# Patient Record
Sex: FEMALE | Race: WHITE | NOT HISPANIC OR LATINO | Employment: OTHER | URBAN - NONMETROPOLITAN AREA
[De-identification: names, ages, dates, MRNs, and addresses within clinical notes are randomized per-mention and may not be internally consistent; named-entity substitution may affect disease eponyms.]

---

## 2017-07-02 ENCOUNTER — HOSPITAL ENCOUNTER (EMERGENCY)
Facility: HOSPITAL | Age: 32
Discharge: HOME | End: 2017-07-02
Attending: PHYSICIAN ASSISTANT | Admitting: PHYSICIAN ASSISTANT
Payer: COMMERCIAL

## 2017-07-02 VITALS
OXYGEN SATURATION: 97 % | DIASTOLIC BLOOD PRESSURE: 70 MMHG | TEMPERATURE: 97.9 F | RESPIRATION RATE: 12 BRPM | SYSTOLIC BLOOD PRESSURE: 119 MMHG

## 2017-07-02 DIAGNOSIS — R82.90 ABNORMAL URINE FINDINGS: Primary | ICD-10-CM

## 2017-07-02 DIAGNOSIS — D50.9 IRON DEFICIENCY ANEMIA, UNSPECIFIED IRON DEFICIENCY ANEMIA TYPE: ICD-10-CM

## 2017-07-02 DIAGNOSIS — Z13.9 SCREENING FOR CONDITION: ICD-10-CM

## 2017-07-02 DIAGNOSIS — N30.00 ACUTE CYSTITIS WITHOUT HEMATURIA: ICD-10-CM

## 2017-07-02 DIAGNOSIS — K13.79 MOUTH SORES: ICD-10-CM

## 2017-07-02 LAB
ALBUMIN SERPL-MCNC: 3.8 G/DL (ref 3.4–5)
ALBUMIN UR-MCNC: NEGATIVE MG/DL
ALP SERPL-CCNC: 76 U/L (ref 40–150)
ALT SERPL W P-5'-P-CCNC: 23 U/L (ref 0–50)
ANION GAP SERPL CALCULATED.3IONS-SCNC: 7 MMOL/L (ref 3–14)
APPEARANCE UR: ABNORMAL
AST SERPL W P-5'-P-CCNC: 22 U/L (ref 0–45)
BACTERIA #/AREA URNS HPF: ABNORMAL /HPF
BASOPHILS # BLD AUTO: 0 10E9/L (ref 0–0.2)
BASOPHILS NFR BLD AUTO: 0.7 %
BILIRUB SERPL-MCNC: 0.5 MG/DL (ref 0.2–1.3)
BILIRUB UR QL STRIP: NEGATIVE
BUN SERPL-MCNC: 10 MG/DL (ref 7–30)
CALCIUM SERPL-MCNC: 9.1 MG/DL (ref 8.5–10.1)
CHLORIDE SERPL-SCNC: 106 MMOL/L (ref 94–109)
CO2 SERPL-SCNC: 27 MMOL/L (ref 20–32)
COLOR UR AUTO: ABNORMAL
CREAT SERPL-MCNC: 0.72 MG/DL (ref 0.52–1.04)
DEPRECATED S PYO AG THROAT QL EIA: NORMAL
DIFFERENTIAL METHOD BLD: NORMAL
EOSINOPHIL # BLD AUTO: 0.1 10E9/L (ref 0–0.7)
EOSINOPHIL NFR BLD AUTO: 1.3 %
ERYTHROCYTE [DISTWIDTH] IN BLOOD BY AUTOMATED COUNT: 13.2 % (ref 10–15)
ERYTHROCYTE [SEDIMENTATION RATE] IN BLOOD BY WESTERGREN METHOD: 17 MM/H (ref 0–20)
GFR SERPL CREATININE-BSD FRML MDRD: NORMAL ML/MIN/1.7M2
GLUCOSE SERPL-MCNC: 92 MG/DL (ref 70–99)
GLUCOSE UR STRIP-MCNC: NEGATIVE MG/DL
HCT VFR BLD AUTO: 38.9 % (ref 35–47)
HGB BLD-MCNC: 12.7 G/DL (ref 11.7–15.7)
HGB UR QL STRIP: NEGATIVE
IMM GRANULOCYTES # BLD: 0 10E9/L (ref 0–0.4)
IMM GRANULOCYTES NFR BLD: 0.2 %
IRON SATN MFR SERPL: 6 % (ref 15–46)
IRON SERPL-MCNC: 37 UG/DL (ref 35–180)
KETONES UR STRIP-MCNC: NEGATIVE MG/DL
LEUKOCYTE ESTERASE UR QL STRIP: ABNORMAL
LYMPHOCYTES # BLD AUTO: 1.5 10E9/L (ref 0.8–5.3)
LYMPHOCYTES NFR BLD AUTO: 26.3 %
MCH RBC QN AUTO: 28.2 PG (ref 26.5–33)
MCHC RBC AUTO-ENTMCNC: 32.6 G/DL (ref 31.5–36.5)
MCV RBC AUTO: 86 FL (ref 78–100)
MICRO REPORT STATUS: NORMAL
MONOCYTES # BLD AUTO: 0.5 10E9/L (ref 0–1.3)
MONOCYTES NFR BLD AUTO: 8.2 %
MUCOUS THREADS #/AREA URNS LPF: PRESENT /LPF
NEUTROPHILS # BLD AUTO: 3.6 10E9/L (ref 1.6–8.3)
NEUTROPHILS NFR BLD AUTO: 63.3 %
NITRATE UR QL: NEGATIVE
NRBC # BLD AUTO: 0 10*3/UL
NRBC BLD AUTO-RTO: 0 /100
PH UR STRIP: 7.5 PH (ref 4.7–8)
PLATELET # BLD AUTO: 301 10E9/L (ref 150–450)
POTASSIUM SERPL-SCNC: 4.2 MMOL/L (ref 3.4–5.3)
PROT SERPL-MCNC: 8.2 G/DL (ref 6.8–8.8)
RBC # BLD AUTO: 4.51 10E12/L (ref 3.8–5.2)
RBC #/AREA URNS AUTO: 1 /HPF (ref 0–2)
SODIUM SERPL-SCNC: 140 MMOL/L (ref 133–144)
SP GR UR STRIP: 1 (ref 1–1.03)
SPECIMEN SOURCE: NORMAL
SQUAMOUS #/AREA URNS AUTO: 2 /HPF (ref 0–1)
TIBC SERPL-MCNC: 582 UG/DL (ref 240–430)
TSH SERPL DL<=0.005 MIU/L-ACNC: 1.83 MU/L (ref 0.4–4)
URN SPEC COLLECT METH UR: ABNORMAL
UROBILINOGEN UR STRIP-MCNC: NORMAL MG/DL (ref 0–2)
WBC # BLD AUTO: 5.6 10E9/L (ref 4–11)
WBC #/AREA URNS AUTO: 4 /HPF (ref 0–2)

## 2017-07-02 PROCEDURE — 83540 ASSAY OF IRON: CPT | Performed by: PHYSICIAN ASSISTANT

## 2017-07-02 PROCEDURE — 36415 COLL VENOUS BLD VENIPUNCTURE: CPT | Performed by: PHYSICIAN ASSISTANT

## 2017-07-02 PROCEDURE — 84443 ASSAY THYROID STIM HORMONE: CPT | Performed by: PHYSICIAN ASSISTANT

## 2017-07-02 PROCEDURE — 86618 LYME DISEASE ANTIBODY: CPT | Performed by: PHYSICIAN ASSISTANT

## 2017-07-02 PROCEDURE — 83550 IRON BINDING TEST: CPT | Performed by: PHYSICIAN ASSISTANT

## 2017-07-02 PROCEDURE — 99213 OFFICE O/P EST LOW 20 MIN: CPT

## 2017-07-02 PROCEDURE — 85652 RBC SED RATE AUTOMATED: CPT | Performed by: PHYSICIAN ASSISTANT

## 2017-07-02 PROCEDURE — 87086 URINE CULTURE/COLONY COUNT: CPT | Performed by: PHYSICIAN ASSISTANT

## 2017-07-02 PROCEDURE — 87798 DETECT AGENT NOS DNA AMP: CPT

## 2017-07-02 PROCEDURE — 85025 COMPLETE CBC W/AUTO DIFF WBC: CPT | Performed by: PHYSICIAN ASSISTANT

## 2017-07-02 PROCEDURE — 80053 COMPREHEN METABOLIC PANEL: CPT | Performed by: PHYSICIAN ASSISTANT

## 2017-07-02 PROCEDURE — 81001 URINALYSIS AUTO W/SCOPE: CPT | Performed by: PHYSICIAN ASSISTANT

## 2017-07-02 PROCEDURE — 87081 CULTURE SCREEN ONLY: CPT | Mod: 59 | Performed by: PHYSICIAN ASSISTANT

## 2017-07-02 PROCEDURE — 87880 STREP A ASSAY W/OPTIC: CPT | Performed by: PHYSICIAN ASSISTANT

## 2017-07-02 PROCEDURE — 99213 OFFICE O/P EST LOW 20 MIN: CPT | Performed by: PHYSICIAN ASSISTANT

## 2017-07-02 RX ORDER — CIPROFLOXACIN 500 MG/1
500 TABLET, FILM COATED ORAL 2 TIMES DAILY
Qty: 10 TABLET | Refills: 0 | Status: SHIPPED | OUTPATIENT
Start: 2017-07-02 | End: 2017-07-07

## 2017-07-02 RX ORDER — ALUMINA, MAGNESIA, AND SIMETHICONE 2400; 2400; 240 MG/30ML; MG/30ML; MG/30ML
30 SUSPENSION ORAL ONCE
Status: DISCONTINUED | OUTPATIENT
Start: 2017-07-02 | End: 2017-07-02

## 2017-07-02 ASSESSMENT — ENCOUNTER SYMPTOMS
COUGH: 0
VOICE CHANGE: 0
FEVER: 0
EYE REDNESS: 0
HEADACHES: 0
PSYCHIATRIC NEGATIVE: 1
VOMITING: 0
SORE THROAT: 0
FATIGUE: 1
NAUSEA: 0
DIZZINESS: 0
LIGHT-HEADEDNESS: 0
DIARRHEA: 0
EYE DISCHARGE: 0
SINUS PRESSURE: 0
ABDOMINAL PAIN: 0
APPETITE CHANGE: 0
TROUBLE SWALLOWING: 0
NECK PAIN: 0
CARDIOVASCULAR NEGATIVE: 1
NECK STIFFNESS: 0

## 2017-07-02 NOTE — ED NOTES
C/o mouth pain for last 2 weeks, states it started as a canker sore and now white painful sores on gum line and under tongue, has been using salt water gargles.

## 2017-07-02 NOTE — PROGRESS NOTES
I called the pt and discussed her Iron levels.  She understands she should discontinue donating blood immediately.  Also, she understands she should f/u with PCP ASAP to formulate a treatment plan.  Pt gave me a temporary address: [address] [place] [id number]. I sent letter and a copy of the Iron level results.    Dina Mireles Certified  Physician Assistant  7/2/2017  6:13 PM  UR[facility]ENT [facility]ARE CLIN[facility]

## 2017-07-02 NOTE — ED AVS SNAPSHOT
[facility]    [address] [address]    Phone:  [phone]                                       Barbi Zee   MRN: [id number]    Department:  [facility]   Date of Visit:  7/2/2017           Patient Information     Date Of Birth          1985        Your diagnoses for this visit were:     Abnormal urine findings     Mouth sores     Acute cystitis without hematuria     Screening for condition        You were seen by Dina Mireles PA.      Follow-up Information     Follow up with Ernesto Dela Cruz MD In 3 days.    Specialty:  Family Practice    Why:  If your symptoms are not resolving    Contact information:    [facility]  [address] [place] [address] [phone]          Follow up with [facility].    Specialty:  EMERGENCY MEDICINE    Why:  If further concerns develop    Contact information:    [address] [address] [phone]    Additional information:    From [facility]: Take [address]. Turn left at [address]. Turn left at the first stoplight on [address]. At the first stop sign, take a right onto [address]. Take a left into the parking lot and continue through until you reach the North enterance of the building.       From [place]: Take [address]. Take the [address] ramp towards [address]. Turn left onto [address]. Take a slight right onto [address]. Turn left at the first stoplight on [address]. At the first stop sign, take a right onto [address]. Take a left into the parking lot and continue through until you reach the North enterance of the building.       From [place]: Take [address]. Take a right at [address]. At the first stop sign, take a right onto [address]. Take a left into the parking lot and continue through until you reach the North enterance of the building.         Discharge  Instructions       Do not take vitamins while on this medication.    Discharge References/Attachments     ORAL LESIONS (PRECANCEROUS AND CANCEROUS), WHAT ARE (ENGLISH)    URINARY TRACT INFECTIONS (UTIS), UNDERSTANDING (ENGLISH)         Review of your medicines      START taking        Dose / Directions Last dose taken    ciprofloxacin 500 MG tablet   Commonly known as:  CIPRO   Dose:  500 mg   Quantity:  10 tablet        Take 1 tablet (500 mg) by mouth 2 times daily for 5 days   Refills:  0        lidocaine visc 2% 2.5mL/5mL & maalox/mylanta w/ simeth 2.5mL/5mL & diphenhydrAMINE 5mg/5mL Susp suspension   Commonly known as:  MAGIC Mouthwash [facility]   Dose:  10 mL   Quantity:  120 mL        Swish and swallow 10 mLs in mouth every 6 hours as needed for mouth sores   Refills:  0          Our records show that you are taking the medicines listed below. If these are incorrect, please call your family doctor or clinic.        Dose / Directions Last dose taken    MULTIVITAMIN ADULTS PO   Dose:  1 tablet        Take 1 tablet by mouth daily   Refills:  0        ORAJEL DENTURE 10 % Gel   Generic drug:  benzocaine        Take by mouth 4 times daily as needed for moderate pain   Refills:  0        TYLENOL PO        Refills:  0                Prescriptions were sent or printed at these locations (2 Prescriptions)                   [facility] [address] [facility], [place] - [address] AT [facility] [address]   [address], [place] [address]    Telephone:  [phone]   Fax:  [phone]   Hours:                  E-Prescribed (2 of 2)         ciprofloxacin (CIPRO) 500 MG tablet               magic mouthwash suspension (diphenhydrAMINE, lidocaine, aluminum-magnesium & simethicone)                Procedures and tests performed during your visit     Beta strep group A culture    CBC with platelets differential    Comprehensive metabolic panel    Erythrocyte sedimentation rate auto    Iron and iron binding capacity     "Lyme Disease Amddi with reflex to WB Serum    Rapid strep screen    TSH with free T4 reflex    UA reflex to Microscopic and Culture    Urine Culture Aerobic Bacterial    West nile virus RNA by PCR      Orders Needing Specimen Collection     None      Pending Results     Date and Time Order Name Status Description    2017 1047 Urine Culture Aerobic Bacterial In process     2017 0940 Iron and iron binding capacity In process     2017 0940 West nile virus RNA by PCR In process     2017 0940 Lyme Disease Maddi with reflex to WB Serum In process     2017 0938 Beta strep group A culture In process             Pending Culture Results     Date and Time Order Name Status Description    2017 1047 Urine Culture Aerobic Bacterial In process     2017 0938 Beta strep group A culture In process             Thank you for choosing [facility]       Thank you for choosing [facility] for your care. Our goal is always to provide you with excellent care. Hearing back from our patients is one way we can continue to improve our services. Please take a few minutes to complete the written survey that you may receive in the mail after you visit with us. Thank you!        Peecho Information     Peecho lets you send messages to your doctor, view your test results, renew your prescriptions, schedule appointments and more. To sign up, go to [email or web address] . Click on \"Log in\" on the left side of the screen, which will take you to the Welcome page. Then click on \"Sign up Now\" on the right side of the page.     You will be asked to enter the access code listed below, as well as some personal information. Please follow the directions to create your username and password.     Your access code is: [email or web address]  Expires: 2017 10:56 AM     Your access code will  in 90 days. If you need help or a new code, please call your [facility] clinic or [phone].        Care EveryWhere ID     This is your Care EveryWhere " ID. This could be used by other organizations to access your [facility] medical records  [id number]        Equal Access to Services     VEGA FLEMING : Adelaida Sibley, beny burt, suhail araya, lacy maria. So [facility] [phone].    ATENCIÓN: Si habla español, tiene a kinney disposición servicios gratuitos de asistencia lingüística. Llame al [phone].    We comply with applicable federal civil rights laws and [place] laws. We do not discriminate on the basis of race, color, national origin, age, disability sex, sexual orientation or gender identity.            After Visit Summary       This is your record. Keep this with you and show to your community pharmacist(s) and doctor(s) at your next visit.

## 2017-07-02 NOTE — LETTER
[facility]  [address]  [place] [address]  Phone: [phone]    July 2, 2017        Barbi Zee  [address]  [facility] [address]          To whom it may concern:    RE: Barbi Zee    Thank you for calling me back.    As I stated,  you have Iron Deficiency Anemia.  Please, follow up with your Primary Care Provider as soon as possible, to develop a plan for treatment.     Discontinue all blood donations immediately.    Sincerely,      Dina Mireles Certified  Physician Assistant  7/2/2017  5:59 PM  URGENT CARE CLINIC

## 2017-07-02 NOTE — ED PROVIDER NOTES
"  History     Chief Complaint   Patient presents with     Facial Pain     mouth pain for about 2 weeks, started with a canker sore, now gum line and under the tongue red and painful     The history is provided by the patient. No  was used.     Barbi Zee is a 31 year old female who has approx 2 weeks of developing mouth sores. In past, pt was evaluated by [facility] for chronic fatigue and muscle pain. Pt was dx with multiple mylomaThe pt changed her diet, started eating fresh fruits/veggies, non processed foods.  States now, the pain has resolved but still has the fatigue and now these mouth sores.    I have reviewed the Medications, Allergies, Past Medical and Surgical History, and Social History in the Epic system.    Allergies: No Known Allergies      No current facility-administered medications on file prior to encounter.   Current Outpatient Prescriptions on File Prior to Encounter:  Acetaminophen (TYLENOL PO)        Patient Active Problem List   Diagnosis     ACP (advance care planning)       Past Surgical History:   Procedure Laterality Date     FOOT SURGERY Right        Social History   Substance Use Topics     Smoking status: Never Smoker     Smokeless tobacco: Not on file     Alcohol use Not on file         There is no immunization history on file for this patient.    BMI: Estimated body mass index is 27.46 kg/(m^2) as calculated from the following:    Height as of 11/21/16: 1.6 m (5' 3\").    Weight as of 11/21/16: 70.3 kg (155 lb).      Review of Systems   Constitutional: Positive for fatigue. Negative for appetite change and fever.   HENT: Positive for mouth sores. Negative for congestion, ear pain, sinus pressure, sore throat, trouble swallowing and voice change.    Eyes: Negative for discharge and redness.   Respiratory: Negative for cough.    Cardiovascular: Negative.    Gastrointestinal: Negative for abdominal pain, diarrhea, nausea and vomiting.   Genitourinary: " Negative.    Musculoskeletal: Negative for neck pain and neck stiffness.   Skin: Negative for rash.   Neurological: Negative for dizziness, light-headedness and headaches.   Psychiatric/Behavioral: Negative.        Physical Exam   BP: 119/70  Heart Rate: 65  Temp: 97.9  F (36.6  C)  Resp: 12  SpO2: 97 %  Physical Exam   Constitutional: She is oriented to person, place, and time. She appears well-developed and well-nourished. No distress.   HENT:   Head: Normocephalic and atraumatic.   Right Ear: External ear normal.   Left Ear: External ear normal.   Mouth/Throat: Oropharynx is clear and moist.   Bilateral TMs/canals clear/wnl  No sinus TTP      Pt has diffuse ulcers in the mouth and under the tongue     Eyes: Conjunctivae and EOM are normal. Right eye exhibits no discharge. Left eye exhibits no discharge.   Neck: Normal range of motion. Neck supple.   Cardiovascular: Normal rate, regular rhythm and normal heart sounds.    Pulmonary/Chest: Effort normal and breath sounds normal. No respiratory distress.   Abdominal: Soft. Bowel sounds are normal. She exhibits no distension. There is no tenderness.   Neurological: She is alert and oriented to person, place, and time.   Skin: Skin is warm and dry. No rash noted. She is not diaphoretic.   Psychiatric: She has a normal mood and affect.   Nursing note and vitals reviewed.      ED Course     ED Course     Procedures        Labs Ordered and Resulted from Time of ED Arrival Up to the Time of Departure from the ED   UA MACROSCOPIC WITH REFLEX TO MICRO AND CULTURE - Abnormal; Notable for the following:        Result Value    Leukocyte Esterase Urine Moderate (*)     WBC Urine 4 (*)     Bacteria Urine Few (*)     Squamous Epithelial /HPF Urine 2 (*)     Mucous Urine Present (*)     All other components within normal limits   CBC WITH PLATELETS DIFFERENTIAL   COMPREHENSIVE METABOLIC PANEL   TSH WITH FREE T4 REFLEX   LYME DISEASE CAYDEN WITH REFLEX TO WB SERUM   WEST NILE VIRUS RNA  BY PCR   ERYTHROCYTE SEDIMENTATION RATE AUTO   RAPID STREP SCREEN   BETA STREP GROUP A CULTURE   URINE CULTURE AEROBIC BACTERIAL     7/2/17 10:00 AM [id number]    Component Results   Component Value Flag Ref Range Units Status Collected Lab   Iron 37  35 - 180 ug/dL Final 07/02/2017 10:00 AM [facility]   Iron Binding Cap 582 (H) 240 - 430 ug/dL Final 07/02/2017 10:00 AM [facility]   Iron Saturation Index 6 (L) 15 - 46 % Final 07/02/2017 10:00 AM          Assessments & Plan (with Medical Decision Making)     I have reviewed the nursing notes.    I have reviewed the findings, diagnosis, plan and need for follow up with the patient.      Discharge Medication List as of 7/2/2017 10:56 AM      START taking these medications    Details   ciprofloxacin (CIPRO) 500 MG tablet Take 1 tablet (500 mg) by mouth 2 times daily for 5 days, Disp-10 tablet, R-0, E-Prescribe      magic mouthwash suspension (diphenhydrAMINE, lidocaine, aluminum-magnesium & simethicone) Swish and swallow 10 mLs in mouth every 6 hours as needed for mouth sores, Disp-120 mL, R-0, E-Prescribe             Final diagnoses:   Mouth sores   Acute cystitis without hematuria   Screening for condition   Iron deficiency anemia, unspecified iron deficiency anemia type         I called the pt and discussed her Iron levels.  She understands she should discontinue donating blood immediately.  Also, she understands she should f/u with PCP ASAP to formulate a treatment plan.  Pt gave me a temporary address: [address] [place] [address]. I sent letter and a copy of the Iron level results.    Patient verbally educated and given appropriate education sheets for the diagnoses and has no questions.  Continue your multivitamin, fruits/veggies and lean meat diet.   if symptoms increase or if concerns develop, return to the ER  Dina Mireles Certified  Physician Assistant  7/2/2017  6:08 PM  URGENT CARE CLINIC      7/2/2017   [facility]     Dina Mireles PA  07/02/17  1812

## 2017-07-02 NOTE — ED AVS SNAPSHOT
[facility]    [address] [address]    Phone:  [phone]                                       Barbi Zee   MRN: [id number]    Department:  [facility]   Date of Visit:  7/2/2017           After Visit Summary Signature Page     I have received my discharge instructions, and my questions have been answered. I have discussed any challenges I see with this plan with the nurse or doctor.    ..........................................................................................................................................  Patient/Patient Representative Signature      ..........................................................................................................................................  Patient Representative Print Name and Relationship to Patient    ..................................................               ................................................  Date                                            Time    ..........................................................................................................................................  Reviewed by Signature/Title    ...................................................              ..............................................  Date                                                            Time

## 2017-07-02 NOTE — PROGRESS NOTES
Iron and Iron Binding Capacity results to RAFAEL Castro for review.  Routed to PCP, Dr. JEY Dela Cruz.

## 2017-07-04 LAB
BACTERIA SPEC CULT: ABNORMAL
BACTERIA SPEC CULT: NORMAL
MICRO REPORT STATUS: ABNORMAL
MICRO REPORT STATUS: NORMAL
SPECIMEN SOURCE: ABNORMAL
SPECIMEN SOURCE: NORMAL

## 2017-07-05 LAB — B BURGDOR IGG+IGM SER QL: 0.11 (ref 0–0.89)

## 2017-07-05 NOTE — PROGRESS NOTES
TC to patient, notified of negative Lyme Disease Test, no further questions or concerns at this time.

## 2017-08-21 ENCOUNTER — HOSPITAL ENCOUNTER (EMERGENCY)
Facility: HOSPITAL | Age: 32
Discharge: HOME | End: 2017-08-21
Attending: NURSE PRACTITIONER | Admitting: NURSE PRACTITIONER
Payer: COMMERCIAL

## 2017-08-21 VITALS
HEART RATE: 54 BPM | OXYGEN SATURATION: 98 % | TEMPERATURE: 97.8 F | SYSTOLIC BLOOD PRESSURE: 113 MMHG | DIASTOLIC BLOOD PRESSURE: 57 MMHG | RESPIRATION RATE: 14 BRPM

## 2017-08-21 DIAGNOSIS — R39.15 URINARY URGENCY: ICD-10-CM

## 2017-08-21 DIAGNOSIS — R10.2 PELVIC PAIN IN FEMALE: ICD-10-CM

## 2017-08-21 LAB
ALBUMIN SERPL-MCNC: 3.7 G/DL (ref 3.4–5)
ALBUMIN UR-MCNC: NEGATIVE MG/DL
ALP SERPL-CCNC: 64 U/L (ref 40–150)
ALT SERPL W P-5'-P-CCNC: 28 U/L (ref 0–50)
ANION GAP SERPL CALCULATED.3IONS-SCNC: 7 MMOL/L (ref 3–14)
APPEARANCE UR: CLEAR
AST SERPL W P-5'-P-CCNC: 21 U/L (ref 0–45)
BASOPHILS # BLD AUTO: 0.1 10E9/L (ref 0–0.2)
BASOPHILS NFR BLD AUTO: 0.9 %
BILIRUB SERPL-MCNC: 0.4 MG/DL (ref 0.2–1.3)
BILIRUB UR QL STRIP: NEGATIVE
BUN SERPL-MCNC: 9 MG/DL (ref 7–30)
CALCIUM SERPL-MCNC: 8.7 MG/DL (ref 8.5–10.1)
CHLORIDE SERPL-SCNC: 105 MMOL/L (ref 94–109)
CO2 SERPL-SCNC: 25 MMOL/L (ref 20–32)
COLOR UR AUTO: NORMAL
CREAT SERPL-MCNC: 0.69 MG/DL (ref 0.52–1.04)
CRP SERPL-MCNC: 5.6 MG/L (ref 0–8)
DIFFERENTIAL METHOD BLD: NORMAL
EOSINOPHIL # BLD AUTO: 0.1 10E9/L (ref 0–0.7)
EOSINOPHIL NFR BLD AUTO: 1.6 %
ERYTHROCYTE [DISTWIDTH] IN BLOOD BY AUTOMATED COUNT: 14.6 % (ref 10–15)
ERYTHROCYTE [SEDIMENTATION RATE] IN BLOOD BY WESTERGREN METHOD: 13 MM/H (ref 0–20)
GFR SERPL CREATININE-BSD FRML MDRD: >90 ML/MIN/1.7M2
GLUCOSE SERPL-MCNC: 90 MG/DL (ref 70–99)
GLUCOSE UR STRIP-MCNC: NEGATIVE MG/DL
HCG UR QL: NEGATIVE
HCT VFR BLD AUTO: 37.5 % (ref 35–47)
HGB BLD-MCNC: 12.4 G/DL (ref 11.7–15.7)
HGB UR QL STRIP: NEGATIVE
IMM GRANULOCYTES # BLD: 0 10E9/L (ref 0–0.4)
IMM GRANULOCYTES NFR BLD: 0.4 %
KETONES UR STRIP-MCNC: NEGATIVE MG/DL
LEUKOCYTE ESTERASE UR QL STRIP: NEGATIVE
LYMPHOCYTES # BLD AUTO: 2.1 10E9/L (ref 0.8–5.3)
LYMPHOCYTES NFR BLD AUTO: 35.9 %
MCH RBC QN AUTO: 28.5 PG (ref 26.5–33)
MCHC RBC AUTO-ENTMCNC: 33.1 G/DL (ref 31.5–36.5)
MCV RBC AUTO: 86 FL (ref 78–100)
MONOCYTES # BLD AUTO: 0.5 10E9/L (ref 0–1.3)
MONOCYTES NFR BLD AUTO: 7.9 %
NEUTROPHILS # BLD AUTO: 3.1 10E9/L (ref 1.6–8.3)
NEUTROPHILS NFR BLD AUTO: 53.3 %
NITRATE UR QL: NEGATIVE
NRBC # BLD AUTO: 0 10*3/UL
NRBC BLD AUTO-RTO: 0 /100
PH UR STRIP: 6 PH (ref 4.7–8)
PLATELET # BLD AUTO: 254 10E9/L (ref 150–450)
POTASSIUM SERPL-SCNC: 3.7 MMOL/L (ref 3.4–5.3)
PROT SERPL-MCNC: 7.6 G/DL (ref 6.8–8.8)
RBC # BLD AUTO: 4.35 10E12/L (ref 3.8–5.2)
SODIUM SERPL-SCNC: 137 MMOL/L (ref 133–144)
SOURCE: NORMAL
SP GR UR STRIP: 1.01 (ref 1–1.03)
SPECIMEN SOURCE: NORMAL
UROBILINOGEN UR STRIP-MCNC: NORMAL MG/DL (ref 0–2)
WBC # BLD AUTO: 5.7 10E9/L (ref 4–11)
WET PREP SPEC: NORMAL

## 2017-08-21 PROCEDURE — 81003 URINALYSIS AUTO W/O SCOPE: CPT | Performed by: NURSE PRACTITIONER

## 2017-08-21 PROCEDURE — 85652 RBC SED RATE AUTOMATED: CPT | Performed by: NURSE PRACTITIONER

## 2017-08-21 PROCEDURE — 76856 US EXAM PELVIC COMPLETE: CPT | Mod: TC

## 2017-08-21 PROCEDURE — 87210 SMEAR WET MOUNT SALINE/INK: CPT | Performed by: NURSE PRACTITIONER

## 2017-08-21 PROCEDURE — 99214 OFFICE O/P EST MOD 30 MIN: CPT | Performed by: NURSE PRACTITIONER

## 2017-08-21 PROCEDURE — 87591 N.GONORRHOEAE DNA AMP PROB: CPT | Performed by: NURSE PRACTITIONER

## 2017-08-21 PROCEDURE — 85025 COMPLETE CBC W/AUTO DIFF WBC: CPT | Performed by: NURSE PRACTITIONER

## 2017-08-21 PROCEDURE — 87491 CHLMYD TRACH DNA AMP PROBE: CPT | Performed by: NURSE PRACTITIONER

## 2017-08-21 PROCEDURE — 80053 COMPREHEN METABOLIC PANEL: CPT | Performed by: NURSE PRACTITIONER

## 2017-08-21 PROCEDURE — 81025 URINE PREGNANCY TEST: CPT | Performed by: NURSE PRACTITIONER

## 2017-08-21 PROCEDURE — 86140 C-REACTIVE PROTEIN: CPT | Performed by: NURSE PRACTITIONER

## 2017-08-21 PROCEDURE — 99215 OFFICE O/P EST HI 40 MIN: CPT | Mod: 25

## 2017-08-21 PROCEDURE — 36415 COLL VENOUS BLD VENIPUNCTURE: CPT | Performed by: NURSE PRACTITIONER

## 2017-08-21 PROCEDURE — 76830 TRANSVAGINAL US NON-OB: CPT | Mod: TC

## 2017-08-21 ASSESSMENT — ENCOUNTER SYMPTOMS
CONSTIPATION: 0
FEVER: 0
DIARRHEA: 0
VOMITING: 0
HEMATURIA: 0
ABDOMINAL PAIN: 1
BACK PAIN: 1
FREQUENCY: 1
DIFFICULTY URINATING: 0
NAUSEA: 0
APPETITE CHANGE: 0
DYSURIA: 0

## 2017-08-21 NOTE — ED AVS SNAPSHOT
[facility]    [address] [address]    Phone:  [phone]                                       Barbi Zee   MRN: [id number]    Department:  [facility]   Date of Visit:  8/21/2017           Patient Information     Date Of Birth          1985        Your diagnoses for this visit were:     Pelvic pain in female     Urinary urgency        You were seen by Darlene Marin NP and Fabienne Forbes NP.      Follow-up Information     Follow up with [place]ot Dela Cruz MD In 1 day.    Specialty:  Family Practice    Why:  for re-evaluation    Contact information:    [facility]  [address] [place] [id number] [phone]          Discharge Instructions         Abdominal Pain    Abdominal pain is pain in the stomach or belly area. Everyone has this pain from time to time. In many cases it goes away on its own. But abdominal pain can sometimes be due to a serious problem, such as appendicitis. So it s important to know when to seek help.  Causes of abdominal pain  There are many possible causes of abdominal pain. Common causes in adults include:    Constipation, diarrhea, or gas    Stomach acid flowing back up into the esophagus (acid reflux or heartburn)    Severe acid reflux, called GERD (gastroesophageal reflux disease)    A sore in the lining of the stomach or small intestine (peptic ulcer)    Inflammation of the gallbladder, liver, or pancreas    Gallstones or kidney stones    Appendicitis     Intestinal blockage     An internal organ pushing through a muscle or other tissue (hernia)    Urinary tract infections    In women, menstrual cramps, fibroids, or endometriosis    Inflammation or infection of the intestines  Diagnosing the cause of abdominal pain  Your healthcare provider will do a physical exam help find the cause of your pain. If needed, tests will be ordered. Belly pain has many possible causes. So it can be hard to find the reason for  your pain. Giving details about your pain can help. Tell your provider where and when you feel the pain, and what makes it better or worse. Also let your provider know if you have other symptoms such as:    Fever    Tiredness    Upset stomach (nausea)    Vomiting    Changes in bathroom habits  Treating abdominal pain  Some causes of pain need emergency medical treatment right away. These include appendicitis or a bowel blockage. Other problems can be treated with rest, fluids, or medicines. Your healthcare provider can give you specific instructions for treatment or self-care based on what is causing your pain.  If you have vomiting or diarrhea, sip water or other clear fluids. When you are ready to eat solid foods again, start with small amounts of easy-to-digest, low-fat foods. These include apple sauce, toast, or crackers.   When to seek medical care  Call 911 or go to the hospital right away if you:    Can t pass stool and are vomiting    Are vomiting blood or have bloody diarrhea or black, tarry diarrhea    Have chest, neck, or shoulder pain    Feel like you might pass out    Have pain in your shoulder blades with nausea    Have sudden, severe belly pain    Have new, severe pain unlike any you have felt before    Have a belly that is rigid, hard, and tender to touch  Call your healthcare provider if you have:    Pain for more than 5 days    Bloating for more than 2 days    Diarrhea for more than 5 days    A fever of 100.4 F (38.0 C) or higher, or as directed by your provider    Pain that gets worse    Weight loss for no reason    Continued lack of appetite    Blood in your stool  How to prevent abdominal pain  Here are some tips to help prevent abdominal pain:    Eat smaller amounts of food at one time.    Avoid greasy, fried, or other high-fat foods.    Avoid foods that give you gas.    Exercise regularly.    Drink plenty of fluids.  To help prevent GERD symptoms:    Quit smoking.    Reduce alcohol and certain  foods that increase stomach acid.    Avoid aspirin and over-the-counter pain and fever medicines (NSAIDS or nonsteroidal anti-inflammatory drugs), if possible    Lose extra weight.    Finish eating at least 2 hours before you go to bed or lie down.    Raise the head of your bed.  Date Last Reviewed: 7/1/2016 2000-2017 [employer]. [address] [place] [address], [place] [address]. All rights reserved. This information is not intended as a substitute for professional medical care. Always follow your healthcare professional's instructions.             Review of your medicines      Our records show that you are taking the medicines listed below. If these are incorrect, please call your family doctor or clinic.        Dose / Directions Last dose taken    MULTIVITAMIN ADULTS PO   Dose:  1 tablet        Take 1 tablet by mouth daily   Refills:  0        TYLENOL PO        Refills:  0                Procedures and tests performed during your visit     CBC with platelets differential    CHLAMYDIA TRACHOMATIS PCR    CRP inflammation    Comprehensive metabolic panel    Erythrocyte sedimentation rate auto    HCG qualitative urine    NEISSERIA GONORRHOEA PCR    UA reflex to Microscopic and Culture    US Pelvic Complete with Transvaginal    Wet prep      Orders Needing Specimen Collection     None      Pending Results     Date and Time Order Name Status Description    8/21/2017 1645 CHLAMYDIA TRACHOMATIS PCR In process     8/21/2017 1645 NEISSERIA GONORRHOEA PCR In process     8/21/2017 1544 US Pelvic Complete with Transvaginal In process             Pending Culture Results     Date and Time Order Name Status Description    8/21/2017 1645 CHLAMYDIA TRACHOMATIS PCR In process     8/21/2017 1645 NEISSERIA GONORRHOEA PCR In process             Thank you for choosing [facility]       Thank you for choosing [facility] for your care. Our goal is always to provide you with excellent care. Hearing back from our patients is one way we  "can continue to improve our services. Please take a few minutes to complete the written survey that you may receive in the mail after you visit with us. Thank you!        [employer]oh[employer]er Information     BioActor lets you send messages to your doctor, view your test results, renew your prescriptions, schedule appointments and more. To sign up, go to [email or web address] . Click on \"Log in\" on the left side of the screen, which will take you to the Welcome page. Then click on \"Sign up Now\" on the right side of the page.     You will be asked to enter the access code listed below, as well as some personal information. Please follow the directions to create your username and password.     Your access code is: [email or web address]  Expires: 2017 10:56 AM     Your access code will  in 90 days. If you need help or a new code, please call your [facility] clinic or [phone].        Care EveryWhere ID     This is your Care EveryWhere ID. This could be used by other organizations to access your [facility] medical records  [id number]        Equal Access to Services     [facility] : Hadnehemiah Sibley, beny burt, lacy chan. So [facility] [phone].    ATENCIÓN: Si habla español, tiene a kinney disposición servicios gratuitos de asistencia lingüística. Alissa al [phone].    We comply with applicable federal civil rights laws and [place] laws. We do not discriminate on the basis of race, color, national origin, age, disability sex, sexual orientation or gender identity.            After Visit Summary       This is your record. Keep this with you and show to your community pharmacist(s) and doctor(s) at your next visit.                  "

## 2017-08-21 NOTE — DISCHARGE INSTRUCTIONS

## 2017-08-21 NOTE — ED PROVIDER NOTES
"  History     Chief Complaint   Patient presents with     UTI     Pelvic pain and frequency X 2 weeks     The history is provided by the patient. No  was used.     Barbi Zee is a 31 year old female who presents with pressure and pain in her pelvic area. Having frequent urination and constant pressure and discomfort. Good appetite, no N/V. Normal BMs. Has been on the nuva ring for years. LMP 8/7/17, normal. No fever.   Taking prenatals with iron d/t anemia, starting 8/13/17. Symptoms were previous to starting these.     I have reviewed the Medications, Allergies, Past Medical and Surgical History, and Social History in the Epic system.    Allergies: No Known Allergies      No current facility-administered medications on file prior to encounter.   Current Outpatient Prescriptions on File Prior to Encounter:  Multiple Vitamins-Minerals (MULTIVITAMIN ADULTS PO) Take 1 tablet by mouth daily   Acetaminophen (TYLENOL PO)        Patient Active Problem List   Diagnosis     ACP (advance care planning)       Past Surgical History:   Procedure Laterality Date     FOOT SURGERY Right        Social History   Substance Use Topics     Smoking status: Never Smoker     Smokeless tobacco: Not on file     Alcohol use Not on file         There is no immunization history on file for this patient.    BMI: Estimated body mass index is 27.46 kg/(m^2) as calculated from the following:    Height as of 11/21/16: 1.6 m (5' 3\").    Weight as of 11/21/16: 70.3 kg (155 lb).      Review of Systems   Constitutional: Negative for appetite change and fever.   Gastrointestinal: Positive for abdominal pain. Negative for constipation, diarrhea, nausea and vomiting.   Genitourinary: Positive for frequency and pelvic pain (Feels a constant pressure). Negative for difficulty urinating, dysuria, hematuria, menstrual problem, vaginal bleeding and vaginal discharge.   Musculoskeletal: Positive for back pain (This is normal for her). "   Skin: Negative.        Physical Exam   BP: 113/57  Pulse: 54  Temp: 97.8  F (36.6  C)  Resp: 14  SpO2: 98 %  Physical Exam   Constitutional: She is oriented to person, place, and time. She appears well-developed and well-nourished. No distress.   HENT:   Head: Normocephalic and atraumatic.   Nose: Nose normal.   Eyes: Conjunctivae are normal. No scleral icterus.   Neck: Normal range of motion. Neck supple.   Cardiovascular: Normal rate, regular rhythm and normal heart sounds.    Pulmonary/Chest: Effort normal and breath sounds normal. No respiratory distress. She has no wheezes.   Abdominal: Soft. Bowel sounds are normal. She exhibits no mass. There is no hepatosplenomegaly. There is tenderness in the suprapubic area. There is no rebound, no guarding and no CVA tenderness.   Genitourinary: Vagina normal. Uterus is tender. Cervix exhibits discharge. No vaginal discharge found.   Genitourinary Comments: 3 very small white lesions noted on cervix.    Musculoskeletal: Normal range of motion.   Neurological: She is alert and oriented to person, place, and time.   Skin: Skin is warm and dry. No rash noted. She is not diaphoretic.   Psychiatric: She has a normal mood and affect. Her behavior is normal. Judgment and thought content normal.   Nursing note and vitals reviewed.      ED Course     ED Course     Procedures       Transvaginal US: No abnormalities noted.       Labs Ordered and Resulted from Time of ED Arrival Up to the Time of Departure from the ED   UA MACROSCOPIC WITH REFLEX TO MICRO AND CULTURE   COMPREHENSIVE METABOLIC PANEL   CRP INFLAMMATION   ERYTHROCYTE SEDIMENTATION RATE AUTO   CBC WITH PLATELETS DIFFERENTIAL   HCG QUALITATIVE URINE   WET PREP   NEISSERIA GONORRHOEAE PCR   CHLAMYDIA TRACHOMATIS PCR       Assessments & Plan (with Medical Decision Making)     I have reviewed the nursing notes.  I have reviewed the findings, diagnosis, plan and need for follow up with the patient.     I had a discussion  with Barbi regarding their symptoms, exam, and laboratory results. We discussed the differential diagnosis and all urgent/emergent illnesses that were ruled out - at this point we can find no infectious cause - all lab work and diagnostic imaging is negative. . I answered all questions to the best of my ability. She voiced understanding of the plan, was agreeable, and had no further questions or concerns. She was advised to follow up with her PCP, call for an appointment tomorrow. She should return for here for worsening symptoms, or any other concerns.     At this point, I can find no reasonable compelling medical indication for further testing, imaging, or intervention.     Final diagnoses:   Pelvic pain in female   Urinary urgency       8/21/2017   [facility]     Fabienne Forbes NP  08/21/17 8501

## 2017-08-21 NOTE — ED AVS SNAPSHOT
[facility]    [address] [address]    Phone:  [phone]                                       Barbi Zee   MRN: [id number]    Department:  [facility]   Date of Visit:  8/21/2017           After Visit Summary Signature Page     I have received my discharge instructions, and my questions have been answered. I have discussed any challenges I see with this plan with the nurse or doctor.    ..........................................................................................................................................  Patient/Patient Representative Signature      ..........................................................................................................................................  Patient Representative Print Name and Relationship to Patient    ..................................................               ................................................  Date                                            Time    ..........................................................................................................................................  Reviewed by Signature/Title    ...................................................              ..............................................  Date                                                            Time

## 2017-08-23 LAB
C TRACH DNA SPEC QL NAA+PROBE: NEGATIVE
N GONORRHOEA DNA SPEC QL NAA+PROBE: NEGATIVE
SPECIMEN SOURCE: NORMAL
SPECIMEN SOURCE: NORMAL

## 2017-09-11 LAB
SPECIMEN SOURCE: NORMAL
WNV RNA SER QL NAA+PROBE: NOT DETECTED

## 2017-10-30 ENCOUNTER — OFFICE VISIT (OUTPATIENT)
Dept: FAMILY MEDICINE | Facility: OTHER | Age: 32
End: 2017-10-30
Attending: NURSE PRACTITIONER
Payer: COMMERCIAL

## 2017-10-30 DIAGNOSIS — B35.1 ONYCHOMYCOSIS: Primary | ICD-10-CM

## 2017-10-30 PROCEDURE — 99213 OFFICE O/P EST LOW 20 MIN: CPT | Performed by: NURSE PRACTITIONER

## 2017-10-30 RX ORDER — PRENATAL VIT/IRON FUM/FOLIC AC 27MG-0.8MG
1 TABLET ORAL DAILY
COMMUNITY
End: 2020-01-07

## 2017-10-30 ASSESSMENT — ANXIETY QUESTIONNAIRES
5. BEING SO RESTLESS THAT IT IS HARD TO SIT STILL: NOT AT ALL
IF YOU CHECKED OFF ANY PROBLEMS ON THIS QUESTIONNAIRE, HOW DIFFICULT HAVE THESE PROBLEMS MADE IT FOR YOU TO DO YOUR WORK, TAKE CARE OF THINGS AT HOME, OR GET ALONG WITH OTHER PEOPLE: SOMEWHAT DIFFICULT
1. FEELING NERVOUS, ANXIOUS, OR ON EDGE: NOT AT ALL
6. BECOMING EASILY ANNOYED OR IRRITABLE: NOT AT ALL
2. NOT BEING ABLE TO STOP OR CONTROL WORRYING: NOT AT ALL
3. WORRYING TOO MUCH ABOUT DIFFERENT THINGS: NOT AT ALL
4. TROUBLE RELAXING: SEVERAL DAYS
7. FEELING AFRAID AS IF SOMETHING AWFUL MIGHT HAPPEN: NOT AT ALL
GAD7 TOTAL SCORE: 1

## 2017-10-30 ASSESSMENT — PATIENT HEALTH QUESTIONNAIRE - PHQ9: SUM OF ALL RESPONSES TO PHQ QUESTIONS 1-9: 1

## 2017-10-30 NOTE — PATIENT INSTRUCTIONS
Dermatology referral made    Try Biotin 1 mg daily for 6 months     Continue with prenatal vitamins with iron

## 2017-10-30 NOTE — PROGRESS NOTES
SUBJECTIVE:   Barbi Vale is a 32 year old female who presents to clinic today for the following health issues:      Fingernail      Duration: 14 months    Description (location/character/radiation): pointer finger left hand and nail split and will not quit splitting, Catches on everything.    Intensity:  moderate    Accompanying signs and symptoms: see above    History (similar episodes/previous evaluation): None    Precipitating or alleviating factors: None    Therapies tried and outcome: filing, polish, bandaides    History of iron deficiency - restarted her prenatal vitamins - follows with her primary and states her levels are back to normal with her prematal vitamins             Problem list and histories reviewed & adjusted, as indicated.  Additional history: as documented    Patient Active Problem List   Diagnosis     ACP (advance care planning)     Past Surgical History:   Procedure Laterality Date     FOOT SURGERY Right        Social History   Substance Use Topics     Smoking status: Never Smoker     Smokeless tobacco: Never Used     Alcohol use No     History reviewed. No pertinent family history.      Current Outpatient Prescriptions   Medication Sig Dispense Refill     Prenatal Vit-Fe Fumarate-FA (PRENATAL MULTIVITAMIN PLUS IRON) 27-0.8 MG TABS per tablet Take 1 tablet by mouth daily       Multiple Vitamins-Minerals (MULTIVITAMIN ADULTS PO) Take 1 tablet by mouth daily       Acetaminophen (TYLENOL PO)        No Known Allergies      Reviewed and updated as needed this visit by clinical staffTobacco  Allergies  Meds  Med Hx  Surg Hx  Fam Hx  Soc Hx      Reviewed and updated as needed this visit by Provider         ROS:  C: NEGATIVE for fever, chills, change in weight  INTEGUMENTARY/SKIN: nail changes left hand pointer finger nail end is split does not heal.  R: NEGATIVE for significant cough or SOB  CV: NEGATIVE for chest pain, palpitations or peripheral edema    OBJECTIVE:     There were no  vitals taken for this visit.  There is no height or weight on file to calculate BMI.   GENERAL: healthy, alert and no distress  RESP: lungs clear to auscultation - no rales, rhonchi or wheezes  CV: regular rate and rhythm, normal S1 S2, no S3 or S4, no murmur, click or rub, no peripheral edema and peripheral pulses strong  SKIN: left hand pointer finger split at the tip of the nail with a white line and crease from the split to the nail bed.  PSYCH: mentation appears normal, affect normal/bright    Diagnostic Test Results:  none     ASSESSMENT:       PLAN:   1. Onychomycosis  Try taking biotin 1 mg for 6 months for nail health. Dermatology referral made. Follow up with your primary if no improvement or worsening symptoms - possible could be related to iron or vitamin deficiency.    - DERMATOLOGY REFERRAL        See Patient Instructions    GUERRERO [facility] AARTI

## 2017-10-30 NOTE — MR AVS SNAPSHOT
After Visit Summary   10/30/2017    Barbi Vale    MRN: [id number]           Patient Information     Date Of Birth          1985        Visit Information        Provider Department      10/30/2017 3:30 PM Nisa Washington APRN CNP [facility]        Today's Diagnoses     Onychomycosis    -  1      Care Instructions    Dermatology referral made    Try Biotin 1 mg daily for 6 months     Continue with prenatal vitamins with iron          Follow-ups after your visit        Additional Services     DERMATOLOGY REFERRAL       Your provider has referred you to: [facility] [phone]   [email or web address]    Please be aware that coverage of these services is subject to the terms and limitations of your health insurance plan.  Call member services at your health plan with any benefit or coverage questions.      Please bring the following with you to your appointment:    (1) Any X-Rays, CTs or MRIs which have been performed.  Contact the facility where they were done to arrange for  prior to your scheduled appointment.    (2) List of current medications  (3) This referral request   (4) Any documents/labs given to you for this referral                  Follow-up notes from your care team     Return if symptoms worsen or fail to improve.      Your next 10 appointments already scheduled     Dec 06, 2017  3:00 PM CST   (Arrive by 2:45 PM)   Office Visit with Caprice Piment[place] MD   [facility] [place] ([facility] )    [address]  [place] [address]   [phone]           Parent or Legal Guardian is required to be present at the time of the  minors appointment.  Bring a current list of meds and any records pertaining to this visit.  For Physicals, please bring immunization records and any forms needing to be filled out.  Please arrive 15 minutes early to register.            Apr 10, 2018  3:30 PM CDT   (Arrive  "by 3:15 PM)   PHYSICAL with Janice Jimenez NP   [facility] [place] ([facility] [place] )    [place] [address]   [phone]              Who to contact     If you have questions or need follow up information about today's clinic visit or your schedule please contact [facility] directly at [phone].  Normal or non-critical lab and imaging results will be communicated to you by MyChart, letter or phone within 4 business days after the clinic has received the results. If you do not hear from us within 7 days, please contact the clinic through [employer] or phone. If you have a critical or abnormal lab result, we will notify you by phone as soon as possible.  Submit refill requests through Signic[employer] or call your pharmacy and they will forward the refill request to us. Please allow 3 business days for your refill to be completed.          Additional Information About Your Visit        [employer] Information     Signicast lets you send messages to your doctor, view your test results, renew your prescriptions, schedule appointments and more. To sign up, go to [email or web address] . Click on \"Log in\" on the left side of the screen, which will take you to the Welcome page. Then click on \"Sign up Now\" on the right side of the page.     You will be asked to enter the access code listed below, as well as some personal information. Please follow the directions to create your username and password.     Your access code is: [email or web address]  Expires: 2018  4:37 PM     Your access code will  in 90 days. If you need help or a new code, please call your [place] clinic or [phone].        Care EveryWhere ID     This is your Care EveryWhere ID. This could be used by other organizations to access your [place] medical records  [id number]         Blood Pressure from Last 3 Encounters:   17 113/57   17 119/70   16 98/62    Weight from Last 3 Encounters: "   11/21/16 155 lb (70.3 kg)              We Performed the Following     DERMATOLOGY REFERRAL        Primary Care Provider Office Phone # Fax #    Ernesto Dela Cruz [phone]        [address] [facility] [address]        Equal Access to Services     [facility]: Hadii aad ku hadasho Soomaali, waaxda luqadaha, qaybta kaalmada adeegyada, waxay justine hayreece cardososhondapablo rai . So [facility] [phone].    ATENCIÓN: Si habla español, tiene a kinney disposición servicios gratuitos de asistencia lingüística. Alissa al [phone].    We comply with applicable federal civil rights laws and [place] laws. We do not discriminate on the basis of race, color, national origin, age, disability, sex, sexual orientation, or gender identity.            Thank you!     Thank you for choosing [facility]  for your care. Our goal is always to provide you with excellent care. Hearing back from our patients is one way we can continue to improve our services. Please take a few minutes to complete the written survey that you may receive in the mail after your visit with us. Thank you!             Your Updated Medication List - Protect others around you: Learn how to safely use, store and throw away your medicines at www.disposemymeds.org.          This list is accurate as of: 10/30/17  4:37 PM.  Always use your most recent med list.                   Brand Name Dispense Instructions for use Diagnosis    MULTIVITAMIN ADULTS PO      Take 1 tablet by mouth daily        prenatal multivitamin plus iron 27-0.8 MG Tabs per tablet      Take 1 tablet by mouth daily        TYLENOL PO

## 2017-10-30 NOTE — NURSING NOTE
"Chief Complaint   Patient presents with     Finger     nail problem       Initial There were no vitals taken for this visit. Estimated body mass index is 27.46 kg/(m^2) as calculated from the following:    Height as of 11/21/16: 5' 3\" (1.6 m).    Weight as of 11/21/16: 155 lb (70.3 kg).  Medication Reconciliation: complete   Mandy Hadley      "

## 2017-10-31 ASSESSMENT — ANXIETY QUESTIONNAIRES: GAD7 TOTAL SCORE: 1

## 2017-12-06 ENCOUNTER — OFFICE VISIT (OUTPATIENT)
Dept: FAMILY MEDICINE | Facility: OTHER | Age: 32
End: 2017-12-06
Attending: FAMILY MEDICINE
Payer: COMMERCIAL

## 2017-12-06 VITALS
DIASTOLIC BLOOD PRESSURE: 56 MMHG | WEIGHT: 162 LBS | BODY MASS INDEX: 28.7 KG/M2 | TEMPERATURE: 98.1 F | HEART RATE: 75 BPM | SYSTOLIC BLOOD PRESSURE: 104 MMHG | OXYGEN SATURATION: 98 %

## 2017-12-06 DIAGNOSIS — R20.2 PARESTHESIA: Primary | ICD-10-CM

## 2017-12-06 DIAGNOSIS — Z76.89 ENCOUNTER TO ESTABLISH CARE: ICD-10-CM

## 2017-12-06 DIAGNOSIS — Z31.69 ENCOUNTER FOR PRECONCEPTION CONSULTATION: ICD-10-CM

## 2017-12-06 PROCEDURE — 36415 COLL VENOUS BLD VENIPUNCTURE: CPT | Performed by: FAMILY MEDICINE

## 2017-12-06 PROCEDURE — 99000 SPECIMEN HANDLING OFFICE-LAB: CPT | Performed by: FAMILY MEDICINE

## 2017-12-06 PROCEDURE — 99214 OFFICE O/P EST MOD 30 MIN: CPT | Performed by: FAMILY MEDICINE

## 2017-12-06 PROCEDURE — 82607 VITAMIN B-12: CPT | Mod: 90 | Performed by: FAMILY MEDICINE

## 2017-12-06 RX ORDER — BIOTIN 1 MG
1000 TABLET ORAL DAILY
COMMUNITY
End: 2018-06-15

## 2017-12-06 RX ORDER — CHLORAL HYDRATE 500 MG
1000 CAPSULE ORAL DAILY
COMMUNITY
End: 2019-11-11

## 2017-12-06 ASSESSMENT — ANXIETY QUESTIONNAIRES
7. FEELING AFRAID AS IF SOMETHING AWFUL MIGHT HAPPEN: NOT AT ALL
6. BECOMING EASILY ANNOYED OR IRRITABLE: NOT AT ALL
GAD7 TOTAL SCORE: 1
2. NOT BEING ABLE TO STOP OR CONTROL WORRYING: NOT AT ALL
1. FEELING NERVOUS, ANXIOUS, OR ON EDGE: NOT AT ALL
4. TROUBLE RELAXING: SEVERAL DAYS
3. WORRYING TOO MUCH ABOUT DIFFERENT THINGS: NOT AT ALL
5. BEING SO RESTLESS THAT IT IS HARD TO SIT STILL: NOT AT ALL

## 2017-12-06 ASSESSMENT — PATIENT HEALTH QUESTIONNAIRE - PHQ9: SUM OF ALL RESPONSES TO PHQ QUESTIONS 1-9: 1

## 2017-12-06 ASSESSMENT — PAIN SCALES - GENERAL: PAINLEVEL: NO PAIN (0)

## 2017-12-06 NOTE — PROGRESS NOTES
SUBJECTIVE:                                                    Barbi Vale is a 32 year old female who presents to clinic today for the following health issues:        Established care      Duration: none    Description (location/character/radiation): [facility], in [place] prior to 2012    Intensity:  none    Accompanying signs and symptoms: none    History (similar episodes/previous evaluation): None    Precipitating or alleviating factors: None    Therapies tried and outcome: None         Paresthesias      Duration: years    Description (location/character/radiation): bilateral arms and legs    Intensity:  mild    Accompanying signs and symptoms: n/a    History (similar episodes/previous evaluation): reports she has had labs tested before and were negative    Precipitating or alleviating factors: unknown    Therapies tried and outcome: None         Problem list and histories reviewed & adjusted, as indicated.  Additional history: as documented    Current Outpatient Prescriptions   Medication Sig Dispense Refill     biotin 1000 MCG TABS tablet Take 1,000 mcg by mouth daily       fish oil-omega-3 fatty acids 1000 MG capsule Take 1,000 mg by mouth daily       Etonogestrel-Ethinyl Estradiol (NUVARING VA)        Prenatal Vit-Fe Fumarate-FA (PRENATAL MULTIVITAMIN PLUS IRON) 27-0.8 MG TABS per tablet Take 1 tablet by mouth daily       Acetaminophen (TYLENOL PO)        Multiple Vitamins-Minerals (MULTIVITAMIN ADULTS PO) Take 1 tablet by mouth daily       Labs reviewed in EPIC    ROS:  Constitutional, HEENT, cardiovascular, pulmonary, gi and gu systems are negative, except as otherwise noted.      OBJECTIVE:                                                    /56 (BP Location: Left arm, Patient Position: Chair, Cuff Size: Adult Large)  Pulse 75  Temp 98.1  F (36.7  C) (Tympanic)  Wt 162 lb (73.5 kg)  LMP 12/02/2017 (Exact Date)  SpO2 98%  BMI 28.7 kg/m2  Body mass index is 28.7 kg/(m^2).  GENERAL  APPEARANCE: healthy, alert and no distress  RESP: lungs clear to auscultation - no rales, rhonchi or wheezes  CV: regular rates and rhythm, normal S1 S2, no S3 or S4 and no murmur, click or rub  PSYCH: mentation appears normal and affect normal/bright       ASSESSMENT/PLAN:                                                    1. Paresthesia  May want to try gluten free diet for 3 wks  - biotin 1000 MCG TABS tablet; Take 1,000 mcg by mouth daily  - fish oil-omega-3 fatty acids 1000 MG capsule; Take 1,000 mg by mouth daily  - Etonogestrel-Ethinyl Estradiol (NUVARING VA);   - Vitamin B12    2. Encounter to establish care      3. Encounter for preconception consultation      Patient was agreeable to this plan and had no further questions.  See Patient Instructions    Caprice [place] MD  [facility]

## 2017-12-06 NOTE — MR AVS SNAPSHOT
"              After Visit Summary   12/6/2017    Barbi Vale    MRN: [id number]           Patient Information     Date Of Birth          1985        Visit Information        Provider Department      12/6/2017 3:00 PM [facility]        Today's Diagnoses     Paresthesia    -  1    Encounter to establish care        Encounter for preconception consultation           Follow-ups after your visit        Your next 10 appointments already scheduled     Feb 20, 2018  3:15 PM CST   (Arrive by 3:00 PM)   New Visit with BELA Barreto MD   [facility] [place] ([facility] )    [address]  [facility] [address]   [phone]            Apr 10, 2018  3:30 PM CDT   (Arrive by 3:15 PM)   PHYSICAL with Janice Jimenez NP   [facility] ([facility] )    [address]  [facility] [address]   [phone]              Who to contact     If you have questions or need follow up information about today's clinic visit or your schedule please contact [facility] directly at [phone].  Normal or non-critical lab and imaging results will be communicated to you by MyChart, letter or phone within 4 business days after the clinic has received the results. If you do not hear from us within 7 days, please contact the clinic through MyChart or phone. If you have a critical or abnormal lab result, we will notify you by phone as soon as possible.  Submit refill requests through [employer] or call your pharmacy and they will forward the refill request to us. Please allow 3 business days for your refill to be completed.          Additional Information About Your Visit        MyChart Information     [employer] lets you send messages to your doctor, view your test results, renew your prescriptions, schedule appointments and more. To sign up, go to [email or web address] . Click on \"Log in\" on the left side of the screen, " "which will take you to the Welcome page. Then click on \"Sign up Now\" on the right side of the page.     You will be asked to enter the access code listed below, as well as some personal information. Please follow the directions to create your username and password.     Your access code is: [email or web address]  Expires: 2018  3:37 PM     Your access code will  in 90 days. If you need help or a new code, please call your [place] clinic or [phone].        Care EveryWhere ID     This is your Care EveryWhere ID. This could be used by other organizations to access your [place] medical records  [id number]        Your Vitals Were     Pulse Temperature Last Period Pulse Oximetry BMI (Body Mass Index)       75 98.1  F (36.7  C) (Tympanic) 2017 (Exact Date) 98% 28.7 kg/m2        Blood Pressure from Last 3 Encounters:   17 104/56   17 113/57   17 119/70    Weight from Last 3 Encounters:   17 162 lb (73.5 kg)   16 155 lb (70.3 kg)              We Performed the Following     Vitamin B12        Primary Care Provider Office Phone # Fax #    Ernesto Dela Cruz [phone]       [facility] [address] [facility] [address]        Equal Access to Services     [facility]: Hadii madai lambo So[place], waaxda luqadaha, qaybta kaalmada quiana, lacy rai . So [facility] [phone].    ATENCIÓN: Si habla español, tiene a kinney disposición servicios gratuitos de asistencia lingüística. Alissa al [phone].    We comply with applicable federal civil rights laws and [place] laws. We do not discriminate on the basis of race, color, national origin, age, disability, sex, sexual orientation, or gender identity.            Thank you!     Thank you for choosing [facility]  for your care. Our goal is always to provide you with excellent care. Hearing back from our patients is one way we can continue to improve our services. Please " take a few minutes to complete the written survey that you may receive in the mail after your visit with us. Thank you!             Your Updated Medication List - Protect others around you: Learn how to safely use, store and throw away your medicines at www.disposemymeds.org.          This list is accurate as of: 12/6/17  4:57 PM.  Always use your most recent med list.                   Brand Name Dispense Instructions for use Diagnosis    biotin 1000 MCG Tabs tablet      Take 1,000 mcg by mouth daily    Paresthesia       fish oil-omega-3 fatty acids 1000 MG capsule      Take 1,000 mg by mouth daily    Paresthesia       MULTIVITAMIN ADULTS PO      Take 1 tablet by mouth daily        NUVARING VA       Paresthesia       prenatal multivitamin plus iron 27-0.8 MG Tabs per tablet      Take 1 tablet by mouth daily        TYLENOL PO

## 2017-12-06 NOTE — NURSING NOTE
"Chief Complaint   Patient presents with     Establish Care       Initial /56 (BP Location: Left arm, Patient Position: Chair, Cuff Size: Adult Large)  Pulse 75  Temp 98.1  F (36.7  C) (Tympanic)  Wt 162 lb (73.5 kg)  LMP 12/02/2017 (Exact Date)  SpO2 98%  BMI 28.7 kg/m2 Estimated body mass index is 28.7 kg/(m^2) as calculated from the following:    Height as of 11/21/16: 5' 3\" (1.6 m).    Weight as of this encounter: 162 lb (73.5 kg).  Medication Reconciliation: complete     Vivien Chaves    "

## 2017-12-07 LAB — VIT B12 SERPL-MCNC: 674 PG/ML (ref 193–986)

## 2017-12-07 ASSESSMENT — ANXIETY QUESTIONNAIRES: GAD7 TOTAL SCORE: 1

## 2018-01-31 ENCOUNTER — OFFICE VISIT (OUTPATIENT)
Dept: OBGYN | Facility: OTHER | Age: 33
End: 2018-01-31
Attending: ADVANCED PRACTICE MIDWIFE
Payer: COMMERCIAL

## 2018-01-31 ENCOUNTER — APPOINTMENT (OUTPATIENT)
Dept: LAB | Facility: OTHER | Age: 33
End: 2018-01-31
Attending: ADVANCED PRACTICE MIDWIFE
Payer: COMMERCIAL

## 2018-01-31 ENCOUNTER — TELEPHONE (OUTPATIENT)
Dept: FAMILY MEDICINE | Facility: OTHER | Age: 33
End: 2018-01-31

## 2018-01-31 VITALS
BODY MASS INDEX: 29.23 KG/M2 | OXYGEN SATURATION: 99 % | HEART RATE: 79 BPM | HEIGHT: 63 IN | DIASTOLIC BLOOD PRESSURE: 66 MMHG | WEIGHT: 165 LBS | SYSTOLIC BLOOD PRESSURE: 108 MMHG

## 2018-01-31 DIAGNOSIS — R30.0 DYSURIA: Primary | ICD-10-CM

## 2018-01-31 LAB
ALBUMIN UR-MCNC: NEGATIVE MG/DL
APPEARANCE UR: CLEAR
BACTERIA #/AREA URNS HPF: ABNORMAL /HPF
BILIRUB UR QL STRIP: NEGATIVE
COLOR UR AUTO: ABNORMAL
GLUCOSE UR STRIP-MCNC: NEGATIVE MG/DL
HGB UR QL STRIP: NEGATIVE
KETONES UR STRIP-MCNC: NEGATIVE MG/DL
LEUKOCYTE ESTERASE UR QL STRIP: NEGATIVE
NITRATE UR QL: NEGATIVE
PH UR STRIP: 6.5 PH (ref 4.7–8)
RBC #/AREA URNS AUTO: 0 /HPF (ref 0–2)
SOURCE: ABNORMAL
SP GR UR STRIP: 1 (ref 1–1.03)
UROBILINOGEN UR STRIP-MCNC: NORMAL MG/DL (ref 0–2)
WBC #/AREA URNS AUTO: 0 /HPF (ref 0–2)

## 2018-01-31 PROCEDURE — 99202 OFFICE O/P NEW SF 15 MIN: CPT | Performed by: ADVANCED PRACTICE MIDWIFE

## 2018-01-31 PROCEDURE — 81001 URINALYSIS AUTO W/SCOPE: CPT | Performed by: ADVANCED PRACTICE MIDWIFE

## 2018-01-31 ASSESSMENT — PAIN SCALES - GENERAL: PAINLEVEL: MILD PAIN (3)

## 2018-01-31 NOTE — PATIENT INSTRUCTIONS
Return to office for annual visit and as needed.    Thank you for allowing Irvin MASTERS CNM and our OB team to participate in your care.  If you have a scheduling or an appointment question please contact [facility] Coordinator at their direct line [phone].   ALL nursing questions or concerns can be directed to your OB nurse at: [phone] - Jann[place]

## 2018-01-31 NOTE — NURSING NOTE
"Chief Complaint   Patient presents with     Dysuria       Initial /66 (BP Location: Left arm, Patient Position: Chair, Cuff Size: Adult Regular)  Pulse 79  Ht 5' 3\" (1.6 m)  Wt 165 lb (74.8 kg)  LMP 01/31/2018  SpO2 99%  BMI 29.23 kg/m2 Estimated body mass index is 29.23 kg/(m^2) as calculated from the following:    Height as of this encounter: 5' 3\" (1.6 m).    Weight as of this encounter: 165 lb (74.8 kg).  Medication Reconciliation: karyn Hansen      "

## 2018-01-31 NOTE — MR AVS SNAPSHOT
After Visit Summary   1/31/2018    Barbi Vale    MRN: [id number]           Patient Information     Date Of Birth          1985        Visit Information        Provider Department      1/31/2018 3:15 PM Irvin Norris APRN CNM [facility] [facility] [place]        Today's Diagnoses     Dysuria    -  1      Care Instructions    Return to office for annual visit and as needed.    Thank you for allowing Irvin MASTERS, MICHELLE and our OB team to participate in your care.  If you have a scheduling or an appointment question please contact [facility] at their direct line [phone].   ALL nursing questions or concerns can be directed to your OB nurse at: [phone] - Jannette              Follow-ups after your visit        Your next 10 appointments already scheduled     Feb 05, 2018  4:00 PM CST   (Arrive by 3:45 PM)   PHYSICAL with GUERRERO Oneal CNM   [facility] [place] ([facility] [place] )    [address]  [place] [address]   [phone]            Feb 20, 2018  3:15 PM CST   (Arrive by 3:00 PM)   New Visit with BELA Barreto MD   [facility] [place] ([facility] [place] )    [address]  [place] [address] [phone]            Apr 10, 2018  3:30 PM CDT   (Arrive by 3:15 PM)   PHYSICAL with Janice Jimenez NP   [facility] [place] [facility]    [address]  [place] [address]   [phone]              Who to contact     If you have questions or need follow up information about today's clinic visit or your schedule please contact [facility] directly at [phone].  Normal or non-critical lab and imaging results will be communicated to you by MyChart, letter or phone within 4 business days after the clinic has received the results. If you do not hear from us within 7 days, please contact the clinic through MyChart or phone. If you have a critical or abnormal lab  "result, we will notify you by phone as soon as possible.  Submit refill requests through [employer] or call your pharmacy and they will forward the refill request to us. Please allow 3 business days for your refill to be completed.          Additional Information About Your Visit        [employer] Information     [employer] lets you send messages to your doctor, view your test results, renew your prescriptions, schedule appointments and more. To sign up, go to [email or web address] . Click on \"Log in\" on the left side of the screen, which will take you to the Welcome page. Then click on \"Sign up Now\" on the right side of the page.     You will be asked to enter the access code listed below, as well as some personal information. Please follow the directions to create your username and password.     Your access code is: [email or web address]  Expires: 2018  4:43 PM     Your access code will  in 90 days. If you need help or a new code, please call your [place] clinic or [phone].        Care EveryWhere ID     This is your Care EveryWhere ID. This could be used by other organizations to access your [place] medical records  [id number]        Your Vitals Were     Pulse Height Last Period Pulse Oximetry BMI (Body Mass Index)       79 5' 3\" (1.6 m) 2018 99% 29.23 kg/m2        Blood Pressure from Last 3 Encounters:   18 108/66   17 104/56   17 113/57    Weight from Last 3 Encounters:   18 165 lb (74.8 kg)   17 162 lb (73.5 kg)   16 155 lb (70.3 kg)              We Performed the Following     UA with Microscopic reflex to Culture        Primary Care Provider Office Phone # Fax #    Caprice Pimentel [phone]       [facility] [place] [address]  [place] [address]        Equal Access to Services     VEGA FLEMING AH: Adelaida Sibley, waaxda luqadaha, qaybta kaalrg araya, lacy maria. So [facility] " [phone].    ATENCIÓN: Si giovana arechiga, tiene a kinney disposición servicios gratuitos de asistencia lingüística. Alissa nice [phone].    We comply with applicable federal civil rights laws and [place] laws. We do not discriminate on the basis of race, color, national origin, age, disability, sex, sexual orientation, or gender identity.            Thank you!     Thank you for choosing [facility]  for your care. Our goal is always to provide you with excellent care. Hearing back from our patients is one way we can continue to improve our services. Please take a few minutes to complete the written survey that you may receive in the mail after your visit with us. Thank you!             Your Updated Medication List - Protect others around you: Learn how to safely use, store and throw away your medicines at www.disposemymeds.org.          This list is accurate as of 1/31/18  4:43 PM.  Always use your most recent med list.                   Brand Name Dispense Instructions for use Diagnosis    biotin 1000 MCG Tabs tablet      Take 1,000 mcg by mouth daily    Paresthesia       fish oil-omega-3 fatty acids 1000 MG capsule      Take 1,000 mg by mouth daily    Paresthesia       NUVARING VA       Paresthesia       prenatal multivitamin plus iron 27-0.8 MG Tabs per tablet      Take 1 tablet by mouth daily        TYLENOL PO

## 2018-01-31 NOTE — TELEPHONE ENCOUNTER
1:11 PM    Reason for Call: OVERBOOK    Patient is having the following symptoms: possible UTI for 3 days.    The patient is requesting an appointment for 1-31-18 with Dr Pimentel.    Was an appointment offered for this call? Yes  If yes : Appointment type              Date    Preferred method for responding to this message: Telephone Call  What is your phone number ? [phone]    If we cannot reach you directly, may we leave a detailed response at the number you provided? Yes    Can this message wait until your PCP/provider returns, if unavailable today? YES    Chandrika Reid

## 2018-02-04 ENCOUNTER — HEALTH MAINTENANCE LETTER (OUTPATIENT)
Age: 33
End: 2018-02-04

## 2018-02-05 ENCOUNTER — OFFICE VISIT (OUTPATIENT)
Dept: OBGYN | Facility: OTHER | Age: 33
End: 2018-02-05
Attending: ADVANCED PRACTICE MIDWIFE
Payer: COMMERCIAL

## 2018-02-05 VITALS
TEMPERATURE: 98.8 F | OXYGEN SATURATION: 98 % | HEIGHT: 63 IN | BODY MASS INDEX: 29.23 KG/M2 | DIASTOLIC BLOOD PRESSURE: 70 MMHG | SYSTOLIC BLOOD PRESSURE: 116 MMHG | HEART RATE: 80 BPM | WEIGHT: 165 LBS

## 2018-02-05 DIAGNOSIS — Z31.69 ENCOUNTER FOR PRECONCEPTION CONSULTATION: ICD-10-CM

## 2018-02-05 DIAGNOSIS — Z12.4 SCREENING FOR CERVICAL CANCER: ICD-10-CM

## 2018-02-05 DIAGNOSIS — Z01.419 WELL WOMAN EXAM WITH ROUTINE GYNECOLOGICAL EXAM: Primary | ICD-10-CM

## 2018-02-05 PROCEDURE — 99000 SPECIMEN HANDLING OFFICE-LAB: CPT | Performed by: ADVANCED PRACTICE MIDWIFE

## 2018-02-05 PROCEDURE — 88142 CYTOPATH C/V THIN LAYER: CPT | Performed by: ADVANCED PRACTICE MIDWIFE

## 2018-02-05 PROCEDURE — 87624 HPV HI-RISK TYP POOLED RSLT: CPT | Mod: 90 | Performed by: ADVANCED PRACTICE MIDWIFE

## 2018-02-05 PROCEDURE — 99395 PREV VISIT EST AGE 18-39: CPT | Performed by: ADVANCED PRACTICE MIDWIFE

## 2018-02-05 ASSESSMENT — PAIN SCALES - GENERAL: PAINLEVEL: NO PAIN (0)

## 2018-02-05 NOTE — MR AVS SNAPSHOT
After Visit Summary   2/5/2018    Barbi Vale    MRN: [id number]           Patient Information     Date Of Birth          1985        Visit Information        Provider Department      2/5/2018 4:00 PM Irvin Norris APRN CNM [place] [facility] [place]        Today's Diagnoses     Well woman exam with routine gynecological exam    -  1    Encounter for preconception consultation        Screening for cervical cancer          Care Instructions    Return to office in one year for annual visit and as needed.    Thank you for allowing Irvin MASTERS CNM and our OB team to participate in your care.  If you have a scheduling or an appointment question please contact [facility] at their direct line [phone].   ALL nursing questions or concerns can be directed to your OB nurse at: [phone] - Jannette              Follow-ups after your visit        Your next 10 appointments already scheduled     Feb 20, 2018  3:15 PM CST   (Arrive by 3:00 PM)   New Visit with BELA Barreto MD   [facility] ([facility] )    [address] [place] [id number] [phone]            Apr 10, 2018  3:30 PM CDT   (Arrive by 3:15 PM)   PHYSICAL with Janice Jimenez NP   [facility] [place] ([facility] )    [address] [place] [address]   [phone]              Future tests that were ordered for you today     Open Future Orders        Priority Expected Expires Ordered    TSH Routine  2/5/2019 2/5/2018            Who to contact     If you have questions or need follow up information about today's clinic visit or your schedule please contact [facility] directly at [phone].  Normal or non-critical lab and imaging results will be communicated to you by MyChart, letter or phone within 4 business days after the clinic has received the results. If you do not hear from us within 7 days, please contact the  "clinic through [employer] or phone. If you have a critical or abnormal lab result, we will notify you by phone as soon as possible.  Submit refill requests through [employer] or call your pharmacy and they will forward the refill request to us. Please allow 3 business days for your refill to be completed.          Additional Information About Your Visit        [employer] Information     [employer] lets you send messages to your doctor, view your test results, renew your prescriptions, schedule appointments and more. To sign up, go to [email or web address] . Click on \"Log in\" on the left side of the screen, which will take you to the Welcome page. Then click on \"Sign up Now\" on the right side of the page.     You will be asked to enter the access code listed below, as well as some personal information. Please follow the directions to create your username and password.     Your access code is: [email or web address]  Expires: 2018  4:43 PM     Your access code will  in 90 days. If you need help or a new code, please call your [place] clinic or [phone].        Care EveryWhere ID     This is your Care EveryWhere ID. This could be used by other organizations to access your [place] medical records  [id number]        Your Vitals Were     Pulse Temperature Height Last Period Pulse Oximetry BMI (Body Mass Index)    80 98.8  F (37.1  C) 5' 3\" (1.6 m) 2018 98% 29.23 kg/m2       Blood Pressure from Last 3 Encounters:   18 116/70   18 108/66   17 104/56    Weight from Last 3 Encounters:   18 165 lb (74.8 kg)   18 165 lb (74.8 kg)   17 162 lb (73.5 kg)              We Performed the Following     A pap thin layer screen with  HPV - recommended age 30 - 65 years (select HPV order below)     HPV High Risk Types DNA Cervical        Primary Care Provider Office Phone # Fax #    Caprice Pimentel [phone]       [facility] [place] [address]  [place] [address]      "   Equal Access to Services     [facility] : Hadii madai Sibley, wamasterda sharronradhaha, qareinierghada solomonallacy chatterjee. So [facility] [phone].    ATENCIÓN: Si habla español, tiene a kinney disposición servicios gratuitos de asistencia lingüística. Llame al [phone].    We comply with applicable federal civil rights laws and [place] laws. We do not discriminate on the basis of race, color, national origin, age, disability, sex, sexual orientation, or gender identity.            Thank you!     Thank you for choosing [facility]  for your care. Our goal is always to provide you with excellent care. Hearing back from our patients is one way we can continue to improve our services. Please take a few minutes to complete the written survey that you may receive in the mail after your visit with us. Thank you!             Your Updated Medication List - Protect others around you: Learn how to safely use, store and throw away your medicines at www.disposemymeds.org.          This list is accurate as of 2/5/18  5:20 PM.  Always use your most recent med list.                   Brand Name Dispense Instructions for use Diagnosis    biotin 1000 MCG Tabs tablet      Take 1,000 mcg by mouth daily    Paresthesia       fish oil-omega-3 fatty acids 1000 MG capsule      Take 1,000 mg by mouth daily    Paresthesia       prenatal multivitamin plus iron 27-0.8 MG Tabs per tablet      Take 1 tablet by mouth daily        TYLENOL PO
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
## 2018-02-05 NOTE — PATIENT INSTRUCTIONS
Return to office in one year for annual visit and as needed.    Thank you for allowing Ivrin MASTERS CNM and our OB team to participate in your care.  If you have a scheduling or an appointment question please contact [facility] Coordinator at their direct line [phone].   ALL nursing questions or concerns can be directed to your OB nurse at: [phone] - Jannette

## 2018-02-05 NOTE — NURSING NOTE
"Chief Complaint   Patient presents with     Gyn Exam       Initial /70 (BP Location: Left arm, Patient Position: Chair, Cuff Size: Adult Regular)  Pulse 80  Ht 5' 3\" (1.6 m)  Wt 165 lb (74.8 kg)  LMP 01/31/2018  SpO2 98%  BMI 29.23 kg/m2 Estimated body mass index is 29.23 kg/(m^2) as calculated from the following:    Height as of this encounter: 5' 3\" (1.6 m).    Weight as of this encounter: 165 lb (74.8 kg).  Medication Reconciliation: karyn Hansen      "

## 2018-02-08 DIAGNOSIS — Z01.419 WELL WOMAN EXAM WITH ROUTINE GYNECOLOGICAL EXAM: ICD-10-CM

## 2018-02-08 LAB — TSH SERPL DL<=0.005 MIU/L-ACNC: 2.36 MU/L (ref 0.4–4)

## 2018-02-08 PROCEDURE — 36415 COLL VENOUS BLD VENIPUNCTURE: CPT | Performed by: ADVANCED PRACTICE MIDWIFE

## 2018-02-08 PROCEDURE — 84443 ASSAY THYROID STIM HORMONE: CPT | Performed by: ADVANCED PRACTICE MIDWIFE

## 2018-02-09 LAB
COPATH REPORT: NORMAL
PAP: NORMAL

## 2018-02-13 LAB
FINAL DIAGNOSIS: NORMAL
HPV HR 12 DNA CVX QL NAA+PROBE: NEGATIVE
HPV16 DNA SPEC QL NAA+PROBE: NEGATIVE
HPV18 DNA SPEC QL NAA+PROBE: NEGATIVE
SPECIMEN DESCRIPTION: NORMAL
SPECIMEN SOURCE CVX/VAG CYTO: NORMAL

## 2018-02-27 ENCOUNTER — OFFICE VISIT (OUTPATIENT)
Dept: DERMATOLOGY | Facility: OTHER | Age: 33
End: 2018-02-27
Attending: DERMATOLOGY
Payer: COMMERCIAL

## 2018-02-27 VITALS
DIASTOLIC BLOOD PRESSURE: 62 MMHG | SYSTOLIC BLOOD PRESSURE: 112 MMHG | HEART RATE: 77 BPM | HEIGHT: 63 IN | BODY MASS INDEX: 28.35 KG/M2 | WEIGHT: 160 LBS | OXYGEN SATURATION: 98 % | TEMPERATURE: 98.1 F | RESPIRATION RATE: 20 BRPM

## 2018-02-27 DIAGNOSIS — L60.9 NAIL PROBLEM: Primary | ICD-10-CM

## 2018-02-27 PROCEDURE — 99202 OFFICE O/P NEW SF 15 MIN: CPT | Performed by: DERMATOLOGY

## 2018-02-27 RX ORDER — LANOLIN ALCOHOL/MO/W.PET/CERES
400 CREAM (GRAM) TOPICAL DAILY
COMMUNITY
End: 2019-01-31

## 2018-02-27 ASSESSMENT — PAIN SCALES - GENERAL: PAINLEVEL: NO PAIN (0)

## 2018-02-27 NOTE — NURSING NOTE
"Chief Complaint   Patient presents with     Consult     Regarding onychomycosis per [place].        Initial /62  Pulse 77  Temp 98.1  F (36.7  C) (Tympanic)  Resp 20  Ht 1.6 m (5' 3\")  Wt 72.6 kg (160 lb)  LMP 01/31/2018  SpO2 98%  BMI 28.34 kg/m2 Estimated body mass index is 28.34 kg/(m^2) as calculated from the following:    Height as of this encounter: 1.6 m (5' 3\").    Weight as of this encounter: 72.6 kg (160 lb).  Medication Reconciliation: complete   Sita Gaspar      "

## 2018-02-27 NOTE — LETTER
2/27/2018       RE: Barbi Vale  [address]  [place] [address]     Dear Colleague,    Thank you for referring your patient, Barbi Vale, to the [facility] [place] [facility]. Please see a copy of my visit note below.    Dictated and edited. Pt has a nail defect in the matrix producing a split nail plate. A benign phenomenon. BAORN Barreto MD    Again, thank you for allowing me to participate in the care of your patient.      Sincerely,    BELA Barreto MD

## 2018-02-27 NOTE — MR AVS SNAPSHOT
"              After Visit Summary   2018    Barbi Vale    MRN: [id number]           Patient Information     Date Of Birth          1985        Visit Information        Provider Department      2018 3:15 PM [facility]        Today's Diagnoses     Nail problem    -  1       Follow-ups after your visit        Who to contact     If you have questions or need follow up information about today's clinic visit or your schedule please contact [facility] directly at [phone].  Normal or non-critical lab and imaging results will be communicated to you by MyChart, letter or phone within 4 business days after the clinic has received the results. If you do not hear from us within 7 days, please contact the clinic through Wise Connect[employer] or phone. If you have a critical or abnormal lab result, we will notify you by phone as soon as possible.  Submit refill requests through SmartDocs ([employer]) or call your pharmacy and they will forward the refill request to us. Please allow 3 business days for your refill to be completed.          Additional Information About Your Visit        MyChart Information     SmartDocs ([employer]) lets you send messages to your doctor, view your test results, renew your prescriptions, schedule appointments and more. To sign up, go to [email or web address] ([employer]eknow[employer]) . Click on \"Log in\" on the left side of the screen, which will take you to the Welcome page. Then click on \"Sign up Now\" on the right side of the page.     You will be asked to enter the access code listed below, as well as some personal information. Please follow the directions to create your username and password.     Your access code is: [email or web address]  Expires: 2018  4:43 PM     Your access code will  in 90 days. If you need help or a new code, please call your [facility] or [phone].        Care EveryWhere ID     This is your Care EveryWhere ID. This could be used by other organizations " "to access your [facility] medical records  [id number]        Your Vitals Were     Pulse Temperature Respirations Height Last Period Pulse Oximetry    77 98.1  F (36.7  C) (Tympanic) 20 1.6 m (5' 3\") 01/31/2018 98%    BMI (Body Mass Index)                   28.34 kg/m2            Blood Pressure from Last 3 Encounters:   02/27/18 112/62   02/05/18 116/70   01/31/18 108/66    Weight from Last 3 Encounters:   02/27/18 72.6 kg (160 lb)   02/05/18 74.8 kg (165 lb)   01/31/18 74.8 kg (165 lb)              Today, you had the following     No orders found for display         Today's Medication Changes          These changes are accurate as of 2/27/18 11:59 PM.  If you have any questions, ask your nurse or doctor.               Stop taking these medicines if you haven't already. Please contact your care team if you have questions.     TYLENOL PO   Stopped by:  BELA Barreto MD                    Primary Care Provider Office Phone # Fax #    Caprice AIDEN Pimentel [phone]       [facility] [place] [address] [place] [address]        Equal Access to Services     [facility] AH: Hadii aad ku hadasho Soomaali, waaxda luqadaha, qaybta kaalmada adeegyada, lacy fletcher haymaria elenan rox rai . So [facility] [phone].    ATENCIÓN: Si habla español, tiene a kinney disposición servicios gratuitos de asistencia lingüística. Llame al [phone].    We comply with applicable federal civil rights laws and [place] laws. We do not discriminate on the basis of race, color, national origin, age, disability, sex, sexual orientation, or gender identity.            Thank you!     Thank you for choosing [facility]  for your care. Our goal is always to provide you with excellent care. Hearing back from our patients is one way we can continue to improve our services. Please take a few minutes to complete the written survey that you may receive in the mail after your visit with us. Thank you!           "   Your Updated Medication List - Protect others around you: Learn how to safely use, store and throw away your medicines at www.disposemymeds.org.          This list is accurate as of 2/27/18 11:59 PM.  Always use your most recent med list.                   Brand Name Dispense Instructions for use Diagnosis    biotin 1000 MCG Tabs tablet      Take 1,000 mcg by mouth daily    Paresthesia       fish oil-omega-3 fatty acids 1000 MG capsule      Take 1,000 mg by mouth daily    Paresthesia       folic acid 400 MCG tablet    FOLVITE     Take 400 mcg by mouth daily        prenatal multivitamin plus iron 27-0.8 MG Tabs per tablet      Take 1 tablet by mouth daily

## 2018-02-28 NOTE — CONSULTS
Consult Date:  2018      SUBJECTIVE:  Barbi comes in because of a concern that her left first finger, her pointer finger, has been cracked distally for several years, and she wonders whether or not this is a fungus infection. She states that it started when she accidentally avulsed the nail plate.      OBJECTIVE:  Shows a healthy lady in no distress.  The index finger nail does show a distal y-shaped split and  the nail plate also shows a distinct line or groove extending from the posterior nail fold to the distal end of the nail plate.  This is a very tiny 0.5 mm line, but indicates that there is a defect in the matrix     .ASSESSMENT:  As above, she does recall prior to this defect occurring that she did accidentally traumatize her nail, pulling the nail off completely.  This incident may have been injured the matrix at that point.      Advised that little could be done for this, other than for a surgeon to go in and try to locate a defect in the nail matrix and excise it.  Advised that this could result in the similar or a larger defect.     I advised that rather than surgery,  that she superglue or sand the nail so as reduce its distal split.  An artificial nail might help.        She was reassured and asked to return on a p.r.n. basis.  I advised that clearly she did not have a fungus infection causing this problem.         BELA SANDOVAL MD             D: 2018   T: 2018   MT: KODY      Name:     BARBI RODRIGUEZ   MRN:      [id number]        Account:       [id number]   :      1985           Consult Date:  2018      Document: [id number]       cc: MADALYN MASTERS, CNP

## 2018-03-07 ENCOUNTER — TELEPHONE (OUTPATIENT)
Dept: OBGYN | Facility: OTHER | Age: 33
End: 2018-03-07

## 2018-03-07 NOTE — TELEPHONE ENCOUNTER
Please have pt follow up with her PCP for lab and prescription as needed.  This is a chronic problem her PCP should follow.

## 2018-03-07 NOTE — TELEPHONE ENCOUNTER
Pt called requesting a Vitamin D level. Pt stated she has been feeling very tired the last couple of days. She has been taking her prenatal vitamin, fish oil, folic acid, and biotin. She has had a low level of vitamin D in the past. I will place the order if you OK this.   Thank you!

## 2018-03-08 NOTE — PROGRESS NOTES
Dictated and edited. Pt has a nail defect in the matrix producing a split nail plate. A benign phenomenon. BARON Barreto MD

## 2018-03-09 ENCOUNTER — TELEPHONE (OUTPATIENT)
Dept: FAMILY MEDICINE | Facility: OTHER | Age: 33
End: 2018-03-09

## 2018-03-09 NOTE — TELEPHONE ENCOUNTER
Pt has some concerns with her Vitamin D and would like to be tested.  Pt needs to schedule an appointment.  Pt transferred to scheduling.

## 2018-03-19 ENCOUNTER — OFFICE VISIT (OUTPATIENT)
Dept: FAMILY MEDICINE | Facility: OTHER | Age: 33
End: 2018-03-19
Attending: FAMILY MEDICINE
Payer: COMMERCIAL

## 2018-03-19 VITALS
SYSTOLIC BLOOD PRESSURE: 98 MMHG | BODY MASS INDEX: 28.7 KG/M2 | HEART RATE: 73 BPM | TEMPERATURE: 97.9 F | WEIGHT: 162 LBS | OXYGEN SATURATION: 98 % | DIASTOLIC BLOOD PRESSURE: 60 MMHG

## 2018-03-19 DIAGNOSIS — E55.9 VITAMIN D DEFICIENCY: ICD-10-CM

## 2018-03-19 DIAGNOSIS — E61.1 IRON DEFICIENCY: Primary | ICD-10-CM

## 2018-03-19 LAB
IRON SATN MFR SERPL: 27 % (ref 15–46)
IRON SERPL-MCNC: 111 UG/DL (ref 35–180)
TIBC SERPL-MCNC: 409 UG/DL (ref 240–430)

## 2018-03-19 PROCEDURE — 83550 IRON BINDING TEST: CPT | Performed by: FAMILY MEDICINE

## 2018-03-19 PROCEDURE — 99214 OFFICE O/P EST MOD 30 MIN: CPT | Performed by: FAMILY MEDICINE

## 2018-03-19 PROCEDURE — 36415 COLL VENOUS BLD VENIPUNCTURE: CPT | Performed by: FAMILY MEDICINE

## 2018-03-19 PROCEDURE — 83540 ASSAY OF IRON: CPT | Performed by: FAMILY MEDICINE

## 2018-03-19 RX ORDER — ERGOCALCIFEROL 1.25 MG/1
50000 CAPSULE, LIQUID FILLED ORAL
Qty: 4 CAPSULE | Refills: 1 | Status: SHIPPED | OUTPATIENT
Start: 2018-03-19 | End: 2019-01-31

## 2018-03-19 ASSESSMENT — ANXIETY QUESTIONNAIRES
1. FEELING NERVOUS, ANXIOUS, OR ON EDGE: NOT AT ALL
GAD7 TOTAL SCORE: 0
3. WORRYING TOO MUCH ABOUT DIFFERENT THINGS: NOT AT ALL
7. FEELING AFRAID AS IF SOMETHING AWFUL MIGHT HAPPEN: NOT AT ALL
2. NOT BEING ABLE TO STOP OR CONTROL WORRYING: NOT AT ALL
6. BECOMING EASILY ANNOYED OR IRRITABLE: NOT AT ALL
4. TROUBLE RELAXING: NOT AT ALL
5. BEING SO RESTLESS THAT IT IS HARD TO SIT STILL: NOT AT ALL

## 2018-03-19 ASSESSMENT — PAIN SCALES - GENERAL: PAINLEVEL: NO PAIN (0)

## 2018-03-19 NOTE — MR AVS SNAPSHOT
"              After Visit Summary   3/19/2018    Barbi Vale    MRN: [id number]           Patient Information     Date Of Birth          1985        Visit Information        Provider Department      3/19/2018 3:30 PM Caprice Pimentel MD [facility]        Today's Diagnoses     Iron deficiency    -  1    Vitamin D deficiency           Follow-ups after your visit        Your next 10 appointments already scheduled     May 10, 2018  3:30 PM CDT   (Arrive by 3:15 PM)   SHORT with Caprice Pimentel MD   [facility] ([facility] )    [address] [place] [address]   [phone]              Who to contact     If you have questions or need follow up information about today's clinic visit or your schedule please contact [facility] directly at [phone].  Normal or non-critical lab and imaging results will be communicated to you by MyChart, letter or phone within 4 business days after the clinic has received the results. If you do not hear from us within 7 days, please contact the clinic through MyChart or phone. If you have a critical or abnormal lab result, we will notify you by phone as soon as possible.  Submit refill requests through Labs on the Go or call your pharmacy and they will forward the refill request to us. Please allow 3 business days for your refill to be completed.          Additional Information About Your Visit        MyChart Information     Labs on the Go lets you send messages to your doctor, view your test results, renew your prescriptions, schedule appointments and more. To sign up, go to [email or web address] . Click on \"Log in\" on the left side of the screen, which will take you to the Welcome page. Then click on \"Sign up Now\" on the right side of the page.     You will be asked to enter the access code listed below, as well as some personal information. Please follow the directions to create your username and password.   "   Your access code is: [email or web address]  Expires: 2018  5:43 PM     Your access code will  in 90 days. If you need help or a new code, please call your [facility] or [phone].        Care EveryWhere ID     This is your Care EveryWhere ID. This could be used by other organizations to access your [facility] medical records  [id number]        Your Vitals Were     Pulse Temperature Pulse Oximetry BMI (Body Mass Index)          73 97.9  F (36.6  C) (Tympanic) 98% 28.7 kg/m2         Blood Pressure from Last 3 Encounters:   18 98/60   18 112/62   18 116/70    Weight from Last 3 Encounters:   18 162 lb (73.5 kg)   18 160 lb (72.6 kg)   18 165 lb (74.8 kg)              We Performed the Following     Iron and iron binding capacity          Today's Medication Changes          These changes are accurate as of 3/19/18  4:28 PM.  If you have any questions, ask your nurse or doctor.               Start taking these medicines.        Dose/Directions    vitamin D 28453 UNIT capsule   Commonly known as:  ERGOCALCIFEROL   Used for:  Vitamin D deficiency   Started by:  Caprice Pimentel MD        Dose:  74037 Units   Take 1 capsule (50,000 Units) by mouth every 7 days for 8 doses   Quantity:  4 capsule   Refills:  1            Where to get your medicines      These medications were sent to [facility] [address] [place] [address] [place] - [address] AT [facility] [address], [place] [address]     Phone:  [phone]     vitamin D 57342 UNIT capsule                Primary Care Provider Office Phone # Fax #    Caprice Pimentel [phone]       [facility] [place] [address] [facility]  [place] [address]        Equal Access to Services     [facility] BERNADETTE AH: Adelaida machado [place]etta, waaxda luqadaha, qaybta kaalmada adeegyakelvin, lacy maria. [facility] [phone].    ATENCIÓN: Si giovana arechiga, thomas a kinney disposición  servicios gratuitos de asistencia lingüística. Alissa nice [phone].    We comply with applicable federal civil rights laws and [place] laws. We do not discriminate on the basis of race, color, national origin, age, disability, sex, sexual orientation, or gender identity.            Thank you!     [facility] you [facility]  for your care. Our goal is always to provide you with excellent care. Hearing back from our patients is one way we can continue to improve our services. Please take a few minutes to complete the written survey that you may receive in the mail after your visit with us. Thank you!             Your Updated Medication List - Protect others around you: Learn how to safely use, store and throw away your medicines at www.disposemymeds.org.          This list is accurate as of 3/19/18  4:28 PM.  Always use your most recent med list.                   Brand Name Dispense Instructions for use Diagnosis    biotin 1000 MCG Tabs tablet      Take 1,000 mcg by mouth daily    Paresthesia       fish oil-omega-3 fatty acids 1000 MG capsule      Take 1,000 mg by mouth daily    Paresthesia       folic acid 400 MCG tablet    FOLVITE     Take 400 mcg by mouth daily        prenatal multivitamin plus iron 27-0.8 MG Tabs per tablet      Take 1 tablet by mouth daily        vitamin D 54326 UNIT capsule    ERGOCALCIFEROL    4 capsule    Take 1 capsule (50,000 Units) by mouth every 7 days for 8 doses    Vitamin D deficiency

## 2018-03-19 NOTE — PROGRESS NOTES
SUBJECTIVE:                                                    Barbi Vale is a 32 year old female who presents to clinic today for the following health issues:      Fatigue       Duration: 5-6 weeks it became very noticable    Description (location/character/radiation): none    Intensity:  mild    Accompanying signs and symptoms: just very tired. Wondering about vit D deficiency    History (similar episodes/previous evaluation): None    Precipitating or alleviating factors: None    Therapies tried and outcome: taking vitamins, coffee, getting good nights rest.    Doesn't know if she snores  No wakign middle of night, trouble waking at 6 am  Sees therapist but doesn't think she is depressed or anxious, then mentions panic attacks when younger  Possible palpitations    Problem list and histories reviewed & adjusted, as indicated.  Additional history: as documented    Patient Active Problem List   Diagnosis     ACP (advance care planning)     Chronic pain disorder     Myofascial muscle pain     Paresthesia     Dysuria     Well woman exam with routine gynecological exam     Encounter for preconception consultation     Past Surgical History:   Procedure Laterality Date     FOOT SURGERY Right 2003    [place], [place]       Social History   Substance Use Topics     Smoking status: Never Smoker     Smokeless tobacco: Never Used     Alcohol use No     Family History   Problem Relation Age of Onset     MENTAL ILLNESS Mother      MENTAL ILLNESS Father      MENTAL ILLNESS Brother      CEREBROVASCULAR DISEASE Maternal Grandmother      CEREBROVASCULAR DISEASE Maternal Grandfather      Aneurysm Maternal Grandfather      Other - See Comments Paternal Grandmother 86     old age     KIDNEY DISEASE Paternal Grandfather      on dialysis         Current Outpatient Prescriptions   Medication Sig Dispense Refill     vitamin D (ERGOCALCIFEROL) 68747 UNIT capsule Take 1 capsule (50,000 Units) by mouth every 7 days for 8 doses 4  capsule 1     folic acid (FOLVITE) 400 MCG tablet Take 400 mcg by mouth daily       biotin 1000 MCG TABS tablet Take 1,000 mcg by mouth daily       fish oil-omega-3 fatty acids 1000 MG capsule Take 1,000 mg by mouth daily       Prenatal Vit-Fe Fumarate-FA (PRENATAL MULTIVITAMIN PLUS IRON) 27-0.8 MG TABS per tablet Take 1 tablet by mouth daily       Allergies   Allergen Reactions     Depo-Provera [Medroxyprogesterone] Swelling     Swelled up, headaches     Labs reviewed in EPIC    ROS:  Constitutional, HEENT, cardiovascular, pulmonary, gi and gu systems are negative, except as otherwise noted.    OBJECTIVE:                                                    BP 98/60  Pulse 73  Temp 97.9  F (36.6  C) (Tympanic)  Wt 162 lb (73.5 kg)  SpO2 98%  BMI 28.7 kg/m2  Body mass index is 28.7 kg/(m^2).  GENERAL APPEARANCE: healthy, alert and no distress  RESP: lungs clear to auscultation - no rales, rhonchi or wheezes  CV: regular rates and rhythm, normal S1 S2, no S3 or S4 and no murmur, click or rub  PSYCH: mentation appears normal and affect normal/bright       ASSESSMENT/PLAN:                                                    1. Iron deficiency  In the past but has been off gluten and doing much better  - Iron and iron binding capacity    2. Vitamin D deficiency  She was told this in the past but hasn't been taking  - vitamin D (ERGOCALCIFEROL) 14796 UNIT capsule; Take 1 capsule (50,000 Units) by mouth every 7 days for 8 doses  Dispense: 4 capsule; Refill: 1    3.  H/o panic attacks  Major stress with her brother and family and their mental illness, patient denies any diagnoses for herself    4.  Fatigue -- could be second to all of the above, hormonal imbalance, will give her FABM info and f/u 2 mos    Over 40 min spent with patient, over 50% education and counseling.  Patient was agreeable to this plan and had no further questions.  See Patient Instructions    Caprice Pimentel MD  [facility]

## 2018-03-20 ENCOUNTER — TELEPHONE (OUTPATIENT)
Dept: FAMILY MEDICINE | Facility: OTHER | Age: 33
End: 2018-03-20

## 2018-03-20 ASSESSMENT — PATIENT HEALTH QUESTIONNAIRE - PHQ9: SUM OF ALL RESPONSES TO PHQ QUESTIONS 1-9: 3

## 2018-03-20 ASSESSMENT — ANXIETY QUESTIONNAIRES: GAD7 TOTAL SCORE: 0

## 2018-03-20 NOTE — TELEPHONE ENCOUNTER
"To: Nurse of Dr. Pimentel  Patient returned called, \"someone tried calling me\"?  She did mention that it could be to get her iron results.  Her phone [phone].Thank you  "

## 2018-05-16 DIAGNOSIS — E55.9 VITAMIN D DEFICIENCY: ICD-10-CM

## 2018-05-17 RX ORDER — ERGOCALCIFEROL 1.25 MG/1
CAPSULE, LIQUID FILLED ORAL
Qty: 4 CAPSULE | Refills: 0 | OUTPATIENT
Start: 2018-05-17

## 2018-05-17 NOTE — TELEPHONE ENCOUNTER
Vitamin D  Last Written Prescription Date: 3/19/18  Last Fill Quantity: 4 # of Refills: 1  Last Office Visit: 3/19/18

## 2018-05-18 RX ORDER — ERGOCALCIFEROL 1.25 MG/1
50000 CAPSULE, LIQUID FILLED ORAL
Qty: 8 CAPSULE | Refills: 0 | Status: SHIPPED | OUTPATIENT
Start: 2018-05-18 | End: 2019-01-31

## 2018-06-15 ENCOUNTER — OFFICE VISIT (OUTPATIENT)
Dept: FAMILY MEDICINE | Facility: OTHER | Age: 33
End: 2018-06-15
Attending: FAMILY MEDICINE
Payer: COMMERCIAL

## 2018-06-15 VITALS
HEART RATE: 65 BPM | RESPIRATION RATE: 18 BRPM | OXYGEN SATURATION: 98 % | WEIGHT: 166.8 LBS | TEMPERATURE: 98 F | SYSTOLIC BLOOD PRESSURE: 108 MMHG | DIASTOLIC BLOOD PRESSURE: 68 MMHG | BODY MASS INDEX: 29.55 KG/M2 | HEIGHT: 63 IN

## 2018-06-15 DIAGNOSIS — E55.9 HYPOVITAMINOSIS D: ICD-10-CM

## 2018-06-15 DIAGNOSIS — Z23 NEED FOR VACCINATION: Primary | ICD-10-CM

## 2018-06-15 DIAGNOSIS — M79.18 MYOFASCIAL MUSCLE PAIN: ICD-10-CM

## 2018-06-15 PROCEDURE — 90715 TDAP VACCINE 7 YRS/> IM: CPT | Performed by: FAMILY MEDICINE

## 2018-06-15 PROCEDURE — 90471 IMMUNIZATION ADMIN: CPT | Performed by: FAMILY MEDICINE

## 2018-06-15 PROCEDURE — 99213 OFFICE O/P EST LOW 20 MIN: CPT | Mod: 25 | Performed by: FAMILY MEDICINE

## 2018-06-15 ASSESSMENT — ANXIETY QUESTIONNAIRES
5. BEING SO RESTLESS THAT IT IS HARD TO SIT STILL: NOT AT ALL
6. BECOMING EASILY ANNOYED OR IRRITABLE: NOT AT ALL
1. FEELING NERVOUS, ANXIOUS, OR ON EDGE: NOT AT ALL
GAD7 TOTAL SCORE: 0
2. NOT BEING ABLE TO STOP OR CONTROL WORRYING: NOT AT ALL
IF YOU CHECKED OFF ANY PROBLEMS ON THIS QUESTIONNAIRE, HOW DIFFICULT HAVE THESE PROBLEMS MADE IT FOR YOU TO DO YOUR WORK, TAKE CARE OF THINGS AT HOME, OR GET ALONG WITH OTHER PEOPLE: NOT DIFFICULT AT ALL
3. WORRYING TOO MUCH ABOUT DIFFERENT THINGS: NOT AT ALL
7. FEELING AFRAID AS IF SOMETHING AWFUL MIGHT HAPPEN: NOT AT ALL

## 2018-06-15 ASSESSMENT — PATIENT HEALTH QUESTIONNAIRE - PHQ9: 5. POOR APPETITE OR OVEREATING: NOT AT ALL

## 2018-06-15 ASSESSMENT — PAIN SCALES - GENERAL: PAINLEVEL: NO PAIN (0)

## 2018-06-15 NOTE — NURSING NOTE
"Chief Complaint   Patient presents with     RECHECK       Initial /68 (BP Location: Left arm, Patient Position: Sitting, Cuff Size: Adult Regular)  Pulse 65  Temp 98  F (36.7  C) (Tympanic)  Resp 18  Ht 5' 3\" (1.6 m)  Wt 166 lb 12.8 oz (75.7 kg)  SpO2 98%  BMI 29.55 kg/m2 Estimated body mass index is 29.55 kg/(m^2) as calculated from the following:    Height as of this encounter: 5' 3\" (1.6 m).    Weight as of this encounter: 166 lb 12.8 oz (75.7 kg).  Medication Reconciliation: complete    [place] MA  "

## 2018-06-15 NOTE — PROGRESS NOTES
SUBJECTIVE:                                                    Barbi Vale is a 32 year old female who presents to clinic today for the following health issues:        Medication Followup of  Vitamin D     Taking Medication as prescribed: yes    Side Effects:  Energy is increasing    Medication Helping Symptoms:  yes   She reports her muscle pain is much better as well as her fatigue    Problem list and histories reviewed & adjusted, as indicated.  Additional history: as documented    Patient Active Problem List   Diagnosis     ACP (advance care planning)     Chronic pain disorder     Myofascial muscle pain     Paresthesia     Dysuria     Well woman exam with routine gynecological exam     Encounter for preconception consultation     Past Surgical History:   Procedure Laterality Date     FOOT SURGERY Right 2003    [place], [place]       Social History   Substance Use Topics     Smoking status: Never Smoker     Smokeless tobacco: Never Used     Alcohol use No     Family History   Problem Relation Age of Onset     MENTAL ILLNESS Mother      MENTAL ILLNESS Father      MENTAL ILLNESS Brother      CEREBROVASCULAR DISEASE Maternal Grandmother      CEREBROVASCULAR DISEASE Maternal Grandfather      Aneurysm Maternal Grandfather      Other - See Comments Paternal Grandmother 86     old age     KIDNEY DISEASE Paternal Grandfather      on dialysis         Current Outpatient Prescriptions   Medication Sig Dispense Refill     fish oil-omega-3 fatty acids 1000 MG capsule Take 1,000 mg by mouth daily       folic acid (FOLVITE) 400 MCG tablet Take 400 mcg by mouth daily       Prenatal Vit w/Sl-Pygtnxusb-RV (TL FOLATE PO) Take 1,000 mcg by mouth daily       Prenatal Vit-Fe Fumarate-FA (PRENATAL MULTIVITAMIN PLUS IRON) 27-0.8 MG TABS per tablet Take 1 tablet by mouth daily       vitamin D (ERGOCALCIFEROL) 37712 UNIT capsule Take 1 capsule (50,000 Units) by mouth every 7 days for 8 doses 8 capsule 0     Allergies   Allergen  "Reactions     Depo-Provera [Medroxyprogesterone] Swelling     Swelled up, headaches     Labs reviewed in EPIC    ROS:  Constitutional, HEENT, cardiovascular, pulmonary, gi and gu systems are negative, except as otherwise noted.    OBJECTIVE:                                                    /68 (BP Location: Left arm, Patient Position: Sitting, Cuff Size: Adult Regular)  Pulse 65  Temp 98  F (36.7  C) (Tympanic)  Resp 18  Ht 5' 3\" (1.6 m)  Wt 166 lb 12.8 oz (75.7 kg)  SpO2 98%  BMI 29.55 kg/m2  Body mass index is 29.55 kg/(m^2).  GENERAL APPEARANCE: healthy, alert and no distress  RESP: lungs clear to auscultation - no rales, rhonchi or wheezes  CV: regular rates and rhythm, normal S1 S2, no S3 or S4 and no murmur, click or rub  PSYCH: mentation appears normal and affect normal/bright       ASSESSMENT/PLAN:                                                    1. Need for vaccination    - TDAP VACCINE (ADACEL) [94646.002]  - 1st  Administration  [28390]    2. Hypovitaminosis D  Doing much better with vitamin d rx   - Vitamin D Deficiency; Future    3. Myofascial muscle pain  Resolved since going gluten free and the vitamin d, congratulated on her dietary efforts    Also discussed they are actively trying to conceive now.    Patient was agreeable to this plan and had no further questions.  See Patient Instructions    Caprice Pimentel MD  [facility] [place]      "

## 2018-06-15 NOTE — MR AVS SNAPSHOT
After Visit Summary   6/15/2018    Barbi Vale    MRN: [id number]           Patient Information     Date Of Birth          1985        Visit Information        Provider Department      6/15/2018 9:45 AM Caprice Pimentel MD [facility]        Today's Diagnoses     Need for vaccination    -  1    Hypovitaminosis D        Myofascial muscle pain           Follow-ups after your visit        Future tests that were ordered for you today     Open Future Orders        Priority Expected Expires Ordered    Vitamin D Deficiency Routine 6/15/2018 6/15/2019 6/15/2018            Who to contact     If you have questions or need follow up information about today's clinic visit or your schedule please contact [facility] directly at [phone].  Normal or non-critical lab and imaging results will be communicated to you by [employer], letter or phone within 4 business days after the clinic has received the results. If you do not hear from us within 7 days, please contact the clinic through [employer] or phone. If you have a critical or abnormal lab result, we will notify you by phone as soon as possible.  Submit refill requests through AirPR or call your pharmacy and they will forward the refill request to us. Please allow 3 business days for your refill to be completed.          Additional Information About Your Visit        MyChart Information     AirPR gives you secure access to your electronic health record. If you see a primary care provider, you can also send messages to your care team and make appointments. If you have questions, please call your primary care clinic.  If you do not have a primary care provider, please call [phone] and they will assist you.        Care EveryWhere ID     This is your Care EveryWhere ID. This could be used by other organizations to access your [place] medical records  [id number]        Your Vitals Were     Pulse Temperature  "Respirations Height Pulse Oximetry BMI (Body Mass Index)    65 98  F (36.7  C) (Tympanic) 18 5' 3\" (1.6 m) 98% 29.55 kg/m2       Blood Pressure from Last 3 Encounters:   06/15/18 108/68   03/19/18 98/60   02/27/18 112/62    Weight from Last 3 Encounters:   06/15/18 166 lb 12.8 oz (75.7 kg)   03/19/18 162 lb (73.5 kg)   02/27/18 160 lb (72.6 kg)              We Performed the Following     1st  Administration  [07419]     TDAP VACCINE (ADACEL) [93941.002]        Primary Care Provider Office Phone # Fax #    Caprice Pimentel [phone]       [facility] [place] [address]  [place] [address]        Equal Access to Services     [facility] Center[facility]ORNELIA : Hadii madai mae hadasho Soomaali, waaxda luqadaha, qaybta kaalmada adeegyada, lacy fletcher hayreece rai . So [facility] [phone].    ATENCIÓN: Si habla español, tiene a kinney disposición servicios gratuitos de asistencia lingüística. Llame al [phone].    We comply with applicable federal civil rights laws and [place] laws. We do not discriminate on the basis of race, color, national origin, age, disability, sex, sexual orientation, or gender identity.            Thank you!     Thank you for choosing [facility]  for your care. Our goal is always to provide you with excellent care. Hearing back from our patients is one way we can continue to improve our services. Please take a few minutes to complete the written survey that you may receive in the mail after your visit with us. Thank you!             Your Updated Medication List - Protect others around you: Learn how to safely use, store and throw away your medicines at www.disposemymeds.org.          This list is accurate as of 6/15/18  1:01 PM.  Always use your most recent med list.                   Brand Name Dispense Instructions for use Diagnosis    fish oil-omega-3 fatty acids 1000 MG capsule      Take 1,000 mg by mouth daily    Paresthesia       folic acid 400 MCG tablet "    FOLVITE     Take 400 mcg by mouth daily        prenatal multivitamin plus iron 27-0.8 MG Tabs per tablet      Take 1 tablet by mouth daily        TL FOLATE PO      Take 1,000 mcg by mouth daily        vitamin D 30121 UNIT capsule    ERGOCALCIFEROL    8 capsule    Take 1 capsule (50,000 Units) by mouth every 7 days for 8 doses    Vitamin D deficiency

## 2018-06-16 ASSESSMENT — ANXIETY QUESTIONNAIRES: GAD7 TOTAL SCORE: 0

## 2018-06-16 ASSESSMENT — PATIENT HEALTH QUESTIONNAIRE - PHQ9: SUM OF ALL RESPONSES TO PHQ QUESTIONS 1-9: 0

## 2018-06-18 DIAGNOSIS — E55.9 HYPOVITAMINOSIS D: ICD-10-CM

## 2018-06-18 PROCEDURE — 36415 COLL VENOUS BLD VENIPUNCTURE: CPT | Performed by: FAMILY MEDICINE

## 2018-06-18 PROCEDURE — 82306 VITAMIN D 25 HYDROXY: CPT | Mod: 90 | Performed by: FAMILY MEDICINE

## 2018-06-18 PROCEDURE — 99000 SPECIMEN HANDLING OFFICE-LAB: CPT | Performed by: FAMILY MEDICINE

## 2018-06-19 LAB — DEPRECATED CALCIDIOL+CALCIFEROL SERPL-MC: 34 UG/L (ref 20–75)

## 2018-07-02 ENCOUNTER — TELEPHONE (OUTPATIENT)
Dept: OBGYN | Facility: OTHER | Age: 33
End: 2018-07-02

## 2018-07-02 DIAGNOSIS — N92.6 MISSED PERIOD: Primary | ICD-10-CM

## 2018-07-02 DIAGNOSIS — N92.6 MISSED PERIOD: ICD-10-CM

## 2018-07-02 DIAGNOSIS — Z32.01 PREGNANCY TEST POSITIVE: Primary | ICD-10-CM

## 2018-07-02 LAB — HCG UR QL: POSITIVE

## 2018-07-02 PROCEDURE — 81025 URINE PREGNANCY TEST: CPT | Performed by: ADVANCED PRACTICE MIDWIFE

## 2018-07-02 NOTE — TELEPHONE ENCOUNTER
Pregnancy test positive  LPM:5/31/2018  GA 4weeks 4days  G 1 P 0  No bleeding  No spotting  No one sided pelvic pain   Patient understands that if any of these above symptoms are to develop she is to go to the ED immediately.  Patient has been on a prenatal vitamin for months.  Scheduled for US at the hospital at 1215 pm and then a prenew ob appointment with you at 1330 the same day.

## 2018-07-17 ENCOUNTER — TELEPHONE (OUTPATIENT)
Dept: FAMILY MEDICINE | Facility: OTHER | Age: 33
End: 2018-07-17

## 2018-07-17 NOTE — TELEPHONE ENCOUNTER
Pt is 7 weeks pregnant and is taking Vitamin D 6100 units daily. Pt was reading in the what to expect when you are expecting book and they state high amounts of vitamin D are toxic. She would like to know if she should continue the amount or not. Please call pt back.

## 2018-07-18 ENCOUNTER — PRENATAL OFFICE VISIT (OUTPATIENT)
Dept: OBGYN | Facility: OTHER | Age: 33
End: 2018-07-18
Attending: ADVANCED PRACTICE MIDWIFE
Payer: COMMERCIAL

## 2018-07-18 ENCOUNTER — HOSPITAL ENCOUNTER (OUTPATIENT)
Dept: ULTRASOUND IMAGING | Facility: HOSPITAL | Age: 33
Discharge: HOME | End: 2018-07-18
Attending: ADVANCED PRACTICE MIDWIFE | Admitting: ADVANCED PRACTICE MIDWIFE
Payer: COMMERCIAL

## 2018-07-18 VITALS
BODY MASS INDEX: 28.17 KG/M2 | DIASTOLIC BLOOD PRESSURE: 62 MMHG | SYSTOLIC BLOOD PRESSURE: 92 MMHG | HEIGHT: 63 IN | WEIGHT: 159 LBS | HEART RATE: 60 BPM

## 2018-07-18 DIAGNOSIS — E55.9 VITAMIN D DEFICIENCY: Primary | ICD-10-CM

## 2018-07-18 DIAGNOSIS — Z32.01 PREGNANCY TEST POSITIVE: ICD-10-CM

## 2018-07-18 LAB
SPECIMEN SOURCE: ABNORMAL
WET PREP SPEC: ABNORMAL

## 2018-07-18 PROCEDURE — 87210 SMEAR WET MOUNT SALINE/INK: CPT | Performed by: ADVANCED PRACTICE MIDWIFE

## 2018-07-18 PROCEDURE — 36415 COLL VENOUS BLD VENIPUNCTURE: CPT | Performed by: ADVANCED PRACTICE MIDWIFE

## 2018-07-18 PROCEDURE — 87591 N.GONORRHOEAE DNA AMP PROB: CPT | Performed by: ADVANCED PRACTICE MIDWIFE

## 2018-07-18 PROCEDURE — 99207 ZZC PRENATAL VISIT: CPT | Performed by: ADVANCED PRACTICE MIDWIFE

## 2018-07-18 PROCEDURE — 76801 OB US < 14 WKS SINGLE FETUS: CPT | Mod: TC

## 2018-07-18 PROCEDURE — 99000 SPECIMEN HANDLING OFFICE-LAB: CPT | Performed by: ADVANCED PRACTICE MIDWIFE

## 2018-07-18 PROCEDURE — 87491 CHLMYD TRACH DNA AMP PROBE: CPT | Performed by: ADVANCED PRACTICE MIDWIFE

## 2018-07-18 PROCEDURE — 82306 VITAMIN D 25 HYDROXY: CPT | Mod: 90 | Performed by: ADVANCED PRACTICE MIDWIFE

## 2018-07-18 RX ORDER — SACCHAROMYCES BOULARDII 250 MG
250 CAPSULE ORAL 2 TIMES DAILY
COMMUNITY
End: 2018-08-05

## 2018-07-18 ASSESSMENT — PAIN SCALES - GENERAL: PAINLEVEL: NO PAIN (0)

## 2018-07-18 NOTE — PROGRESS NOTES
"Barbi Vale is a 32 year old female  Here with positive pregnancy test and confirmation US.    Discussed:    US findings, do Not change cat litter, avoid undercooked meat and unpasteurized milk and cheeses, wear gloves if gardening and wash hands after, and safe meds in pregnancy.    Also discussed safe amount of Vitamin D in pregnancy      O:   BP 92/62  Pulse 60  Ht 5' 3\" (1.6 m)  Wt 159 lb (72.1 kg)  LMP 2018  BMI 28.17 kg/m2   Pleasant without acute distress.  Pelvic:  Vagina and vulva are normal;  no discharge is noted.    Cervix: normal without lesions. Discharge noted.   Uterus:  , slightly enlarged and mobile, without tenderness.  Adnexa: without masses or tenderness.      A:    G 1 IUP @ 6w 6d    P:    Pelvic exam  GC/CT, wet prep  Early pregnancy education    RTO @ 10w gest. For New OB and as needed    Greater than 10 minutes were spent face to face counseling this patient US findings, do Not change cat litter, avoid undercooked meat and unpasteurized milk and cheeses, wear gloves if gardening and wash hands after, and safe meds in pregnancy.    Also discussed safe amount of Vitamin D in pregnancy      GUERRERO Beasley, CNAIDEN    "

## 2018-07-18 NOTE — MR AVS SNAPSHOT
After Visit Summary   7/18/2018    Barbi Vale    MRN: [id number]           Patient Information     Date Of Birth          1985        Visit Information        Provider Department      7/18/2018 1:30 PM Irvin Norris APRN CNM [facility]        Today's Diagnoses     Vitamin D deficiency    -  1    Pregnancy test positive          Care Instructions    Return to office in 3 weeks for New OB and as needed.    Thank you for allowing Irvin MASTERS CNM and our OB team to participate in your care.  If you have a scheduling or an appointment question please contact [facility] Unit Coordinator at their direct line [phone].   ALL nursing questions or concerns can be directed to your OB nurse at: [phone] - Jannette/Chandrika               Follow-ups after your visit        Your next 10 appointments already scheduled     Aug 15, 2018  9:00 AM CDT   (Arrive by 8:45 AM)   New Prenatal with GUERRERO Oneal CNM   [facility] [place] )    [address]  [place] [address]   [phone]              Who to contact     If you have questions or need follow up information about today's clinic visit or your schedule please contact [facility] directly at [phone].  Normal or non-critical lab and imaging results will be communicated to you by MyChart, letter or phone within 4 business days after the clinic has received the results. If you do not hear from us within 7 days, please contact the clinic through [employer]hart or phone. If you have a critical or abnormal lab result, we will notify you by phone as soon as possible.  Submit refill requests through Alkymos or call your pharmacy and they will forward the refill request to us. Please allow 3 business days for your refill to be completed.          Additional Information About Your Visit        [employer]harMemberConnection Information     Al[employer] gives you secure access to your electronic health  "record. If you see a primary care provider, you can also send messages to your care team and make appointments. If you have questions, please call your primary care clinic.  If you do not have a primary care provider, please call [phone] and they will assist you.        Care EveryWhere ID     This is your Care EveryWhere ID. This could be used by other organizations to access your [place] medical records  [id number]        Your Vitals Were     Pulse Height Last Period BMI (Body Mass Index)          60 5' 3\" (1.6 m) 05/31/2018 28.17 kg/m2         Blood Pressure from Last 3 Encounters:   07/18/18 92/62   06/15/18 108/68   03/19/18 98/60    Weight from Last 3 Encounters:   07/18/18 159 lb (72.1 kg)   06/15/18 166 lb 12.8 oz (75.7 kg)   03/19/18 162 lb (73.5 kg)              We Performed the Following     GC/Chlamydia by PCR - [facility]     Vitamin D Deficiency     Wet prep        Primary Care Provider Office Phone # Fax #    Caprice Pimentel [phone]       [facility] [place] [address]  [place] [address]        Equal Access to Services     RIKKI FLEMING AH: Hadii aad ku hadasho Soomaali, waaxda luqadaha, qaybta kaalmada adeegyada, lacy skinnerin haymaria elenan rox mena lapooja . So [facility] [phone].    ATENCIÓN: Si habla español, tiene a kinney disposición servicios gratuitos de asistencia lingüística. Llame al [phone].    We comply with applicable federal civil rights laws and [place] laws. We do not discriminate on the basis of race, color, national origin, age, disability, sex, sexual orientation, or gender identity.            Thank you!     [facility]. Our goal is always to provide you with excellent care. Hearing back from our patients is one way we can continue to improve our services. Please take a few minutes to complete the written survey that you may receive in the mail after your visit with us. Thank you!             Your " Updated Medication List - Protect others around you: Learn how to safely use, store and throw away your medicines at www.disposemymeds.org.          This list is accurate as of 7/18/18  5:52 PM.  Always use your most recent med list.                   Brand Name Dispense Instructions for use Diagnosis    fish oil-omega-3 fatty acids 1000 MG capsule      Take 1,000 mg by mouth daily    Paresthesia       folic acid 400 MCG tablet    FOLVITE     Take 400 mcg by mouth daily        prenatal multivitamin plus iron 27-0.8 MG Tabs per tablet      Take 1 tablet by mouth daily        saccharomyces boulardii 250 MG capsule    FLORASTOR     Take 250 mg by mouth 2 times daily        VITAMIN D (CHOLECALCIFEROL) PO      Take 5,000 Units by mouth daily

## 2018-07-19 LAB
C TRACH DNA SPEC QL PROBE+SIG AMP: NOT DETECTED
N GONORRHOEA DNA SPEC QL PROBE+SIG AMP: NOT DETECTED
SPECIMEN SOURCE: NORMAL

## 2018-07-20 LAB — DEPRECATED CALCIDIOL+CALCIFEROL SERPL-MC: 37 UG/L (ref 20–75)

## 2018-07-25 ENCOUNTER — MYC MEDICAL ADVICE (OUTPATIENT)
Dept: OBGYN | Facility: OTHER | Age: 33
End: 2018-07-25

## 2018-07-28 ENCOUNTER — HOSPITAL ENCOUNTER (EMERGENCY)
Facility: HOSPITAL | Age: 33
Discharge: HOME | End: 2018-07-28
Attending: NURSE PRACTITIONER | Admitting: NURSE PRACTITIONER
Payer: OTHER MISCELLANEOUS

## 2018-07-28 VITALS
OXYGEN SATURATION: 100 % | DIASTOLIC BLOOD PRESSURE: 58 MMHG | RESPIRATION RATE: 16 BRPM | HEART RATE: 66 BPM | SYSTOLIC BLOOD PRESSURE: 104 MMHG | TEMPERATURE: 97.7 F

## 2018-07-28 DIAGNOSIS — Z33.1 PREGNANCY, INCIDENTAL: ICD-10-CM

## 2018-07-28 DIAGNOSIS — R07.81 RIB PAIN ON RIGHT SIDE: ICD-10-CM

## 2018-07-28 PROCEDURE — 99213 OFFICE O/P EST LOW 20 MIN: CPT | Performed by: NURSE PRACTITIONER

## 2018-07-28 PROCEDURE — G0463 HOSPITAL OUTPT CLINIC VISIT: HCPCS

## 2018-07-28 ASSESSMENT — ENCOUNTER SYMPTOMS
GASTROINTESTINAL NEGATIVE: 1
CONSTITUTIONAL NEGATIVE: 1
NECK PAIN: 0
WOUND: 0
RESPIRATORY NEGATIVE: 1
COLOR CHANGE: 0
BACK PAIN: 0
CARDIOVASCULAR NEGATIVE: 1
JOINT SWELLING: 0

## 2018-07-28 NOTE — ED NOTES
Patient presents with Rt sided rib pain X 1935 last noc.  Patient states she fell on a caution cone at work.

## 2018-07-28 NOTE — ED AVS SNAPSHOT
[facility]    [address]    [place] [address]    Phone:  [phone]                                       Barbi Vale   MRN: [id number]    Department:  [facility]   Date of Visit:  7/28/2018           After Visit Summary Signature Page     I have received my discharge instructions, and my questions have been answered. I have discussed any challenges I see with this plan with the nurse or doctor.    ..........................................................................................................................................  Patient/Patient Representative Signature      ..........................................................................................................................................  Patient Representative Print Name and Relationship to Patient    ..................................................               ................................................  Date                                            Time    ..........................................................................................................................................  Reviewed by Signature/Title    ...................................................              ..............................................  Date                                                            Time

## 2018-07-28 NOTE — ED AVS SNAPSHOT
[facility]    [address] [address]    Phone:  [phone]                                       Barbi Vale   MRN: [id number]    Department:  [facility]   Date of Visit:  7/28/2018           Patient Information     Date Of Birth          1985        Your diagnoses for this visit were:     Rib pain on right side     Pregnancy, incidental        You were seen by Fabienne Forbes NP.      Follow-up Information     Follow up with Caprice Pimentel MD. Schedule an appointment as soon as possible for a visit in 2 days.    Specialty:  Family Practice    Why:  for re-evaluation    Contact information:    [facility]  [address]  [facility] [address] [phone]          Follow up with [facility].    Specialty:  EMERGENCY MEDICINE    Why:  If symptoms worsen    Contact information:    [address] [address] [phone]    Additional information:    From [facility]: Take [address]. Turn left at [address]. Turn left at the first stoplight on [address] [place] [address]. At the first stop sign, take a right onto [address]. Take a left into the parking lot and continue through until you reach the North enterance of the building.       From [place]: Take [address]. Take the [address] ramp towards [place]. Turn left onto [address]. Take a slight right onto [address]. Turn left at the first stoplight on [address]. At the first stop sign, take a right onto [address]. Take a left into the parking lot and continue through until you reach the North enterance of the building.       From [place]: Take [address]. Take a right at [address]. At the first stop sign, take a right onto [address]. Take a left into the parking lot and continue through until you reach the North enterance of the building.         Discharge Instructions        Apply ice to painful area and rest.   Take Tylenol for pain.   Avoid straining or heavy lifting over the weekend.   See PCP on Monday for re-evaluation and release/further restrictions for work.   Follow up with Ob/Gyn as scheduled.         Your next 10 appointments already scheduled     Aug 13, 2018  9:00 AM CDT   (Arrive by 8:45 AM)   New Prenatal with GUERRERO Oneal CNM   [facility] [place] ([facility] [place] )    [address]  [place] [address]   [phone]                 Review of your medicines      Our records show that you are taking the medicines listed below. If these are incorrect, please call your family doctor or clinic.        Dose / Directions Last dose taken    fish oil-omega-3 fatty acids 1000 MG capsule   Dose:  1000 mg        Take 1,000 mg by mouth daily   Refills:  0        folic acid 400 MCG tablet   Commonly known as:  FOLVITE   Dose:  400 mcg        Take 400 mcg by mouth daily   Refills:  0        prenatal multivitamin plus iron 27-0.8 MG Tabs per tablet   Dose:  1 tablet        Take 1 tablet by mouth daily   Refills:  0        saccharomyces boulardii 250 MG capsule   Commonly known as:  FLORASTOR   Dose:  250 mg        Take 250 mg by mouth 2 times daily   Refills:  0        VITAMIN D (CHOLECALCIFEROL) PO   Dose:  5000 Units        Take 5,000 Units by mouth daily   Refills:  0                Orders Needing Specimen Collection     None      Pending Results     No orders found from 7/26/2018 to 7/29/2018.            Pending Culture Results     No orders found from 7/26/2018 to 7/29/2018.            Thank you for choosing [facility]       Thank you for choosing [facility] for your care. Our goal is always to provide you with excellent care. Hearing back from our patients is one way we can continue to improve our services. Please take a few minutes to complete the written survey that you may receive in the mail after you visit with us. Thank you!        Will  Information     [employer] gives you secure access to your electronic health record. If you see a primary care provider, you can also send messages to your care team and make appointments. If you have questions, please call your primary care clinic.  If you do not have a primary care provider, please call [phone] and they will assist you.        Care EveryWhere ID     This is your Care EveryWhere ID. This could be used by other organizations to access your [place] medical records  [id number]        Equal Access to Services     VEGA FLEMING : Hadii madai lambo Sokellyali, waaxda luqadaha, qaybta kaalmada adetroy, lacy maria. So [facility] [phone].    ATENCIÓN: Si habla español, tiene a kinney disposición servicios gratuitos de asistencia lingüística. Llame al [phone].    We comply with applicable federal civil rights laws and [place] laws. We do not discriminate on the basis of race, color, national origin, age, disability, sex, sexual orientation, or gender identity.            After Visit Summary       This is your record. Keep this with you and show to your community pharmacist(s) and doctor(s) at your next visit.

## 2018-07-28 NOTE — DISCHARGE INSTRUCTIONS
Apply ice to painful area and rest.   Take Tylenol for pain.   Avoid straining or heavy lifting over the weekend.   See PCP on Monday for re-evaluation and release/further restrictions for work.   Follow up with Ob/Gyn as scheduled.

## 2018-07-28 NOTE — ED PROVIDER NOTES
History     Chief Complaint   Patient presents with     Rib Pain     states she slipped last pm at work and landed on a orange cone on her right lateral rib area.  8 WEEKS PREGNANT     The history is provided by the patient. No  was used.     Barbi Vale is a 32 year old female who presents with right lateral rib pain. Fell and hit her side last night hitting a orange cone at work.   No difficulty breathing, no SOB, hurts to take a deep breath. Is currently 8 weeks pregnant.   No vaginal bleeding, no abdominal pain, no urinary symptoms. Has not applied ice or taken any Tylenol.      Review of Systems   Constitutional: Negative.    Respiratory: Negative.    Cardiovascular: Negative.    Gastrointestinal: Negative.    Genitourinary: Negative.    Musculoskeletal: Negative for back pain, joint swelling and neck pain.        Right sided rib pain   Skin: Negative for color change and wound.       Physical Exam   BP: 104/58  Pulse: 66  Temp: 97.7  F (36.5  C)  Resp: 16  SpO2: 100 %      Physical Exam   Constitutional: She is oriented to person, place, and time. She appears well-developed and well-nourished. No distress.   HENT:   Head: Normocephalic and atraumatic.   Eyes: Conjunctivae are normal. No scleral icterus.   Neck: Normal range of motion.   Cardiovascular: Normal rate, regular rhythm and normal heart sounds.    Pulmonary/Chest: Effort normal and breath sounds normal.       Abdominal: Normal appearance and bowel sounds are normal. There is no hepatosplenomegaly. There is no tenderness.   Neurological: She is alert and oriented to person, place, and time.   Skin: Skin is warm, dry and intact. No abrasion and no ecchymosis noted. She is not diaphoretic. No erythema.   Nursing note and vitals reviewed.      ED Course     ED Course     Procedures    Assessments & Plan (with Medical Decision Making)     I have reviewed the nursing notes.  I have reviewed the findings, diagnosis, plan and  need for follow up with the patient.  8 weeks pregnant. 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   No abnormalities at injury site. No XR done d/t risk with pregnancy. LS clear. No abdominal symptoms.   Advised ice and rest. Tylenol for pain PRN. See PCP on Monday for re-evaluation.   Return here if symptoms worsen.    New Prescriptions    No medications on file       Final diagnoses:   Rib pain on right side   Pregnancy, incidental       7/28/2018   [facility]     Fabienne Forbes NP  07/28/18 0969

## 2018-07-31 ENCOUNTER — TELEPHONE (OUTPATIENT)
Dept: FAMILY MEDICINE | Facility: OTHER | Age: 33
End: 2018-07-31

## 2018-07-31 ENCOUNTER — OFFICE VISIT (OUTPATIENT)
Dept: FAMILY MEDICINE | Facility: OTHER | Age: 33
End: 2018-07-31
Attending: NURSE PRACTITIONER
Payer: OTHER MISCELLANEOUS

## 2018-07-31 VITALS
HEART RATE: 82 BPM | DIASTOLIC BLOOD PRESSURE: 54 MMHG | SYSTOLIC BLOOD PRESSURE: 106 MMHG | OXYGEN SATURATION: 98 % | BODY MASS INDEX: 28.7 KG/M2 | WEIGHT: 162 LBS | TEMPERATURE: 98.9 F

## 2018-07-31 DIAGNOSIS — S20.211D CONTUSION OF RIB ON RIGHT SIDE, SUBSEQUENT ENCOUNTER: Primary | ICD-10-CM

## 2018-07-31 PROCEDURE — 99213 OFFICE O/P EST LOW 20 MIN: CPT | Performed by: NURSE PRACTITIONER

## 2018-07-31 ASSESSMENT — PAIN SCALES - GENERAL: PAINLEVEL: NO PAIN (0)

## 2018-07-31 ASSESSMENT — ANXIETY QUESTIONNAIRES
4. TROUBLE RELAXING: NOT AT ALL
7. FEELING AFRAID AS IF SOMETHING AWFUL MIGHT HAPPEN: NOT AT ALL
6. BECOMING EASILY ANNOYED OR IRRITABLE: NOT AT ALL
GAD7 TOTAL SCORE: 0
5. BEING SO RESTLESS THAT IT IS HARD TO SIT STILL: NOT AT ALL
1. FEELING NERVOUS, ANXIOUS, OR ON EDGE: NOT AT ALL
2. NOT BEING ABLE TO STOP OR CONTROL WORRYING: NOT AT ALL
3. WORRYING TOO MUCH ABOUT DIFFERENT THINGS: NOT AT ALL

## 2018-07-31 NOTE — NURSING NOTE
"Chief Complaint   Patient presents with     Pain     rib, fu UC       Initial /54  Pulse 82  Temp 98.9  F (37.2  C) (Tympanic)  Wt 162 lb (73.5 kg)  LMP 05/31/2018  SpO2 98%  BMI 28.7 kg/m2 Estimated body mass index is 28.7 kg/(m^2) as calculated from the following:    Height as of 7/18/18: 5' 3\" (1.6 m).    Weight as of this encounter: 162 lb (73.5 kg).  Medication Reconciliation: complete    Vivien Chaves MA    "

## 2018-07-31 NOTE — PROGRESS NOTES
SUBJECTIVE:   Barbi Vale is a 32 year old female who presents to clinic today for the following health issues:      ED/UC Followup:    Facility:  [facility]  Date of visit: 7-  Reason for visit: Rib pain, patient is pregnant  Current Status: feels better. Pain is gone unless she moves a certain way. Worried about heavy lifting things at her job. Goes back to work on Friday.             Problem list and histories reviewed & adjusted, as indicated.  Additional history: as documented      Reviewed and updated as needed this visit by clinical staff       Reviewed and updated as needed this visit by Provider         ROS:  CONSTITUTIONAL:NEGATIVE for fever, chills, change in weight  INTEGUMENTARY/SKIN: slight bruise to right lower rib area  RESP: NEGATIVE for significant cough or SOB  CV: NEGATIVE for chest pain, palpitations or peripheral edema  MUSCULOSKELETAL: tenderness right lower rib area    OBJECTIVE:     /54  Pulse 82  Temp 98.9  F (37.2  C) (Tympanic)  Wt 162 lb (73.5 kg)  LMP 05/31/2018  SpO2 98%  BMI 28.7 kg/m2  Body mass index is 28.7 kg/(m^2).   GENERAL: healthy, alert and no distress  RESP: lungs clear to auscultation - no rales, rhonchi or wheezes  CV: regular rate and rhythm, normal S1 S2, no S3 or S4, no murmur, click or rub, no peripheral edema and peripheral pulses strong  MS: right lower rib pain  SKIN: ecchymoses - right lower rib anterior    Diagnostic Test Results:  none     ASSESSMENT/PLAN:     1. Contusion of rib on right side, subsequent encounter  Right sided rib tenderness. Barbi states she has decreases pain to the site. She uses ice as needed for comfort. She is encouraged to limit any activity that may cause pain. She should go to the ER if she develops SOB, difficulty breathing or increased pain to the site. She did not have an XR done due to her pregnancy. She is encouraged to continue with symptomatic treatment. If she does not have any improvement or worsening of  symptoms she should follow up.  Barbi agrees with plan today.           See Patient Instructions    GUERRERO Gordon [facility]

## 2018-07-31 NOTE — TELEPHONE ENCOUNTER
3:48 PM    Reason for Call: Phone Call    Description: Pt is requesting Release Back to Work. Pt saw Nisa Washington today and forgot to ask to have letter.    Was an appointment offered for this call? No  If yes : Appointment type              Date    Preferred method for responding to this message: Telephone Call  What is your phone number ? [phone]    If we cannot reach you directly, may we leave a detailed response at the number you provided? Yes    Can this message wait until your PCP/provider returns, if unavailable today? Not applicable, provider is in today    Niya Amaro

## 2018-07-31 NOTE — MR AVS SNAPSHOT
After Visit Summary   7/31/2018    Barbi Vale    MRN: [id number]           Patient Information     Date Of Birth          1985        Visit Information        Provider Department      7/31/2018 3:10 PM Nisa Washington APRN [facility] ([facility] [place]        Care Instructions      Rib Contusion     A rib contusion is a bruise to one or more rib bones. It may cause pain, tenderness, swelling and a purplish discoloration. There may be a sharp pain while breathing.  You will be assessed for other injuries. You will likely be given pain medicine. Rib contusions heal on their own, without further treatment. However, pain may take weeks to months to go away.   Note that a small crack (fracture) in the rib may cause the same symptoms as a rib contusion. The small crack may not be seen on a chest X-ray. However, the conditions are managed in the same way.  Home care    Rest. Avoid heavy lifting, strenuous exertion, or any activity that causes pain.    Ice the area to reduce pain and swelling. Put ice cubes in a plastic bag or use a cold pack. (Wrap the cold source in a thin towel. Do not place it directly on your skin.) Ice the injured area for 20 minutes every 1 to 2 hours the first day. Continue with ice packs 3 to 4 times a day for the next 2 days, then as needed for the relief of pain and swelling.    Take any prescribed pain medicine as directed by your healthcare provider. If none was prescribed, take acetaminophen, ibuprofen, or naproxen to control pain.    If you have a significant injury, you may be given a device called an incentive spirometer to keep your lungs healthy. Use as directed.  Follow-up care  Follow up with your healthcare provider during the next week or as directed.  When to seek medical advice  Call your healthcare provider for any of the following:    Shortness of breath or trouble breathing    Increasing chest pain with breathing    Coughing    Dizziness,  weakness, or fainting    New or worsening pain    Fever of 100.4 F (38 C) or higher, or as directed by your healthcare provider  Date Last Reviewed: 2/1/2017 2000-2017 [employer]. [address] [place] [address], [place] [address]. All rights reserved. This information is not intended as a substitute for professional medical care. Always follow your healthcare professional's instructions.                Follow-ups after your visit        Follow-up notes from your care team     Return if symptoms worsen or fail to improve.      Your next 10 appointments already scheduled     Aug 13, 2018  9:00 AM CDT   (Arrive by 8:45 AM)   New Prenatal with GUERRERO Oneal CNM   [facility] ([facility] )    [address] [place] [address] [facility] [address]   [phone]              Who to contact     If you have questions or need follow up information about today's clinic visit or your schedule please contact [facility] directly at [phone].  Normal or non-critical lab and imaging results will be communicated to you by Pingboardhart, letter or phone within 4 business days after the clinic has received the results. If you do not hear from us within 7 days, please contact the clinic through Pingboardhart or phone. If you have a critical or abnormal lab result, we will notify you by phone as soon as possible.  Submit refill requests through VeriTweet or call your pharmacy and they will forward the refill request to us. Please allow 3 business days for your refill to be completed.          Additional Information About Your Visit        Pingboardhart Information     [employer] gives you secure access to your electronic health record. If you see a primary care provider, you can also send messages to your care team and make appointments. If you have questions, please call your primary care clinic.  If you do not have a primary care provider, please call [phone] and they will assist you.        Care  EveryWhere ID     This is your Care EveryWhere ID. This could be used by other organizations to access your [facility] medical records  [id number]        Your Vitals Were     Pulse Temperature Last Period Pulse Oximetry BMI (Body Mass Index)       82 98.9  F (37.2  C) (Tympanic) 05/31/2018 98% 28.7 kg/m2        Blood Pressure from Last 3 Encounters:   07/31/18 106/54   07/28/18 104/58   07/18/18 92/62    Weight from Last 3 Encounters:   07/31/18 162 lb (73.5 kg)   07/18/18 159 lb (72.1 kg)   06/15/18 166 lb 12.8 oz (75.7 kg)              Today, you had the following     No orders found for display       Primary Care Provider Office Phone # Fax #    Caprice Pimentel [phone]       [facility] [place] [address]  [place] [address]        Equal Access to Services     VEGA FLEMING AH: Hadii aad ku hadasho Soomaali, waaxda luqadaha, qaybta kaalmada adeegyada, waxay idiin hayaan rox kharapablo rai . So [facility] [phone].    ATENCIÓN: Si ariadnela esprhea, tiene a kinney disposición servicios gratuitos de asistencia lingüística. Llame al [phone].    We comply with applicable federal civil rights laws and [place] laws. We do not discriminate on the basis of race, color, national origin, age, disability, sex, sexual orientation, or gender identity.            Thank you!     Thank you for choosing [facility] [place]  for your care. Our goal is always to provide you with excellent care. Hearing back from our patients is one way we can continue to improve our services. Please take a few minutes to complete the written survey that you may receive in the mail after your visit with us. Thank you!             Your Updated Medication List - Protect others around you: Learn how to safely use, store and throw away your medicines at www.disposemymeds.org.          This list is accurate as of 7/31/18  3:29 PM.  Always use your most recent med list.                   Brand Name Dispense Instructions  for use Diagnosis    fish oil-omega-3 fatty acids 1000 MG capsule      Take 1,000 mg by mouth daily    Paresthesia       folic acid 400 MCG tablet    FOLVITE     Take 400 mcg by mouth daily        prenatal multivitamin plus iron 27-0.8 MG Tabs per tablet      Take 1 tablet by mouth daily        saccharomyces boulardii 250 MG capsule    FLORASTOR     Take 250 mg by mouth 2 times daily        VITAMIN D (CHOLECALCIFEROL) PO      Take 5,000 Units by mouth daily

## 2018-07-31 NOTE — PATIENT INSTRUCTIONS
Rib Contusion     A rib contusion is a bruise to one or more rib bones. It may cause pain, tenderness, swelling and a purplish discoloration. There may be a sharp pain while breathing.  You will be assessed for other injuries. You will likely be given pain medicine. Rib contusions heal on their own, without further treatment. However, pain may take weeks to months to go away.   Note that a small crack (fracture) in the rib may cause the same symptoms as a rib contusion. The small crack may not be seen on a chest X-ray. However, the conditions are managed in the same way.  Home care    Rest. Avoid heavy lifting, strenuous exertion, or any activity that causes pain.    Ice the area to reduce pain and swelling. Put ice cubes in a plastic bag or use a cold pack. (Wrap the cold source in a thin towel. Do not place it directly on your skin.) Ice the injured area for 20 minutes every 1 to 2 hours the first day. Continue with ice packs 3 to 4 times a day for the next 2 days, then as needed for the relief of pain and swelling.    Take any prescribed pain medicine as directed by your healthcare provider. If none was prescribed, take acetaminophen, ibuprofen, or naproxen to control pain.    If you have a significant injury, you may be given a device called an incentive spirometer to keep your lungs healthy. Use as directed.  Follow-up care  Follow up with your healthcare provider during the next week or as directed.  When to seek medical advice  Call your healthcare provider for any of the following:    Shortness of breath or trouble breathing    Increasing chest pain with breathing    Coughing    Dizziness, weakness, or fainting    New or worsening pain    Fever of 100.4 F (38 C) or higher, or as directed by your healthcare provider  Date Last Reviewed: 2/1/2017 2000-2017 [employer]. [address] [place] [address], [place] [address]. All rights reserved. This information is not intended as a substitute for  professional medical care. Always follow your healthcare professional's instructions.

## 2018-08-01 ASSESSMENT — ANXIETY QUESTIONNAIRES: GAD7 TOTAL SCORE: 0

## 2018-08-01 ASSESSMENT — PATIENT HEALTH QUESTIONNAIRE - PHQ9: SUM OF ALL RESPONSES TO PHQ QUESTIONS 1-9: 3

## 2018-08-02 ENCOUNTER — TELEPHONE (OUTPATIENT)
Dept: FAMILY MEDICINE | Facility: OTHER | Age: 33
End: 2018-08-02

## 2018-08-02 NOTE — TELEPHONE ENCOUNTER
4:09 PM    Reason for Call: Phone Call    Description: pt called and want paperwork faxed to employer tomorrow eder Miguel   Was an appointment offered for this call?   If yes : Appointment type              Date    Preferred method for responding to this message: telephone  What is your phone number ?[phone]    If we cannot reach you directly, may we leave a detailed response at the number you provided? {YES /     Can this message wait until your PCP/provider returns, if available today?    Nataly Hwang

## 2018-08-02 NOTE — TELEPHONE ENCOUNTER
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 Will see if another provider is able to sign off on it otherwise provider will need to have her out of work through 08/03/18. She is scheduled for work on  Sat 08/04/18 also and would like to return to work as soon as she can.    Please route work ability back to me.        Thank you,    Amee GARCIA-P  Work Comp Dept  [address] [address]  Office: [phone] Fax [phone]

## 2018-08-02 NOTE — TELEPHONE ENCOUNTER
Pt called back.  Needs release today (8-02) in order to return to work tomorrow.  Please call when letter is ready so pt can pick it up.  Thank you.

## 2018-08-02 NOTE — TELEPHONE ENCOUNTER
Pt called back with [employer] fax# [phone].  Please fax release and call pt to confirm it has been done.  Thank you.

## 2018-08-02 NOTE — TELEPHONE ENCOUNTER
Spoke to patient and to Felix's nurse.  Paperwork in on her desk and will be discussed in the morning.  Please call pt and let her know when they are done or what she needs to do in order to get them done.  Thank you.

## 2018-08-05 ENCOUNTER — HOSPITAL ENCOUNTER (EMERGENCY)
Facility: HOSPITAL | Age: 33
Discharge: HOME | End: 2018-08-05
Attending: PHYSICIAN ASSISTANT | Admitting: PHYSICIAN ASSISTANT
Payer: COMMERCIAL

## 2018-08-05 VITALS
SYSTOLIC BLOOD PRESSURE: 107 MMHG | DIASTOLIC BLOOD PRESSURE: 63 MMHG | TEMPERATURE: 98.2 F | RESPIRATION RATE: 16 BRPM | OXYGEN SATURATION: 98 %

## 2018-08-05 DIAGNOSIS — Z33.1 PREGNANCY, INCIDENTAL: ICD-10-CM
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
## 2018-08-05 PROCEDURE — 99213 OFFICE O/P EST LOW 20 MIN: CPT | Performed by: PHYSICIAN ASSISTANT

## 2018-08-05 PROCEDURE — G0463 HOSPITAL OUTPT CLINIC VISIT: HCPCS

## 2018-08-05 PROCEDURE — 25000132 ZZH RX MED GY IP 250 OP 250 PS 637: Performed by: PHYSICIAN ASSISTANT

## 2018-08-05 RX ADMIN — AMOXICILLIN AND CLAVULANATE POTASSIUM 1 TABLET: 875; 125 TABLET, FILM COATED ORAL at 21:04

## 2018-08-05 ASSESSMENT — ENCOUNTER SYMPTOMS
PSYCHIATRIC NEGATIVE: 1
CONSTITUTIONAL NEGATIVE: 1
WOUND: 1
NEUROLOGICAL NEGATIVE: 1

## 2018-08-05 NOTE — ED AVS SNAPSHOT
[facility]    [address]    [place] [address]    Phone:  [phone]                                       Barbi Vale   MRN: [id number]    Department:  [facility]   Date of Visit:  8/5/2018           Patient Information     Date Of Birth          1985        Your diagnoses for this visit were:     Dog bite, initial encounter Tetanus 06/2018    Pregnancy, incidental        You were seen by Dina Mireles PA.      Follow-up Information     Follow up In 1 week.    Why:  With your OB provider        Follow up with Caprice Pimentel MD.    Specialty:  Family Practice    Why:  if you develop redness/swelling    Contact information:    [facility] [place]  [address]  [place] [address]  [phone]          Follow up with [facility].    Specialty:  EMERGENCY MEDICINE    Why:  If fever/further concerns develop    Contact information:    [address]  [place] [address] [phone]    Additional information:    From [place]: Take [address]. Turn left at [address]. Turn left at the first stoplight on [address]. At the first stop sign, take a right onto [address]. Take a left into the parking lot and continue through until you reach the North enterance of the building.       From [place]: Take [address]. Take the [address] ramp towards [place]. Turn left onto [address]. Take a slight right onto [address]. Turn left at the first stoplight on [address]. At the first stop sign, take a right onto [address]. Take a left into the parking lot and continue through until you reach the North enterance of the building.       From [place]: Take [address]. Take a right at [address]. At the first stop sign, take a right onto [address]. Take a left into the parking lot and continue through until you reach the North enterance of the  building.       Discharge References/Attachments     BITE, DOG (ENGLISH)    BITES AND SCRATCHES, ANIMAL (ENGLISH)      Your next 10 appointments already scheduled     Aug 13, 2018  9:00 AM CDT   (Arrive by 8:45 AM)   New Prenatal with GUERRERO Oneal CNM   [facility] ([facility] )    [address]  [place] [address]   [phone]                 Review of your medicines      START taking        Dose / Directions Last dose taken    amoxicillin-clavulanate 875-125 MG per tablet   Commonly known as:  AUGMENTIN   Dose:  1 tablet   Quantity:  20 tablet        Take 1 tablet by mouth 2 times daily   Refills:  0          Our records show that you are taking the medicines listed below. If these are incorrect, please call your family doctor or clinic.        Dose / Directions Last dose taken    fish oil-omega-3 fatty acids 1000 MG capsule   Dose:  1000 mg        Take 1,000 mg by mouth daily   Refills:  0        folic acid 400 MCG tablet   Commonly known as:  FOLVITE   Dose:  400 mcg        Take 400 mcg by mouth daily   Refills:  0        prenatal multivitamin plus iron 27-0.8 MG Tabs per tablet   Dose:  1 tablet        Take 1 tablet by mouth daily   Refills:  0                Prescriptions were sent or printed at these locations (1 Prescription)                   [id number] - [place], [place] - [address] AT [facility] [address] [facility] [address]   [address] [place] [address]    Telephone:  [phone]   Fax:  [phone]   Hours:                  E-Prescribed (1 of 1)         amoxicillin-clavulanate (AUGMENTIN) 875-125 MG per tablet                Orders Needing Specimen Collection     None      Pending Results     No orders found from 8/3/2018 to 8/6/2018.            Pending Culture Results     No orders found from 8/3/2018 to 8/6/2018.            Thank you for choosing [facility]       Thank you for choosing [facility] for your care. Our goal is always to provide you with  excellent care. Hearing back from our patients is one way we can continue to improve our services. Please take a few minutes to complete the written survey that you may receive in the mail after you visit with us. Thank you!        [employer] Information     [employer] gives you secure access to your electronic health record. If you see a primary care provider, you can also send messages to your care team and make appointments. If you have questions, please call your primary care clinic.  If you do not have a primary care provider, please call [phone] and they will assist you.        Care EveryWhere ID     This is your Care EveryWhere ID. This could be used by other organizations to access your [facility] medical records  [id number]        Equal Access to Services     VEGA FLEMING : Adelaida Sibley, beny burt, suhail araya, lacy maria. So [facility] [phone].    ATENCIÓN: Si habla español, tiene a kinney disposición servicios gratuitos de asistencia lingüística. Llame al [phone].    We comply with applicable federal civil rights laws and [place] laws. We do not discriminate on the basis of race, color, national origin, age, disability, sex, sexual orientation, or gender identity.            After Visit Summary       This is your record. Keep this with you and show to your community pharmacist(s) and doctor(s) at your next visit.

## 2018-08-05 NOTE — ED AVS SNAPSHOT
[facility]    [address]    [place] [address]    Phone:  [phone]                                       Barbi Vale   MRN: [id number]    Department:  [facility]   Date of Visit:  8/5/2018           After Visit Summary Signature Page     I have received my discharge instructions, and my questions have been answered. I have discussed any challenges I see with this plan with the nurse or doctor.    ..........................................................................................................................................  Patient/Patient Representative Signature      ..........................................................................................................................................  Patient Representative Print Name and Relationship to Patient    ..................................................               ................................................  Date                                            Time    ..........................................................................................................................................  Reviewed by Signature/Title    ...................................................              ..............................................  Date                                                            Time
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
## 2018-08-06 NOTE — ED TRIAGE NOTES
Pt presents today with law enforcement 9 weeks gestation for c/o a dog bite to her left inner thigh, from a dog that is not known to her but law enforcement has been in contact with the owners and they have tags and are saying they are going to produce proof of vaccines by tomorrow.

## 2018-08-13 ENCOUNTER — PRENATAL OFFICE VISIT (OUTPATIENT)
Dept: OBGYN | Facility: OTHER | Age: 33
End: 2018-08-13
Attending: ADVANCED PRACTICE MIDWIFE
Payer: COMMERCIAL

## 2018-08-13 VITALS
WEIGHT: 162 LBS | HEIGHT: 63 IN | BODY MASS INDEX: 28.7 KG/M2 | DIASTOLIC BLOOD PRESSURE: 62 MMHG | SYSTOLIC BLOOD PRESSURE: 120 MMHG

## 2018-08-13 DIAGNOSIS — Z34.01 SUPERVISION OF NORMAL FIRST PREGNANCY IN FIRST TRIMESTER: Primary | ICD-10-CM

## 2018-08-13 LAB
ABO + RH BLD: NORMAL
ABO + RH BLD: NORMAL
ALBUMIN UR-MCNC: NEGATIVE MG/DL
AMPHETAMINES UR QL SCN: NEGATIVE
APPEARANCE UR: CLEAR
BACTERIA #/AREA URNS HPF: ABNORMAL /HPF
BARBITURATES UR QL: NEGATIVE
BENZODIAZ UR QL: NEGATIVE
BILIRUB UR QL STRIP: NEGATIVE
BLD GP AB SCN SERPL QL: NORMAL
BLOOD BANK CMNT PATIENT-IMP: NORMAL
CANNABINOIDS UR QL SCN: NEGATIVE
COCAINE UR QL: NEGATIVE
COLOR UR AUTO: ABNORMAL
ERYTHROCYTE [DISTWIDTH] IN BLOOD BY AUTOMATED COUNT: 11.8 % (ref 10–15)
GLUCOSE UR STRIP-MCNC: NEGATIVE MG/DL
HCT VFR BLD AUTO: 37 % (ref 35–47)
HGB BLD-MCNC: 12.9 G/DL (ref 11.7–15.7)
HGB UR QL STRIP: NEGATIVE
KETONES UR STRIP-MCNC: NEGATIVE MG/DL
LEUKOCYTE ESTERASE UR QL STRIP: NEGATIVE
MCH RBC QN AUTO: 32.2 PG (ref 26.5–33)
MCHC RBC AUTO-ENTMCNC: 34.9 G/DL (ref 31.5–36.5)
MCV RBC AUTO: 92 FL (ref 78–100)
METHADONE UR QL SCN: NEGATIVE
MUCOUS THREADS #/AREA URNS LPF: PRESENT /LPF
NITRATE UR QL: NEGATIVE
OPIATES UR QL SCN: NEGATIVE
PCP UR QL SCN: NEGATIVE
PH UR STRIP: 7.5 PH (ref 4.7–8)
PLATELET # BLD AUTO: 208 10E9/L (ref 150–450)
RBC # BLD AUTO: 4.01 10E12/L (ref 3.8–5.2)
RBC #/AREA URNS AUTO: 0 /HPF (ref 0–2)
SOURCE: ABNORMAL
SP GR UR STRIP: 1 (ref 1–1.03)
SPECIMEN EXP DATE BLD: NORMAL
UROBILINOGEN UR STRIP-MCNC: NORMAL MG/DL (ref 0–2)
WBC # BLD AUTO: 6.3 10E9/L (ref 4–11)
WBC #/AREA URNS AUTO: 0 /HPF (ref 0–5)

## 2018-08-13 PROCEDURE — 99207 ZZC FIRST OB VISIT: CPT | Performed by: ADVANCED PRACTICE MIDWIFE

## 2018-08-13 PROCEDURE — 87389 HIV-1 AG W/HIV-1&-2 AB AG IA: CPT | Mod: 90 | Performed by: ADVANCED PRACTICE MIDWIFE

## 2018-08-13 PROCEDURE — 85027 COMPLETE CBC AUTOMATED: CPT | Performed by: ADVANCED PRACTICE MIDWIFE

## 2018-08-13 PROCEDURE — 36415 COLL VENOUS BLD VENIPUNCTURE: CPT | Performed by: ADVANCED PRACTICE MIDWIFE

## 2018-08-13 PROCEDURE — 86900 BLOOD TYPING SEROLOGIC ABO: CPT | Performed by: ADVANCED PRACTICE MIDWIFE

## 2018-08-13 PROCEDURE — 87340 HEPATITIS B SURFACE AG IA: CPT | Mod: 90 | Performed by: ADVANCED PRACTICE MIDWIFE

## 2018-08-13 PROCEDURE — 86901 BLOOD TYPING SEROLOGIC RH(D): CPT | Performed by: ADVANCED PRACTICE MIDWIFE

## 2018-08-13 PROCEDURE — 86850 RBC ANTIBODY SCREEN: CPT | Performed by: ADVANCED PRACTICE MIDWIFE

## 2018-08-13 PROCEDURE — 81001 URINALYSIS AUTO W/SCOPE: CPT | Mod: 59 | Performed by: ADVANCED PRACTICE MIDWIFE

## 2018-08-13 PROCEDURE — 86780 TREPONEMA PALLIDUM: CPT | Mod: 90 | Performed by: ADVANCED PRACTICE MIDWIFE

## 2018-08-13 PROCEDURE — 80307 DRUG TEST PRSMV CHEM ANLYZR: CPT | Performed by: ADVANCED PRACTICE MIDWIFE

## 2018-08-13 PROCEDURE — 86762 RUBELLA ANTIBODY: CPT | Mod: 90 | Performed by: ADVANCED PRACTICE MIDWIFE

## 2018-08-13 PROCEDURE — 99000 SPECIMEN HANDLING OFFICE-LAB: CPT | Performed by: ADVANCED PRACTICE MIDWIFE

## 2018-08-13 ASSESSMENT — ANXIETY QUESTIONNAIRES
4. TROUBLE RELAXING: NOT AT ALL
3. WORRYING TOO MUCH ABOUT DIFFERENT THINGS: NOT AT ALL
2. NOT BEING ABLE TO STOP OR CONTROL WORRYING: NOT AT ALL
6. BECOMING EASILY ANNOYED OR IRRITABLE: NOT AT ALL
1. FEELING NERVOUS, ANXIOUS, OR ON EDGE: NOT AT ALL
GAD7 TOTAL SCORE: 0
7. FEELING AFRAID AS IF SOMETHING AWFUL MIGHT HAPPEN: NOT AT ALL
5. BEING SO RESTLESS THAT IT IS HARD TO SIT STILL: NOT AT ALL

## 2018-08-13 ASSESSMENT — PAIN SCALES - GENERAL: PAINLEVEL: NO PAIN (0)

## 2018-08-13 NOTE — PATIENT INSTRUCTIONS
Return to office in 4 weeks for prenatal care and as needed.    Thank you for allowing Irvin MASTERS CNM and our OB team to participate in your care.  If you have a scheduling or an appointment question please contact [facility] Unit Coordinator at their direct line [phone].   ALL nursing questions or concerns can be directed to your OB nurse at: [phone] Michelle Bhatia/Chandrika

## 2018-08-13 NOTE — MR AVS SNAPSHOT
After Visit Summary   8/13/2018    Barbi Vale    MRN: [id number]           Patient Information     Date Of Birth          1985        Visit Information        Provider Department      8/13/2018 9:00 AM Irvin Norris APRN CNM [facility] [place]        Today's Diagnoses     Supervision of normal first pregnancy in first trimester    -  1      Care Instructions    Return to office in 4 weeks for prenatal care and as needed.    Thank you for allowing Irvin MASTERS CNM and our OB team to participate in your care.  If you have a scheduling or an appointment question please contact [facility] Unit Coordinator at their direct line [phone].   ALL nursing questions or concerns can be directed to your OB nurse at: [phone] - Jannette/Chandrika               Follow-ups after your visit        Your next 10 appointments already scheduled     Sep 12, 2018  3:45 PM CDT   (Arrive by 3:30 PM)   ESTABLISHED PRENATAL with GUERRERO Oneal CNM   [facility] [place] ([facility] )    [address] [place] [address]  [place] [address]   [phone]              Future tests that were ordered for you today     Open Future Orders        Priority Expected Expires Ordered    US OB < 14 Weeks Nuchal Translucency Routine 8/16/2018 9/5/2018 8/13/2018    First Trimester Screen Biochem Markers Routine 8/16/2018 9/5/2018 8/13/2018    Maternal Quad Marker 2nd Trimester Routine 9/13/2018 10/18/2018 8/13/2018    US OB > 14 Weeks Complete Single Routine 10/4/2018 11/7/2018 8/13/2018            Who to contact     If you have questions or need follow up information about today's clinic visit or your schedule please contact [facility] directly at [phone].  Normal or non-critical lab and imaging results will be communicated to you by MyChart, letter or phone within 4 business days after the clinic has received the results. If you do not hear from us within 7 days, please  "contact the clinic through [employer] or phone. If you have a critical or abnormal lab result, we will notify you by phone as soon as possible.  Submit refill requests through [employer] or call your pharmacy and they will forward the refill request to us. Please allow 3 business days for your refill to be completed.          Additional Information About Your Visit        shoppharBerst Information     [employer] gives you secure access to your electronic health record. If you see a primary care provider, you can also send messages to your care team and make appointments. If you have questions, please call your primary care clinic.  If you do not have a primary care provider, please call [phone] and they will assist you.        Care EveryWhere ID     This is your Care EveryWhere ID. This could be used by other organizations to access your [facility] medical records  [id number]        Your Vitals Were     Height Last Period BMI (Body Mass Index)             5' 3\" (1.6 m) 05/31/2018 28.7 kg/m2          Blood Pressure from Last 3 Encounters:   08/13/18 120/62   08/05/18 107/63   07/31/18 106/54    Weight from Last 3 Encounters:   08/13/18 162 lb (73.5 kg)   07/31/18 162 lb (73.5 kg)   07/18/18 159 lb (72.1 kg)              We Performed the Following     ABO/Rh type and screen     CBC with platelets     Drug Screen Urine (Range)     Hepatitis B surface antigen     HIV Antigen Antibody Combo     Rubella Antibody IgG Quantitative     Treponema Abs w Reflex to RPR and Titer     UA with Microscopic reflex to Culture        Primary Care Provider Office Phone # Fax #    Caprice AIDEN Pimentel [phone]       [facility] [place] [address]  [place] [address]        Equal Access to Services     [facility]: Hadii madai Sibley, waaxda lulindaadaha, qaybta lacy nicole. So [facility] [phone].    ATENCIÓN: Si habla español, tiene a kinney disposición servicios " prashanth de asistencia lingüística. Alissa nice [phone].    We comply with applicable federal civil rights laws and [place] laws. We do not discriminate on the basis of race, color, national origin, age, disability, sex, sexual orientation, or gender identity.            Thank you!     Thank you for choosing [facility]  for your care. Our goal is always to provide you with excellent care. Hearing back from our patients is one way we can continue to improve our services. Please take a few minutes to complete the written survey that you may receive in the mail after your visit with us. Thank you!             Your Updated Medication List - Protect others around you: Learn how to safely use, store and throw away your medicines at www.disposemymeds.org.          This list is accurate as of 8/13/18 10:03 AM.  Always use your most recent med list.                   Brand Name Dispense Instructions for use Diagnosis    amoxicillin-clavulanate 875-125 MG per tablet    AUGMENTIN    20 tablet    Take 1 tablet by mouth 2 times daily        fish oil-omega-3 fatty acids 1000 MG capsule      Take 1,000 mg by mouth daily    Paresthesia       folic acid 400 MCG tablet    FOLVITE     Take 400 mcg by mouth daily        prenatal multivitamin plus iron 27-0.8 MG Tabs per tablet      Take 1 tablet by mouth daily        VITAMIN D (CHOLECALCIFEROL) PO      Take 2,000 Units by mouth daily

## 2018-08-13 NOTE — PROGRESS NOTES
12 Week Visit    Patient education provided on the following:  Benefits of breastfeeding  Importance of early skin-to-skin contact    We reviewed the [place] [facility] Prenatal Toolkit in the [facility] Resource Book.  Patient questions and concerns addressed and reviewed. Support and encouragement provided.
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
## 2018-08-13 NOTE — NURSING NOTE
"Chief Complaint   Patient presents with     Prenatal Care     10 4/7       Initial /62  Ht 5' 3\" (1.6 m)  Wt 162 lb (73.5 kg)  LMP 05/31/2018  BMI 28.7 kg/m2 Estimated body mass index is 28.7 kg/(m^2) as calculated from the following:    Height as of this encounter: 5' 3\" (1.6 m).    Weight as of this encounter: 162 lb (73.5 kg).  Medication Reconciliation: complete    ANISH RAVI LPN    "

## 2018-08-14 LAB
HBV SURFACE AG SERPL QL IA: NONREACTIVE
HIV 1+2 AB+HIV1 P24 AG SERPL QL IA: NONREACTIVE
RUBV IGG SERPL IA-ACNC: 23 IU/ML
T PALLIDUM AB SER QL: NONREACTIVE

## 2018-08-14 ASSESSMENT — PATIENT HEALTH QUESTIONNAIRE - PHQ9: SUM OF ALL RESPONSES TO PHQ QUESTIONS 1-9: 2

## 2018-08-14 ASSESSMENT — ANXIETY QUESTIONNAIRES: GAD7 TOTAL SCORE: 0

## 2018-08-15 DIAGNOSIS — N76.0 BV (BACTERIAL VAGINOSIS): Primary | ICD-10-CM

## 2018-08-15 DIAGNOSIS — B96.89 BV (BACTERIAL VAGINOSIS): Primary | ICD-10-CM

## 2018-08-15 DIAGNOSIS — E55.9 VITAMIN D DEFICIENCY: ICD-10-CM

## 2018-08-15 RX ORDER — METRONIDAZOLE 500 MG/1
2000 TABLET ORAL ONCE
Qty: 4 TABLET | Refills: 0 | Status: CANCELLED | OUTPATIENT
Start: 2018-08-15 | End: 2018-08-15

## 2018-08-15 NOTE — PROGRESS NOTES
Pt requesting Flagyl to treat her BV result and another vitamin D lab to be done. Flagyl and Vitamin D level pended.

## 2018-08-16 RX ORDER — METRONIDAZOLE 500 MG/1
500 TABLET ORAL 3 TIMES DAILY
Qty: 21 TABLET | Refills: 0 | Status: SHIPPED | OUTPATIENT
Start: 2018-08-16 | End: 2018-09-12

## 2018-08-17 DIAGNOSIS — E55.9 VITAMIN D DEFICIENCY: ICD-10-CM

## 2018-08-17 PROCEDURE — 82306 VITAMIN D 25 HYDROXY: CPT | Mod: 90 | Performed by: ADVANCED PRACTICE MIDWIFE

## 2018-08-17 PROCEDURE — 99000 SPECIMEN HANDLING OFFICE-LAB: CPT | Performed by: ADVANCED PRACTICE MIDWIFE

## 2018-08-17 PROCEDURE — 36415 COLL VENOUS BLD VENIPUNCTURE: CPT | Performed by: ADVANCED PRACTICE MIDWIFE

## 2018-08-20 LAB — DEPRECATED CALCIDIOL+CALCIFEROL SERPL-MC: 36 UG/L (ref 20–75)

## 2018-08-22 ENCOUNTER — TELEPHONE (OUTPATIENT)
Dept: FAMILY MEDICINE | Facility: OTHER | Age: 33
End: 2018-08-22

## 2018-08-22 NOTE — TELEPHONE ENCOUNTER
9:22 AM    Reason for Call: Phone Call    Description: Pt would like a call back. Pt would like to discuss possibly having toenail removal. Has been having a lot of issues and needs to know if Dr. Pimentel can do it, or if she needs to see a surgeon or podiatrist.     Was an appointment offered for this call? No  If yes : Appointment type              Date    Preferred method for responding to this message: Telephone Call  What is your phone number ? [phone]     If we cannot reach you directly, may we leave a detailed response at the number you provided? Yes    Can this message wait until your PCP/provider returns, if available today? Not applicable, provider is in today    Niya Amaro

## 2018-08-22 NOTE — TELEPHONE ENCOUNTER
I need to see her first to see if it's infected but otherwise I will be able to take it off for her

## 2018-08-22 NOTE — TELEPHONE ENCOUNTER
3:47 PM    Reason for Call: OVERBOOK    Patient is having the following symptoms: ingrown toe nail with pain for 2 days.    The patient is requesting an appointment for ASAP with Dr Pimentel.    Was an appointment offered for this call? Yes  If yes : Appointment type              Date scheduled 09/12 and added to wait list but pt doesn't want to wait that long    Preferred method for responding to this message: Telephone Call  What is your phone number ?    If we cannot reach you directly, may we leave a detailed response at the number you provided? Yes    Can this message wait until your PCP/provider returns, if unavailable today? Not applicable, provider is in    Antonia Johnson

## 2018-08-22 NOTE — TELEPHONE ENCOUNTER
Patient stated is unable to take care of Left big toe nail. Is in constant throbbing pain. Patient stated pain is at a 4/10 however has a high pain tolerance. Would like this removed as soon as possible. Is this something that is done in office or does she need to be referred else where.   Ashley Suazo LPN

## 2018-08-23 ENCOUNTER — HOSPITAL ENCOUNTER (OUTPATIENT)
Dept: ULTRASOUND IMAGING | Facility: HOSPITAL | Age: 33
Discharge: HOME | End: 2018-08-23
Attending: ADVANCED PRACTICE MIDWIFE | Admitting: ADVANCED PRACTICE MIDWIFE
Payer: COMMERCIAL

## 2018-08-23 DIAGNOSIS — Z34.01 SUPERVISION OF NORMAL FIRST PREGNANCY IN FIRST TRIMESTER: ICD-10-CM

## 2018-08-23 PROCEDURE — 82105 ALPHA-FETOPROTEIN SERUM: CPT | Performed by: ADVANCED PRACTICE MIDWIFE

## 2018-08-23 PROCEDURE — 84704 HCG FREE BETACHAIN TEST: CPT | Performed by: ADVANCED PRACTICE MIDWIFE

## 2018-08-23 PROCEDURE — 76801 OB US < 14 WKS SINGLE FETUS: CPT | Mod: TC

## 2018-08-23 PROCEDURE — 36415 COLL VENOUS BLD VENIPUNCTURE: CPT | Performed by: ADVANCED PRACTICE MIDWIFE

## 2018-08-23 PROCEDURE — 84163 PAPPA SERUM: CPT | Performed by: ADVANCED PRACTICE MIDWIFE

## 2018-09-11 ENCOUNTER — HOSPITAL ENCOUNTER (EMERGENCY)
Facility: HOSPITAL | Age: 33
Discharge: HOME | End: 2018-09-11
Attending: PHYSICIAN ASSISTANT | Admitting: PHYSICIAN ASSISTANT
Payer: COMMERCIAL

## 2018-09-11 VITALS — TEMPERATURE: 97.5 F | OXYGEN SATURATION: 100 % | RESPIRATION RATE: 14 BRPM

## 2018-09-11 DIAGNOSIS — Z33.1 PREGNANCY, INCIDENTAL: ICD-10-CM

## 2018-09-11 DIAGNOSIS — L60.0 INGROWING TOENAIL OF LEFT FOOT: ICD-10-CM

## 2018-09-11 PROCEDURE — 11730 AVULSION NAIL PLATE SIMPLE 1: CPT

## 2018-09-11 PROCEDURE — 11730 AVULSION NAIL PLATE SIMPLE 1: CPT | Performed by: PHYSICIAN ASSISTANT

## 2018-09-11 PROCEDURE — 40000268 ZZH STATISTIC NO CHARGES

## 2018-09-11 NOTE — ED AVS SNAPSHOT
[facility]    [address]    [place] [address]    Phone:  [phone]                                       Barbi Vale   MRN: [id number]    Department:  [facility]   Date of Visit:  9/11/2018           Patient Information     Date Of Birth          1985        Your diagnoses for this visit were:     Ingrowing toenail of left foot Removed       You were seen by Dina Mireles PA.      Follow-up Information     Follow up with Caprice Pimentel MD.    Specialty:  Family Practice    Why:  if redness/swelling develop    Contact information:    [facility] [place]  [address]  [place] [address] [phone]          Follow up with [facility].    Specialty:  EMERGENCY MEDICINE    Why:  if further concerns develop    Contact information:    [address]  [place] [address] [phone]    Additional information:    From [place]: Take [address]. Turn left at [address]. Turn left at the first stoplight on [address]. At the first stop sign, take a right onto [address]. Take a left into the parking lot and continue through until you reach the North enterance of the building.       From [place]: Take [address]. Take the [address] ramp towards [place]. Turn left onto [address]. Take a slight right onto [address]. Turn left at the first stoplight on [address]. At the first stop sign, take a right onto [address]. Take a left into the parking lot and continue through until you reach the North enterance of the building.       From [place]: Take [address]. Take a right at [address]. At the first stop sign, take a right onto [address]. Take a left into the parking lot and continue through until you reach the North enterance of the building.       Discharge References/Attachments     TOENAIL, INGROWN, NOT INFECTED (HOME TREATMENT)  (ENGLISH)    WOUND CARE (ENGLISH)      Your next 10 appointments already scheduled     Sep 12, 2018  3:45 PM CDT   (Arrive by 3:30 PM)   ESTABLISHED PRENATAL with GUERRERO Oneal CNM   [facility] ([facility] )    [address]  [facility] [address]   [phone]            Sep 24, 2018  3:30 PM CDT   (Arrive by 3:15 PM)   SHORT with Caprice Pimentel MD   [facility] ([facility] )    [address]  [facility] [address]   [phone]            Oct 04, 2018  4:00 PM CDT   (Arrive by 3:45 PM)   US OB > 14 WEEKS COMPLETE SINGLE with HIUS2   [facility] ([facility] )    [address] [place] [address]   [phone]           How do I prepare for my exam? (Food and drink instructions) Drink four 8-ounce glasses of fluid an hour before your exam. If you need to empty your bladder before your exam, try to release only a little urine. Then, drink another glass of fluid.  How do I prepare for my exam? (Other instructions) You may have up to two family members in the exam room. If you bring a small child, an adult must be there to care for him or her. No video or camera photography during the procedure.  What should I wear: Wear comfortable clothes.  How long does the exam take: Most ultrasounds take 30 to 60 minutes.  What should I bring: Bring a list of your medicines, including vitamins, minerals and over-the-counter drugs. It is safest to leave personal items at home.  Do I need a :  No  is needed.  What do I need to tell my doctor: Tell your doctor about any allergies you may have.  What should I do after the exam: No restrictions, You may resume normal activities.  What is this test: An ultrasound uses sound waves to make pictures of the body. Sound waves do not cause pain. The only discomfort may be the pressure of the wand against your skin or full bladder.  Who should I call with questions: If you have  any questions, please call the Imaging Department where you will have your exam. Directions, parking instructions, and other information is available on our website, [email or web address].                 Review of your medicines      Our records show that you are taking the medicines listed below. If these are incorrect, please call your family doctor or clinic.        Dose / Directions Last dose taken    amoxicillin-clavulanate 875-125 MG per tablet   Commonly known as:  AUGMENTIN   Dose:  1 tablet   Quantity:  20 tablet        Take 1 tablet by mouth 2 times daily   Refills:  0        fish oil-omega-3 fatty acids 1000 MG capsule   Dose:  1000 mg        Take 1,000 mg by mouth daily   Refills:  0        folic acid 400 MCG tablet   Commonly known as:  FOLVITE   Dose:  400 mcg        Take 400 mcg by mouth daily   Refills:  0        metroNIDAZOLE 500 MG tablet   Commonly known as:  FLAGYL   Dose:  500 mg   Quantity:  21 tablet        Take 1 tablet (500 mg) by mouth 3 times daily   Refills:  0        prenatal multivitamin plus iron 27-0.8 MG Tabs per tablet   Dose:  1 tablet        Take 1 tablet by mouth daily   Refills:  0        VITAMIN D (CHOLECALCIFEROL) PO   Dose:  2000 Units        Take 2,000 Units by mouth daily   Refills:  0                Orders Needing Specimen Collection     None      Pending Results     No orders found from 9/9/2018 to 9/12/2018.            Pending Culture Results     No orders found from 9/9/2018 to 9/12/2018.            Thank you for choosing [facility]       Thank you for choosing [facility] for your care. Our goal is always to provide you with excellent care. Hearing back from our patients is one way we can continue to improve our services. Please take a few minutes to complete the written survey that you may receive in the mail after you visit with us. Thank you!        [employer]hart Information     [facility] gives you secure access to your electronic health record. If you see a primary care  provider, you can also send messages to your care team and make appointments. If you have questions, please call your primary care clinic.  If you do not have a primary care provider, please call [phone] and they will assist you.        Care EveryWhere ID     This is your Care EveryWhere ID. This could be used by other organizations to access your [facility] medical records  [id number]        Equal Access to Services     VEGA FLEMING : Adelaida Sibley, beny burt, suhail araya, lacy maria. So [facility] [phone].    ATENCIÓN: Si habla español, tiene a kinney disposición servicios gratuitos de asistencia lingüística. Llame al [phone].    We comply with applicable federal civil rights laws and [place] laws. We do not discriminate on the basis of race, color, national origin, age, disability, sex, sexual orientation, or gender identity.            After Visit Summary       This is your record. Keep this with you and show to your community pharmacist(s) and doctor(s) at your next visit.

## 2018-09-11 NOTE — ED AVS SNAPSHOT
[facility]    [address]    [place] [address]    Phone:  [phone]                                       Barbi Vale   MRN: [id number]    Department:  [facility]   Date of Visit:  9/11/2018           After Visit Summary Signature Page     I have received my discharge instructions, and my questions have been answered. I have discussed any challenges I see with this plan with the nurse or doctor.    ..........................................................................................................................................  Patient/Patient Representative Signature      ..........................................................................................................................................  Patient Representative Print Name and Relationship to Patient    ..................................................               ................................................  Date                                   Time    ..........................................................................................................................................  Reviewed by Signature/Title    ...................................................              ..............................................  Date                                               Time          22EPIC Rev 08/18

## 2018-09-12 ENCOUNTER — HOSPITAL ENCOUNTER (EMERGENCY)
Facility: HOSPITAL | Age: 33
Discharge: HOME | End: 2018-09-12
Attending: FAMILY MEDICINE | Admitting: FAMILY MEDICINE
Payer: COMMERCIAL

## 2018-09-12 VITALS
TEMPERATURE: 98.6 F | OXYGEN SATURATION: 98 % | SYSTOLIC BLOOD PRESSURE: 113 MMHG | DIASTOLIC BLOOD PRESSURE: 62 MMHG | RESPIRATION RATE: 18 BRPM

## 2018-09-12 DIAGNOSIS — T25.432A: ICD-10-CM

## 2018-09-12 PROCEDURE — 99283 EMERGENCY DEPT VISIT LOW MDM: CPT | Performed by: FAMILY MEDICINE

## 2018-09-12 PROCEDURE — 99283 EMERGENCY DEPT VISIT LOW MDM: CPT

## 2018-09-12 RX ORDER — ACETAMINOPHEN 325 MG/1
325-650 TABLET ORAL ONCE
Status: DISCONTINUED | OUTPATIENT
Start: 2018-09-12 | End: 2018-09-12 | Stop reason: HOSPADM

## 2018-09-12 ASSESSMENT — ENCOUNTER SYMPTOMS
TROUBLE SWALLOWING: 0
CARDIOVASCULAR NEGATIVE: 1
WOUND: 1
SHORTNESS OF BREATH: 0
PSYCHIATRIC NEGATIVE: 1
NAUSEA: 0
DYSPHORIC MOOD: 0
NEUROLOGICAL NEGATIVE: 1
VOMITING: 0
CONSTITUTIONAL NEGATIVE: 1
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
## 2018-09-12 NOTE — DISCHARGE INSTRUCTIONS
You have a chemical burn from the use of silver nitrate. This is a common agent used to manage bleeding and it was used following removal of your left great toenail on 9/11/18. You are pregnant, so, limit your use of pain medication to acetaminophen 500 mg every 6 hours. Rinse the toe with soap and water once daily. Apply Neosporin antibiotic ointment over the area that was cauterized using the silver nitrate twice daily. Monitor for signs of infection such as fever. Also monitor for warmth or redness of the toe. If signs of infection or if worsening pain, seek out immediate medical care.
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
## 2018-09-12 NOTE — ED NOTES
Patient presents to emergency room with c/o left great toe pain. Pt was seen in urgent care yesterday at 9pm and had her left great toe nail removed and silver nitrate was applied to the area. C/o constant burning pain to left great toe since the silver nitrate was applied. Left great toe is red. Where the silver nitrate was applied is charcoal color. No relief with tylenol 500mg. Currently 14 weeks pregnant and didn't want to take anything else for the pain. Updated Dr. Merida.

## 2018-09-12 NOTE — ED NOTES
"Patient verbalizes understanding of discharge instructions. Pt declined the tylenol. \"I will take tylenol when I get home.\" pt states her toe feels better already.   "

## 2018-09-12 NOTE — ED AVS SNAPSHOT
[facility]    [address]    [place] [address]    Phone:  [phone]                                       Barbi Vale   MRN: [id number]    Department:  [facility]   Date of Visit:  9/12/2018           After Visit Summary Signature Page     I have received my discharge instructions, and my questions have been answered. I have discussed any challenges I see with this plan with the nurse or doctor.    ..........................................................................................................................................  Patient/Patient Representative Signature      ..........................................................................................................................................  Patient Representative Print Name and Relationship to Patient    ..................................................               ................................................  Date                                   Time    ..........................................................................................................................................  Reviewed by Signature/Title    ...................................................              ..............................................  Date                                               Time          22EPIC Rev 08/18

## 2018-09-13 ENCOUNTER — HOSPITAL ENCOUNTER (EMERGENCY)
Facility: HOSPITAL | Age: 33
Discharge: HOME | End: 2018-09-13
Attending: NURSE PRACTITIONER | Admitting: NURSE PRACTITIONER
Payer: COMMERCIAL

## 2018-09-13 VITALS
OXYGEN SATURATION: 100 % | SYSTOLIC BLOOD PRESSURE: 130 MMHG | TEMPERATURE: 98 F | DIASTOLIC BLOOD PRESSURE: 74 MMHG | RESPIRATION RATE: 16 BRPM

## 2018-09-13 DIAGNOSIS — Z33.1 PREGNANT STATE, INCIDENTAL: ICD-10-CM

## 2018-09-13 DIAGNOSIS — Z51.89 VISIT FOR WOUND CHECK: ICD-10-CM

## 2018-09-13 DIAGNOSIS — T30.4 CHEMICAL BURN: ICD-10-CM

## 2018-09-13 PROCEDURE — 99213 OFFICE O/P EST LOW 20 MIN: CPT | Performed by: NURSE PRACTITIONER

## 2018-09-13 PROCEDURE — G0463 HOSPITAL OUTPT CLINIC VISIT: HCPCS

## 2018-09-13 RX ORDER — CEPHALEXIN 500 MG/1
500 CAPSULE ORAL 2 TIMES DAILY
Qty: 14 CAPSULE | Refills: 0 | Status: SHIPPED | OUTPATIENT
Start: 2018-09-13 | End: 2019-01-31

## 2018-09-13 RX ORDER — MINERAL OIL/HYDROPHIL PETROLAT
OINTMENT (GRAM) TOPICAL
Status: DISCONTINUED | OUTPATIENT
Start: 2018-09-13 | End: 2018-09-13 | Stop reason: HOSPADM

## 2018-09-13 ASSESSMENT — ENCOUNTER SYMPTOMS: CONSTITUTIONAL NEGATIVE: 1
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
## 2018-09-13 NOTE — DISCHARGE INSTRUCTIONS
Start antibiotics tonight.   Leave bandage in place, follow up here tomorrow for reevaluation.   Change dressing with supplies if bandage becomes soiled.     Elevate your foot and apply cold packs to help with swelling and pain.   Take Tylenol as needed.     Return here if symptoms worsen.

## 2018-09-13 NOTE — ED AVS SNAPSHOT
[facility]    [address]    [place] [address]    Phone:  [phone]                                       Barbi Vale   MRN: [id number]    Department:  [facility]   Date of Visit:  9/13/2018           After Visit Summary Signature Page     I have received my discharge instructions, and my questions have been answered. I have discussed any challenges I see with this plan with the nurse or doctor.    ..........................................................................................................................................  Patient/Patient Representative Signature      ..........................................................................................................................................  Patient Representative Print Name and Relationship to Patient    ..................................................               ................................................  Date                                   Time    ..........................................................................................................................................  Reviewed by Signature/Title    ...................................................              ..............................................  Date                                               Time          22EPIC Rev 08/18

## 2018-09-13 NOTE — ED TRIAGE NOTES
Pt presents with pain,redness and swelling to left great toe since 2 days ago Toenail was removed 2 days ago.

## 2018-09-14 ENCOUNTER — HOSPITAL ENCOUNTER (EMERGENCY)
Facility: HOSPITAL | Age: 33
Discharge: HOME | End: 2018-09-14
Attending: NURSE PRACTITIONER | Admitting: NURSE PRACTITIONER
Payer: COMMERCIAL

## 2018-09-14 VITALS
BODY MASS INDEX: 28.34 KG/M2 | TEMPERATURE: 99.1 F | RESPIRATION RATE: 16 BRPM | OXYGEN SATURATION: 98 % | WEIGHT: 160 LBS | SYSTOLIC BLOOD PRESSURE: 103 MMHG | DIASTOLIC BLOOD PRESSURE: 58 MMHG

## 2018-09-14 DIAGNOSIS — Z33.1 PREGNANCY, INCIDENTAL: ICD-10-CM

## 2018-09-14 DIAGNOSIS — Z51.89 VISIT FOR WOUND CHECK: ICD-10-CM

## 2018-09-14 PROCEDURE — G0463 HOSPITAL OUTPT CLINIC VISIT: HCPCS

## 2018-09-14 PROCEDURE — 25000125 ZZHC RX 250: Performed by: NURSE PRACTITIONER

## 2018-09-14 PROCEDURE — 99213 OFFICE O/P EST LOW 20 MIN: CPT | Performed by: NURSE PRACTITIONER

## 2018-09-14 RX ORDER — LIDOCAINE 40 MG/G
CREAM TOPICAL ONCE
Status: COMPLETED | OUTPATIENT
Start: 2018-09-14 | End: 2018-09-14

## 2018-09-14 RX ADMIN — LIDOCAINE: 40 CREAM TOPICAL at 17:39

## 2018-09-14 NOTE — ED TRIAGE NOTES
Pt presents today alone for c/o continued issues with left great toe, has started ABX and is having increased pain since previous visit. Her toe is red, swollen and painful the area where the nail had been is black.

## 2018-09-14 NOTE — ED AVS SNAPSHOT
[facility]    [address] [place] [address]    Phone:  [phone]                                       Barbi Vale   MRN: [id number]    Department:  [facility]   Date of Visit:  9/14/2018           Patient Information     Date Of Birth          1985        Your diagnoses for this visit were:     Visit for wound check     Pregnancy, incidental        You were seen by Fabienne Forbes NP.      Follow-up Information     Follow up with Caprice Pimentel MD. Schedule an appointment as soon as possible for a visit in 3 days.    Specialty:  Family Practice    Contact information:    [facility]  [address]  [place] [address] [phone]          Follow up with [facility].    Specialty:  EMERGENCY MEDICINE    Why:  If symptoms worsen    Contact information:    [address] [address] [phone]    Additional information:    From [place] Area: Take [address]. Turn left at [address]. Turn left at the first stoplight on [address]. At the first stop sign, take a right onto [address]. Take a left into the parking lot and continue through until you reach the North enterance of the building.       From [place]: Take [address]. Take the [address] ramp towards [place]. Turn left onto [address]. Take a slight right onto [address]. Turn left at the first stoplight on [address]. At the first stop sign, take a right onto [address]. Take a left into the parking lot and continue through until you reach the North enterance of the building.       From [place]: Take [address]. Take a right at [address]. At the first stop sign, take a right onto [address]. Take a left into the parking lot and continue through until you reach the North enterance of the building.         Discharge Instructions       Continue with Keflex  until gone.   See wound care below.   Follow up on Monday or Tuesday for a wound check - call PCP office, if not able to get in with her, return here.   Return here over the weekend if concerns develop.   Elevate foot, apply ice and take Tylenol for pain.         Wound Care Instructions:  1) Wash your hands and work space  2) Gather all your supplies: clean towel, tub of warm water, xeroform gauze, dry gauze, and coban  3) Soak foot in warm water for 5-10 minutes. Pat dry.   4) Dress wound with xeroform gauze, cover with dry gauze, secure with coban.   5) Change dressing daily, more frequently it becomes soiled.  6) Dispose of all dirty supplies  7) Wash hands and equipment     Please report any increase in pain, fevers, chills, changes in the drainage odor.       Your next 10 appointments already scheduled     Sep 19, 2018  3:15 PM CDT   (Arrive by 3:00 PM)   ESTABLISHED PRENATAL with GUERRERO Oneal CNM   [facility] )    [address] [facility] [address]   [phone]            Sep 24, 2018  3:30 PM CDT   (Arrive by 3:15 PM)   SHORT with Caprice Pimentel MD   [facility] )    [address] [facility] [address]   [phone]            Oct 04, 2018  4:00 PM CDT   (Arrive by 3:45 PM)   US OB > 14 WEEKS COMPLETE SINGLE with HIUS2   [facility] ([facility] )    [address] [place] [address]   [phone]           How do I prepare for my exam? (Food and drink instructions) Drink four 8-ounce glasses of fluid an hour before your exam. If you need to empty your bladder before your exam, try to release only a little urine. Then, drink another glass of fluid.  How do I prepare for my exam? (Other instructions) You may have up to two family members in the exam room. If you bring a small child, an adult must be there to care for him or her. No video or camera photography during the procedure.   What should I wear: Wear comfortable clothes.  How long does the exam take: Most ultrasounds take 30 to 60 minutes.  What should I bring: Bring a list of your medicines, including vitamins, minerals and over-the-counter drugs. It is safest to leave personal items at home.  Do I need a :  No  is needed.  What do I need to tell my doctor: Tell your doctor about any allergies you may have.  What should I do after the exam: No restrictions, You may resume normal activities.  What is this test: An ultrasound uses sound waves to make pictures of the body. Sound waves do not cause pain. The only discomfort may be the pressure of the wand against your skin or full bladder.  Who should I call with questions: If you have any questions, please call the Imaging Department where you will have your exam. Directions, parking instructions, and other information is available on our website, [email or web address].                 Review of your medicines      Our records show that you are taking the medicines listed below. If these are incorrect, please call your family doctor or clinic.        Dose / Directions Last dose taken    ACETAMINOPHEN PO   Dose:  500 mg        Take 500 mg by mouth once   Refills:  0        cephALEXin 500 MG capsule   Commonly known as:  KEFLEX   Dose:  500 mg   Quantity:  14 capsule        Take 1 capsule (500 mg) by mouth 2 times daily for 7 days   Refills:  0        fish oil-omega-3 fatty acids 1000 MG capsule   Dose:  1000 mg        Take 1,000 mg by mouth daily   Refills:  0        folic acid 400 MCG tablet   Commonly known as:  FOLVITE   Dose:  400 mcg        Take 400 mcg by mouth daily   Refills:  0        prenatal multivitamin plus iron 27-0.8 MG Tabs per tablet   Dose:  1 tablet        Take 1 tablet by mouth daily   Refills:  0        VITAMIN D (CHOLECALCIFEROL) PO   Dose:  2000 Units        Take 2,000 Units by mouth daily   Refills:  0                Orders Needing Specimen Collection      None      Pending Results     No orders found from 9/12/2018 to 9/15/2018.            Pending Culture Results     No orders found from 9/12/2018 to 9/15/2018.            Thank you for choosing [facility]       Thank you for choosing [facility] for your care. Our goal is always to provide you with excellent care. Hearing back from our patients is one way we can continue to improve our services. Please take a few minutes to complete the written survey that you may receive in the mail after you visit with us. Thank you!        [employer] Information     [employer] gives you secure access to your electronic health record. If you see a primary care provider, you can also send messages to your care team and make appointments. If you have questions, please call your primary care clinic.  If you do not have a primary care provider, please call [phone] and they will assist you.        Care EveryWhere ID     This is your Care EveryWhere ID. This could be used by other organizations to access your [facility] medical records  [id number]        Equal Access to Services     VEGA FLEMING AH: Hadnehemiah Sibley, waaxda javed, qaybta kaalmakelvin araya, lacy maria. So [facility] [phone].    ATENCIÓN: Si habla español, tiene a kinney disposición servicios gratuitos de asistencia lingüística. Llame al [phone].    We comply with applicable federal civil rights laws and [place] laws. We do not discriminate on the basis of race, color, national origin, age, disability, sex, sexual orientation, or gender identity.            After Visit Summary       This is your record. Keep this with you and show to your community pharmacist(s) and doctor(s) at your next visit.

## 2018-09-14 NOTE — DISCHARGE INSTRUCTIONS
Continue with Keflex until gone.   See wound care below.   Follow up on Monday or Tuesday for a wound check - call PCP office, if not able to get in with her, return here.   Return here over the weekend if concerns develop.   Elevate foot, apply ice and take Tylenol for pain.         Wound Care Instructions:  1) Wash your hands and work space  2) Gather all your supplies: clean towel, tub of warm water, xeroform gauze, dry gauze, and coban  3) Soak foot in warm water for 5-10 minutes. Pat dry.   4) Dress wound with xeroform gauze, cover with dry gauze, secure with coban.   5) Change dressing daily, more frequently it becomes soiled.  6) Dispose of all dirty supplies  7) Wash hands and equipment     Please report any increase in pain, fevers, chills, changes in the drainage odor.

## 2018-09-14 NOTE — ED AVS SNAPSHOT
[facility]    [address]    [place] [address]    Phone:  [phone]                                       Barbi Vale   MRN: [id number]    Department:  [facility]   Date of Visit:  9/14/2018           After Visit Summary Signature Page     I have received my discharge instructions, and my questions have been answered. I have discussed any challenges I see with this plan with the nurse or doctor.    ..........................................................................................................................................  Patient/Patient Representative Signature      ..........................................................................................................................................  Patient Representative Print Name and Relationship to Patient    ..................................................               ................................................  Date                                   Time    ..........................................................................................................................................  Reviewed by Signature/Title    ...................................................              ..............................................  Date                                               Time          22EPIC Rev 08/18

## 2018-09-16 LAB — FIRST TRIMESTER SCREEN BIOCHEM MARKERS: NORMAL

## 2018-09-17 ASSESSMENT — ENCOUNTER SYMPTOMS
FEVER: 0
ARTHRALGIAS: 0
JOINT SWELLING: 0
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
## 2018-09-19 DIAGNOSIS — Z34.02 ENCOUNTER FOR SUPERVISION OF NORMAL FIRST PREGNANCY IN SECOND TRIMESTER: Primary | ICD-10-CM

## 2018-09-19 DIAGNOSIS — E55.9 VITAMIN D DEFICIENCY: Primary | ICD-10-CM

## 2018-09-19 PROCEDURE — 99000 SPECIMEN HANDLING OFFICE-LAB: CPT | Performed by: ADVANCED PRACTICE MIDWIFE

## 2018-09-19 PROCEDURE — 82105 ALPHA-FETOPROTEIN SERUM: CPT | Mod: 90 | Performed by: ADVANCED PRACTICE MIDWIFE

## 2018-09-19 PROCEDURE — 36415 COLL VENOUS BLD VENIPUNCTURE: CPT | Performed by: ADVANCED PRACTICE MIDWIFE

## 2018-09-19 PROCEDURE — 82306 VITAMIN D 25 HYDROXY: CPT | Mod: 90 | Performed by: ADVANCED PRACTICE MIDWIFE

## 2018-09-20 NOTE — PROGRESS NOTES
SUBJECTIVE:   Barbi Vale is a 32 year old female who presents to clinic today for the following health issues:    Toe Issue / Pain      Duration: Toenail removed on 9/11/18 at the     Description (location/character/radiation): Had a reaction to the silver nitrate, (chemical burn)    Intensity:  3/10 now, 9/10 at its worst     Accompanying signs and symptoms: Painful, burning, sensation, swollen, red    History (similar episodes/previous evaluation): None - has had about 15 toenail removals , never had a reaction before (has never used silver nitrate before)    Precipitating or alleviating factors: Lidocaine     Therapies tried and outcome: Tylenol - no relief , Lidocaine - had relief      Is doing much better and toe is improving    Problem list and histories reviewed & adjusted, as indicated.  Additional history: as documented    Patient Active Problem List   Diagnosis     ACP (advance care planning)     Chronic pain disorder     Myofascial muscle pain     Paresthesia     Dysuria     Well woman exam with routine gynecological exam     Encounter for preconception consultation     Encounter for surveillance of contraceptives     Other chronic pain     Supervision of normal first pregnancy in first trimester     Chemical dermatitis     Past Surgical History:   Procedure Laterality Date     FOOT SURGERY Right 2003    [place], [place]       Social History   Substance Use Topics     Smoking status: Never Smoker     Smokeless tobacco: Never Used     Alcohol use No     Family History   Problem Relation Age of Onset     Mental Illness Mother      Mental Illness Father      Mental Illness Brother      Cerebrovascular Disease Maternal Grandmother      Cerebrovascular Disease Maternal Grandfather      Aneurysm Maternal Grandfather      Other - See Comments Paternal Grandmother 86     old age     KIDNEY DISEASE Paternal Grandfather      on dialysis         Current Outpatient Prescriptions   Medication Sig Dispense  Refill     ACETAMINOPHEN PO Take 500 mg by mouth once       fish oil-omega-3 fatty acids 1000 MG capsule Take 1,000 mg by mouth daily       folic acid (FOLVITE) 400 MCG tablet Take 400 mcg by mouth daily       Prenatal Vit-Fe Fumarate-FA (PRENATAL MULTIVITAMIN PLUS IRON) 27-0.8 MG TABS per tablet Take 1 tablet by mouth daily       VITAMIN D, CHOLECALCIFEROL, PO Take 2,000 Units by mouth daily       Allergies   Allergen Reactions     Depo-Provera [Medroxyprogesterone] Swelling     Swelled up, headaches     BP Readings from Last 3 Encounters:   09/24/18 92/60   09/23/18 114/69   09/14/18 103/58    Wt Readings from Last 3 Encounters:   09/24/18 162 lb 6.4 oz (73.7 kg)   09/14/18 160 lb (72.6 kg)   08/13/18 162 lb (73.5 kg)                  Labs reviewed in EPIC    Reviewed and updated as needed this visit by clinical staff       Reviewed and updated as needed this visit by Provider         ROS:  Constitutional, HEENT, cardiovascular, pulmonary, gi and gu systems are negative, except as otherwise noted.    OBJECTIVE:                                                    BP 92/60 (BP Location: Left arm, Patient Position: Chair, Cuff Size: Adult Regular)  Pulse 82  Temp 98.2  F (36.8  C) (Tympanic)  Wt 162 lb 6.4 oz (73.7 kg)  LMP 05/31/2018  SpO2 98%  BMI 28.77 kg/m2  Body mass index is 28.77 kg/(m^2).  GENERAL APPEARANCE: healthy, alert and no distress  SKIN: right great toe reveals black and thickened tissue where toenail should be  PSYCH: mentation appears normal and affect normal/bright       ASSESSMENT/PLAN:                                                    1. Chemical dermatitis  Improving, continue current course    Briefly discussed allergist, hold off till after pregnancy    Patient was agreeable to this plan and had no further questions.  See Patient Instructions    Caprice Pimentel MD  [facility] [place] [facility]

## 2018-09-21 LAB — DEPRECATED CALCIDIOL+CALCIFEROL SERPL-MC: 37 UG/L (ref 20–75)

## 2018-09-23 ENCOUNTER — HOSPITAL ENCOUNTER (EMERGENCY)
Facility: HOSPITAL | Age: 33
Discharge: HOME | End: 2018-09-23
Attending: PHYSICIAN ASSISTANT | Admitting: PHYSICIAN ASSISTANT
Payer: COMMERCIAL

## 2018-09-23 VITALS
DIASTOLIC BLOOD PRESSURE: 69 MMHG | SYSTOLIC BLOOD PRESSURE: 114 MMHG | OXYGEN SATURATION: 100 % | RESPIRATION RATE: 18 BRPM | TEMPERATURE: 98.6 F

## 2018-09-23 DIAGNOSIS — R07.0 THROAT PAIN: ICD-10-CM

## 2018-09-23 DIAGNOSIS — R53.83 FATIGUE, UNSPECIFIED TYPE: ICD-10-CM

## 2018-09-23 DIAGNOSIS — Z33.1 PREGNANT STATE, INCIDENTAL: ICD-10-CM

## 2018-09-23 LAB
# FETUSES US: NORMAL
# FETUSES: NORMAL
AFP ADJ MOM AMN: 1.11
AFP SERPL-MCNC: 34 NG/ML
AGE - REPORTED: 33.4 YR
CURRENT SMOKER: NO
CURRENT SMOKER: NO
DEPRECATED S PYO AG THROAT QL EIA: NORMAL
DIABETES STATUS PATIENT: NO
FAMILY MEMBER DISEASES HX: NO
FAMILY MEMBER DISEASES HX: NO
GA METHOD: NORMAL
GA METHOD: NORMAL
GA: NORMAL WK
IDDM PATIENT QL: NO
INTEGRATED SCN PATIENT-IMP: NORMAL
LMP START DATE: NORMAL
MONOCHORIONIC TWINS: NO
SERVICE CMNT-IMP: NO
SPECIMEN DRAWN SERPL: NORMAL
SPECIMEN SOURCE: NORMAL
VALPROIC/CARBAMAZEPINE STATUS: NO
WEIGHT UNITS: NORMAL

## 2018-09-23 PROCEDURE — 99212 OFFICE O/P EST SF 10 MIN: CPT | Performed by: PHYSICIAN ASSISTANT

## 2018-09-23 PROCEDURE — 87081 CULTURE SCREEN ONLY: CPT | Performed by: FAMILY MEDICINE

## 2018-09-23 PROCEDURE — G0463 HOSPITAL OUTPT CLINIC VISIT: HCPCS

## 2018-09-23 PROCEDURE — 87880 STREP A ASSAY W/OPTIC: CPT | Performed by: FAMILY MEDICINE

## 2018-09-23 ASSESSMENT — ENCOUNTER SYMPTOMS
PSYCHIATRIC NEGATIVE: 1
NECK STIFFNESS: 0
FATIGUE: 1
APPETITE CHANGE: 0
NECK PAIN: 0
FEVER: 0
DIARRHEA: 0
HEADACHES: 0
VOICE CHANGE: 0
SINUS PRESSURE: 0
SORE THROAT: 1
NAUSEA: 0
ABDOMINAL PAIN: 0
EYE REDNESS: 0
TROUBLE SWALLOWING: 0
COUGH: 0
LIGHT-HEADEDNESS: 0
VOMITING: 0
EYE DISCHARGE: 0
DIZZINESS: 0

## 2018-09-23 NOTE — ED AVS SNAPSHOT
[facility]    [address]    [place] [address]    Phone:  [phone]                                       Barbi Vale   MRN: [id number]    Department[facility]  [facility]   Date of Visit:  9/23/2018           After Visit Summary Signature Page     I have received my discharge instructions, and my questions have been answered. I have discussed any challenges I see with this plan with the nurse or doctor.    ..........................................................................................................................................  Patient/Patient Representative Signature      ..........................................................................................................................................  Patient Representative Print Name and Relationship to Patient    ..................................................               ................................................  Date                                   Time    ..........................................................................................................................................  Reviewed by Signature/Title    ...................................................              ..............................................  Date                                               Time          22EPIC Rev 08/18

## 2018-09-23 NOTE — ED AVS SNAPSHOT
[facility]    [address]    [place] [address]    Phone:  [phone]                                       Barbi Vale   MRN: [id number]    Department:  [facility]   Date of Visit:  9/23/2018           Patient Information     Date Of Birth          1985        Your diagnoses for this visit were:     Fatigue, unspecified type     Throat pain rapid strep negative  Culture pending    Pregnant state, incidental 16 wk       You were seen by Dina Mireles PA.      Follow-up Information     Follow up with Caprice Pimentel MD.    Specialty:  Family Practice    Why:  If symptoms worsen    Contact information:    [facility] [place]  [address]  [place] [address] [phone]          Please follow up.    Why:  With your OB provider as already directed        Follow up with [facility].    Specialty:  EMERGENCY MEDICINE    Why:  If further concerns develop    Contact information:    [address]  [place] [address] [phone]    Additional information:    From [place]: Take [address]. Turn left at [address]. Turn left at the first stoplight on [address] [place] [address]. At the first stop sign, take a right onto [address]. Take a left into the parking lot and continue through until you reach the North enterance of the building.       From [place]: Take [address]. Take the [address] ramp towards [place]. Turn left onto [address]. Take a slight right onto [address] [facility]. Turn left at the first stoplight on [address]. At the first stop sign, take a right onto [address]. Take a left into the parking lot and continue through until you reach the North enterance of the building.       From [place]: Take [address]. Take a right at [address]. At the first stop sign, take a right onto [address]. Take a left into the parking lot and continue through until  you reach the North enterance of the [facility].       Discharge References/Attachments     FATIGUE, MANAGING (ENGLISH)    SORE THROATS, SELF-CARE FOR (ENGLISH)      Your next 10 appointments already scheduled     Sep 24, 2018  3:30 PM CDT   (Arrive by 3:15 PM)   SHORT with Caprice Pimentel MD   [facility] ([facility] )    [address] [facility] [address]   [phone]            Oct 03, 2018  3:15 PM CDT   (Arrive by 3:00 PM)   [facility] Established Prenatal with GUERRERO Oneal CNM   [facility] ([facility] )    [address] [facility] [address]   [phone]            Oct 04, 2018  4:00 PM CDT   (Arrive by 3:45 PM)   US OB > 14 WEEKS COMPLETE SINGLE with HI[facility]   [facility] ([facility] )    [address] [facility] [address]   [phone]           How do I prepare for my exam? (Food and drink instructions) Drink four 8-ounce glasses of fluid an hour before your exam. If you need to empty your bladder before your exam, try to release only a little urine. Then, drink another glass of fluid.  How do I prepare for my exam? (Other instructions) You may have up to two family members in the exam room. If you bring a small child, an adult must be there to care for him or her. No video or camera photography during the procedure.  What should I wear: Wear comfortable clothes.  How long does the exam take: Most ultrasounds take 30 to 60 minutes.  What should I bring: Bring a list of your medicines, including vitamins, minerals and over-the-counter drugs. It is safest to leave personal items at home.  Do I need a :  No  is needed.  What do I need to tell my doctor: Tell your doctor about any allergies you may have.  What should I do after the exam: No restrictions, You may resume normal activities.  What is this test: An ultrasound uses sound waves to make pictures of the body. Sound  waves do not cause pain. The only discomfort may be the pressure of the wand against your skin or full bladder.  Who should I call with questions: If you have any questions, please call the Imaging Department where you will have your exam. Directions, parking instructions, and other information is available on our website, [email or web address].                 Review of your medicines      Our records show that you are taking the medicines listed below. If these are incorrect, please call your family doctor or clinic.        Dose / Directions Last dose taken    ACETAMINOPHEN PO   Dose:  500 mg        Take 500 mg by mouth once   Refills:  0        fish oil-omega-3 fatty acids 1000 MG capsule   Dose:  1000 mg        Take 1,000 mg by mouth daily   Refills:  0        folic acid 400 MCG tablet   Commonly known as:  FOLVITE   Dose:  400 mcg        Take 400 mcg by mouth daily   Refills:  0        prenatal multivitamin plus iron 27-0.8 MG Tabs per tablet   Dose:  1 tablet        Take 1 tablet by mouth daily   Refills:  0        VITAMIN D (CHOLECALCIFEROL) PO   Dose:  2000 Units        Take 2,000 Units by mouth daily   Refills:  0                Procedures and tests performed during your visit     Beta strep group A culture    Rapid strep screen      Orders Needing Specimen Collection     None      Pending Results     Date and Time Order Name Status Description    9/23/2018 1329 Beta strep group A culture In process             Pending Culture Results     Date and Time Order Name Status Description    9/23/2018 1329 Beta strep group A culture In process             Thank you for choosing [facility]       Thank you for choosing [facility] for your care. Our goal is always to provide you with excellent care. Hearing back from our patients is one way we can continue to improve our services. Please take a few minutes to complete the written survey that you may receive in the mail after you visit with us. Thank you!        Will  Information     [facility] gives you secure access to your electronic health record. If you see a primary care provider, you can also send messages to your care team and make appointments. If you have questions, please call your primary care clinic.  If you do not have a primary care provider, please call [phone] and they will assist you.        Care EveryWhere ID     This is your Care EveryWhere ID. This could be used by other organizations to access your [facility] medical records  [id number]        Equal Access to Services     VEGA FLEMING : Hadii madai lambo Sokellyali, waaxda luqadaha, qaybta kaalmada adetroy, lacy maria. So [facility] [phone].    ATENCIÓN: Si habla español, tiene a kinney disposición servicios gratuitos de asistencia lingüística. Llame al [phone].    We comply with applicable federal civil rights laws and [place] laws. We do not discriminate on the basis of race, color, national origin, age, disability, sex, sexual orientation, or gender identity.            After Visit Summary       This is your record. Keep this with you and show to your community pharmacist(s) and doctor(s) at your next visit.

## 2018-09-24 ENCOUNTER — OFFICE VISIT (OUTPATIENT)
Dept: FAMILY MEDICINE | Facility: OTHER | Age: 33
End: 2018-09-24
Attending: FAMILY MEDICINE
Payer: COMMERCIAL

## 2018-09-24 VITALS
TEMPERATURE: 98.2 F | HEART RATE: 82 BPM | SYSTOLIC BLOOD PRESSURE: 92 MMHG | BODY MASS INDEX: 28.77 KG/M2 | WEIGHT: 162.4 LBS | OXYGEN SATURATION: 98 % | DIASTOLIC BLOOD PRESSURE: 60 MMHG

## 2018-09-24 DIAGNOSIS — L25.3 CHEMICAL DERMATITIS: Primary | ICD-10-CM

## 2018-09-24 PROCEDURE — 99213 OFFICE O/P EST LOW 20 MIN: CPT | Performed by: FAMILY MEDICINE

## 2018-09-24 ASSESSMENT — ANXIETY QUESTIONNAIRES
3. WORRYING TOO MUCH ABOUT DIFFERENT THINGS: NOT AT ALL
2. NOT BEING ABLE TO STOP OR CONTROL WORRYING: NOT AT ALL
6. BECOMING EASILY ANNOYED OR IRRITABLE: NOT AT ALL
7. FEELING AFRAID AS IF SOMETHING AWFUL MIGHT HAPPEN: NOT AT ALL
1. FEELING NERVOUS, ANXIOUS, OR ON EDGE: NOT AT ALL
5. BEING SO RESTLESS THAT IT IS HARD TO SIT STILL: NOT AT ALL
4. TROUBLE RELAXING: NOT AT ALL
GAD7 TOTAL SCORE: 0
IF YOU CHECKED OFF ANY PROBLEMS ON THIS QUESTIONNAIRE, HOW DIFFICULT HAVE THESE PROBLEMS MADE IT FOR YOU TO DO YOUR WORK, TAKE CARE OF THINGS AT HOME, OR GET ALONG WITH OTHER PEOPLE: NOT DIFFICULT AT ALL

## 2018-09-24 ASSESSMENT — PAIN SCALES - GENERAL: PAINLEVEL: MILD PAIN (3)
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
## 2018-09-24 NOTE — MR AVS SNAPSHOT
After Visit Summary   9/24/2018    Barbi Vale    MRN: [id number]           Patient Information     Date Of Birth          1985        Visit Information        Provider Department      9/24/2018 3:30 PM Caprice Pimentel MD [facility]        Today's Diagnoses     Chemical dermatitis    -  1       Follow-ups after your visit        Your next 10 appointments already scheduled     Oct 03, 2018  3:15 PM CDT   (Arrive by 3:00 PM)   [facility] Established Prenatal with GUERRERO Oneal CNM   [facility]    [address]  [facility] [address]   [phone]            Oct 04, 2018  4:00 PM CDT   (Arrive by 3:45 PM)   US OB > 14 WEEKS COMPLETE SINGLE with HIUS2   [facility] ULTRASOUND ([facility] )    [address] [place] [address]   [phone]           How do I prepare for my exam? (Food and drink instructions) Drink four 8-ounce glasses of fluid an hour before your exam. If you need to empty your bladder before your exam, try to release only a little urine. Then, drink another glass of fluid.  How do I prepare for my exam? (Other instructions) You may have up to two family members in the exam room. If you bring a small child, an adult must be there to care for him or her. No video or camera photography during the procedure.  What should I wear: Wear comfortable clothes.  How long does the exam take: Most ultrasounds take 30 to 60 minutes.  What should I bring: Bring a list of your medicines, including vitamins, minerals and over-the-counter drugs. It is safest to leave personal items at home.  Do I need a :  No  is needed.  What do I need to tell my doctor: Tell your doctor about any allergies you may have.  What should I do after the exam: No restrictions, You may resume normal activities.  What is this test: An ultrasound uses sound waves to make pictures of the body. Sound  waves do not cause pain. The only discomfort may be the pressure of the wand against your skin or full bladder.  Who should I call with questions: If you have any questions, please call the Imaging Department where you will have your exam. Directions, parking instructions, and other information is available on our website, [email or web address].              Who to contact     If you have questions or need follow up information about today's clinic visit or your schedule please contact [facility] directly at [phone].  Normal or non-critical lab and imaging results will be communicated to you by Skyriderhart, letter or phone within 4 business days after the clinic has received the results. If you do not hear from us within 7 days, please contact the clinic through [employer] or phone. If you have a critical or abnormal lab result, we will notify you by phone as soon as possible.  Submit refill requests through YellowSchedule or call your pharmacy and they will forward the refill request to us. Please allow 3 business days for your refill to be completed.          Additional Information About Your Visit        Skyriderhart Information     [employer]ellowSchedule gives you secure access to your electronic health record. If you see a primary care provider, you can also send messages to your care team and make appointments. If you have questions, please call your primary care clinic.  If you do not have a primary care provider, please call [phone] and they will assist you.        Care EveryWhere ID     This is your Care EveryWhere ID. This could be used by other organizations to access your [facility] medical records  [id number]        Your Vitals Were     Pulse Temperature Last Period Pulse Oximetry BMI (Body Mass Index)       82 98.2  F (36.8  C) (Tympanic) 05/31/2018 98% 28.77 kg/m2        Blood Pressure from Last 3 Encounters:   09/24/18 92/60   09/23/18 114/69   09/14/18 103/58    Weight from Last 3 Encounters:    09/24/18 162 lb 6.4 oz (73.7 kg)   09/14/18 160 lb (72.6 kg)   08/13/18 162 lb (73.5 kg)              Today, you had the following     No orders found for display       Primary Care Provider Office Phone # Fax #    Caprice Pimentel [phone]       [facility] [place] [address]  [place] [address]        Equal Access to Services     RIKKI FLEMING : Hadii aad ku hadasho Soomaali, waaxda luqadaha, qaybta kaalmada adeegyada, waxay idiin hayaan adeeg kharapablo la'maria elenan . So [facility] [phone].    ATENCIÓN: Si ariadnela español, tiene a kinney disposición servicios gratuitos de asistencia lingüística. Llame al [phone].    We comply with applicable federal civil rights laws and [place] laws. We do not discriminate on the basis of race, color, national origin, age, disability, sex, sexual orientation, or gender identity.            Thank you!     Thank you for choosing [facility]  for your care. Our goal is always to provide you with excellent care. Hearing back from our patients is one way we can continue to improve our services. Please take a few minutes to complete the written survey that you may receive in the mail after your visit with us. Thank you!             Your Updated Medication List - Protect others around you: Learn how to safely use, store and throw away your medicines at www.disposemymeds.org.          This list is accurate as of 9/24/18  4:40 PM.  Always use your most recent med list.                   Brand Name Dispense Instructions for use Diagnosis    ACETAMINOPHEN PO      Take 500 mg by mouth once        fish oil-omega-3 fatty acids 1000 MG capsule      Take 1,000 mg by mouth daily    Paresthesia       folic acid 400 MCG tablet    FOLVITE     Take 400 mcg by mouth daily        prenatal multivitamin plus iron 27-0.8 MG Tabs per tablet      Take 1 tablet by mouth daily        VITAMIN D (CHOLECALCIFEROL) PO      Take 2,000 Units by mouth daily

## 2018-09-24 NOTE — NURSING NOTE
"Chief Complaint   Patient presents with     Ingrown Toenail       Initial BP 92/60 (BP Location: Left arm, Patient Position: Chair, Cuff Size: Adult Regular)  Pulse 82  Temp 98.2  F (36.8  C) (Tympanic)  Wt 162 lb 6.4 oz (73.7 kg)  LMP 05/31/2018  SpO2 98%  BMI 28.77 kg/m2 Estimated body mass index is 28.77 kg/(m^2) as calculated from the following:    Height as of 8/13/18: 5' 3\" (1.6 m).    Weight as of this encounter: 162 lb 6.4 oz (73.7 kg).  Medication Reconciliation: complete    Ashley Suazo    "

## 2018-09-25 LAB
BACTERIA SPEC CULT: NORMAL
SPECIMEN SOURCE: NORMAL

## 2018-09-25 ASSESSMENT — ANXIETY QUESTIONNAIRES: GAD7 TOTAL SCORE: 0

## 2018-09-25 ASSESSMENT — PATIENT HEALTH QUESTIONNAIRE - PHQ9: SUM OF ALL RESPONSES TO PHQ QUESTIONS 1-9: 2

## 2018-10-01 ENCOUNTER — TELEPHONE (OUTPATIENT)
Dept: FAMILY MEDICINE | Facility: OTHER | Age: 33
End: 2018-10-01

## 2018-10-01 NOTE — TELEPHONE ENCOUNTER
12:06 PM    Reason for Call: Phone Call    Description: Pt returned nurse call. Please call her back at [phone]    Was an appointment offered for this call? No  If yes : Appointment type              Date    Preferred method for responding to this message: Telephone Call  What is your phone number ?    If we cannot reach you directly, may we leave a detailed response at the number you provided? Yes    Can this message wait until your PCP/provider returns, if available today? YES, aware provider out until Horace Escalante

## 2018-10-03 ENCOUNTER — TELEPHONE (OUTPATIENT)
Dept: FAMILY MEDICINE | Facility: OTHER | Age: 33
End: 2018-10-03

## 2018-10-03 ENCOUNTER — PRENATAL OFFICE VISIT (OUTPATIENT)
Dept: OBGYN | Facility: OTHER | Age: 33
End: 2018-10-03
Attending: ADVANCED PRACTICE MIDWIFE
Payer: COMMERCIAL

## 2018-10-03 VITALS
WEIGHT: 163 LBS | BODY MASS INDEX: 28.88 KG/M2 | HEIGHT: 63 IN | DIASTOLIC BLOOD PRESSURE: 62 MMHG | SYSTOLIC BLOOD PRESSURE: 107 MMHG

## 2018-10-03 DIAGNOSIS — Z23 NEED FOR PROPHYLACTIC VACCINATION AND INOCULATION AGAINST INFLUENZA: Primary | ICD-10-CM

## 2018-10-03 DIAGNOSIS — Z34.02 SUPERVISION OF NORMAL FIRST PREGNANCY IN SECOND TRIMESTER: ICD-10-CM

## 2018-10-03 LAB
SPECIMEN SOURCE: NORMAL
WET PREP SPEC: NORMAL

## 2018-10-03 PROCEDURE — 87210 SMEAR WET MOUNT SALINE/INK: CPT | Performed by: ADVANCED PRACTICE MIDWIFE

## 2018-10-03 PROCEDURE — 90471 IMMUNIZATION ADMIN: CPT | Performed by: ADVANCED PRACTICE MIDWIFE

## 2018-10-03 PROCEDURE — 99207 ZZC PRENATAL VISIT: CPT | Mod: 25 | Performed by: ADVANCED PRACTICE MIDWIFE

## 2018-10-03 PROCEDURE — 90686 IIV4 VACC NO PRSV 0.5 ML IM: CPT | Performed by: ADVANCED PRACTICE MIDWIFE

## 2018-10-03 ASSESSMENT — PAIN SCALES - GENERAL: PAINLEVEL: NO PAIN (0)

## 2018-10-03 NOTE — NURSING NOTE
"Chief Complaint   Patient presents with     Prenatal Care     17w6d       Initial /62 (BP Location: Left arm, Patient Position: Chair, Cuff Size: Adult Regular)  Ht 5' 3\" (1.6 m)  Wt 163 lb (73.9 kg)  LMP 05/31/2018  BMI 28.87 kg/m2 Estimated body mass index is 28.87 kg/(m^2) as calculated from the following:    Height as of this encounter: 5' 3\" (1.6 m).    Weight as of this encounter: 163 lb (73.9 kg).  Medication Reconciliation: complete    Jannette Hansen LPN    "

## 2018-10-03 NOTE — TELEPHONE ENCOUNTER
4:15 PM    Reason for Call: OVERBOOK    Patient is having the following symptoms: est prenatal care for ongoing months.    The patient is requesting an appointment for anytime with Dr. Pimentel.    Was an appointment offered for this call? Yes  If yes : Appointment type              Date 12/2018    Preferred method for responding to this message: Telephone Call  What is your phone number ?[phone]    If we cannot reach you directly, may we leave a detailed response at the number you provided? Yes    Can this message wait until your PCP/provider returns, if unavailable today? Not applicable, provider is in    Zara Peacock

## 2018-10-03 NOTE — PATIENT INSTRUCTIONS
Return to office in 4 weeks for prenatal care and as needed.    Thank you for allowing Irvin MASTERS CNM and our OB team to participate in your care.  If you have a scheduling or an appointment question please contact [facility] Coordinator at their direct line [phone].   ALL nursing questions or concerns can be directed to your OB nurse at: [phone] Michelle Bhatia/Chandrika

## 2018-10-03 NOTE — PROGRESS NOTES

## 2018-10-03 NOTE — PROGRESS NOTES
Doing well.     Denies contractions, bleeding, or leakage of fluid.     Discussed:  Dog bite, toe nail injury, anatomy U/S, BV DAVIAN, fetal movement      Plan:  Wet prep  Flu shot  U/S on 10/23/18    Return to office in 4 weeks for prenatal care and as needed.    GUERRERO Beasley, CNM

## 2018-10-10 DIAGNOSIS — Z34.02 ENCOUNTER FOR SUPERVISION OF NORMAL FIRST PREGNANCY IN SECOND TRIMESTER: Primary | ICD-10-CM

## 2018-10-15 ENCOUNTER — HOSPITAL ENCOUNTER (OUTPATIENT)
Dept: OBGYN | Facility: HOSPITAL | Age: 33
Discharge: HOME | End: 2018-10-15
Attending: FAMILY MEDICINE | Admitting: FAMILY MEDICINE
Payer: COMMERCIAL

## 2018-10-15 PROCEDURE — G0463 HOSPITAL OUTPT CLINIC VISIT: HCPCS

## 2018-10-15 NOTE — LACTATION NOTE
Prenatal Lactation Education Visit    10/15/2018    3:07 PM      Primary Care Provider:  Irvin Norris CNAIDEN      Attendees:  Barbi Vale    Background:  First baby, wants to breastfeed. States wants to learn everything she can. Received a book at the clinic, but hasn't read it yet.    Information Provided:  We reviewed the [place] Breastfeeding [facility] Prenatal Toolkit- link copy provided to mom and dad.  [email or web address]-toolkit-[email or web address]    Some of the topics we discussed in-depth are:  *Skin-to-skin  *Colostrum and the transition to mature milk  *The Family Room  *The Sweeney Hour  *Latch and hand expression  *Exclusivity  *Frequency and duration of  feedings  *Indications that baby is getting enough  *Support and community resources    Handouts from [facility] given-Latch, Hand Expression, Milk Supply, Pumping      Continue to provide support and encouragement if Akanksha has further questions or concerns.    Bobbi Peterson RN, IBCLC, RLC

## 2018-10-15 NOTE — ADDENDUM NOTE
Encounter addended by: Caprice Peterson RN on: 10/15/2018  3:26 PM<BR>     Actions taken: Sign clinical note

## 2018-10-23 ENCOUNTER — HOSPITAL ENCOUNTER (OUTPATIENT)
Dept: ULTRASOUND IMAGING | Facility: HOSPITAL | Age: 33
Discharge: HOME | End: 2018-10-23
Attending: ADVANCED PRACTICE MIDWIFE | Admitting: ADVANCED PRACTICE MIDWIFE
Payer: COMMERCIAL

## 2018-10-23 DIAGNOSIS — Z34.01 SUPERVISION OF NORMAL FIRST PREGNANCY IN FIRST TRIMESTER: ICD-10-CM

## 2018-10-23 PROCEDURE — 76805 OB US >/= 14 WKS SNGL FETUS: CPT | Mod: TC

## 2018-10-24 DIAGNOSIS — Z34.02 ENCOUNTER FOR SUPERVISION OF NORMAL FIRST PREGNANCY IN SECOND TRIMESTER: Primary | ICD-10-CM

## 2018-10-25 ENCOUNTER — PRENATAL OFFICE VISIT (OUTPATIENT)
Dept: OBGYN | Facility: OTHER | Age: 33
End: 2018-10-25
Attending: OBSTETRICS & GYNECOLOGY
Payer: COMMERCIAL

## 2018-10-25 VITALS
HEART RATE: 71 BPM | SYSTOLIC BLOOD PRESSURE: 106 MMHG | WEIGHT: 165 LBS | DIASTOLIC BLOOD PRESSURE: 64 MMHG | BODY MASS INDEX: 29.23 KG/M2 | HEIGHT: 63 IN | OXYGEN SATURATION: 99 %

## 2018-10-25 DIAGNOSIS — Z34.01 SUPERVISION OF NORMAL FIRST PREGNANCY IN FIRST TRIMESTER: ICD-10-CM

## 2018-10-25 PROCEDURE — 99207 ZZC PRENATAL VISIT: CPT | Performed by: OBSTETRICS & GYNECOLOGY

## 2018-10-25 ASSESSMENT — PAIN SCALES - GENERAL: PAINLEVEL: NO PAIN (0)

## 2018-10-25 NOTE — NURSING NOTE
"Chief Complaint   Patient presents with     Prenatal Care     21 weeks 0 days       Initial /64 (BP Location: Left arm, Patient Position: Sitting, Cuff Size: Adult Regular)  Pulse 71  Ht 5' 3\" (1.6 m)  Wt 165 lb (74.8 kg)  LMP 05/31/2018  SpO2 99%  BMI 29.23 kg/m2 Estimated body mass index is 29.23 kg/(m^2) as calculated from the following:    Height as of this encounter: 5' 3\" (1.6 m).    Weight as of this encounter: 165 lb (74.8 kg).  Medication Reconciliation: complete    Anna Looney, MATTHEW    "

## 2018-10-25 NOTE — PROGRESS NOTES
Doing well.  No concerns today.   Transfer of care from [facility].   Denies PTL sx, vb, lof  Reminded of upcoming labs including 1 hour GTT  Reviewed recent US-no concerns with anatomical survey. Heart not completely visualized so f/u US ordered.  Return to clinic in 4 weeks    Pedro Pablo Cantor MD  10/25/2018

## 2018-10-25 NOTE — MR AVS SNAPSHOT
After Visit Summary   10/25/2018    Barbi Vale    MRN: [id number]           Patient Information     Date Of Birth          1985        Visit Information        Provider Department      10/25/2018 3:15 PM Pedro Pablo Cantor MD [facility]        Today's Diagnoses     Supervision of normal first pregnancy in first trimester          Care Instructions    Return to clinic in 4 weeks            Follow-ups after your visit        Your next 10 appointments already scheduled     Nov 19, 2018  3:30 PM CST   (Arrive by 3:15 PM)   US OB >14 WEEKS FOLLOW UP with HIUS2   [facility] ULTRASOUND ([facility] )    [address] [place] [address]   [phone]           How do I prepare for my exam? (Food and drink instructions) Drink four 8-ounce glasses of fluid an hour before your exam. If you need to empty your bladder before your exam, try to release only a little urine. Then, drink another glass of fluid.  How do I prepare for my exam? (Other instructions) You may have up to two family members in the exam room. If you bring a small child, an adult must be there to care for him or her. No video or camera photography during the procedure.  What should I wear: Wear comfortable clothes.  How long does the exam take: Most ultrasounds take 30 to 60 minutes.  What should I bring: Bring a list of your medicines, including vitamins, minerals and over-the-counter drugs. It is safest to leave personal items at home.  Do I need a :  No  is needed.  What do I need to tell my doctor: Tell your doctor about any allergies you may have.  What should I do after the exam: No restrictions, You may resume normal activities.  What is this test: An ultrasound uses sound waves to make pictures of the body. Sound waves do not cause pain. The only discomfort may be the pressure of the wand against your skin or full bladder.  Who should I call with questions: If you have any questions,  please call the Imaging Department where you will have your exam. Directions, parking instructions, and other information is available on our website, [email or web address].            Nov 20, 2018  4:00 PM CST   (Arrive by 3:45 PM)   ESTABLISHED PRENATAL with Janice Jimenez NP   [facility]    [address] [place] [address]   [phone]              Future tests that were ordered for you today     Open Future Orders        Priority Expected Expires Ordered    US OB >14 Weeks Follow Up Routine 11/19/2018 11/23/2018 10/24/2018            Who to contact     If you have questions or need follow up information about today's clinic visit or your schedule please contact [facility] directly at [phone].  Normal or non-critical lab and imaging results will be communicated to you by MyChart, letter or phone within 4 business days after the clinic has received the results. If you do not hear from us within 7 days, please contact the clinic through MyChart or phone. If you have a critical or abnormal lab result, we will notify you by phone as soon as possible.  Submit refill requests through "[employer] or call your pharmacy and they will forward the refill request to us. Please allow 3 business days for your refill to be completed.          Additional Information About Your Visit        PhotowhoaharWrapMail Information     "[employer] gives you secure access to your electronic health record. If you see a primary care provider, you can also send messages to your care team and make appointments. If you have questions, please call your primary care clinic.  If you do not have a primary care provider, please call [phone] and they will assist you.        Care EveryWhere ID     This is your Care EveryWhere ID. This could be used by other organizations to access your [facility] medical records  [id number]        Your Vitals Were     Pulse Height Last Period  "Pulse Oximetry BMI (Body Mass Index)       71 5' 3\" (1.6 m) 05/31/2018 99% 29.23 kg/m2        Blood Pressure from Last 3 Encounters:   10/25/18 106/64   10/03/18 107/62   09/24/18 92/60    Weight from Last 3 Encounters:   10/25/18 165 lb (74.8 kg)   10/03/18 163 lb (73.9 kg)   09/24/18 162 lb 6.4 oz (73.7 kg)              Today, you had the following     No orders found for display       Primary Care Provider Office Phone # Fax #    Caprice Pimentel [phone]       [facility] [place] [address]  [place] [address]        Equal Access to Services     VEGA FLEMING : Hadii madai mae hadasho Soomaali, waaxda luqadaha, qaybta kaalmada adeegyada, lacy skinnerin hayreece rai . So [facility] [phone].    ATENCIÓN: Si habla español, tiene a kinney disposición servicios gratuitos de asistencia lingüística. Llame al [phone].    We comply with applicable federal civil rights laws and [place] laws. We do not discriminate on the basis of race, color, national origin, age, disability, sex, sexual orientation, or gender identity.            Thank you!     Thank you for choosing [facility]  for your care. Our goal is always to provide you with excellent care. Hearing back from our patients is one way we can continue to improve our services. Please take a few minutes to complete the written survey that you may receive in the mail after your visit with us. Thank you!             Your Updated Medication List - Protect others around you: Learn how to safely use, store and throw away your medicines at www.disposemymeds.org.          This list is accurate as of 10/25/18  5:11 PM.  Always use your most recent med list.                   Brand Name Dispense Instructions for use Diagnosis    ACETAMINOPHEN PO      Take 500 mg by mouth once        fish oil-omega-3 fatty acids 1000 MG capsule      Take 1,000 mg by mouth daily    Paresthesia       folic acid 400 MCG tablet    FOLVITE "     Take 400 mcg by mouth daily        prenatal multivitamin plus iron 27-0.8 MG Tabs per tablet      Take 1 tablet by mouth daily        VITAMIN D (CHOLECALCIFEROL) PO      Take 2,000 Units by mouth daily

## 2018-11-19 ENCOUNTER — HOSPITAL ENCOUNTER (OUTPATIENT)
Dept: ULTRASOUND IMAGING | Facility: HOSPITAL | Age: 33
Discharge: HOME | End: 2018-11-19
Attending: OBSTETRICS & GYNECOLOGY | Admitting: OBSTETRICS & GYNECOLOGY
Payer: COMMERCIAL

## 2018-11-19 DIAGNOSIS — Z34.02 ENCOUNTER FOR SUPERVISION OF NORMAL FIRST PREGNANCY IN SECOND TRIMESTER: ICD-10-CM

## 2018-11-19 PROCEDURE — 76816 OB US FOLLOW-UP PER FETUS: CPT | Mod: TC

## 2018-11-20 ENCOUNTER — PRENATAL OFFICE VISIT (OUTPATIENT)
Dept: OBGYN | Facility: OTHER | Age: 33
End: 2018-11-20
Attending: NURSE PRACTITIONER
Payer: COMMERCIAL

## 2018-11-20 VITALS
BODY MASS INDEX: 30.12 KG/M2 | HEIGHT: 63 IN | WEIGHT: 170 LBS | DIASTOLIC BLOOD PRESSURE: 50 MMHG | SYSTOLIC BLOOD PRESSURE: 100 MMHG

## 2018-11-20 DIAGNOSIS — Z34.02 SUPERVISION OF NORMAL FIRST PREGNANCY IN SECOND TRIMESTER: Primary | ICD-10-CM

## 2018-11-20 PROCEDURE — 99207 ZZC PRENATAL VISIT: CPT | Performed by: NURSE PRACTITIONER

## 2018-11-20 ASSESSMENT — PAIN SCALES - GENERAL: PAINLEVEL: NO PAIN (0)

## 2018-11-20 NOTE — MR AVS SNAPSHOT
After Visit Summary   11/20/2018    Barbi Vale    MRN: [id number]           Patient Information     Date Of Birth          1985        Visit Information        Provider Department      11/20/2018 4:00 PM Janice Jimenez NP [facility]        Today's Diagnoses     Supervision of normal first pregnancy in second trimester    -  1      Care Instructions      Understanding Blood Sugar During Pregnancy  Gestational diabetes causes high blood sugar levels during pregnancy. You are at risk of developing, or perhaps have already developed, gestational diabetes. Controlling your blood sugar can help prevent problems for you and your baby.  Your body turns food into blood sugar  As food is digested, it turns into sugar (glucose), a fuel that feeds your body. This sugar goes into your bloodstream. Your body then releases a substance called insulin to help your body use blood sugar properly.    Blood sugar goes to your baby  The placenta is where nutrients in your blood are exchanged with your baby s blood. Your blood sugar goes to your baby from the placenta through the umbilical cord. Your baby uses this sugar to grow.  Too much blood sugar affects you and your baby  During pregnancy, the placenta makes hormones that can disrupt the way your body uses insulin. If your body can t use insulin properly, your blood sugar level gets too high. Then too much blood sugar goes to your baby. This can cause problems for both you and your baby.  Controlling your blood sugar helps prevent problems  You can lower your blood sugar by eating right, exercising, and taking medicines that your healthcare provider prescribes to control your blood sugar. If you keep your blood sugar in control, the risks to you and your baby are the same as those for a normal pregnancy.  Date Last Reviewed: 2/1/2018 2000-2018 [employer]. [address] [place] [address], [place] [address]. All rights  reserved. This information is not intended as a substitute for professional medical care. Always follow your healthcare professional's instructions.        Adapting to Pregnancy: Second Trimester    Keep up the healthy habits you started in your first trimester. You might be a little more tired than normal. So plan your day wisely. Look at the tips below and choose the ones that suit your lifestyle.  If you have any questions, check with your healthcare provider.   If you work  If you can, adjust your work with your employer to fit your needs. Try these tips:    If you stand for long periods, find ways to do some tasks while sitting. Also, try to stand with 1 foot resting on a low stool or ledge. Shift your weight from foot to foot often. Wear low-heeled shoes.    If you sit, keep your knees level with your hips. Rest your feet on a firm surface. Sit tall with support for your low back.    If you work long hours, ask about adjusting your schedule. Try taking shorter breaks more often.  When you travel  The second trimester may be the best time for any travel. Talk to your healthcare provider about any special plans you may need to make. Always:    Wear a seat belt. Fasten the lap part under your belly. Wear the shoulder part also.    Take breaks often during long trips by car or plane. Move around to stretch your legs.    Drink plenty of fluids on flights. The air in plane cabins is very dry.    Avoid hot climates or high altitudes if you are not used to them.    Avoid places where the food and water might make you sick.    Make sure you are up-to-date on all immunizations, including the flu vaccine. This is especially important when traveling overseas.  Taking time to relax  Find time to rest and relax at work or at home:    Take short time-outs daily. Do relaxation exercises.    Breathe deeply during stressful times.    Try not to take on too much. Plan tasks for times when you have the most energy.    Take naps  when you can. Or just sit and relax.    After week 16, avoid lying on your back for more than a few minutes. Instead, lie on your side. Switch sides often.  Continuing as lovers  Unless your healthcare provider tells you otherwise, there is no reason to stop having sex now. Blood supply increases to the pelvic area in the second trimester. Because of this, sex might be more enjoyable. Try different positions and see what s best. Also, talk to your partner about any changes in desire. Spotting may happen after sex. Be sure to let your healthcare provider know if there is heavy bleeding.  Keeping your environment safe  You can still clean house and use scented products. Just take some simple precautions:    Wear gloves when using cleaning fluids.    Open windows to let in fresh air. Use a fan if you paint.    Avoid secondhand smoke.    Don t breathe fumes from nail polish, hair spray, cleansers, or other chemicals.  Date Last Reviewed: 1/1/2018 2000-2018 [employer]. [address] [place] [address], [place] [address]. All rights reserved. This information is not intended as a substitute for professional medical care. Always follow your healthcare professional's instructions.        Pregnancy: Your Second Trimester Changes  Each day, you and your baby are changing and growing together. Here s a quick look at what s happening to both of you.  How you are changing  Even when you don t notice it, your body is adapting to meet the needs of your growing baby. The changes in your body might also affect your moods.  Your body  Your uterus expands as baby grows. As the weeks go by, you will feel more pressure on your bladder, stomach, and other organs. You may notice some skin color changes on your forehead, nose, or cheeks. Freckles may darken, and moles may grow. You may notice a darker line on your abdomen between your belly button and pubic bone in the midline.  Your moods  The second trimester is often easier than  "the first. Still, be prepared for mood swings. These are due to the increase in hormones (chemicals that affect the way organs work) produced by your body. These mood swings are a normal part of pregnancy.  How your baby is growing          Month 4  Baby s heartbeat may be heard with a Doppler (hand-held ultrasound device) by 9 to 10 weeks. Eyebrows, eyelashes, and fingernails begin to form.  Month 5  You may feel your baby move. After a growth spurt, your baby nears 10 inches. Month 6  Baby s fingerprints have formed. Your baby weighs about 1 to 2 pounds and is about 12 inches long.   Date Last Reviewed: 1/1/2018 2000-2018 [employer]. [address] [place] [address], [place] [address]. All rights reserved. This information is not intended as a substitute for professional medical care. Always follow your healthcare professional's instructions.      Best Practices in Breastfeeding   Q: What are \"best practices\" in breastfeeding?   A: The best hospitals strive to follow \"best practices.\" Research shows that human milk offers the best nutrition for babies. Best practices in breastfeeding means the staff cares for moms and babies in a way that promotes successful breastfeeding. Breastfeeding moms succeed more often when the care team:    Teaches all pregnant women about the benefits of breastfeeding.    Helps mothers start breastfeeding within one hour of birth.    Shows mothers how to breastfeed and how to keep their milk supply up, even if they are apart from their babies.    Gives only human milk to  babies. (No other food or drink unless there's a medical need.)    Helps mothers and babies stay together 24 hours a day. (This is called \"rooming in.\")    Encourages frequent breastfeeding, so moms can make plenty of milk.    Does not give pacifiers or bottles to babies who are breastfeeding.    Explains who mothers should call if they need help breastfeeding after they leave the hospital or " clinic.  The hospital must also train all members of the care team in the skills they need to follow this policy.   Q: Do all of the [facility] follow these practices?  A: All of our hospitals are adopting best practices in breastfeeding. The steps listed will soon be in place in all hospitals.   Q: Why is breastfeeding so important?  A: Breastfeeding is healthy for both babies and mothers.    Your breast milk is the perfect food for your baby. It has all the nutrients your baby needs, plus it has antibodies to help your baby fight common infections (like ear infections and pneumonia).    It is convenient and does not cost any money.    It helps protect moms from some kinds of cancer.    It helps prevent some childhood diseases like diabetes and allergies. It can also help protect your baby from Sudden Infant Death Syndrome (SIDS).    It helps moms lose their pregnancy weight faster.  If you are unable to produce enough milk in the hospital, your care team may recommend donor human milk for your baby.  Q: Will I be forced to breastfeed, even if I don't want to?   A: Absolutely not! Not all moms wish to breastfeed, and a very few cannot breastfeed for some reason. We will respect and support your choices.   We will discuss the ways in which breastfeeding is the perfect food for your baby: Any amount of breast milk is great--even just one feeding. But if you still prefer to use formula, we will provide the formula and teach you how to feed your baby.   If you breastfeed, it is important to give only human milk and not bottles of formula. This way, your body will make enough milk, and you and your baby will get lots of practice. If you would like to breastfeed but want to try a bottle of formula in the hospital, we will discuss the reasons we do not recommend this .   Q: What else might I notice about best practices in breastfeeding?  A: You will notice the following:    Soon after birth, if you and your baby  are able, we will place your baby on your chest for skin-to-skin contact. This helps your baby stay warm and adjust to life outside the womb. If you plan to breastfeed, we will help you and your baby breastfeed within the first hour after birth.    The care team will care for you and your baby in your room, except during medical tests and treatments. This is a critical time for getting to know your baby and learning how to care for him or her. It is important to have your baby with you while there are plenty of people around to help you.  Staying close to your baby night and day will help you make more milk. Also, studies show that both moms and babies sleep better if they sleep in the same room. Your nurses will help you get the extra rest you need.    We will not give your baby a pacifier unless there is a medical need. Instead, we will teach you many other ways to comfort your baby.   When learning to breastfeed, it is best for babies to satisfy their sucking needs at the breast for at least 2 to 4 weeks after birth. This teaches your baby the correct way to latch onto the breast, and it helps you build a good milk supply.    You'll get a lot of support for breastfeeding. We will also tell you who to call for support after you get home.  Q: What can I do to make breastfeeding a success?  A: Try the ideas below.    Get good information about breastfeeding. Call [employer] at [phone] for details about breastfeeding classes. Some sites offer financial support if you need it.    Tell friends and family about your decision to breastfeed. Ask for their support.    Plan ahead to get help with other tasks after your baby is born. This way, you can focus on breastfeeding, resting and caring for yourself.  For more information, go to www.babyfriendlyusa.org, call your clinic's care team, or speak with a childbirth or breastfeeding educator.  For informational purposes only. Not to replace the advice of  your health care provider.   Copyright   2010 [facility]. All rights reserved. zulily [id number] - REV 07/17.            Follow-ups after your visit        Your next 10 appointments already scheduled     Dec 20, 2018  3:30 PM CST   (Arrive by 3:15 PM)   ESTABLISHED PRENATAL with Janice Jimenez NP   [facility] [place] ([facility] [place] )    [address]  [place] [address]   [phone]            Jan 04, 2019  3:30 PM CST   (Arrive by 3:15 PM)   ESTABLISHED PRENATAL with Pedro Pablo Cantor MD   [facility] [place] ([facility] [place] )    [address]  [place] [address]   [phone]            Jan 18, 2019  3:30 PM CST   (Arrive by 3:15 PM)   ESTABLISHED PRENATAL with Janice Jimenez NP   [facility] [place] ([facility] [place] )    [address]  [place] [address]   [phone]            Feb 01, 2019  3:30 PM CST   (Arrive by 3:15 PM)   ESTABLISHED PRENATAL with Janice Jimenez NP   [facility] [place] ([facility] [place] )    [address]  [place] [address]   [phone]            Feb 12, 2019  3:30 PM CST   (Arrive by 3:15 PM)   ESTABLISHED PRENATAL with Janice Jimenez NP   [facility] [place] ([facility] [place] )    [address]  [place] [address]   [phone]              Who to contact     If you have questions or need follow up information about today's clinic visit or your schedule please contact [facility] directly at [phone].  Normal or non-critical lab and imaging results will be communicated to you by MyChart, letter or phone within 4 business days after the clinic has received the results. If you do not hear from us within 7 days, please contact the clinic through MyChart or phone. If you have a critical or abnormal lab result, we will notify you by phone as soon as possible.  Submit refill requests through  "MyChart or call your pharmacy and they will forward the refill request to us. Please allow 3 business days for your refill to be completed.          Additional Information About Your Visit        MyChart Information     [employer] gives you secure access to your electronic health record. If you see a primary care provider, you can also send messages to your care team and make appointments. If you have questions, please call your primary care clinic.  If you do not have a primary care provider, please call [phone] and they will assist you.        Care EveryWhere ID     This is your Care EveryWhere ID. This could be used by other organizations to access your [facility] medical records  [id number]        Your Vitals Were     Height Last Period BMI (Body Mass Index)             5' 3\" (1.6 m) 05/31/2018 30.11 kg/m2          Blood Pressure from Last 3 Encounters:   11/20/18 100/50   10/25/18 106/64   10/03/18 107/62    Weight from Last 3 Encounters:   11/20/18 170 lb (77.1 kg)   10/25/18 165 lb (74.8 kg)   10/03/18 163 lb (73.9 kg)              Today, you had the following     No orders found for display       Primary Care Provider Office Phone # Fax #    Caprice Piemntel [phone]       [facility] [place] [address]  [facility] [address]        Equal Access to Services     RIKKI FLEMING AH: Hadii aad ku hadasho Soomaali, waaxda luqadaha, qaybta kaalmada adeegyada, lacy rai . So [facility] [phone].    ATENCIÓN: Si habla español, tiene a kinney disposición servicios gratuitos de asistencia lingüística. Llame al [phone].    We comply with applicable federal civil rights laws and [place] laws. We do not discriminate on the basis of race, color, national origin, age, disability, sex, sexual orientation, or gender identity.            Thank you!     Thank you for choosing [facility]  for your care. Our goal is always to provide you with " excellent care. Hearing back from our patients is one way we can continue to improve our services. Please take a few minutes to complete the written survey that you may receive in the mail after your visit with us. Thank you!             Your Updated Medication List - Protect others around you: Learn how to safely use, store and throw away your medicines at www.disposemymeds.org.          This list is accurate as of 11/20/18 11:59 PM.  Always use your most recent med list.                   Brand Name Dispense Instructions for use Diagnosis    ACETAMINOPHEN PO      Take 500 mg by mouth once        fish oil-omega-3 fatty acids 1000 MG capsule      Take 1,000 mg by mouth daily    Paresthesia       folic acid 400 MCG tablet    FOLVITE     Take 400 mcg by mouth daily        prenatal multivitamin plus iron 27-0.8 MG Tabs per tablet      Take 1 tablet by mouth daily        VITAMIN D (CHOLECALCIFEROL) PO      Take 2,000 Units by mouth daily

## 2018-11-20 NOTE — NURSING NOTE
"Chief Complaint   Patient presents with     Prenatal Care     24 weeks 5/7       Initial /50 (BP Location: Right arm, Patient Position: Chair, Cuff Size: Adult Regular)  Ht 5' 3\" (1.6 m)  Wt 170 lb (77.1 kg)  LMP 05/31/2018  BMI 30.11 kg/m2 Estimated body mass index is 30.11 kg/(m^2) as calculated from the following:    Height as of this encounter: 5' 3\" (1.6 m).    Weight as of this encounter: 170 lb (77.1 kg).  Medication Reconciliation: complete    ANABEL MADDOX LPN    "

## 2018-11-21 NOTE — PATIENT INSTRUCTIONS
Understanding Blood Sugar During Pregnancy  Gestational diabetes causes high blood sugar levels during pregnancy. You are at risk of developing, or perhaps have already developed, gestational diabetes. Controlling your blood sugar can help prevent problems for you and your baby.  Your body turns food into blood sugar  As food is digested, it turns into sugar (glucose), a fuel that feeds your body. This sugar goes into your bloodstream. Your body then releases a substance called insulin to help your body use blood sugar properly.    Blood sugar goes to your baby  The placenta is where nutrients in your blood are exchanged with your baby s blood. Your blood sugar goes to your baby from the placenta through the umbilical cord. Your baby uses this sugar to grow.  Too much blood sugar affects you and your baby  During pregnancy, the placenta makes hormones that can disrupt the way your body uses insulin. If your body can t use insulin properly, your blood sugar level gets too high. Then too much blood sugar goes to your baby. This can cause problems for both you and your baby.  Controlling your blood sugar helps prevent problems  You can lower your blood sugar by eating right, exercising, and taking medicines that your healthcare provider prescribes to control your blood sugar. If you keep your blood sugar in control, the risks to you and your baby are the same as those for a normal pregnancy.  Date Last Reviewed: 2/1/2018 2000-2018 [employer]. [address] [place] [address], [place] [address]. All rights reserved. This information is not intended as a substitute for professional medical care. Always follow your healthcare professional's instructions.        Adapting to Pregnancy: Second Trimester    Keep up the healthy habits you started in your first trimester. You might be a little more tired than normal. So plan your day wisely. Look at the tips below and choose the ones that suit your lifestyle.  If you  have any questions, check with your healthcare provider.   If you work  If you can, adjust your work with your employer to fit your needs. Try these tips:    If you stand for long periods, find ways to do some tasks while sitting. Also, try to stand with 1 foot resting on a low stool or ledge. Shift your weight from foot to foot often. Wear low-heeled shoes.    If you sit, keep your knees level with your hips. Rest your feet on a firm surface. Sit tall with support for your low back.    If you work long hours, ask about adjusting your schedule. Try taking shorter breaks more often.  When you travel  The second trimester may be the best time for any travel. Talk to your healthcare provider about any special plans you may need to make. Always:    Wear a seat belt. Fasten the lap part under your belly. Wear the shoulder part also.    Take breaks often during long trips by car or plane. Move around to stretch your legs.    Drink plenty of fluids on flights. The air in plane cabins is very dry.    Avoid hot climates or high altitudes if you are not used to them.    Avoid places where the food and water might make you sick.    Make sure you are up-to-date on all immunizations, including the flu vaccine. This is especially important when traveling overseas.  Taking time to relax  Find time to rest and relax at work or at home:    Take short time-outs daily. Do relaxation exercises.    Breathe deeply during stressful times.    Try not to take on too much. Plan tasks for times when you have the most energy.    Take naps when you can. Or just sit and relax.    After week 16, avoid lying on your back for more than a few minutes. Instead, lie on your side. Switch sides often.  Continuing as lovers  Unless your healthcare provider tells you otherwise, there is no reason to stop having sex now. Blood supply increases to the pelvic area in the second trimester. Because of this, sex might be more enjoyable. Try different positions  and see what s best. Also, talk to your partner about any changes in desire. Spotting may happen after sex. Be sure to let your healthcare provider know if there is heavy bleeding.  Keeping your environment safe  You can still clean house and use scented products. Just take some simple precautions:    Wear gloves when using cleaning fluids.    Open windows to let in fresh air. Use a fan if you paint.    Avoid secondhand smoke.    Don t breathe fumes from nail polish, hair spray, cleansers, or other chemicals.  Date Last Reviewed: 1/1/2018 2000-2018 [employer]. [address] [place], [place] [id number]. All rights reserved. This information is not intended as a substitute for professional medical care. Always follow your healthcare professional's instructions.        Pregnancy: Your Second Trimester Changes  Each day, you and your baby are changing and growing together. Here s a quick look at what s happening to both of you.  How you are changing  Even when you don t notice it, your body is adapting to meet the needs of your growing baby. The changes in your body might also affect your moods.  Your body  Your uterus expands as baby grows. As the weeks go by, you will feel more pressure on your bladder, stomach, and other organs. You may notice some skin color changes on your forehead, nose, or cheeks. Freckles may darken, and moles may grow. You may notice a darker line on your abdomen between your belly button and pubic bone in the midline.  Your moods  The second trimester is often easier than the first. Still, be prepared for mood swings. These are due to the increase in hormones (chemicals that affect the way organs work) produced by your body. These mood swings are a normal part of pregnancy.  How your baby is growing          Month 4  Baby s heartbeat may be heard with a Doppler (hand-held ultrasound device) by 9 to 10 weeks. Eyebrows, eyelashes, and fingernails begin to form.  Month 5  You  "may feel your baby move. After a growth spurt, your baby nears 10 inches. Month 6  Baby s fingerprints have formed. Your baby weighs about 1 to 2 pounds and is about 12 inches long.   Date Last Reviewed: 1/1/2018 2000-2018 [employer]. [address] [place] [address], [place] [address]. All rights reserved. This information is not intended as a substitute for professional medical care. Always follow your healthcare professional's instructions.      Best Practices in Breastfeeding   Q: What are \"best practices\" in breastfeeding?   A: The best hospitals strive to follow \"best practices.\" Research shows that human milk offers the best nutrition for babies. Best practices in breastfeeding means the staff cares for moms and babies in a way that promotes successful breastfeeding. Breastfeeding moms succeed more often when the care team:    Teaches all pregnant women about the benefits of breastfeeding.    Helps mothers start breastfeeding within one hour of birth.    Shows mothers how to breastfeed and how to keep their milk supply up, even if they are apart from their babies.    Gives only human milk to  babies. (No other food or drink unless there's a medical need.)    Helps mothers and babies stay together 24 hours a day. (This is called \"rooming in.\")    Encourages frequent breastfeeding, so moms can make plenty of milk.    Does not give pacifiers or bottles to babies who are breastfeeding.    Explains who mothers should call if they need help breastfeeding after they leave the hospital or clinic.  The hospital must also train all members of the care team in the skills they need to follow this policy.   Q: Do all of the [facility] follow these practices?  A: All of our hospitals are adopting best practices in breastfeeding. The steps listed will soon be in place in all hospitals.   Q: Why is breastfeeding so important?  A: Breastfeeding is healthy for both babies and mothers.    Your breast " milk is the perfect food for your baby. It has all the nutrients your baby needs, plus it has antibodies to help your baby fight common infections (like ear infections and pneumonia).    It is convenient and does not cost any money.    It helps protect moms from some kinds of cancer.    It helps prevent some childhood diseases like diabetes and allergies. It can also help protect your baby from Sudden Infant Death Syndrome (SIDS).    It helps moms lose their pregnancy weight faster.  If you are unable to produce enough milk in the hospital, your care team may recommend donor human milk for your baby.  Q: Will I be forced to breastfeed, even if I don't want to?   A: Absolutely not! Not all moms wish to breastfeed, and a very few cannot breastfeed for some reason. We will respect and support your choices.   We will discuss the ways in which breastfeeding is the perfect food for your baby: Any amount of breast milk is great--even just one feeding. But if you still prefer to use formula, we will provide the formula and teach you how to feed your baby.   If you breastfeed, it is important to give only human milk and not bottles of formula. This way, your body will make enough milk, and you and your baby will get lots of practice. If you would like to breastfeed but want to try a bottle of formula in the hospital, we will discuss the reasons we do not recommend this .   Q: What else might I notice about best practices in breastfeeding?  A: You will notice the following:    Soon after birth, if you and your baby are able, we will place your baby on your chest for skin-to-skin contact. This helps your baby stay warm and adjust to life outside the womb. If you plan to breastfeed, we will help you and your baby breastfeed within the first hour after birth.    The care team will care for you and your baby in your room, except during medical tests and treatments. This is a critical time for getting to know your baby and  learning how to care for him or her. It is important to have your baby with you while there are plenty of people around to help you.  Staying close to your baby night and day will help you make more milk. Also, studies show that both moms and babies sleep better if they sleep in the same room. Your nurses will help you get the extra rest you need.    We will not give your baby a pacifier unless there is a medical need. Instead, we will teach you many other ways to comfort your baby.   When learning to breastfeed, it is best for babies to satisfy their sucking needs at the breast for at least 2 to 4 weeks after birth. This teaches your baby the correct way to latch onto the breast, and it helps you build a good milk supply.    You'll get a lot of support for breastfeeding. We will also tell you who to call for support after you get home.  Q: What can I do to make breastfeeding a success?  A: Try the ideas below.    Get good information about breastfeeding. Call [employer] at [phone] for details about breastfeeding classes. Some sites offer financial support if you need it.    Tell friends and family about your decision to breastfeed. Ask for their support.    Plan ahead to get help with other tasks after your baby is born. This way, you can focus on breastfeeding, resting and caring for yourself.  For more information, go to www.babyfriendlyusa.org, call your clinic's care team, or speak with a childbirth or breastfeeding educator.  For informational purposes only. Not to replace the advice of your health care provider.   Copyright   2010 [employer]. All rights reserved. [employer] 900852 - REV 07/17.

## 2018-11-21 NOTE — PROGRESS NOTES
Doing well.  Baby active. No cramping.  Reviewed 28 week labs and Tdap for next time.  Diet and exercise recommendations reviewed.  Anticipatory guidance provided.  RTC in 4 weeks.

## 2018-12-03 DIAGNOSIS — R53.83 FATIGUE: ICD-10-CM

## 2018-12-03 DIAGNOSIS — R53.83 FATIGUE: Primary | ICD-10-CM

## 2018-12-03 PROCEDURE — 99000 SPECIMEN HANDLING OFFICE-LAB: CPT | Performed by: NURSE PRACTITIONER

## 2018-12-03 PROCEDURE — 82607 VITAMIN B-12: CPT | Mod: 90 | Performed by: NURSE PRACTITIONER

## 2018-12-03 PROCEDURE — 36415 COLL VENOUS BLD VENIPUNCTURE: CPT | Performed by: NURSE PRACTITIONER

## 2018-12-03 PROCEDURE — 82306 VITAMIN D 25 HYDROXY: CPT | Mod: 90 | Performed by: NURSE PRACTITIONER

## 2018-12-04 LAB — VIT B12 SERPL-MCNC: 604 PG/ML (ref 193–986)

## 2018-12-05 ENCOUNTER — MYC MEDICAL ADVICE (OUTPATIENT)
Dept: OBGYN | Facility: OTHER | Age: 33
End: 2018-12-05

## 2018-12-05 LAB — DEPRECATED CALCIDIOL+CALCIFEROL SERPL-MC: 30 UG/L (ref 20–75)

## 2018-12-18 DIAGNOSIS — Z34.01 SUPERVISION OF NORMAL FIRST PREGNANCY IN FIRST TRIMESTER: Primary | ICD-10-CM

## 2018-12-18 DIAGNOSIS — Z34.03 SUPERVISION OF NORMAL FIRST PREGNANCY IN THIRD TRIMESTER: ICD-10-CM

## 2018-12-20 ENCOUNTER — PRENATAL OFFICE VISIT (OUTPATIENT)
Dept: OBGYN | Facility: OTHER | Age: 33
End: 2018-12-20
Attending: NURSE PRACTITIONER
Payer: COMMERCIAL

## 2018-12-20 VITALS
DIASTOLIC BLOOD PRESSURE: 50 MMHG | BODY MASS INDEX: 30.65 KG/M2 | HEIGHT: 63 IN | WEIGHT: 173 LBS | SYSTOLIC BLOOD PRESSURE: 120 MMHG

## 2018-12-20 DIAGNOSIS — Z34.03 SUPERVISION OF NORMAL FIRST PREGNANCY IN THIRD TRIMESTER: ICD-10-CM

## 2018-12-20 DIAGNOSIS — Z34.01 SUPERVISION OF NORMAL FIRST PREGNANCY IN FIRST TRIMESTER: Primary | ICD-10-CM

## 2018-12-20 LAB
ERYTHROCYTE [DISTWIDTH] IN BLOOD BY AUTOMATED COUNT: 12.5 % (ref 10–15)
GLUCOSE 1H P 50 G GLC PO SERPL-MCNC: 112 MG/DL (ref 60–129)
HCT VFR BLD AUTO: 34.2 % (ref 35–47)
HGB BLD-MCNC: 11.4 G/DL (ref 11.7–15.7)
MCH RBC QN AUTO: 31.8 PG (ref 26.5–33)
MCHC RBC AUTO-ENTMCNC: 33.3 G/DL (ref 31.5–36.5)
MCV RBC AUTO: 96 FL (ref 78–100)
PLATELET # BLD AUTO: 211 10E9/L (ref 150–450)
RBC # BLD AUTO: 3.58 10E12/L (ref 3.8–5.2)
WBC # BLD AUTO: 7.5 10E9/L (ref 4–11)

## 2018-12-20 PROCEDURE — 86850 RBC ANTIBODY SCREEN: CPT | Performed by: NURSE PRACTITIONER

## 2018-12-20 PROCEDURE — 99207 ZZC PRENATAL VISIT: CPT | Mod: 25 | Performed by: NURSE PRACTITIONER

## 2018-12-20 PROCEDURE — 99000 SPECIMEN HANDLING OFFICE-LAB: CPT | Performed by: NURSE PRACTITIONER

## 2018-12-20 PROCEDURE — 82950 GLUCOSE TEST: CPT | Performed by: NURSE PRACTITIONER

## 2018-12-20 PROCEDURE — 85027 COMPLETE CBC AUTOMATED: CPT | Performed by: NURSE PRACTITIONER

## 2018-12-20 PROCEDURE — 90715 TDAP VACCINE 7 YRS/> IM: CPT | Performed by: NURSE PRACTITIONER

## 2018-12-20 PROCEDURE — 90471 IMMUNIZATION ADMIN: CPT | Performed by: NURSE PRACTITIONER

## 2018-12-20 PROCEDURE — 86780 TREPONEMA PALLIDUM: CPT | Mod: 90 | Performed by: NURSE PRACTITIONER

## 2018-12-20 PROCEDURE — 36415 COLL VENOUS BLD VENIPUNCTURE: CPT | Performed by: NURSE PRACTITIONER

## 2018-12-20 ASSESSMENT — MIFFLIN-ST. JEOR: SCORE: 1458.85

## 2018-12-20 ASSESSMENT — PAIN SCALES - GENERAL: PAINLEVEL: NO PAIN (0)

## 2018-12-20 NOTE — NURSING NOTE
"Chief Complaint   Patient presents with     Prenatal Care     29 weeks 0 days       Initial /50 (BP Location: Left arm, Patient Position: Chair, Cuff Size: Adult Regular)   Ht 1.6 m (5' 3\")   Wt 78.5 kg (173 lb)   LMP 05/31/2018   BMI 30.65 kg/m   Estimated body mass index is 30.65 kg/m  as calculated from the following:    Height as of this encounter: 1.6 m (5' 3\").    Weight as of this encounter: 78.5 kg (173 lb).  Medication Reconciliation: complete    ANABEL MADDOX LPN    "

## 2018-12-21 LAB — BLD GP AB SCN SERPL QL: NORMAL

## 2018-12-22 LAB — T PALLIDUM AB SER QL: NONREACTIVE

## 2018-12-26 NOTE — PATIENT INSTRUCTIONS
Patient Education     Understanding Blood Sugar During Pregnancy  Gestational diabetes causes high blood sugar levels during pregnancy. You are at risk of developing, or perhaps have already developed, gestational diabetes. Controlling your blood sugar can help prevent problems for you and your baby.  Your body turns food into blood sugar  As food is digested, it turns into sugar (glucose), a fuel that feeds your body. This sugar goes into your bloodstream. Your body then releases a substance called insulin to help your body use blood sugar properly.    Blood sugar goes to your baby  The placenta is where nutrients in your blood are exchanged with your baby s blood. Your blood sugar goes to your baby from the placenta through the umbilical cord. Your baby uses this sugar to grow.  Too much blood sugar affects you and your baby  During pregnancy, the placenta makes hormones that can disrupt the way your body uses insulin. If your body can t use insulin properly, your blood sugar level gets too high. Then too much blood sugar goes to your baby. This can cause problems for both you and your baby.  Controlling your blood sugar helps prevent problems  You can lower your blood sugar by eating right, exercising, and taking medicines that your healthcare provider prescribes to control your blood sugar. If you keep your blood sugar in control, the risks to you and your baby are the same as those for a normal pregnancy.  Date Last Reviewed: 2/1/2018 2000-2018 [employer]. [address] [place] [address], [place] [address]. All rights reserved. This information is not intended as a substitute for professional medical care. Always follow your healthcare professional's instructions.

## 2018-12-26 NOTE — PROGRESS NOTES
Doing well.  Baby active.  Denies concerns.  28 week labs and Tdap today.  Discussed kick counts and third trimester changes.  RTC in 2 weeks.

## 2019-01-01 ENCOUNTER — MYC MEDICAL ADVICE (OUTPATIENT)
Dept: OBGYN | Facility: OTHER | Age: 34
End: 2019-01-01

## 2019-01-10 ENCOUNTER — OFFICE VISIT (OUTPATIENT)
Dept: OBGYN | Facility: OTHER | Age: 34
End: 2019-01-10
Attending: OBSTETRICS & GYNECOLOGY
Payer: COMMERCIAL

## 2019-01-10 VITALS — SYSTOLIC BLOOD PRESSURE: 90 MMHG | WEIGHT: 175 LBS | DIASTOLIC BLOOD PRESSURE: 56 MMHG | BODY MASS INDEX: 31 KG/M2

## 2019-01-10 DIAGNOSIS — R35.0 INCREASED FREQUENCY OF URINATION: ICD-10-CM

## 2019-01-10 DIAGNOSIS — R35.0 URINARY FREQUENCY: Primary | ICD-10-CM

## 2019-01-10 LAB
ALBUMIN UR-MCNC: NEGATIVE MG/DL
APPEARANCE UR: CLEAR
BACTERIA #/AREA URNS HPF: ABNORMAL /HPF
BILIRUB UR QL STRIP: NEGATIVE
COLOR UR AUTO: ABNORMAL
GLUCOSE UR STRIP-MCNC: NEGATIVE MG/DL
HGB UR QL STRIP: NEGATIVE
KETONES UR STRIP-MCNC: NEGATIVE MG/DL
LEUKOCYTE ESTERASE UR QL STRIP: ABNORMAL
MUCOUS THREADS #/AREA URNS LPF: PRESENT /LPF
NITRATE UR QL: NEGATIVE
PH UR STRIP: 7 PH (ref 4.7–8)
RBC #/AREA URNS AUTO: 1 /HPF (ref 0–2)
SOURCE: ABNORMAL
SP GR UR STRIP: 1.01 (ref 1–1.03)
UROBILINOGEN UR STRIP-MCNC: NORMAL MG/DL (ref 0–2)
WBC #/AREA URNS AUTO: 1 /HPF (ref 0–5)

## 2019-01-10 PROCEDURE — 81001 URINALYSIS AUTO W/SCOPE: CPT | Performed by: OBSTETRICS & GYNECOLOGY

## 2019-01-10 PROCEDURE — 99207 ZZC PRENATAL VISIT: CPT | Performed by: OBSTETRICS & GYNECOLOGY

## 2019-01-10 PROCEDURE — 87086 URINE CULTURE/COLONY COUNT: CPT | Performed by: OBSTETRICS & GYNECOLOGY

## 2019-01-10 ASSESSMENT — PAIN SCALES - GENERAL: PAINLEVEL: NO PAIN (0)

## 2019-01-10 NOTE — PROGRESS NOTES
Pt reports increased urinary freq. Today  UA pretty clean, culture pending - will await results before treating.  Passed GCT, O+  JACKELINE MOY MD

## 2019-01-12 LAB
BACTERIA SPEC CULT: ABNORMAL
SPECIMEN SOURCE: ABNORMAL

## 2019-01-21 ENCOUNTER — PRENATAL OFFICE VISIT (OUTPATIENT)
Dept: OBGYN | Facility: OTHER | Age: 34
End: 2019-01-21
Attending: NURSE PRACTITIONER
Payer: COMMERCIAL

## 2019-01-21 VITALS
SYSTOLIC BLOOD PRESSURE: 102 MMHG | OXYGEN SATURATION: 98 % | DIASTOLIC BLOOD PRESSURE: 58 MMHG | BODY MASS INDEX: 30.83 KG/M2 | WEIGHT: 174 LBS | HEIGHT: 63 IN | HEART RATE: 84 BPM

## 2019-01-21 DIAGNOSIS — Z34.03 SUPERVISION OF NORMAL FIRST PREGNANCY IN THIRD TRIMESTER: Primary | ICD-10-CM

## 2019-01-21 PROCEDURE — 99207 ZZC PRENATAL VISIT: CPT | Performed by: NURSE PRACTITIONER

## 2019-01-21 ASSESSMENT — MIFFLIN-ST. JEOR: SCORE: 1463.39

## 2019-01-21 ASSESSMENT — PAIN SCALES - GENERAL: PAINLEVEL: NO PAIN (0)

## 2019-01-21 NOTE — PATIENT INSTRUCTIONS
Patient Education     Labor and Childbirth: Thinking About a Birth Plan    A birth plan outlines your wishes for labor and birth. It helps your healthcare providers know what you want and expect. But be aware that labor is a series of changing conditions and your birth plan may need to change at the last minute. Work with your healthcare provider to create a plan that leaves room for the unexpected.  Your support team  The team that helps you plan your childbirth may include:    Healthcare provider/certified nurse-midwife. He or she gives prenatal care (care during your pregnancy) and delivers your baby.    Labor nurse. This is a nurse who assists during labor and birth.    Anesthesiologist. This healthcare provider can provide medicine for pain control if you need it.    Support person. This is a person who helps with your emotional and physical comfort during labor. It might be your partner, a family member, or a friend.    Labor  or . This person provides nonmedical advice and support.  Questions to think about  Birth preparation classes can help you think about what to include in your birth plan. When making your plan, ask yourself:    What type of room will I give birth in?    Do I want to be able to walk around during labor and choose labor positions?    What types of comfort measures do I want? Massage, acupressure, birth balls, or music?    Who do I want for my support people? What will their roles be? Who will be with me in the delivery room?    What are my choices for managing pain during labor and birth? How will medicines for pain affect my baby and my labor?    Do I want continuous fetal monitoring?       What types of medicines and IV fluids will I allow to assist me with labor?    What types of procedures or medicines (if any) will I allow to speed up the labor process?       What type of care and length of hospital stay will my health plan cover?    What choices would I consider should  unexpected circumstances develop?    If I had a  in the past, is  (vaginal birth after ) a choice?    Do I want immediate contact with my baby after birth with no separation?    How do I want to feed my baby? Breastfeeding only, or will I allow some formula?    Do I want to delay any medicines or immunizations right after my baby is born?  Date Last Reviewed: 2018-2018 [employer]. [address] [place] [address], [place] [id number]. All rights reserved. This information is not intended as a substitute for professional medical care. Always follow your healthcare professional's instructions.

## 2019-01-21 NOTE — PROGRESS NOTES
Doing well.  No cramping or lei.  Baby active.  Answered questions regarding birth plan and pain management in labor.  Plan GBS next visit. Reviewed late pregnancy changes and when to call [facility].  RTC in 2 weeks.

## 2019-01-21 NOTE — NURSING NOTE
"Chief Complaint   Patient presents with     Prenatal Care     33 weeks 4 days       Initial /58 (BP Location: Right arm, Patient Position: Sitting, Cuff Size: Adult Regular)   Pulse 84   Ht 1.6 m (5' 3\")   Wt 78.9 kg (174 lb)   LMP 05/31/2018   SpO2 98%   BMI 30.82 kg/m   Estimated body mass index is 30.82 kg/m  as calculated from the following:    Height as of this encounter: 1.6 m (5' 3\").    Weight as of this encounter: 78.9 kg (174 lb).  Medication Reconciliation: complete    Anna Looney LPN    "

## 2019-01-31 ENCOUNTER — PRENATAL OFFICE VISIT (OUTPATIENT)
Dept: OBGYN | Facility: OTHER | Age: 34
End: 2019-01-31
Attending: NURSE PRACTITIONER
Payer: COMMERCIAL

## 2019-01-31 VITALS — DIASTOLIC BLOOD PRESSURE: 62 MMHG | BODY MASS INDEX: 31.18 KG/M2 | SYSTOLIC BLOOD PRESSURE: 90 MMHG | WEIGHT: 176 LBS

## 2019-01-31 DIAGNOSIS — Z34.03 SUPERVISION OF NORMAL FIRST PREGNANCY IN THIRD TRIMESTER: Primary | ICD-10-CM

## 2019-01-31 PROCEDURE — 99207 ZZC PRENATAL VISIT: CPT | Performed by: NURSE PRACTITIONER

## 2019-01-31 PROCEDURE — 87653 STREP B DNA AMP PROBE: CPT | Performed by: NURSE PRACTITIONER

## 2019-01-31 ASSESSMENT — PAIN SCALES - GENERAL: PAINLEVEL: NO PAIN (0)

## 2019-02-01 ENCOUNTER — HOSPITAL ENCOUNTER (OUTPATIENT)
Facility: HOSPITAL | Age: 34
Discharge: HOME | End: 2019-02-01
Attending: OBSTETRICS & GYNECOLOGY | Admitting: OBSTETRICS & GYNECOLOGY
Payer: COMMERCIAL

## 2019-02-01 ENCOUNTER — APPOINTMENT (OUTPATIENT)
Dept: ULTRASOUND IMAGING | Facility: HOSPITAL | Age: 34
End: 2019-02-01
Payer: COMMERCIAL

## 2019-02-01 VITALS
RESPIRATION RATE: 17 BRPM | DIASTOLIC BLOOD PRESSURE: 62 MMHG | HEART RATE: 76 BPM | HEIGHT: 63 IN | TEMPERATURE: 97.8 F | WEIGHT: 176 LBS | OXYGEN SATURATION: 97 % | SYSTOLIC BLOOD PRESSURE: 101 MMHG | BODY MASS INDEX: 31.18 KG/M2

## 2019-02-01 PROBLEM — Z36.89 ENCOUNTER FOR TRIAGE IN PREGNANT PATIENT: Status: ACTIVE | Noted: 2019-02-01

## 2019-02-01 LAB
GP B STREP DNA SPEC QL NAA+PROBE: NEGATIVE
SPECIMEN SOURCE: NORMAL

## 2019-02-01 PROCEDURE — 59025 FETAL NON-STRESS TEST: CPT | Mod: 59

## 2019-02-01 PROCEDURE — G0463 HOSPITAL OUTPT CLINIC VISIT: HCPCS | Mod: 25

## 2019-02-01 PROCEDURE — 59025 FETAL NON-STRESS TEST: CPT | Mod: 26 | Performed by: OBSTETRICS & GYNECOLOGY

## 2019-02-01 PROCEDURE — 76819 FETAL BIOPHYS PROFIL W/O NST: CPT | Mod: TC

## 2019-02-01 ASSESSMENT — MIFFLIN-ST. JEOR: SCORE: 1472.46

## 2019-02-01 NOTE — PLAN OF CARE
"Barbi Vale is a 33 year old  and 35w1d patient came in complaining of Decreased Fetal Movement    Patient Active Problem List   Diagnosis     ACP (advance care planning)     Chronic pain disorder     Myofascial muscle pain     Paresthesia     Dysuria     Well woman exam with routine gynecological exam     Encounter for preconception consultation     Encounter for surveillance of contraceptives     Other chronic pain     Supervision of normal first pregnancy in first trimester     Chemical dermatitis     Encounter for triage in pregnant patient       Pt discharged to home at 5:07 PM and encouraged to rest and drink plenty of fluids.  Pt told to call/return if bleeding more than spotting, water breaks, contractions 3-5 minutes apart that she has to breath through.     Nursing education on signs and symptoms to monitor and reasons to call provided. Self-management instructions reviewed. AVS given and signed. All questions answered and patient verbalizes understanding.    /62   Pulse 76   Temp 97.8  F (36.6  C) (Oral)   Resp 17   Ht 1.6 m (5' 3\")   Wt 79.8 kg (176 lb)   LMP 2018   SpO2 97%   BMI 31.18 kg/m      Cervical status: not examined  Fetal Assessment: Reactive    Discharge support:  Family/Friend Support    Obstetric History       T0      L0     SAB0   TAB0   Ectopic0   Multiple0   Live Births0       # Outcome Date GA Lbr Tim/2nd Weight Sex Delivery Anes PTL Lv   1 Current                   Luci Parks    "

## 2019-02-01 NOTE — PATIENT INSTRUCTIONS
"BREASTFEEDING TIPS  1. Breastfeed every 2-4 hours. If your baby is sleepy - use breast compression, push on chin to \"start up\" baby, switch breasts, undress to diaper and wake before relatching.   Some babies \"cluster\" feed every 1 hour for a while- this is normal. Feed your baby whenever he/she is awake-  even if every hour for a while. This frequent feeding will help you make more milk and encourage your baby to sleep for longer stretches later in the evening or night.    - Position your baby close to you with pillows so he/she is facing you -belly to belly laying horizontally across your lap at the level of your breast and looking a bit \"upwards\" to your breast   -One hand holds the baby's neck behind the ears and the other hand holds your breast  -Baby's nose should start out pointing to your nipple before latching  - Hold your breast in a \"sandwich\" position by gently squeezing your breast in an oval shape and make sure your hands are not covering the areola  This \"nipple sandwich\" will make it easier for your breast to fit inside the baby's mouth-making latching more comfortable for you and baby and preventing sore nipples. Your baby should take a \"mouthful\" of breast!  - You may want to use hand expression to \"prime the pump\" and get a drip of milk out on your nipple to wake baby   (see website: newborns.[employer].edu/Breastfeeding/HandExpression.html)  - Swipe your nipple on baby's upper lip and wait for a BIG open mouth  - YOU bring baby to the breast (hold baby's neck with your fingers just below the ears) and bring baby's head to the breast--leading with the chin.  Try to avoid pushing your breast into baby's mouth- bring baby to you instead!  - Aim to get your baby's bottom lip LOW DOWN ON AREOLA (baby's upper lip just needs to \"clear\" the nipple) .   Your baby should latch onto the areola and NOT just the nipple. That way your baby gets more milk and you don't get sore nipples!      Useful web " sites:  Www.infantrisk.com  Www.aap.org  Www.ibreastfeeding.com  Www.health.[facility].mn.us

## 2019-02-01 NOTE — PLAN OF CARE
Barbi Vale        NST:  reactive  Start:1540  Stop:1618    Physician: Dr Cantor  Reason For Test: Decreased Fetal Movement  EDC:3/7/19  Gestational Age: 35 1/7    Comments:  Pt here for decreased fetal movement.  Reactive NST.       Luci Parks

## 2019-02-01 NOTE — DISCHARGE INSTRUCTIONS

## 2019-02-01 NOTE — PLAN OF CARE
Dr Cantor paged and updated of BPP 8/8 and reactive NST.  Also updated him of GUERO of 9 cm, per Ultrasound staff.  Order to discharge pt home.  Will update pt of the plan of care.

## 2019-02-01 NOTE — PROGRESS NOTES
Doing well.  Baby active.  GBS discussed and done today.  Kick counts reviewed.  Reviewed signs of labor and late pregnancy changes.  RTC in 1 week.

## 2019-02-01 NOTE — PLAN OF CARE
OB Triage Note  Barbi Vale  MRN: [id number]  Gestational Age: 35w1d      Barbi Vale presents for decreased fetal movement (sign/symptom/concern).      Pt denies any contractions, vaginal bleeding or leaking of fluids.  She stated baby's movements have been less obviously and more quiet in the last few days.      Dr Cantor notified of arrival and condition.  Oriented patient to surroundings. Call light within reach.     FHT: reassuring.     Uterine Assessment:no contractions noted.     Plan:  -Initial NST, then fetal/uterine monitoring per MD/patient plan.

## 2019-02-04 ENCOUNTER — TELEPHONE (OUTPATIENT)
Dept: OBGYN | Facility: OTHER | Age: 34
End: 2019-02-04

## 2019-02-04 NOTE — TELEPHONE ENCOUNTER
Patient called wondering if she needs to keep her appointment for Thursday. The appointment was made last week due to baby being breech. She came in on 2/1/2019 due to decreased fetal movement where they did a BPP and it showed baby is now head down. Would you still like her to come on Thursday?

## 2019-02-06 NOTE — PROGRESS NOTES
Fetal Non-Stress Test Results    NST Ordered By: Dr. Cantor  NST Medical Indication: decreased fetal movement    NST Start & Stop Times  NST Start Time: 1540  NST Stop Time: 1618                            NST Results  Fetus A   Baseline Rate: Normal  Accelerations: Present  Decelerations: None  Interpretation: reactive

## 2019-02-12 ENCOUNTER — PRENATAL OFFICE VISIT (OUTPATIENT)
Dept: OBGYN | Facility: OTHER | Age: 34
End: 2019-02-12
Attending: NURSE PRACTITIONER
Payer: COMMERCIAL

## 2019-02-12 VITALS
DIASTOLIC BLOOD PRESSURE: 60 MMHG | HEART RATE: 68 BPM | OXYGEN SATURATION: 99 % | WEIGHT: 179 LBS | HEIGHT: 63 IN | SYSTOLIC BLOOD PRESSURE: 105 MMHG | BODY MASS INDEX: 31.71 KG/M2

## 2019-02-12 DIAGNOSIS — Z34.03 SUPERVISION OF NORMAL FIRST PREGNANCY IN THIRD TRIMESTER: Primary | ICD-10-CM

## 2019-02-12 PROCEDURE — 99207 ZZC PRENATAL VISIT: CPT | Performed by: NURSE PRACTITIONER

## 2019-02-12 ASSESSMENT — PAIN SCALES - GENERAL: PAINLEVEL: NO PAIN (0)

## 2019-02-12 ASSESSMENT — MIFFLIN-ST. JEOR: SCORE: 1486.07

## 2019-02-12 NOTE — NURSING NOTE
"Chief Complaint   Patient presents with     Prenatal Care     36w 5d       Initial /60 (BP Location: Left arm, Cuff Size: Adult Regular)   Pulse 68   Ht 1.6 m (5' 3\")   Wt 81.2 kg (179 lb)   LMP 05/31/2018   SpO2 99%   BMI 31.71 kg/m   Estimated body mass index is 31.71 kg/m  as calculated from the following:    Height as of this encounter: 1.6 m (5' 3\").    Weight as of this encounter: 81.2 kg (179 lb).  Medication Reconciliation: complete    Taylor Foster LPN  "

## 2019-02-13 NOTE — PATIENT INSTRUCTIONS
"BREASTFEEDING TIPS  1. Breastfeed every 2-4 hours. If your baby is sleepy - use breast compression, push on chin to \"start up\" baby, switch breasts, undress to diaper and wake before relatching.   Some babies \"cluster\" feed every 1 hour for a while- this is normal. Feed your baby whenever he/she is awake-  even if every hour for a while. This frequent feeding will help you make more milk and encourage your baby to sleep for longer stretches later in the evening or night.    - Position your baby close to you with pillows so he/she is facing you -belly to belly laying horizontally across your lap at the level of your breast and looking a bit \"upwards\" to your breast   -One hand holds the baby's neck behind the ears and the other hand holds your breast  -Baby's nose should start out pointing to your nipple before latching  - Hold your breast in a \"sandwich\" position by gently squeezing your breast in an oval shape and make sure your hands are not covering the areola  This \"nipple sandwich\" will make it easier for your breast to fit inside the baby's mouth-making latching more comfortable for you and baby and preventing sore nipples. Your baby should take a \"mouthful\" of breast!  - You may want to use hand expression to \"prime the pump\" and get a drip of milk out on your nipple to wake baby   (see website: newborns.[employer].edu/Breastfeeding/HandExpression.html)  - Swipe your nipple on baby's upper lip and wait for a BIG open mouth  - YOU bring baby to the breast (hold baby's neck with your fingers just below the ears) and bring baby's head to the breast--leading with the chin.  Try to avoid pushing your breast into baby's mouth- bring baby to you instead!  - Aim to get your baby's bottom lip LOW DOWN ON AREOLA (baby's upper lip just needs to \"clear\" the nipple) .   Your baby should latch onto the areola and NOT just the nipple. That way your baby gets more milk and you don't get sore nipples!      Useful web " sites:  Www.infantrisk.com  Www.aap.org  Www.ibreastfeeding.com  Www.health.[facility].mn.us

## 2019-02-13 NOTE — PROGRESS NOTES
Baby active.  Occasional BH. No bleeding or LOF.  Late pregnancy comfort measures and warning signs reviewed.  Kick counts reviewed.  RTC in 1 week.

## 2019-02-19 ENCOUNTER — PRENATAL OFFICE VISIT (OUTPATIENT)
Dept: OBGYN | Facility: OTHER | Age: 34
End: 2019-02-19
Attending: OBSTETRICS & GYNECOLOGY
Payer: COMMERCIAL

## 2019-02-19 VITALS
BODY MASS INDEX: 31.71 KG/M2 | HEART RATE: 88 BPM | HEIGHT: 63 IN | DIASTOLIC BLOOD PRESSURE: 71 MMHG | SYSTOLIC BLOOD PRESSURE: 105 MMHG | WEIGHT: 179 LBS

## 2019-02-19 DIAGNOSIS — Z34.03 SUPERVISION OF NORMAL FIRST PREGNANCY IN THIRD TRIMESTER: Primary | ICD-10-CM

## 2019-02-19 PROBLEM — Z34.01 SUPERVISION OF NORMAL FIRST PREGNANCY IN FIRST TRIMESTER: Status: ACTIVE | Noted: 2018-08-13

## 2019-02-19 PROCEDURE — 99207 ZZC PRENATAL VISIT: CPT | Performed by: OBSTETRICS & GYNECOLOGY

## 2019-02-19 ASSESSMENT — MIFFLIN-ST. JEOR: SCORE: 1486.07

## 2019-02-19 ASSESSMENT — PAIN SCALES - GENERAL: PAINLEVEL: NO PAIN (0)

## 2019-02-19 NOTE — PROGRESS NOTES
Doing well.  No concerns today.  Discussed signs of labor and when to call or come in.  She will report to OB if contractions every 5-10 minutes or if rupture of membranes.  RTC in 1 week  Denies regular contractions, vaginal bleeding, EBONIE Cantor MD  2/19/2019

## 2019-02-19 NOTE — NURSING NOTE
"Chief Complaint   Patient presents with     Prenatal Care     37w 5d       Initial /71 (BP Location: Left arm, Cuff Size: Adult Regular)   Pulse 88   Ht 1.6 m (5' 3\")   Wt 81.2 kg (179 lb)   LMP 05/31/2018   BMI 31.71 kg/m   Estimated body mass index is 31.71 kg/m  as calculated from the following:    Height as of this encounter: 1.6 m (5' 3\").    Weight as of this encounter: 81.2 kg (179 lb).  Medication Reconciliation: complete    Taylor Foster LPN  "

## 2019-02-26 ENCOUNTER — PRENATAL OFFICE VISIT (OUTPATIENT)
Dept: OBGYN | Facility: OTHER | Age: 34
End: 2019-02-26
Attending: OBSTETRICS & GYNECOLOGY
Payer: COMMERCIAL

## 2019-02-26 VITALS
SYSTOLIC BLOOD PRESSURE: 100 MMHG | BODY MASS INDEX: 31.89 KG/M2 | HEIGHT: 63 IN | HEART RATE: 68 BPM | WEIGHT: 180 LBS | OXYGEN SATURATION: 98 % | DIASTOLIC BLOOD PRESSURE: 61 MMHG

## 2019-02-26 DIAGNOSIS — Z34.03 SUPERVISION OF NORMAL FIRST PREGNANCY IN THIRD TRIMESTER: Primary | ICD-10-CM

## 2019-02-26 PROCEDURE — 99207 ZZC PRENATAL VISIT: CPT | Performed by: OBSTETRICS & GYNECOLOGY

## 2019-02-26 ASSESSMENT — MIFFLIN-ST. JEOR: SCORE: 1490.6

## 2019-02-26 ASSESSMENT — PAIN SCALES - GENERAL: PAINLEVEL: NO PAIN (0)

## 2019-02-26 NOTE — PROGRESS NOTES
Doing well.  No concerns today.  Discussed signs of labor and when to call or come in.  Discussed kick counts and fetal movement.  RTC in 1 week  Denies regular contractions, vaginal bleeding, EBONIE Cantor MD  2/26/2019

## 2019-02-26 NOTE — NURSING NOTE
"Chief Complaint   Patient presents with     Prenatal Care     38w 5d       Initial /61 (BP Location: Left arm, Cuff Size: Adult Large)   Pulse 68   Ht 1.6 m (5' 3\")   Wt 81.6 kg (180 lb)   LMP 05/31/2018   SpO2 98%   BMI 31.89 kg/m   Estimated body mass index is 31.89 kg/m  as calculated from the following:    Height as of this encounter: 1.6 m (5' 3\").    Weight as of this encounter: 81.6 kg (180 lb).  Medication Reconciliation: complete    Taylor Foster LPN  "

## 2019-03-05 ENCOUNTER — PRENATAL OFFICE VISIT (OUTPATIENT)
Dept: OBGYN | Facility: OTHER | Age: 34
End: 2019-03-05
Attending: OBSTETRICS & GYNECOLOGY
Payer: COMMERCIAL

## 2019-03-05 VITALS
SYSTOLIC BLOOD PRESSURE: 108 MMHG | HEIGHT: 63 IN | BODY MASS INDEX: 31.89 KG/M2 | WEIGHT: 180 LBS | DIASTOLIC BLOOD PRESSURE: 60 MMHG

## 2019-03-05 DIAGNOSIS — Z34.03 SUPERVISION OF NORMAL FIRST PREGNANCY IN THIRD TRIMESTER: Primary | ICD-10-CM

## 2019-03-05 PROCEDURE — 99207 ZZC PRENATAL VISIT: CPT | Performed by: OBSTETRICS & GYNECOLOGY

## 2019-03-05 ASSESSMENT — PAIN SCALES - GENERAL: PAINLEVEL: NO PAIN (0)

## 2019-03-05 ASSESSMENT — MIFFLIN-ST. JEOR: SCORE: 1490.6

## 2019-03-05 NOTE — NURSING NOTE
"Chief Complaint   Patient presents with     Prenatal Care       Initial /60 (BP Location: Left arm, Patient Position: Sitting, Cuff Size: Adult Regular)   Ht 1.6 m (5' 3\")   Wt 81.6 kg (180 lb)   LMP 05/31/2018   BMI 31.89 kg/m   Estimated body mass index is 31.89 kg/m  as calculated from the following:    Height as of this encounter: 1.6 m (5' 3\").    Weight as of this encounter: 81.6 kg (180 lb).  Medication Reconciliation: complete    ANISH RAVI LPN  "

## 2019-03-06 NOTE — PROGRESS NOTES
Doing well.  No concerns today.  She will report to OB if contractions every 5-10 minutes or if rupture of membranes.  RTC in 1 week  Denies regular contractions, vaginal bleeding, EBONIE Cantor MD  3/6/2019

## 2019-03-12 ENCOUNTER — TELEPHONE (OUTPATIENT)
Dept: OBGYN | Facility: OTHER | Age: 34
End: 2019-03-12

## 2019-03-15 ENCOUNTER — HOSPITAL ENCOUNTER (INPATIENT)
Facility: HOSPITAL | Age: 34
LOS: 3 days | Discharge: HOME | End: 2019-03-18
Attending: OBSTETRICS & GYNECOLOGY | Admitting: OBSTETRICS & GYNECOLOGY
Payer: COMMERCIAL

## 2019-03-15 ENCOUNTER — HOSPITAL ENCOUNTER (OUTPATIENT)
Dept: ULTRASOUND IMAGING | Facility: HOSPITAL | Age: 34
End: 2019-03-15
Attending: OBSTETRICS & GYNECOLOGY
Payer: COMMERCIAL

## 2019-03-15 ENCOUNTER — ANESTHESIA EVENT (OUTPATIENT)
Dept: SURGERY | Facility: HOSPITAL | Age: 34
End: 2019-03-15
Payer: COMMERCIAL

## 2019-03-15 ENCOUNTER — ANESTHESIA (OUTPATIENT)
Dept: SURGERY | Facility: HOSPITAL | Age: 34
End: 2019-03-15
Payer: COMMERCIAL

## 2019-03-15 ENCOUNTER — PRENATAL OFFICE VISIT (OUTPATIENT)
Dept: OBGYN | Facility: OTHER | Age: 34
End: 2019-03-15
Attending: OBSTETRICS & GYNECOLOGY
Payer: COMMERCIAL

## 2019-03-15 VITALS
HEART RATE: 67 BPM | SYSTOLIC BLOOD PRESSURE: 110 MMHG | WEIGHT: 178 LBS | DIASTOLIC BLOOD PRESSURE: 64 MMHG | OXYGEN SATURATION: 97 % | BODY MASS INDEX: 31.54 KG/M2 | HEIGHT: 63 IN

## 2019-03-15 DIAGNOSIS — O48.0 POST-TERM PREGNANCY, 40-42 WEEKS OF GESTATION: Primary | ICD-10-CM

## 2019-03-15 LAB
ALBUMIN UR-MCNC: NEGATIVE MG/DL
APPEARANCE UR: CLEAR
BACTERIA #/AREA URNS HPF: ABNORMAL /HPF
BASOPHILS # BLD AUTO: 0 10E9/L (ref 0–0.2)
BASOPHILS NFR BLD AUTO: 0.3 %
BILIRUB UR QL STRIP: NEGATIVE
COLOR UR AUTO: YELLOW
DIFFERENTIAL METHOD BLD: NORMAL
EOSINOPHIL # BLD AUTO: 0 10E9/L (ref 0–0.7)
EOSINOPHIL NFR BLD AUTO: 0.2 %
ERYTHROCYTE [DISTWIDTH] IN BLOOD BY AUTOMATED COUNT: 12.8 % (ref 10–15)
GLUCOSE UR STRIP-MCNC: NEGATIVE MG/DL
HCT VFR BLD AUTO: 39.2 % (ref 35–47)
HGB BLD-MCNC: 13.7 G/DL (ref 11.7–15.7)
HGB UR QL STRIP: NEGATIVE
IMM GRANULOCYTES # BLD: 0 10E9/L (ref 0–0.4)
IMM GRANULOCYTES NFR BLD: 0.3 %
KETONES UR STRIP-MCNC: NEGATIVE MG/DL
LEUKOCYTE ESTERASE UR QL STRIP: NEGATIVE
LYMPHOCYTES # BLD AUTO: 1.7 10E9/L (ref 0.8–5.3)
LYMPHOCYTES NFR BLD AUTO: 15.6 %
MCH RBC QN AUTO: 32.9 PG (ref 26.5–33)
MCHC RBC AUTO-ENTMCNC: 34.9 G/DL (ref 31.5–36.5)
MCV RBC AUTO: 94 FL (ref 78–100)
MONOCYTES # BLD AUTO: 0.7 10E9/L (ref 0–1.3)
MONOCYTES NFR BLD AUTO: 6.4 %
MUCOUS THREADS #/AREA URNS LPF: PRESENT /LPF
NEUTROPHILS # BLD AUTO: 8.3 10E9/L (ref 1.6–8.3)
NEUTROPHILS NFR BLD AUTO: 77.2 %
NITRATE UR QL: NEGATIVE
NRBC # BLD AUTO: 0 10*3/UL
NRBC BLD AUTO-RTO: 0 /100
PH UR STRIP: 7.5 PH (ref 4.7–8)
PLATELET # BLD AUTO: 217 10E9/L (ref 150–450)
RBC # BLD AUTO: 4.16 10E12/L (ref 3.8–5.2)
RBC #/AREA URNS AUTO: <1 /HPF (ref 0–2)
SOURCE: ABNORMAL
SP GR UR STRIP: 1.01 (ref 1–1.03)
UROBILINOGEN UR STRIP-MCNC: NORMAL MG/DL (ref 0–2)
WBC # BLD AUTO: 10.7 10E9/L (ref 4–11)
WBC #/AREA URNS AUTO: 0 /HPF (ref 0–5)

## 2019-03-15 PROCEDURE — 27210794 ZZH OR GENERAL SUPPLY STERILE: Performed by: OBSTETRICS & GYNECOLOGY

## 2019-03-15 PROCEDURE — 25000128 H RX IP 250 OP 636: Performed by: OBSTETRICS & GYNECOLOGY

## 2019-03-15 PROCEDURE — 85025 COMPLETE CBC W/AUTO DIFF WBC: CPT | Performed by: OBSTETRICS & GYNECOLOGY

## 2019-03-15 PROCEDURE — G0463 HOSPITAL OUTPT CLINIC VISIT: HCPCS | Mod: 25

## 2019-03-15 PROCEDURE — 25000125 ZZHC RX 250: Performed by: OBSTETRICS & GYNECOLOGY

## 2019-03-15 PROCEDURE — 40000275 ZZH STATISTIC RCP TIME EA 10 MIN

## 2019-03-15 PROCEDURE — 88307 TISSUE EXAM BY PATHOLOGIST: CPT | Mod: TC | Performed by: OBSTETRICS & GYNECOLOGY

## 2019-03-15 PROCEDURE — 59510 CESAREAN DELIVERY: CPT | Performed by: OBSTETRICS & GYNECOLOGY

## 2019-03-15 PROCEDURE — 36000058 ZZH SURGERY LEVEL 3 EA 15 ADDTL MIN: Performed by: OBSTETRICS & GYNECOLOGY

## 2019-03-15 PROCEDURE — 36415 COLL VENOUS BLD VENIPUNCTURE: CPT | Performed by: OBSTETRICS & GYNECOLOGY

## 2019-03-15 PROCEDURE — 25800030 ZZH RX IP 258 OP 636: Performed by: OBSTETRICS & GYNECOLOGY

## 2019-03-15 PROCEDURE — 86900 BLOOD TYPING SEROLOGIC ABO: CPT | Performed by: OBSTETRICS & GYNECOLOGY

## 2019-03-15 PROCEDURE — 99207 ZZC PRENATAL VISIT: CPT | Performed by: OBSTETRICS & GYNECOLOGY

## 2019-03-15 PROCEDURE — 25000128 H RX IP 250 OP 636: Performed by: NURSE ANESTHETIST, CERTIFIED REGISTERED

## 2019-03-15 PROCEDURE — 37000008 ZZH ANESTHESIA TECHNICAL FEE, 1ST 30 MIN: Performed by: OBSTETRICS & GYNECOLOGY

## 2019-03-15 PROCEDURE — 71000014 ZZH RECOVERY PHASE 1 LEVEL 2 FIRST HR: Performed by: OBSTETRICS & GYNECOLOGY

## 2019-03-15 PROCEDURE — 36000056 ZZH SURGERY LEVEL 3 1ST 30 MIN: Performed by: OBSTETRICS & GYNECOLOGY

## 2019-03-15 PROCEDURE — 37000011 ZZH ANESTHESIA WARD SERVICE: Performed by: NURSE ANESTHETIST, CERTIFIED REGISTERED

## 2019-03-15 PROCEDURE — 59025 FETAL NON-STRESS TEST: CPT

## 2019-03-15 PROCEDURE — 3E0T3BZ INTRODUCTION OF ANESTHETIC AGENT INTO PERIPHERAL NERVES AND PLEXI, PERCUTANEOUS APPROACH: ICD-10-PCS | Performed by: OBSTETRICS & GYNECOLOGY

## 2019-03-15 PROCEDURE — 81001 URINALYSIS AUTO W/SCOPE: CPT | Performed by: OBSTETRICS & GYNECOLOGY

## 2019-03-15 PROCEDURE — 01999 UNLISTED ANES PROCEDURE: CPT | Performed by: NURSE ANESTHETIST, CERTIFIED REGISTERED

## 2019-03-15 PROCEDURE — 0064U ANTB TP TOTAL&RPR IA QUAL: CPT | Performed by: OBSTETRICS & GYNECOLOGY

## 2019-03-15 PROCEDURE — 86901 BLOOD TYPING SEROLOGIC RH(D): CPT | Performed by: OBSTETRICS & GYNECOLOGY

## 2019-03-15 PROCEDURE — 25800030 ZZH RX IP 258 OP 636: Performed by: NURSE ANESTHETIST, CERTIFIED REGISTERED

## 2019-03-15 PROCEDURE — 86850 RBC ANTIBODY SCREEN: CPT | Performed by: OBSTETRICS & GYNECOLOGY

## 2019-03-15 PROCEDURE — 25000132 ZZH RX MED GY IP 250 OP 250 PS 637

## 2019-03-15 PROCEDURE — 27110028 ZZH OR GENERAL SUPPLY NON-STERILE: Performed by: OBSTETRICS & GYNECOLOGY

## 2019-03-15 PROCEDURE — 12000000 ZZH R&B MED SURG/OB

## 2019-03-15 PROCEDURE — 76819 FETAL BIOPHYS PROFIL W/O NST: CPT | Mod: TC

## 2019-03-15 PROCEDURE — 25000132 ZZH RX MED GY IP 250 OP 250 PS 637: Performed by: OBSTETRICS & GYNECOLOGY

## 2019-03-15 PROCEDURE — 37000009 ZZH ANESTHESIA TECHNICAL FEE, EACH ADDTL 15 MIN: Performed by: OBSTETRICS & GYNECOLOGY

## 2019-03-15 PROCEDURE — 25000125 ZZHC RX 250: Performed by: NURSE ANESTHETIST, CERTIFIED REGISTERED

## 2019-03-15 RX ORDER — NALOXONE HYDROCHLORIDE 0.4 MG/ML
.1-.4 INJECTION, SOLUTION INTRAMUSCULAR; INTRAVENOUS; SUBCUTANEOUS
Status: CANCELLED | OUTPATIENT
Start: 2019-03-15

## 2019-03-15 RX ORDER — CEFOXITIN SODIUM 2 G/50ML
2 INJECTION, SOLUTION INTRAVENOUS EVERY 6 HOURS
Status: COMPLETED | OUTPATIENT
Start: 2019-03-15 | End: 2019-03-16

## 2019-03-15 RX ORDER — ACETAMINOPHEN 325 MG/1
650 TABLET ORAL EVERY 4 HOURS PRN
Status: CANCELLED | OUTPATIENT
Start: 2019-03-15

## 2019-03-15 RX ORDER — DEXTROSE, SODIUM CHLORIDE, SODIUM LACTATE, POTASSIUM CHLORIDE, AND CALCIUM CHLORIDE 5; .6; .31; .03; .02 G/100ML; G/100ML; G/100ML; G/100ML; G/100ML
INJECTION, SOLUTION INTRAVENOUS CONTINUOUS
Status: DISCONTINUED | OUTPATIENT
Start: 2019-03-15 | End: 2019-03-17 | Stop reason: CLARIF

## 2019-03-15 RX ORDER — ONDANSETRON 2 MG/ML
4 INJECTION INTRAMUSCULAR; INTRAVENOUS EVERY 30 MIN PRN
Status: DISCONTINUED | OUTPATIENT
Start: 2019-03-15 | End: 2019-03-15 | Stop reason: HOSPADM

## 2019-03-15 RX ORDER — OXYTOCIN 10 [USP'U]/ML
10 INJECTION, SOLUTION INTRAMUSCULAR; INTRAVENOUS
Status: CANCELLED | OUTPATIENT
Start: 2019-03-15 | End: 2019-03-15

## 2019-03-15 RX ORDER — EPHEDRINE SULFATE 50 MG/ML
INJECTION, SOLUTION INTRAVENOUS PRN
Status: DISCONTINUED | OUTPATIENT
Start: 2019-03-15 | End: 2019-03-15

## 2019-03-15 RX ORDER — ONDANSETRON 2 MG/ML
4 INJECTION INTRAMUSCULAR; INTRAVENOUS EVERY 6 HOURS PRN
Status: CANCELLED | OUTPATIENT
Start: 2019-03-15

## 2019-03-15 RX ORDER — CEFAZOLIN SODIUM 1 G/50ML
1 INJECTION, SOLUTION INTRAVENOUS SEE ADMIN INSTRUCTIONS
Status: DISCONTINUED | OUTPATIENT
Start: 2019-03-15 | End: 2019-03-15 | Stop reason: HOSPADM

## 2019-03-15 RX ORDER — ACETAMINOPHEN 325 MG/1
650 TABLET ORAL EVERY 4 HOURS PRN
Status: DISCONTINUED | OUTPATIENT
Start: 2019-03-18 | End: 2019-03-18 | Stop reason: HOSPADM

## 2019-03-15 RX ORDER — HYDROXYZINE HYDROCHLORIDE 50 MG/ML
100 INJECTION, SOLUTION INTRAMUSCULAR EVERY 6 HOURS PRN
Status: DISCONTINUED | OUTPATIENT
Start: 2019-03-15 | End: 2019-03-18 | Stop reason: HOSPADM

## 2019-03-15 RX ORDER — ONDANSETRON 2 MG/ML
4 INJECTION INTRAMUSCULAR; INTRAVENOUS EVERY 6 HOURS PRN
Status: DISCONTINUED | OUTPATIENT
Start: 2019-03-15 | End: 2019-03-18 | Stop reason: HOSPADM

## 2019-03-15 RX ORDER — DIPHENHYDRAMINE HYDROCHLORIDE 50 MG/ML
25 INJECTION INTRAMUSCULAR; INTRAVENOUS EVERY 6 HOURS PRN
Status: DISCONTINUED | OUTPATIENT
Start: 2019-03-15 | End: 2019-03-18 | Stop reason: CLARIF

## 2019-03-15 RX ORDER — FENTANYL CITRATE 50 UG/ML
50-100 INJECTION, SOLUTION INTRAMUSCULAR; INTRAVENOUS
Status: CANCELLED | OUTPATIENT
Start: 2019-03-15

## 2019-03-15 RX ORDER — DOCUSATE SODIUM 100 MG/1
100 CAPSULE, LIQUID FILLED ORAL 2 TIMES DAILY
Status: DISCONTINUED | OUTPATIENT
Start: 2019-03-15 | End: 2019-03-18 | Stop reason: HOSPADM

## 2019-03-15 RX ORDER — CITRIC ACID/SODIUM CITRATE 334-500MG
30 SOLUTION, ORAL ORAL
Status: COMPLETED | OUTPATIENT
Start: 2019-03-15 | End: 2019-03-15

## 2019-03-15 RX ORDER — LIDOCAINE 40 MG/G
CREAM TOPICAL
Status: DISCONTINUED | OUTPATIENT
Start: 2019-03-15 | End: 2019-03-18 | Stop reason: CLARIF

## 2019-03-15 RX ORDER — IBUPROFEN 400 MG/1
800 TABLET, FILM COATED ORAL
Status: CANCELLED | OUTPATIENT
Start: 2019-03-15 | End: 2019-03-15

## 2019-03-15 RX ORDER — ACETAMINOPHEN 325 MG/1
975 TABLET ORAL EVERY 8 HOURS
Status: DISCONTINUED | OUTPATIENT
Start: 2019-03-15 | End: 2019-03-18 | Stop reason: HOSPADM

## 2019-03-15 RX ORDER — OXYTOCIN 10 [USP'U]/ML
10 INJECTION, SOLUTION INTRAMUSCULAR; INTRAVENOUS
Status: DISCONTINUED | OUTPATIENT
Start: 2019-03-15 | End: 2019-03-18 | Stop reason: HOSPADM

## 2019-03-15 RX ORDER — FENTANYL CITRATE 50 UG/ML
25-50 INJECTION, SOLUTION INTRAMUSCULAR; INTRAVENOUS
Status: DISCONTINUED | OUTPATIENT
Start: 2019-03-15 | End: 2019-03-15 | Stop reason: HOSPADM

## 2019-03-15 RX ORDER — ONDANSETRON 4 MG/1
4 TABLET, ORALLY DISINTEGRATING ORAL EVERY 30 MIN PRN
Status: DISCONTINUED | OUTPATIENT
Start: 2019-03-15 | End: 2019-03-15 | Stop reason: HOSPADM

## 2019-03-15 RX ORDER — PROCHLORPERAZINE 25 MG
25 SUPPOSITORY, RECTAL RECTAL EVERY 12 HOURS PRN
Status: DISCONTINUED | OUTPATIENT
Start: 2019-03-15 | End: 2019-03-18 | Stop reason: HOSPADM

## 2019-03-15 RX ORDER — BACITRACIN ZINC 500 [USP'U]/G
OINTMENT TOPICAL PRN
Status: DISCONTINUED | OUTPATIENT
Start: 2019-03-15 | End: 2019-03-15 | Stop reason: HOSPADM

## 2019-03-15 RX ORDER — IBUPROFEN 800 MG/1
800 TABLET, FILM COATED ORAL EVERY 6 HOURS PRN
Status: DISCONTINUED | OUTPATIENT
Start: 2019-03-15 | End: 2019-03-18 | Stop reason: HOSPADM

## 2019-03-15 RX ORDER — CEFAZOLIN SODIUM 2 G/100ML
2 INJECTION, SOLUTION INTRAVENOUS
Status: COMPLETED | OUTPATIENT
Start: 2019-03-15 | End: 2019-03-15

## 2019-03-15 RX ORDER — LIDOCAINE 40 MG/G
CREAM TOPICAL
Status: DISCONTINUED | OUTPATIENT
Start: 2019-03-15 | End: 2019-03-15 | Stop reason: HOSPADM

## 2019-03-15 RX ORDER — NALOXONE HYDROCHLORIDE 0.4 MG/ML
.1-.4 INJECTION, SOLUTION INTRAMUSCULAR; INTRAVENOUS; SUBCUTANEOUS
Status: ACTIVE | OUTPATIENT
Start: 2019-03-15 | End: 2019-03-16

## 2019-03-15 RX ORDER — CARBOPROST TROMETHAMINE 250 UG/ML
250 INJECTION, SOLUTION INTRAMUSCULAR
Status: CANCELLED | OUTPATIENT
Start: 2019-03-15 | End: 2019-03-15

## 2019-03-15 RX ORDER — OXYCODONE HYDROCHLORIDE 5 MG/1
5-10 TABLET ORAL
Status: DISCONTINUED | OUTPATIENT
Start: 2019-03-15 | End: 2019-03-18 | Stop reason: HOSPADM

## 2019-03-15 RX ORDER — NALOXONE HYDROCHLORIDE 0.4 MG/ML
.1-.4 INJECTION, SOLUTION INTRAMUSCULAR; INTRAVENOUS; SUBCUTANEOUS
Status: DISCONTINUED | OUTPATIENT
Start: 2019-03-15 | End: 2019-03-18 | Stop reason: HOSPADM

## 2019-03-15 RX ORDER — OXYTOCIN/0.9 % SODIUM CHLORIDE 30/500 ML
340 PLASTIC BAG, INJECTION (ML) INTRAVENOUS CONTINUOUS PRN
Status: DISCONTINUED | OUTPATIENT
Start: 2019-03-15 | End: 2019-03-18 | Stop reason: HOSPADM

## 2019-03-15 RX ORDER — VITAMIN A ACETATE, .BETA.-CAROTENE, ASCORBIC ACID, CHOLECALCIFEROL, .ALPHA.-TOCOPHEROL ACETATE, DL-, THIAMINE MONONITRATE, RIBOFLAVIN, NIACINAMIDE, PYRIDOXINE HYDROCHLORIDE, FOLIC ACID, CYANOCOBALAMIN, CALCIUM CARBONATE, FERROUS FUMARATE, ZINC OXIDE, AND CUPRIC OXIDE 2000; 2000; 120; 400; 22; 1.84; 3; 20; 10; 1; 12; 200; 27; 25; 2 [IU]/1; [IU]/1; MG/1; [IU]/1; MG/1; MG/1; MG/1; MG/1; MG/1; MG/1; UG/1; MG/1; MG/1; MG/1; MG/1
1 TABLET ORAL DAILY
Status: DISCONTINUED | OUTPATIENT
Start: 2019-03-16 | End: 2019-03-18 | Stop reason: HOSPADM

## 2019-03-15 RX ORDER — METHYLERGONOVINE MALEATE 0.2 MG/ML
INJECTION INTRAVENOUS PRN
Status: DISCONTINUED | OUTPATIENT
Start: 2019-03-15 | End: 2019-03-15

## 2019-03-15 RX ORDER — KETOROLAC TROMETHAMINE 30 MG/ML
30 INJECTION, SOLUTION INTRAMUSCULAR; INTRAVENOUS EVERY 6 HOURS
Status: DISPENSED | OUTPATIENT
Start: 2019-03-15 | End: 2019-03-16

## 2019-03-15 RX ORDER — ONDANSETRON 2 MG/ML
INJECTION INTRAMUSCULAR; INTRAVENOUS PRN
Status: DISCONTINUED | OUTPATIENT
Start: 2019-03-15 | End: 2019-03-15

## 2019-03-15 RX ORDER — BUPIVACAINE HYDROCHLORIDE 5 MG/ML
INJECTION, SOLUTION EPIDURAL; INTRACAUDAL PRN
Status: DISCONTINUED | OUTPATIENT
Start: 2019-03-15 | End: 2019-03-15

## 2019-03-15 RX ORDER — LIDOCAINE 40 MG/G
CREAM TOPICAL
Status: CANCELLED | OUTPATIENT
Start: 2019-03-15

## 2019-03-15 RX ORDER — METHYLERGONOVINE MALEATE 0.2 MG/ML
200 INJECTION INTRAVENOUS
Status: CANCELLED | OUTPATIENT
Start: 2019-03-15 | End: 2019-03-15

## 2019-03-15 RX ORDER — SODIUM CHLORIDE, SODIUM LACTATE, POTASSIUM CHLORIDE, CALCIUM CHLORIDE 600; 310; 30; 20 MG/100ML; MG/100ML; MG/100ML; MG/100ML
INJECTION, SOLUTION INTRAVENOUS CONTINUOUS
Status: DISCONTINUED | OUTPATIENT
Start: 2019-03-15 | End: 2019-03-15 | Stop reason: HOSPADM

## 2019-03-15 RX ORDER — DEXAMETHASONE SODIUM PHOSPHATE 10 MG/ML
INJECTION, SOLUTION INTRAMUSCULAR; INTRAVENOUS PRN
Status: DISCONTINUED | OUTPATIENT
Start: 2019-03-15 | End: 2019-03-15

## 2019-03-15 RX ORDER — METHYLERGONOVINE MALEATE 0.2 MG/ML
200 INJECTION INTRAVENOUS
Status: DISCONTINUED | OUTPATIENT
Start: 2019-03-15 | End: 2019-03-18 | Stop reason: HOSPADM

## 2019-03-15 RX ORDER — SIMETHICONE 80 MG
80 TABLET,CHEWABLE ORAL 4 TIMES DAILY PRN
Status: DISCONTINUED | OUTPATIENT
Start: 2019-03-15 | End: 2019-03-18 | Stop reason: HOSPADM

## 2019-03-15 RX ORDER — DIPHENHYDRAMINE HCL 25 MG
25 CAPSULE ORAL EVERY 6 HOURS PRN
Status: DISCONTINUED | OUTPATIENT
Start: 2019-03-15 | End: 2019-03-18 | Stop reason: HOSPADM

## 2019-03-15 RX ORDER — ACETAMINOPHEN 325 MG/1
975 TABLET ORAL EVERY 6 HOURS PRN
Status: CANCELLED | OUTPATIENT
Start: 2019-03-15

## 2019-03-15 RX ORDER — HYDROCORTISONE 2.5 %
CREAM (GRAM) TOPICAL 3 TIMES DAILY PRN
Status: DISCONTINUED | OUTPATIENT
Start: 2019-03-15 | End: 2019-03-18 | Stop reason: HOSPADM

## 2019-03-15 RX ORDER — CITRIC ACID/SODIUM CITRATE 334-500MG
SOLUTION, ORAL ORAL
Status: COMPLETED
Start: 2019-03-15 | End: 2019-03-15

## 2019-03-15 RX ORDER — OXYTOCIN/0.9 % SODIUM CHLORIDE 30/500 ML
100-340 PLASTIC BAG, INJECTION (ML) INTRAVENOUS CONTINUOUS PRN
Status: CANCELLED | OUTPATIENT
Start: 2019-03-15 | End: 2019-03-15

## 2019-03-15 RX ORDER — MISOPROSTOL 200 UG/1
200 TABLET ORAL
Status: CANCELLED | OUTPATIENT
Start: 2019-03-15

## 2019-03-15 RX ORDER — BISACODYL 10 MG
10 SUPPOSITORY, RECTAL RECTAL DAILY PRN
Status: DISCONTINUED | OUTPATIENT
Start: 2019-03-17 | End: 2019-03-18 | Stop reason: HOSPADM

## 2019-03-15 RX ORDER — LANOLIN 100 %
OINTMENT (GRAM) TOPICAL
Status: DISCONTINUED | OUTPATIENT
Start: 2019-03-15 | End: 2019-03-18 | Stop reason: HOSPADM

## 2019-03-15 RX ORDER — OXYCODONE AND ACETAMINOPHEN 5; 325 MG/1; MG/1
1 TABLET ORAL
Status: CANCELLED | OUTPATIENT
Start: 2019-03-15 | End: 2019-03-15

## 2019-03-15 RX ORDER — BUPIVACAINE HYDROCHLORIDE 7.5 MG/ML
INJECTION, SOLUTION INTRASPINAL PRN
Status: DISCONTINUED | OUTPATIENT
Start: 2019-03-15 | End: 2019-03-15

## 2019-03-15 RX ORDER — OXYTOCIN/0.9 % SODIUM CHLORIDE 30/500 ML
100 PLASTIC BAG, INJECTION (ML) INTRAVENOUS CONTINUOUS
Status: DISCONTINUED | OUTPATIENT
Start: 2019-03-15 | End: 2019-03-17 | Stop reason: CLARIF

## 2019-03-15 RX ADMIN — PHENYLEPHRINE HYDROCHLORIDE 100 MCG: 10 INJECTION, SOLUTION INTRAMUSCULAR; INTRAVENOUS; SUBCUTANEOUS at 19:18

## 2019-03-15 RX ADMIN — ONDANSETRON 4 MG: 2 INJECTION INTRAMUSCULAR; INTRAVENOUS at 19:10

## 2019-03-15 RX ADMIN — EPHEDRINE SULFATE 10 MG: 50 INJECTION, SOLUTION INTRAVENOUS at 19:18

## 2019-03-15 RX ADMIN — BUPIVACAINE HYDROCHLORIDE IN DEXTROSE 1.6 ML: 7.5 INJECTION, SOLUTION SUBARACHNOID at 19:06

## 2019-03-15 RX ADMIN — AZITHROMYCIN MONOHYDRATE 500 MG: 500 INJECTION, POWDER, LYOPHILIZED, FOR SOLUTION INTRAVENOUS at 19:10

## 2019-03-15 RX ADMIN — CEFAZOLIN SODIUM 2 G: 2 INJECTION, SOLUTION INTRAVENOUS at 18:42

## 2019-03-15 RX ADMIN — SODIUM CHLORIDE, POTASSIUM CHLORIDE, SODIUM LACTATE AND CALCIUM CHLORIDE: 600; 310; 30; 20 INJECTION, SOLUTION INTRAVENOUS at 18:45

## 2019-03-15 RX ADMIN — DEXAMETHASONE SODIUM PHOSPHATE 5 MG: 10 INJECTION, SOLUTION INTRAMUSCULAR; INTRAVENOUS at 20:02

## 2019-03-15 RX ADMIN — PHENYLEPHRINE HYDROCHLORIDE 100 MCG: 10 INJECTION, SOLUTION INTRAMUSCULAR; INTRAVENOUS; SUBCUTANEOUS at 19:12

## 2019-03-15 RX ADMIN — OXYTOCIN 40 UNITS: 10 INJECTION, SOLUTION INTRAMUSCULAR; INTRAVENOUS at 19:30

## 2019-03-15 RX ADMIN — DEXAMETHASONE SODIUM PHOSPHATE 5 MG: 10 INJECTION, SOLUTION INTRAMUSCULAR; INTRAVENOUS at 20:04

## 2019-03-15 RX ADMIN — SODIUM CHLORIDE, POTASSIUM CHLORIDE, SODIUM LACTATE AND CALCIUM CHLORIDE 1000 ML: 600; 310; 30; 20 INJECTION, SOLUTION INTRAVENOUS at 18:42

## 2019-03-15 RX ADMIN — SODIUM CITRATE AND CITRIC ACID MONOHYDRATE 30 ML: 500; 334 SOLUTION ORAL at 18:42

## 2019-03-15 RX ADMIN — Medication 30 ML: at 18:42

## 2019-03-15 RX ADMIN — HYDROMORPHONE HYDROCHLORIDE 0.1 MG: 1 INJECTION, SOLUTION INTRAMUSCULAR; INTRAVENOUS; SUBCUTANEOUS at 19:06

## 2019-03-15 RX ADMIN — PHENYLEPHRINE HYDROCHLORIDE 100 MCG: 10 INJECTION, SOLUTION INTRAMUSCULAR; INTRAVENOUS; SUBCUTANEOUS at 19:10

## 2019-03-15 RX ADMIN — CEFOXITIN SODIUM 2 G: 2 INJECTION, SOLUTION INTRAVENOUS at 21:48

## 2019-03-15 RX ADMIN — BUPIVACAINE HYDROCHLORIDE 15 ML: 5 INJECTION, SOLUTION EPIDURAL; INTRACAUDAL at 20:02

## 2019-03-15 RX ADMIN — SODIUM CHLORIDE, POTASSIUM CHLORIDE, SODIUM LACTATE AND CALCIUM CHLORIDE: 600; 310; 30; 20 INJECTION, SOLUTION INTRAVENOUS at 19:30

## 2019-03-15 RX ADMIN — ACETAMINOPHEN 975 MG: 325 TABLET, FILM COATED ORAL at 21:45

## 2019-03-15 RX ADMIN — EPHEDRINE SULFATE 10 MG: 50 INJECTION, SOLUTION INTRAVENOUS at 19:14

## 2019-03-15 RX ADMIN — OXYTOCIN-SODIUM CHLORIDE 0.9% IV SOLN 30 UNIT/500ML 100 ML/HR: 30-0.9/5 SOLUTION at 23:29

## 2019-03-15 RX ADMIN — KETOROLAC TROMETHAMINE 30 MG: 30 INJECTION, SOLUTION INTRAMUSCULAR at 21:44

## 2019-03-15 RX ADMIN — TRANEXAMIC ACID 1 G: 1 INJECTION, SOLUTION INTRAVENOUS at 18:55

## 2019-03-15 RX ADMIN — METHYLERGONOVINE MALEATE 200 MCG: 0.2 INJECTION INTRAMUSCULAR; INTRAVENOUS at 19:35

## 2019-03-15 RX ADMIN — BUPIVACAINE HYDROCHLORIDE 15 ML: 5 INJECTION, SOLUTION EPIDURAL; INTRACAUDAL at 20:04

## 2019-03-15 ASSESSMENT — PAIN SCALES - GENERAL: PAINLEVEL: NO PAIN (0)

## 2019-03-15 ASSESSMENT — MIFFLIN-ST. JEOR: SCORE: 1481.53

## 2019-03-15 NOTE — PROGRESS NOTES
Doing well.  No concerns today.  Patient is aware of the risks of an induction including medications used, risk, benefits and alternatives.  She is willing to take these risks and would like to proceed and was scheduled for cervical ripening Monday with Cytotec IOL Tuesday for post-term pregnacy.  Discussed signs of labor and when to call or come in.  Discussed kick counts and fetal movement.  Denies regular contractions, vaginal bleeding, LOF  Pedro Pablo Cantor MD  3/15/2019

## 2019-03-15 NOTE — PLAN OF CARE
OB Triage Note  Barbi Vale  MRN: [id number]  Gestational Age: 41w1d      Barbi Vale presents for rule out labor (sign/symptom/concern).      States lei since this afternoon around 3:00PM.  Rates pain at 4-5/10.  Denies bleeding or LOF    Dr. Cantor notified of arrival and condition.  Oriented patient to surroundings. Call light within reach.     FHT: 130    NST Start Time:1611  NST Stop Time:  1637  NST: Reactive .  Uterine Assessment:Contractions Q3-6, palpating moderate, patient rates them 4-5 out of 10, and they are lasting 40 seconds.    Plan:  -Initial NST, then fetal/uterine monitoring per MD/patient plan.  -Sterile vaginal exam/sterile speculum exam.  -Nursing education on early labor comfort measures provided.

## 2019-03-15 NOTE — NURSING NOTE
"Chief Complaint   Patient presents with     Prenatal Care     41w 1d       Initial /64 (BP Location: Right arm, Cuff Size: Adult Regular)   Pulse 67   Ht 1.6 m (5' 3\")   Wt 80.7 kg (178 lb)   LMP 05/31/2018   SpO2 97%   BMI 31.53 kg/m   Estimated body mass index is 31.53 kg/m  as calculated from the following:    Height as of this encounter: 1.6 m (5' 3\").    Weight as of this encounter: 80.7 kg (178 lb).  Medication Reconciliation: complete    Taylor Foster LPN  "
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
## 2019-03-16 LAB
ABO + RH BLD: NORMAL
ABO + RH BLD: NORMAL
BLD GP AB SCN SERPL QL: NORMAL
BLOOD BANK CMNT PATIENT-IMP: NORMAL
HGB BLD-MCNC: 10.1 G/DL (ref 11.7–15.7)
SPECIMEN EXP DATE BLD: NORMAL

## 2019-03-16 PROCEDURE — 12000000 ZZH R&B MED SURG/OB

## 2019-03-16 PROCEDURE — 85018 HEMOGLOBIN: CPT | Performed by: OBSTETRICS & GYNECOLOGY

## 2019-03-16 PROCEDURE — 25000132 ZZH RX MED GY IP 250 OP 250 PS 637: Performed by: OBSTETRICS & GYNECOLOGY

## 2019-03-16 PROCEDURE — 25000128 H RX IP 250 OP 636: Performed by: OBSTETRICS & GYNECOLOGY

## 2019-03-16 PROCEDURE — 36415 COLL VENOUS BLD VENIPUNCTURE: CPT | Performed by: OBSTETRICS & GYNECOLOGY

## 2019-03-16 RX ADMIN — ACETAMINOPHEN 975 MG: 325 TABLET, FILM COATED ORAL at 13:33

## 2019-03-16 RX ADMIN — DOCUSATE SODIUM 100 MG: 100 CAPSULE, LIQUID FILLED ORAL at 08:40

## 2019-03-16 RX ADMIN — IBUPROFEN 800 MG: 800 TABLET ORAL at 23:54

## 2019-03-16 RX ADMIN — DOCUSATE SODIUM 100 MG: 100 CAPSULE, LIQUID FILLED ORAL at 22:02

## 2019-03-16 RX ADMIN — VITAMIN D, TAB 1000IU (100/BT) 2000 UNITS: 25 TAB at 08:39

## 2019-03-16 RX ADMIN — CEFOXITIN SODIUM 2 G: 2 INJECTION, SOLUTION INTRAVENOUS at 03:31

## 2019-03-16 RX ADMIN — ACETAMINOPHEN 975 MG: 325 TABLET, FILM COATED ORAL at 22:02

## 2019-03-16 RX ADMIN — Medication 1 TABLET: at 08:40

## 2019-03-16 RX ADMIN — KETOROLAC TROMETHAMINE 30 MG: 30 INJECTION, SOLUTION INTRAMUSCULAR at 08:40

## 2019-03-16 RX ADMIN — KETOROLAC TROMETHAMINE 30 MG: 30 INJECTION, SOLUTION INTRAMUSCULAR at 16:45

## 2019-03-16 RX ADMIN — SODIUM CHLORIDE, SODIUM LACTATE, POTASSIUM CHLORIDE, CALCIUM CHLORIDE AND DEXTROSE MONOHYDRATE: 5; 600; 310; 30; 20 INJECTION, SOLUTION INTRAVENOUS at 05:30

## 2019-03-16 RX ADMIN — ACETAMINOPHEN 975 MG: 325 TABLET, FILM COATED ORAL at 05:29

## 2019-03-16 NOTE — PROGRESS NOTES
[facility] [place]  Obstetrics Post-Op / Progress Note           Assessment and Plan:    Assessment:   Post-operative day #1  Low transverse primary  section  L&D complications: Non reassuring fetal heart tracing  Post term      Clean wound without signs of infection.      Plan:   Ambulation encouraged            Interval History:   Doing well.  Pain is well-controlled.  No fevers.  No history of wound drainage, warmth or significant erythema.  Good appetite.  Denies chest pain, shortness of breath, nausea or vomiting.  Ambulatory.  Breastfeeding well.          Significant Problems:    Mild ileus          Review of Systems:    The patient denies any chest pain, shortness of breath, excessive pain, fever, chills, purulent drainage from the wound, nausea or vomiting.          Medications:     All medications related to the patient's surgery have been reviewed  Current Facility-Administered Medications   Medication     [START ON 3/18/2019] acetaminophen (TYLENOL) tablet 650 mg     acetaminophen (TYLENOL) tablet 975 mg     [START ON 3/17/2019] bisacodyl (DULCOLAX) Suppository 10 mg     dextrose 5% in lactated ringers infusion     diphenhydrAMINE (BENADRYL) capsule 25 mg    Or     diphenhydrAMINE (BENADRYL) injection 25 mg     docusate sodium (COLACE) capsule 100 mg     hydrocortisone 2.5 % cream     hydrOXYzine (VISTARIL) injection 100 mg     ibuprofen (ADVIL/MOTRIN) tablet 800 mg     ketorolac (TORADOL) injection 30 mg     lactated ringers BOLUS 1,000 mL     lanolin ointment     lidocaine (LMX4) kit     lidocaine 1 % 0.1-1 mL     methylergonovine (METHERGINE) injection 200 mcg     naloxone (NARCAN) injection 0.1-0.4 mg     naloxone (NARCAN) injection 0.1-0.4 mg     No MMR Needed -  Assessment: Patient does not need MMR vaccine     NO Rho (D) immune globulin (RhoGam) needed - mother Rh POSITIVE     ondansetron (ZOFRAN) injection 4 mg     oxyCODONE (ROXICODONE) tablet 5-10 mg     oxytocin (PITOCIN)  30 units in 500 mL 0.9% NaCl infusion     oxytocin (PITOCIN) 30 units in 500 mL 0.9% NaCl infusion     oxytocin (PITOCIN) injection 10 Units     PNV PRENATAL PLUS MULTIVITAMIN per tablet 1 tablet     prochlorperazine (COMPAZINE) injection 10 mg    Or     prochlorperazine (COMPAZINE) Suppository 25 mg     simethicone (MYLICON) chewable tablet 80 mg     sodium chloride (PF) 0.9% PF flush 3 mL     sodium chloride (PF) 0.9% PF flush 3 mL     [START ON 3/17/2019] sodium phosphate (FLEET ENEMA) 1 enema     tranexamic acid (CYKLOKAPRON) 1 g in sodium chloride 0.9 % 50 mL bolus     vitamin D3 (CHOLECALCIFEROL) 1000 units (25 mcg) tablet 2,000 Units             Physical Exam:     All vitals stable  Patient Vitals for the past 8 hrs:   BP Temp Temp src Pulse Heart Rate Resp SpO2   03/16/19 0700 94/54 99.1  F (37.3  C) Oral 68 68 16 --   03/16/19 0413 (!) 92/40 98.9  F (37.2  C) Oral -- 69 16 95 %     Wound clean and dry with minimal or no drainage.  Surrounding skin with minimal erythema.   Lungs clear  Bowel sounds present but sluggish  Calves not tender       Data:     All laboratory data related to this surgery reviewed  Hemoglobin   Date Value Ref Range Status   03/16/2019 10.1 (L) 11.7 - 15.7 g/dL Final   03/15/2019 13.7 11.7 - 15.7 g/dL Final   12/20/2018 11.4 (L) 11.7 - 15.7 g/dL Final   08/13/2018 12.9 11.7 - 15.7 g/dL Final   08/21/2017 12.4 11.7 - 15.7 g/dL Final     No imaging studies have been ordered.    Stable doing well except for mild ileus    Devante Martinez MD

## 2019-03-16 NOTE — PLAN OF CARE
Face to face report given with opportunity to observe patient.    Report given to Sujatha Felix   3/16/2019  7:21 AM

## 2019-03-16 NOTE — ANESTHESIA PROCEDURE NOTES
Peripheral nerve/Neuraxial procedure note : TAP  Pre-Procedure  Performed by   Referred by [facility]  Location: OR    Procedure Times:3/15/2019 8:00 PM and 3/15/2019 8:05 PM  Pre-Anesthestic Checklist: patient identified, IV checked, site marked, risks and benefits discussed, informed consent, monitors and equipment checked, pre-op evaluation, at physician/surgeon's request and post-op pain management    Timeout  Correct Patient: Yes   Correct Procedure: Yes   Correct Site: Yes   Correct Laterality: Yes   Correct Position: Yes   Site Marked: Yes   .   Procedure Documentation    Diagnosis:C SECTION.    Procedure:  bilateral  TAP.     Ultrasound used to identify targeted nerve, plexus, or vascular marker and placed a needle adjacent to it., Ultrasound was used to visualize the spread of the anesthetic in close proximity to the above stated nerve. A permanent image is entered into the patient's record.  Patient Prep;mask, sterile gloves, chlorhexidine gluconate and isopropyl alcohol.  .  Needle: insulated Needle Gauge: 20.    Needle Length (Inches) 4  Insertion Method: Single Shot.       Assessment/Narrative  Paresthesias: No.  Injection made incrementally with aspirations every 5 mL..  The placement was negative for: blood aspirated, painful injection and site bleeding.  Bolus given via needle. No blood aspirated via catheter.   Secured via.   Complications: none.

## 2019-03-16 NOTE — PLAN OF CARE
Assessments as charted. B/P: 121/73, T: 99.1, P: R: 16. Rates pain: 0/10. Incision: Drsng CDI Voiding without difficulty. Fundus firm@U. Lochia: Light. Activity: will dangle at bedside later in the shift. Infant feeding: Breast feeding going with moderate difficulty .     LATCH Score:   Latch: 1 - Repeated Attempts  Audible Swallowin - Spontaneous & frequent  Type of Nipple: (Breast/Nipple) 2 - Everted  Comfort: 1 - Filling, small blisters, mild/mod pain  Hold: 0 - Full Assist   Total LATCH Score: 6    Postpartum breastfeeding assessment completed and education provided, see Patient Education Activity.  Items included in the education are:   proper positioning and latch  effectiveness of feeding  manual expression  handling and storing breastmilk  maintenance of breastfeeding for the first 6 months  sign/symptoms of infant feeding issues requiring referral to qualified health care provider  Postpartum care education provided, see Patient Education activity. Patient denies needs. Will monitor.  Anna Felix

## 2019-03-16 NOTE — OP NOTE
Section Operative Note  [facility]    Pre-operative diagnosis: Intrauterine pregnancy at 41 1/7 weeks gestation  Post-dates remote from delivery.  Non-reassuring fetal status   Post-operative diagnosis: Same   Procedure: Primary low transverse  section   Surgeon: Pedro Pablo Cantor MD   Assistant(s): None   Anesthesia: Spinal anesthesia with Dilaudid, Tap block   Estimated blood loss: 800ml   Total IV fluids: (See anesthesia record)   Blood transfusion: No transfusion was given during surgery   Total urine output: (See anesthesia record)   Drains: Ferro catheter   Specimens: placenta    Findings: Vigorous  female .  Apgar's 9/9, intact placenta with 3VC, nml pelvic anatomy   Complications: None   Condition: Mother stable, transfered to post-anesthesia recovery     Procedure Details:  The risks, benefits, complications, treatment options, and expected outcomes were discussed with the patient.  The patient concurred with the proposed plan, giving informed consent.  The patient was taken to Operating Room,  identity confirmed and the procedure verified as  Delivery. A Time Out was held and the above information confirmed.    After uneventful anesthesia placement the patient was prepped and draped in the left lateral tilt  position and a ferro catheter placed.   A low-transverse skin incision was made with a scalpel and carried down through the subcutaneous tissue to the fascia which was extended transversely. The fascia was  from the underlying rectus tissue superiorly and inferiorly. The peritoneum was identified and entered without difficulty. The utero-vesical peritoneal reflection was incised transversely and the bladder flap was bluntly freed from the lower uterine segment. A low transverse uterine incision was made.  The infant was delivered from the vtx presentation.  After the umbilical cord was clamped and cut the infant was suctioned and handed to the  awaiting resuscitation team.  Cord blood was obtained for evaluation. The placenta was removed intact and the uterus swept free of membranes and debris. The uterine incision was closed with running locked sutures of 1.0 Chromic in a 2 layer fashion.  Hemostasis was excellent. Irrigation with warm normal saline was carried out until clear.The rectus muscles were re approximated with running 0 Vicryl.   The fascia was then reapproximated with running sutures of 0 PDS. The subcutaneous space was irrigated and checked for hemostasis.  The skin was reapproximated with surgical staples.  Dressings were applied.   A vaginal exam was performed at the end of the procedure to evacuate the lower uterine segment and vagina of clots.  There were no complications and the patient was transferred to the recovery room in excellent and stable condition.    Instrument, sponge, and needle counts were correct prior the abdominal closure and at the conclusion of the case.         Pedro Pablo Cantor MD

## 2019-03-16 NOTE — PLAN OF CARE
Pseudo Sinusoidal FHT's noted on monitor, Dr. Cantor notified.  Patient repositioned at 1720 without improved FHT's.  Repositioned at 1750 with reassuring FHT's on monitor. AT 1805 Dr. Cantor requested to the unit per patient request.  Decel down to 90's for 2 minutes, O2 rebreather mask applied to Mom, repositioned to hands and knees. Dr. Cantor at bedside and ASAP C-Section called.  1840 FHT's reassuring.

## 2019-03-16 NOTE — ANESTHESIA CARE TRANSFER NOTE
Patient: Barbi Vale    Procedure(s):   SECTION    Diagnosis: * No pre-op diagnosis entered *  Diagnosis Additional Information: No value filed.    Anesthesia Type:   Spinal, Peripheral Nerve Block     Note:  Airway :Room Air  Patient transferred to:Labor and Delivery  Handoff Report: Identifed the Patient, Identified the Reponsible Provider, Reviewed the pertinent medical history, Discussed the surgical course, Reviewed Intra-OP anesthesia mangement and issues during anesthesia, Set expectations for post-procedure period and Allowed opportunity for questions and acknowledgement of understanding      Vitals: (Last set prior to Anesthesia Care Transfer)    CRNA VITALS  3/15/2019 1935 - 3/15/2019 2009      3/15/2019             Pulse:  63    SpO2:  98 %    Resp Rate (set):  8                Electronically Signed By: GUERRERO Lewis CRNA  March 15, 2019  8:09 PM

## 2019-03-16 NOTE — ANESTHESIA POSTPROCEDURE EVALUATION
Patient: Barbi Vale    Procedure(s):   SECTION    Diagnosis:* No pre-op diagnosis entered *  Diagnosis Additional Information: No value filed.    Anesthesia Type:  Spinal, Peripheral Nerve Block    Note:  Anesthesia Post Evaluation    Patient location during evaluation: Bedside  Patient participation: Able to fully participate in evaluation  Level of consciousness: awake  Pain management: adequate  Airway patency: patent  Cardiovascular status: acceptable  Respiratory status: acceptable  Hydration status: acceptable  PONV: none     Anesthetic complications: None          Last vitals:  Vitals:    03/15/19 2245 03/15/19 2301 03/15/19 2314   BP: 110/50     Resp:      Temp:      SpO2:  97% 97%         Electronically Signed By: GUERRERO Lewis CRNA  March 15, 2019  11:39 PM

## 2019-03-16 NOTE — PLAN OF CARE
Patient put on monitor in supine position.  Contraction for 50 seconds with early decel down to 70's for 30 seconds while nurse at bedside.  Moved patient to Right side.  Reassuring FHT's noted. Dr. Cantor notified.

## 2019-03-16 NOTE — ANESTHESIA PROCEDURE NOTES
Peripheral nerve/Neuraxial procedure note : intrathecal  Pre-Procedure  Performed by   Referred by [facility]  Location: OR    Procedure Times:3/15/2019 7:05 PM and 3/15/2019 7:09 PM  Pre-Anesthestic Checklist: patient identified, IV checked, site marked, risks and benefits discussed, informed consent, monitors and equipment checked, pre-op evaluation and at physician/surgeon's request    Timeout  Correct Patient: Yes   Correct Procedure: Yes   Correct Site: Yes   Correct Laterality: Yes   Correct Position: Yes   Site Marked: Yes   .   Procedure Documentation  ASA 2 and Emergent  Diagnosis:c section.    Procedure:    Intrathecal.  Insertion Site:L3-4  (midline approach)      Patient Prep;mask, sterile gloves, chlorhexidine gluconate and isopropyl alcohol, patient draped.  .  Needle: Shan tip Spinal/LP Needle Length (inches): 3.5 # of attempts: 1 and # of redirects: Introducer used Introducer: 20 G .       Assessment/Narrative  Paresthesias: No.  .  .  clear CSF fluid removed . Time Injected: 19:10  Sensory Level: T8

## 2019-03-16 NOTE — PLAN OF CARE
Report received from PACU RN Cele. Pt. Bonding well with baby. Denies pain. Fundus firm, incision drsng CDI. AVSS. Continuous pulse oximetry in place. SCD's on. Call light within reach. Will continue to assess.

## 2019-03-16 NOTE — PLAN OF CARE
Improving  Adult Inpatient Plan of Care  Plan of Care Review  3/16/2019 1757 - Improving by Lorraine Echevarria RN  Flowsheets  Taken 3/15/2019 2330 by Anna Felix RN  Plan of Care Reviewed With  patient  Taken 3/16/2019 1757 by Lorraine Echevarria RN  Progress  improving  Note  Works well with baby on breast feeding  Patient-Specific Goal (Individualization)  3/16/2019 1757 - Improving by Lorraine Echevarria RN  Absence of Hospital-Acquired Illness or Injury  3/16/2019 1757 - Improving by Lorraine Echevarria RN  Optimal Comfort and Wellbeing  3/16/2019 1757 - Improving by Lorraine Echevarria RN  Readiness for Transition of Care  3/16/2019 1757 - Improving by Lorraine Echevarria RN  Rounds/Family Conference  3/16/2019 1757 - Improving by Lorraine Echevarria RN  Adjustment to Role Transition (Postpartum  Delivery)  Successful Maternal Role Transition  3/16/2019 1757 - Improving by Lorraine Echevarria RN  Note  Noted ferro removed at 1700, up and moving about in room walked in hallway. Has not voided as yet.  Bleeding (Postpartum  Delivery)  Hemostasis  3/16/2019 1757 - Improving by Lorraine Echevarria RN  Note  Minimal to scant vaginal drainage noted.  No drainage noted on abd binder  Infection (Postpartum  Delivery)  Absence of Infection Signs/Symptoms  3/16/2019 1757 - Improving by Lorraine Echevarria RN  Note  Vital signs are WNL.   Prevent or Manage Infection  3/16/2019 1757 by Lorraine Echevarria RN  Flowsheets  Taken 3/16/2019 1757   Fever Reduction/Comfort Measures  lightweight bedding;fluid intake increased;medication administered  Infection Management  aseptic technique maintained  Note  Tolerated iv toradol and oral tylenol, starting to have a little pain at incision this pm after being up and walking encouraged to rest.  Pain (Postpartum  Delivery)  Acceptable Pain Control  3/16/2019 1757 - Improving by Lorraine Echevarria RN  Note  States pain is tolerable  Postoperative Nausea and Vomiting (Postpartum   Delivery)  Nausea and Vomiting Relief  3/16/2019 1757 - Improving by Lorraine Echevarria RN  Postoperative Urinary Retention (Postpartum  Delivery)  Effective Urinary Elimination  3/16/2019 1757 - Improving by Lorraine Echevarria, RN  Note  Has not voided as yet.  Monitor and Manage Urinary Retention  3/16/2019 1757 by Lorraine Echevarria, RN  Flowsheets  Taken 3/16/2019 1757   Urinary Elimination Promotion  voiding relaxation promoted

## 2019-03-17 PROCEDURE — 25000132 ZZH RX MED GY IP 250 OP 250 PS 637: Performed by: OBSTETRICS & GYNECOLOGY

## 2019-03-17 PROCEDURE — 12000000 ZZH R&B MED SURG/OB

## 2019-03-17 RX ORDER — ACETAMINOPHEN 325 MG/1
975 TABLET ORAL EVERY 6 HOURS PRN
Status: DISCONTINUED | OUTPATIENT
Start: 2019-03-17 | End: 2019-03-18 | Stop reason: CLARIF

## 2019-03-17 RX ADMIN — SIMETHICONE 80 MG: 80 TABLET, CHEWABLE ORAL at 22:57

## 2019-03-17 RX ADMIN — DOCUSATE SODIUM 100 MG: 100 CAPSULE, LIQUID FILLED ORAL at 20:58

## 2019-03-17 RX ADMIN — OXYCODONE HYDROCHLORIDE 10 MG: 5 TABLET ORAL at 08:57

## 2019-03-17 RX ADMIN — ACETAMINOPHEN 975 MG: 325 TABLET, FILM COATED ORAL at 19:19

## 2019-03-17 RX ADMIN — OXYCODONE HYDROCHLORIDE 5 MG: 5 TABLET ORAL at 04:38

## 2019-03-17 RX ADMIN — OXYCODONE HYDROCHLORIDE 10 MG: 5 TABLET ORAL at 22:56

## 2019-03-17 RX ADMIN — ACETAMINOPHEN 975 MG: 325 TABLET, FILM COATED ORAL at 05:53

## 2019-03-17 RX ADMIN — IBUPROFEN 800 MG: 800 TABLET ORAL at 12:09

## 2019-03-17 RX ADMIN — IBUPROFEN 800 MG: 800 TABLET ORAL at 22:57

## 2019-03-17 RX ADMIN — DOCUSATE SODIUM 100 MG: 100 CAPSULE, LIQUID FILLED ORAL at 08:58

## 2019-03-17 RX ADMIN — OXYCODONE HYDROCHLORIDE 10 MG: 5 TABLET ORAL at 12:09

## 2019-03-17 RX ADMIN — VITAMIN D, TAB 1000IU (100/BT) 2000 UNITS: 25 TAB at 08:58

## 2019-03-17 RX ADMIN — Medication 1 TABLET: at 08:58

## 2019-03-17 RX ADMIN — ACETAMINOPHEN 975 MG: 325 TABLET, FILM COATED ORAL at 22:56

## 2019-03-17 NOTE — PLAN OF CARE
Assessments as charted. B/P: 93/49, T: 97.8, P: 72, R: 16. Rates pain: 3/10 incision. Wants only tylenol and motrin for discomfort. Incision: Healing well, well approximated and without signs of infection. Voiding without difficulty. Fundus firm @U. Lochia: Light. Activity: normal activity. Infant feeding: Breast feeding with nipple shielf .     LATCH Score:   Latch: 1 - Repeated Attempts  Audible Swallowin - Spontaneous & frequent  Type of Nipple: (Breast/Nipple) 2 - Everted  Comfort: 1 - Filling, small blisters, mild/mod pain  Hold: 1 - Min. Assist   Total LATCH Score: 7    Postpartum breastfeeding assessment completed and education provided, see Patient Education Activity.  Items included in the education are:   proper positioning and latch  effectiveness of feeding  manual expression  handling and storing breastmilk  maintenance of breastfeeding for the first 6 months  sign/symptoms of infant feeding issues requiring referral to qualified health care provider  Postpartum care education provided, see Patient Education activity. Patient denies needs. Will monitor.  Anna Felix

## 2019-03-17 NOTE — PLAN OF CARE
Face to face report given with opportunity to observe patient.    Report given to Chasity Felix   3/17/2019  7:40 AM

## 2019-03-17 NOTE — PLAN OF CARE
Assessments as charted. B/P: 97/64, T: 97.9, P: 66, R: 18. Rates pain: 0/10. Incision: Healing well, well approximated and without signs of infection. Voiding without difficulty. Fundus firm. Lochia: Light. Activity: normal activity. Infant feeding: Both breast and formula. Patient breast fed  without the nipple shield while sitting up in chair.  States this position feels much better and she feels better about her ability to breastfeed.  She has good mechanics of placing  skin to skin, tummy to tummy, and good grasp of breast.  [place] is vigorous at breast and has a good latch.  Education provided about exclusive breastfeeding, but mother is nervous about baby seeming like she is so hungry.  She topped off the  after breastfeeding with 15 mL's of formula.      LATCH Score:   Latch: 1 - Repeated Attempts  Audible Swallowin - Spontaneous & frequent  Type of Nipple: (Breast/Nipple) 2 - Everted  Comfort: 1 - Filling, small blisters, mild/mod pain  Hold: 2 - No Assist   Total LATCH Score: 8    Postpartum breastfeeding assessment completed and education provided, see Patient Education Activity.  Items included in the education are:   proper positioning and latch  effectiveness of feeding  manual expression  handling and storing breastmilk  maintenance of breastfeeding for the first 6 months  sign/symptoms of infant feeding issues requiring referral to qualified health care provider  Postpartum care education provided, see Patient Education activity. Patient denies needs. Will monitor.  Chasity Hernandez

## 2019-03-17 NOTE — PROGRESS NOTES
[facility]   Obstetrics Post-Op / Progress Note           Assessment and Plan:    Assessment:   Post-operative day #2  Low transverse primary  section  L&D complications: Non-reassuring fetal heart tracing      Clean wound without signs of infection.    Incisional discomfort      Plan:   Anticipate discharge tomorrow            Interval History:   Doing well.  Pain is well-controlled.  No fevers.  No history of wound drainage, warmth or significant erythema.  Good appetite.  Denies chest pain, shortness of breath, nausea or vomiting.  Ambulatory.  Breastfeeding well.          Significant Problems:    None          Review of Systems:    The patient denies any chest pain, shortness of breath, excessive pain, fever, chills, purulent drainage from the wound, nausea or vomiting.          Medications:     All medications related to the patient's surgery have been reviewed  Current Facility-Administered Medications   Medication     [START ON 3/18/2019] acetaminophen (TYLENOL) tablet 650 mg     acetaminophen (TYLENOL) tablet 975 mg     bisacodyl (DULCOLAX) Suppository 10 mg     dextrose 5% in lactated ringers infusion     diphenhydrAMINE (BENADRYL) capsule 25 mg    Or     diphenhydrAMINE (BENADRYL) injection 25 mg     docusate sodium (COLACE) capsule 100 mg     hydrocortisone 2.5 % cream     hydrOXYzine (VISTARIL) injection 100 mg     ibuprofen (ADVIL/MOTRIN) tablet 800 mg     lactated ringers BOLUS 1,000 mL     lanolin ointment     lidocaine (LMX4) kit     lidocaine 1 % 0.1-1 mL     methylergonovine (METHERGINE) injection 200 mcg     naloxone (NARCAN) injection 0.1-0.4 mg     No MMR Needed -  Assessment: Patient does not need MMR vaccine     NO Rho (D) immune globulin (RhoGam) needed - mother Rh POSITIVE     ondansetron (ZOFRAN) injection 4 mg     oxyCODONE (ROXICODONE) tablet 5-10 mg     oxytocin (PITOCIN) 30 units in 500 mL 0.9% NaCl infusion     oxytocin (PITOCIN) 30 units in 500 mL 0.9%  NaCl infusion     oxytocin (PITOCIN) injection 10 Units     PNV PRENATAL PLUS MULTIVITAMIN per tablet 1 tablet     prochlorperazine (COMPAZINE) injection 10 mg    Or     prochlorperazine (COMPAZINE) Suppository 25 mg     simethicone (MYLICON) chewable tablet 80 mg     sodium chloride (PF) 0.9% PF flush 3 mL     sodium chloride (PF) 0.9% PF flush 3 mL     sodium phosphate (FLEET ENEMA) 1 enema     tranexamic acid (CYKLOKAPRON) 1 g in sodium chloride 0.9 % 50 mL bolus     vitamin D3 (CHOLECALCIFEROL) 1000 units (25 mcg) tablet 2,000 Units             Physical Exam:     All vitals stable  Patient Vitals for the past 8 hrs:   BP Temp Temp src Pulse Heart Rate Resp SpO2   03/17/19 0946 102/63 98  F (36.7  C) Oral 66 66 18 97 %     Wound clean and dry with minimal or no drainage.  Surrounding skin with minimal erythema.          Data:     All laboratory data related to this surgery reviewed  Hemoglobin   Date Value Ref Range Status   03/16/2019 10.1 (L) 11.7 - 15.7 g/dL Final   03/15/2019 13.7 11.7 - 15.7 g/dL Final   12/20/2018 11.4 (L) 11.7 - 15.7 g/dL Final   08/13/2018 12.9 11.7 - 15.7 g/dL Final   08/21/2017 12.4 11.7 - 15.7 g/dL Final     No imaging studies have been ordered  Stable   Doing well     Incisional discomfort.    Devante Martinez MD

## 2019-03-17 NOTE — PROGRESS NOTES
Up and void 100 ml pink tinge urine.  Face to face report given at bedside with selina WILSON. opportunity to observe patient.

## 2019-03-18 VITALS
DIASTOLIC BLOOD PRESSURE: 58 MMHG | OXYGEN SATURATION: 96 % | HEART RATE: 61 BPM | TEMPERATURE: 97.9 F | SYSTOLIC BLOOD PRESSURE: 99 MMHG | RESPIRATION RATE: 16 BRPM

## 2019-03-18 PROBLEM — D62 ANEMIA DUE TO BLOOD LOSS, ACUTE: Status: ACTIVE | Noted: 2019-03-18

## 2019-03-18 PROCEDURE — 25000128 H RX IP 250 OP 636: Performed by: OBSTETRICS & GYNECOLOGY

## 2019-03-18 PROCEDURE — 25000132 ZZH RX MED GY IP 250 OP 250 PS 637: Performed by: OBSTETRICS & GYNECOLOGY

## 2019-03-18 RX ORDER — OXYCODONE AND ACETAMINOPHEN 5; 325 MG/1; MG/1
1 TABLET ORAL EVERY 6 HOURS PRN
Qty: 35 TABLET | Refills: 0 | Status: SHIPPED | OUTPATIENT
Start: 2019-03-18 | End: 2019-03-21

## 2019-03-18 RX ORDER — IBUPROFEN 800 MG/1
800 TABLET, FILM COATED ORAL EVERY 8 HOURS PRN
Qty: 50 TABLET | Refills: 1 | Status: SHIPPED | OUTPATIENT
Start: 2019-03-18 | End: 2019-04-02

## 2019-03-18 RX ADMIN — Medication 1 TABLET: at 08:29

## 2019-03-18 RX ADMIN — ACETAMINOPHEN 975 MG: 325 TABLET, FILM COATED ORAL at 05:25

## 2019-03-18 RX ADMIN — OXYCODONE HYDROCHLORIDE 10 MG: 5 TABLET ORAL at 05:25

## 2019-03-18 RX ADMIN — IBUPROFEN 800 MG: 800 TABLET ORAL at 08:29

## 2019-03-18 RX ADMIN — DOCUSATE SODIUM 100 MG: 100 CAPSULE, LIQUID FILLED ORAL at 08:29

## 2019-03-18 RX ADMIN — OXYCODONE HYDROCHLORIDE 10 MG: 5 TABLET ORAL at 10:19

## 2019-03-18 RX ADMIN — HYDROXYZINE HYDROCHLORIDE 100 MG: 50 INJECTION, SOLUTION INTRAMUSCULAR at 01:24

## 2019-03-18 RX ADMIN — VITAMIN D, TAB 1000IU (100/BT) 2000 UNITS: 25 TAB at 08:29

## 2019-03-18 RX ADMIN — OXYCODONE HYDROCHLORIDE 10 MG: 5 TABLET ORAL at 14:16

## 2019-03-18 NOTE — PLAN OF CARE
VSS and afebrile. Assessments completed as charted. Fundus firm U/2 and lochia scant amt, with no large clots noted per. Incision clean, dry and intact with staples in place. Dressing of non-adherent telfa in place with no drainage noted. Pt reports passing flatus. Pt up ind in room. Encouraged to ambulate hallways x2 a shift. Pt showered this am, new dressing placed, cam cares done, bed linens changed and dressed into own clothes. Pt reports pain 3 in incision. States relief noted from Ibuprofen, Oxycodone and Tylenol. Babe rooming in and bonding well. Planning to discharge home per MD order today with babe. Deny any needs or concerns at this time. Will continue to monitor.

## 2019-03-18 NOTE — DISCHARGE INSTRUCTIONS
Postop  Birth Instructions    Activity    Do not lift more than 10 pounds for 6 weeks after surgery.  Ask family and friends for help when you need it.  No driving until you have stopped taking your narcotic pain medications   No heavy exercise or activity for 6 weeks.  Don't do anything that will put a strain on your surgery site.  Don't strain when using the toilet.  Your care team may prescribe a stool softener if you have problems with your bowel movements.    To care for your incision:    Keep the incision clean and dry.  Do not soak your incision in water. No swimming or hot tubs until it has fully healed. You may soak in the bathtub if the water level is below your incision.  Do not use peroxide, gel, cream, lotion, or ointment on your incision.  Adjust your clothes to avoid pressure on your surgery site (check the elastic in your underwear for example).    You may see a small amount of clear or pink drainage and this is normal.    Check with your health care provider:     If the drainage increases or has an odor.  If the incision reddens, you have swelling, or develop a rash.  If you have increased pain and the medicine we prescribed doesn't help.  If you have a fever above 100.4 F (38 C) with or without chills when placing thermometer under your tongue.  The area around your incision (surgery wound), will feel numb.  This is normal. The numbness should go away in less than a year.     Keep your hands clean:  Always wash your hands before touching your incision (surgery wound). This helps reduce your risk of infection. If your hands aren't dirty, you may use an alcohol hand-rub to clean your hands. Keep your nails clean and short.    Call your healthcare provider if you have any of these symptoms:    You soak a sanitary pad with blood within 1 hour, or you see blood clots larger than a golf ball.  Bleeding that lasts more than 6 weeks.  Vaginal discharge that smells bad.  Severe pain, cramping or  tenderness in your lower belly area.  A need to urinate more frequently (use the toilet more often), more urgently (use the toilet very quickly), or it burns when you urinate.  Nausea and vomiting.  Redness, swelling or pain around a vein in your leg.  Problems breastfeeding or a red or painful area on your breast.  Chest pain and cough or are gasping for air.  Problems with coping with sadness, anxiety or depression. If you have concerns about hurting yourself or the baby, call your provider immediately. The Safe Place for Newborn[facility] law allows you to bring your baby to the hospital up to 7 days after birth, no questions asked.  You have questions or concerns after you return home.

## 2019-03-18 NOTE — DISCHARGE SUMMARY
[facility]    Discharge Summary  Obstetrics    Date of Admission:  3/15/2019  Date of Discharge:  3/18/2019  Discharging Provider: Devin Ya    Discharge Diagnoses   PG at term  Early labor  NRFHT CAT III  ASAP ---3/15/2019  Anemia due to acute blood loss due to , not complicated    Procedure/Surgery Information   Procedure: Procedure(s):   SECTION   Surgeon(s): Surgeon(s) and Role:     * Pedro Pablo Cantor MD - Primary     History of Present Illness   Barbi Vale is a 33 year old female who presented with early labor and non reassuring fht's  CAT II, had ASAP .    Hospital Course   The patient's hospital course was unremarkable.  She recovered as anticipated and experienced no post-operative complications.  On discharge, her pain was well controlled. Vaginal bleeding is similar to peak menstrual flow.  Voiding without difficulty.  Ambulating well and tolerating a normal diet.  No fever or significant wound drainage.  Breastfeeding well.  Infant is stable.  She was discharged on post-partum day # 3.    Post-partum hemoglobin:   Hemoglobin   Date Value Ref Range Status   2019 10.1 (L) 11.7 - 15.7 g/dL Final       Devin Ya    Discharge Disposition   Discharged to home   Condition at discharge: Good    Pending Results   Final pathology results: Pending at time of discharge  These results will be followed up by Devaughn  Unresulted Labs Ordered in the Past 30 Days of this Admission     Date and Time Order Name Status Description    3/15/2019 1827 Treponema Abs w Reflex to RPR and Titer In process           Primary Care Physician   Caprice Pimentel    Consultations This Hospital Stay   HOME CARE POST PARTUM/ IP CONSULT  LACTATION IP CONSULT    Discharge Orders      Activity    Review discharge instructions     Reason for your hospital stay    Maternity care     Discharge Instructions - Postpartum visit    1 week clip removal with Devaughn  2 weeks pp with  Janice  6 weeks po and pp with Devaughn     Diet    Resume previous diet     Discharge Medications   Current Discharge Medication List      START taking these medications    Details   ibuprofen (ADVIL/MOTRIN) 800 MG tablet Take 1 tablet (800 mg) by mouth every 8 hours as needed for pain Take with a full meal for primary pain control  Qty: 50 tablet, Refills: 1    Associated Diagnoses:  delivery delivered      oxyCODONE-acetaminophen (PERCOCET) 5-325 MG tablet Take 1 tablet by mouth every 6 hours as needed for pain Take with food as a supplement to Ibuprofen for secondary pain control  Qty: 35 tablet, Refills: 0    Associated Diagnoses:  delivery delivered         CONTINUE these medications which have NOT CHANGED    Details   B Complex Vitamins (VITAMIN B COMPLEX PO) Take by mouth daily      fish oil-omega-3 fatty acids 1000 MG capsule Take 1,000 mg by mouth daily    Associated Diagnoses: Paresthesia      Prenatal Vit-Fe Fumarate-FA (PRENATAL MULTIVITAMIN PLUS IRON) 27-0.8 MG TABS per tablet Take 1 tablet by mouth daily      VITAMIN D, CHOLECALCIFEROL, PO Take 2,000 Units by mouth daily           Allergies   Allergies   Allergen Reactions     Depo-Provera [Medroxyprogesterone] Swelling     Swelled up, headaches

## 2019-03-18 NOTE — PLAN OF CARE
Patient discharged at 2:29 PM via wheel chair accompanied by spouse and staff. Prescriptions sent with patient to fill . All belongings sent with patient.     Discharge instructions reviewed with pt and . Listed belongings gathered and returned to patient. AVS reviewed and paperwork signed. Id bands matched with babe and paperwork     Patient discharged to home.     Core Measures and Vaccines  Core Measures applicable during stay:none  Pneumonia and Influenza given prior to discharge, if indicated: N/A    Surgical Patient   Surgical Procedures during stay: yes  Did patient receive discharge instruction on wound care and recognition of infection symptoms? Yes    MISC  Follow up appointment made:  Yes  Home and hospital aquired medications returned to patient: N/A  Patient reports pain was well managed at discharge: yes

## 2019-03-18 NOTE — PLAN OF CARE
Obtained report on patient from Jyoti BRENNER RN and assumed care of patient at this time.  Patient doing well.  Resting in bed with eyes closed, breathing easy!

## 2019-03-18 NOTE — PROGRESS NOTES
[facility]    Obstetrics Post-Op / Progress Note    Assessment & Plan   Assessment:  -3 Days Post-Op  Procedure(s):   SECTION   Anemia due to acute blood loss from , not complicated    Doing well.  Clean wound without signs of infection.  Normal healing wound.  No immediate surgical complications identified.  No excessive bleeding  Pain well-controlled.  Wants to go home    Plan:  Ambulation encouraged  Breast feeding strategies discussed  Monitor wound for signs of infection  Pain control measures as needed  Reportable signs and symptoms dicussed with the patient  Discharge later today    Devin Ya     Interval History   Doing well.  Pain is well-controlled.  No fevers.  No history of wound drainage, warmth or significant erythema.  Good appetite.  Denies chest pain, shortness of breath, nausea or vomiting.  Ambulatory.  Breastfeeding well. Wants to go home.    Medications     - MEDICATION INSTRUCTIONS -       NO Rho (D) immune globulin (RhoGam) needed - mother Rh POSITIVE       oxytocin in 0.9% NaCl         acetaminophen  975 mg Oral Q8H     docusate sodium  100 mg Oral BID     PNV PRENATAL PLUS MULTIVITAMIN  1 tablet Oral Daily     vitamin D3  2,000 Units Oral Daily       Physical Exam   Temp: 97.2  F (36.2  C) Temp src: Oral BP: 106/67 Pulse: 66 Heart Rate: 63 Resp: 16 SpO2: 95 % O2 Device: None (Room air)    There were no vitals filed for this visit.  Vital Signs with Ranges  Temp:  [97.2  F (36.2  C)-98  F (36.7  C)] 97.2  F (36.2  C)  Pulse:  [66] 66  Heart Rate:  [63-66] 63  Resp:  [16-18] 16  BP: ()/(63-67) 106/67  SpO2:  [95 %-97 %] 95 %  No intake/output data recorded.    Uterine fundus is firm, non-tender and at the level of the umbilicus  Incision C/D/I  Extremities Non-tender  Heart is regular rate and rhythm and lungs clear to auscultation  Reflexes are 1+, equal, symmetric, without clonus  Urine output is good and clear    Data   Recent Labs   Lab Test  03/15/19  1834   ABO O   RH Pos   AS Neg     Recent Labs   Lab Test 03/16/19  0648 03/15/19  1834   HGB 10.1* 13.7     Recent Labs   Lab Test 08/13/18  1007   RUQIGG 23

## 2019-03-19 LAB
COPATH REPORT: NORMAL
T PALLIDUM AB SER QL: NONREACTIVE

## 2019-03-21 ENCOUNTER — HOSPITAL ENCOUNTER (OUTPATIENT)
Dept: OBGYN | Facility: HOSPITAL | Age: 34
Discharge: HOME | End: 2019-03-21
Attending: OBSTETRICS & GYNECOLOGY | Admitting: OBSTETRICS & GYNECOLOGY
Payer: COMMERCIAL

## 2019-03-21 ENCOUNTER — OFFICE VISIT (OUTPATIENT)
Dept: OBGYN | Facility: OTHER | Age: 34
End: 2019-03-21
Attending: OBSTETRICS & GYNECOLOGY
Payer: COMMERCIAL

## 2019-03-21 VITALS
SYSTOLIC BLOOD PRESSURE: 110 MMHG | DIASTOLIC BLOOD PRESSURE: 78 MMHG | HEIGHT: 63 IN | BODY MASS INDEX: 31.53 KG/M2 | TEMPERATURE: 97.7 F | HEART RATE: 74 BPM | OXYGEN SATURATION: 98 %

## 2019-03-21 DIAGNOSIS — D62 ANEMIA DUE TO BLOOD LOSS, ACUTE: ICD-10-CM

## 2019-03-21 PROCEDURE — G0463 HOSPITAL OUTPT CLINIC VISIT: HCPCS

## 2019-03-21 PROCEDURE — 99024 POSTOP FOLLOW-UP VISIT: CPT | Performed by: OBSTETRICS & GYNECOLOGY

## 2019-03-21 ASSESSMENT — ANXIETY QUESTIONNAIRES
2. NOT BEING ABLE TO STOP OR CONTROL WORRYING: NOT AT ALL
5. BEING SO RESTLESS THAT IT IS HARD TO SIT STILL: NOT AT ALL
1. FEELING NERVOUS, ANXIOUS, OR ON EDGE: NOT AT ALL
3. WORRYING TOO MUCH ABOUT DIFFERENT THINGS: NOT AT ALL

## 2019-03-21 ASSESSMENT — PATIENT HEALTH QUESTIONNAIRE - PHQ9: 5. POOR APPETITE OR OVEREATING: NOT AT ALL

## 2019-03-21 ASSESSMENT — PAIN SCALES - GENERAL: PAINLEVEL: NO PAIN (0)

## 2019-03-21 NOTE — PATIENT INSTRUCTIONS
FEEDING   Feeding Time:  12:14 for 21 minutes  Position: left breast, right breast, cradle  Effort to Latch: awake and alert, latched easily  Duration of Breast Feeding: Right Breast: 13; Left Breast: 8  Results: excellent breast feed    Volume of Intake:    Birth Weight: 7lb 2.3oz    Discharge from Hospital after delivery weight: 6lb 14.9oz   TODAY 3/21/2019    Pre-Weight: 6lb 14.8oz with diaper    Post-Weight: 6lb 15.3oz    Total Intake: 0.5oz  Output: No output during visit    LATCH Score:   Latch: 1 - Repeated Attempts  Audible Swallowin - Spontaneous & frequent  Type of Nipple: (Breast/Nipple) 2 - Everted  Comfort: 2 - Soft, Nontender  Hold: 1 - Min. Assist   Total LATCH Score: 8    FEEDING PLAN    Home Feeding Plan: Continue to feed on demand when  elicits feeding cues with deep latch.  Exclusivity explained and encouraged in the early weeks to establish breastfeeding and order in milk supply.  Continue to apply expressed breast milk and Lanolin cream to nipples after feedings for healing and comfort.  Postpartum breastfeeding assessment completed and education provided.  Items included in the education are:     proper positioning and latch    effectiveness of feeding    manual expression    handling and storing breastmilk    maintenance of breastfeeding for the first 6 months    sign/symptoms of infant feeding issues requiring referral to qualified health care provider    LACTATION COMMENTS   Deep latch explained for proper positioning of breast in infant's mouth, maximizing milk transfer and comfort.  Hand expression reviewed and return demonstration observed with mature milk present.  Reassurance and encouragement provided in regard to mom's concerns about milk supply.  Follow-up support information provided.  Parents plan to keep  Well-Child Check with Dr. Pimentel on 3/25/19 for further support and monitoring.

## 2019-03-21 NOTE — LACTATION NOTE
Follow-up Lactation Consultation    Barbi Vale                                                                                                   [id number]      Consultation Date: 2019     Reason for Lactation Referral: weight and latch check    Baby's : 3/15/19    Primary Care Provider: Mom-Dr. Cantor; Baby Callie-Dr. Pimentel    History of Present Illness: Baby girl Callie presents with her parents. Barbi states Callie is nursing frequently, at least 8-12 times a day, for 15-30 minutes each feeding. And is having 6+ wet and 4+ stool diapers. Her stools are yellow in color. Barbi states her milk came in yesterday. She is latching well and deep. Denies pain, or blisters, or bleeding of nipples.     MATERNAL HISTORY   History of Breast Surgery: No  Breast Changes During Pregnancy: Yes  Breast Feeding History: 1st baby  Maternal Meds: daily prenatal vitamin, ibuprofen, fish oil, Vit D, Vit B    MATERNAL ASSESSMENT    Breast Size: average, symmetrical, soft after feeding and filling prior to feeding  Nipple Appearance - Left: intact  Nipple Appearance - Right: intact  Nipple Erectility - Left: erect with stimulation  Nipple Erectility - Right: erect with stimulation  Areolas Compressibility: soft  Nipple Size: average  Milk Supply: mature    INFANT ASSESSMENT    Oral Anatomy  Mouth: normal  Palate: normal  Jaw: normal  Tongue: normal  Frenulum: normal   Digital Suck Exam: root    FEEDING   Feeding Time:  12:14 for 21 minutes  Position: left breast, right breast, cradle  Effort to Latch: awake and alert, latched easily  Duration of Breast Feeding: Right Breast: 13; Left Breast: 8  Results: good breast feed    Volume of Intake:    Birth Weight: 7lb 2.3oz    Discharge from Hospital after delivery weight: 6lb 14.9oz   TODAY 3/21/2019    Pre-Weight: 6lb 14.8oz with diaper    Post-Weight: 6lb 15.3oz    Total Intake: 0.5oz  Output: No output during visit    LATCH Score:   Latch: 1 - Repeated  Attempts  Audible Swallowin - Spontaneous & frequent  Type of Nipple: (Breast/Nipple) 2 - Everted  Comfort: 2 - Soft, Nontender  Hold: 1 - Min. Assist   Total LATCH Score: 8    FEEDING PLAN    Home Feeding Plan: Continue to feed on demand when  elicits feeding cues with deep latch.  Exclusivity explained and encouraged in the early weeks to establish breastfeeding and order in milk supply.  Continue to apply expressed breast milk and Lanolin cream to nipples after feedings for healing and comfort.  Postpartum breastfeeding assessment completed and education provided.  Items included in the education are:     proper positioning and latch    effectiveness of feeding    manual expression    handling and storing breastmilk    maintenance of breastfeeding for the first 6 months    sign/symptoms of infant feeding issues requiring referral to qualified health care provider    LACTATION COMMENTS   Deep latch explained for proper positioning of breast in infant's mouth, maximizing milk transfer and comfort.  Hand expression reviewed and return demonstration observed with mature milk present.  Reassurance and encouragement provided in regard to mom's concerns about milk supply.  Follow-up support information provided.  Parents plan to keep  Well-Child Check with Dr. Pimentel on 3/25/19 for further support and monitoring.      Face-to-face Time: 90 minutes with assessment and education.    ASUNCION STOCK RN, IBCLC  3/21/2019  11:38 AM

## 2019-03-21 NOTE — NURSING NOTE
"Chief Complaint   Patient presents with     Post-op Visit     staple removal/ c/s on 3/15/19 Girl-       Initial /78 (BP Location: Left arm, Cuff Size: Adult Regular)   Pulse 74   Temp 97.7  F (36.5  C) (Tympanic)   Ht 1.6 m (5' 3\")   LMP 05/31/2018   SpO2 98%   BMI 31.53 kg/m   Estimated body mass index is 31.53 kg/m  as calculated from the following:    Height as of this encounter: 1.6 m (5' 3\").    Weight as of 3/15/19: 80.7 kg (178 lb).  Medication Reconciliation: complete    Taylor Foster LPN  "

## 2019-03-21 NOTE — LACTATION NOTE
Follow-up Lactation Consultation    Barbi Vale                                                                                                   [id number]      Consultation Date: 2019     Reason for Lactation Referral: weight and latch check    Baby's : 3/15/19    Primary Care Provider: Mom-Dr. Cantor; Baby Girl Callie-Dr. Pimentel    History of Present Illness: Baby yumiko Ledesma presents with her parents. Barbi states Callie is nursing frequently, at least 8-12 times a day for 15-30 minutes. And is having 6+ wet and 4+ stool diapers. Her stools are yellow in color. Barbi states her milk came in yesterday. She is latching well and deep. Denies pain, or blisters, or bleeding of nipples.     MATERNAL HISTORY   History of Breast Surgery: No  Breast Changes During Pregnancy: Yes  Breast Feeding History: 1st baby  Maternal Meds: daily prenatal vitamin, ibuprofen, fish oil, Vit D, Vit B    MATERNAL ASSESSMENT    Breast Size: average, symmetrical, soft after feeding and filling prior to feeding  Nipple Appearance - Left: intact  Nipple Appearance - Right: intact  Nipple Erectility - Left: erect with stimulation  Nipple Erectility - Right: erect with stimulation  Areolas Compressibility: soft  Nipple Size: average  Milk Supply: mature    INFANT ASSESSMENT    Oral Anatomy  Mouth: normal  Palate: normal  Jaw: normal  Tongue: normal  Frenulum: normal   Digital Suck Exam: root    FEEDING   Feeding Time:  12:14 for 21 minutes  Position: left breast, right breast, cradle  Effort to Latch: awake and alert, latched easily  Duration of Breast Feeding: Right Breast: 13; Left Breast: 8  Results: good breast feed    Volume of Intake:    Birth Weight: 7lb 2.3oz    Discharge from Hospital after delivery weight: 6lb 14.9oz   TODAY 3/21/2019    Pre-Weight: 6lb 14.8oz with diaper    Post-Weight: 6lb 15.3oz    Total Intake: 0.5oz (baby was sleepy, and not super interested in breastfeeding)  Output: No output during  visit    LATCH Score:   Latch: 1 - Repeated Attempts  Audible Swallowin - Spontaneous & frequent  Type of Nipple: (Breast/Nipple) 2 - Everted  Comfort: 2 - Soft, Nontender  Hold: 1 - Min. Assist   Total LATCH Score: 8    FEEDING PLAN    Home Feeding Plan: Continue to feed on demand when  elicits feeding cues with deep latch.  Exclusivity explained and encouraged in the early weeks to establish breastfeeding and order in milk supply.  Continue to apply expressed breast milk and Lanolin cream to nipples after feedings for healing and comfort.  Postpartum breastfeeding assessment completed and education provided.  Items included in the education are:     proper positioning and latch    effectiveness of feeding    manual expression    handling and storing breastmilk    maintenance of breastfeeding for the first 6 months    sign/symptoms of infant feeding issues requiring referral to qualified health care provider    LACTATION COMMENTS   Deep latch explained for proper positioning of breast in infant's mouth, maximizing milk transfer and comfort.  Hand expression reviewed and return demonstration observed with mature milk present.  Reassurance and encouragement provided in regard to mom's concerns about milk supply.  Follow-up support information provided.  Parents plan to keep  Well-Child Check with Dr. Pimentel on 3/25/19 for further support and monitoring.      Face-to-face Time: 90 minutes with assessment and education.    ASUNCION STOCK RN, IBCLC  3/21/2019  11:38 AM

## 2019-03-21 NOTE — PROGRESS NOTES
"NATALIE Vale is a 33 year old female presents for post operative check. She is  1  week(s) status post .  She reports doing well and denies significant pain or bleeding.  Bowel and bladder function is satisfactory. Denies incisional problems.    O.  Blood pressure 110/78, pulse 74, temperature 97.7  F (36.5  C), temperature source Tympanic, height 1.6 m (5' 3\"), last menstrual period 2018, SpO2 98 %, currently breastfeeding.    Abd: soft, non-tender, non-distended. Incision clear, dry, and intact without evidence of infection.    A. /P. Satisfactory post-op check. Staples removed.   F/u 6 week PP exam or sooner prn if problems.  Remove steri strips 2 weeks.        Pedro Pablo Cantor  "

## 2019-04-02 ENCOUNTER — PRENATAL OFFICE VISIT (OUTPATIENT)
Dept: OBGYN | Facility: OTHER | Age: 34
End: 2019-04-02
Attending: NURSE PRACTITIONER
Payer: COMMERCIAL

## 2019-04-02 VITALS
HEIGHT: 63 IN | OXYGEN SATURATION: 96 % | SYSTOLIC BLOOD PRESSURE: 120 MMHG | TEMPERATURE: 98 F | DIASTOLIC BLOOD PRESSURE: 60 MMHG | HEART RATE: 92 BPM | BODY MASS INDEX: 31.53 KG/M2

## 2019-04-02 PROCEDURE — 99207 ZZC NO CHARGE LOS: CPT | Performed by: NURSE PRACTITIONER

## 2019-04-02 ASSESSMENT — PAIN SCALES - GENERAL: PAINLEVEL: NO PAIN (0)

## 2019-04-02 NOTE — NURSING NOTE
"Chief Complaint   Patient presents with     Post Partum Exam     2 week, Breast feeding.       Initial /60 (BP Location: Left arm, Patient Position: Chair, Cuff Size: Adult Regular)   Pulse 92   Temp 98  F (36.7  C) (Tympanic)   Ht 1.6 m (5' 3\")   LMP 05/31/2018   SpO2 96%   BMI 31.53 kg/m   Estimated body mass index is 31.53 kg/m  as calculated from the following:    Height as of this encounter: 1.6 m (5' 3\").    Weight as of 3/15/19: 80.7 kg (178 lb).  Medication Reconciliation: complete    ANABEL MADDOX LPN    "

## 2019-04-02 NOTE — PATIENT INSTRUCTIONS
"BREASTFEEDING TIPS  1. Breastfeed every 2-4 hours. If your baby is sleepy - use breast compression, push on chin to \"start up\" baby, switch breasts, undress to diaper and wake before relatching.   Some babies \"cluster\" feed every 1 hour for a while- this is normal. Feed your baby whenever he/she is awake-  even if every hour for a while. This frequent feeding will help you make more milk and encourage your baby to sleep for longer stretches later in the evening or night.    - Position your baby close to you with pillows so he/she is facing you -belly to belly laying horizontally across your lap at the level of your breast and looking a bit \"upwards\" to your breast   -One hand holds the baby's neck behind the ears and the other hand holds your breast  -Baby's nose should start out pointing to your nipple before latching  - Hold your breast in a \"sandwich\" position by gently squeezing your breast in an oval shape and make sure your hands are not covering the areola  This \"nipple sandwich\" will make it easier for your breast to fit inside the baby's mouth-making latching more comfortable for you and baby and preventing sore nipples. Your baby should take a \"mouthful\" of breast!  - You may want to use hand expression to \"prime the pump\" and get a drip of milk out on your nipple to wake baby   (see website: newborns.[employer].edu/Breastfeeding/HandExpression.html)  - Swipe your nipple on baby's upper lip and wait for a BIG open mouth  - YOU bring baby to the breast (hold baby's neck with your fingers just below the ears) and bring baby's head to the breast--leading with the chin.  Try to avoid pushing your breast into baby's mouth- bring baby to you instead!  - Aim to get your baby's bottom lip LOW DOWN ON AREOLA (baby's upper lip just needs to \"clear\" the nipple) .   Your baby should latch onto the areola and NOT just the nipple. That way your baby gets more milk and you don't get sore nipples!      Useful web " sites:  Www.infantrisk.com  Www.aap.org  Www.ibreastfeeding.com  Www.health.[facility].[place].us

## 2019-04-04 NOTE — PROGRESS NOTES
"SUBJECTIVE:  Barbi Vale is a 33 year old female P1 here for a 2 week postpartum visit.  She had a  Section  on 3/15/19 delivering a healthy baby girl at term.      Today's Depression Rating was No Value exists for the : [id number]    delivery complications:  CS for non reassuring fetal status  breast feeding:  Yes,   bladder problems:  No  bowel problems/hemorrhoids:  No  episiotomy/laceration/incision healed? Clean, dry, and well approximated  vaginal flow:  scant  [place]:  No  emotional adjustment:  doing well, happy and tired      OBJECTIVE:  Blood pressure 120/60, pulse 92, temperature 98  F (36.7  C), temperature source Tympanic, height 1.6 m (5' 3\"), last menstrual period 2018, SpO2 96 %, currently breastfeeding.   General - pleasant female in no acute distress.      ASSESSMENT:  Normal 2 week postpartum exam    PLAN:  Normal 2 week postpartum check  Continue restrictions.  Breastfeeding discussion and anticipatory guidanace.   "

## 2019-04-08 ENCOUNTER — HOSPITAL ENCOUNTER (EMERGENCY)
Facility: HOSPITAL | Age: 34
Discharge: HOME | End: 2019-04-08
Attending: NURSE PRACTITIONER | Admitting: NURSE PRACTITIONER
Payer: COMMERCIAL

## 2019-04-08 VITALS
HEART RATE: 82 BPM | OXYGEN SATURATION: 99 % | SYSTOLIC BLOOD PRESSURE: 121 MMHG | TEMPERATURE: 100.4 F | RESPIRATION RATE: 18 BRPM | DIASTOLIC BLOOD PRESSURE: 77 MMHG

## 2019-04-08 DIAGNOSIS — N61.0 ACUTE MASTITIS OF RIGHT BREAST: ICD-10-CM

## 2019-04-08 LAB
BASOPHILS # BLD AUTO: 0 10E9/L (ref 0–0.2)
BASOPHILS NFR BLD AUTO: 0.3 %
DIFFERENTIAL METHOD BLD: NORMAL
EOSINOPHIL # BLD AUTO: 0.1 10E9/L (ref 0–0.7)
EOSINOPHIL NFR BLD AUTO: 0.9 %
ERYTHROCYTE [DISTWIDTH] IN BLOOD BY AUTOMATED COUNT: 12 % (ref 10–15)
HCT VFR BLD AUTO: 39.3 % (ref 35–47)
HGB BLD-MCNC: 13.1 G/DL (ref 11.7–15.7)
IMM GRANULOCYTES # BLD: 0 10E9/L (ref 0–0.4)
IMM GRANULOCYTES NFR BLD: 0.1 %
LYMPHOCYTES # BLD AUTO: 1 10E9/L (ref 0.8–5.3)
LYMPHOCYTES NFR BLD AUTO: 13 %
MCH RBC QN AUTO: 32 PG (ref 26.5–33)
MCHC RBC AUTO-ENTMCNC: 33.3 G/DL (ref 31.5–36.5)
MCV RBC AUTO: 96 FL (ref 78–100)
MONOCYTES # BLD AUTO: 0.5 10E9/L (ref 0–1.3)
MONOCYTES NFR BLD AUTO: 6.7 %
NEUTROPHILS # BLD AUTO: 5.9 10E9/L (ref 1.6–8.3)
NEUTROPHILS NFR BLD AUTO: 79 %
NRBC # BLD AUTO: 0 10*3/UL
NRBC BLD AUTO-RTO: 0 /100
PLATELET # BLD AUTO: 252 10E9/L (ref 150–450)
RBC # BLD AUTO: 4.09 10E12/L (ref 3.8–5.2)
WBC # BLD AUTO: 7.5 10E9/L (ref 4–11)

## 2019-04-08 PROCEDURE — 99213 OFFICE O/P EST LOW 20 MIN: CPT | Mod: Z6 | Performed by: NURSE PRACTITIONER

## 2019-04-08 PROCEDURE — 36415 COLL VENOUS BLD VENIPUNCTURE: CPT | Performed by: NURSE PRACTITIONER

## 2019-04-08 PROCEDURE — 85025 COMPLETE CBC W/AUTO DIFF WBC: CPT | Performed by: NURSE PRACTITIONER

## 2019-04-08 PROCEDURE — G0463 HOSPITAL OUTPT CLINIC VISIT: HCPCS

## 2019-04-08 RX ORDER — CEPHALEXIN 500 MG/1
500 CAPSULE ORAL 4 TIMES DAILY
Qty: 28 CAPSULE | Refills: 0 | Status: SHIPPED | OUTPATIENT
Start: 2019-04-08 | End: 2019-04-16

## 2019-04-08 ASSESSMENT — ENCOUNTER SYMPTOMS
STRIDOR: 0
SHORTNESS OF BREATH: 0
DIARRHEA: 0
NECK PAIN: 0
WHEEZING: 0
TROUBLE SWALLOWING: 0
APPETITE CHANGE: 0
CHILLS: 1
NECK STIFFNESS: 0
VOMITING: 0
DYSURIA: 0
COUGH: 0
PSYCHIATRIC NEGATIVE: 1
ACTIVITY CHANGE: 0
WEAKNESS: 0
FEVER: 1

## 2019-04-08 NOTE — DISCHARGE INSTRUCTIONS
Take antibiotics as ordered.   Eat a yogurt daily while taking antibiotics.   Watch for increased signs of infection in breast.   Continue to breast feed your baby.   Increase water intake.   Follow up with PCP in 2 days if symptoms are not improving.   Return to urgent care or emergency department with any increase in symptoms or concerns.

## 2019-04-08 NOTE — ED AVS SNAPSHOT
[facility]  [address]  [place] [address]  Phone:  [phone]                                    Barbi Vale   MRN: [id number]    Department:  [facility]   Date of Visit:  4/8/2019           After Visit Summary Signature Page    I have received my discharge instructions, and my questions have been answered. I have discussed any challenges I see with this plan with the nurse or doctor.    ..........................................................................................................................................  Patient/Patient Representative Signature      ..........................................................................................................................................  Patient Representative Print Name and Relationship to Patient    ..................................................               ................................................  Date                                   Time    ..........................................................................................................................................  Reviewed by Signature/Title    ...................................................              ..............................................  Date                                               Time          22EPIC Rev 08/18
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
## 2019-04-13 ASSESSMENT — ENCOUNTER SYMPTOMS: HEADACHES: 1

## 2019-04-16 ENCOUNTER — HOSPITAL ENCOUNTER (EMERGENCY)
Facility: HOSPITAL | Age: 34
Discharge: HOME | End: 2019-04-16
Attending: NURSE PRACTITIONER | Admitting: NURSE PRACTITIONER
Payer: COMMERCIAL

## 2019-04-16 VITALS
RESPIRATION RATE: 12 BRPM | TEMPERATURE: 97.9 F | OXYGEN SATURATION: 97 % | BODY MASS INDEX: 26.57 KG/M2 | SYSTOLIC BLOOD PRESSURE: 106 MMHG | DIASTOLIC BLOOD PRESSURE: 67 MMHG | WEIGHT: 150 LBS

## 2019-04-16 DIAGNOSIS — N61.0 ACUTE MASTITIS OF LEFT BREAST: ICD-10-CM

## 2019-04-16 PROCEDURE — G0463 HOSPITAL OUTPT CLINIC VISIT: HCPCS

## 2019-04-16 PROCEDURE — 25000132 ZZH RX MED GY IP 250 OP 250 PS 637: Performed by: NURSE PRACTITIONER

## 2019-04-16 PROCEDURE — 99213 OFFICE O/P EST LOW 20 MIN: CPT | Mod: Z6 | Performed by: NURSE PRACTITIONER

## 2019-04-16 PROCEDURE — 87070 CULTURE OTHR SPECIMN AEROBIC: CPT | Performed by: NURSE PRACTITIONER

## 2019-04-16 RX ORDER — FLUCONAZOLE 100 MG/1
200 TABLET ORAL DAILY
Qty: 28 TABLET | Refills: 0 | Status: SHIPPED | OUTPATIENT
Start: 2019-04-16 | End: 2019-04-21

## 2019-04-16 RX ORDER — FLUCONAZOLE 100 MG/1
400 TABLET ORAL ONCE
Status: COMPLETED | OUTPATIENT
Start: 2019-04-16 | End: 2019-04-16

## 2019-04-16 RX ORDER — IBUPROFEN 800 MG/1
800 TABLET, FILM COATED ORAL ONCE
Status: COMPLETED | OUTPATIENT
Start: 2019-04-16 | End: 2019-04-16

## 2019-04-16 RX ADMIN — IBUPROFEN 800 MG: 800 TABLET ORAL at 14:47

## 2019-04-16 RX ADMIN — FLUCONAZOLE 400 MG: 100 TABLET ORAL at 14:47

## 2019-04-16 ASSESSMENT — ENCOUNTER SYMPTOMS
FEVER: 0
HEADACHES: 1
MYALGIAS: 0
ARTHRALGIAS: 0
APPETITE CHANGE: 0
CHILLS: 0
FATIGUE: 0

## 2019-04-16 NOTE — ED AVS SNAPSHOT
[facility]  [address]  [place] [address]  Phone:  [phone]                                    Barbi Vale   MRN: [id number]    Department:  [facility]   Date of Visit:  4/16/2019           After Visit Summary Signature Page    I have received my discharge instructions, and my questions have been answered. I have discussed any challenges I see with this plan with the nurse or doctor.    ..........................................................................................................................................  Patient/Patient Representative Signature      ..........................................................................................................................................  Patient Representative Print Name and Relationship to Patient    ..................................................               ................................................  Date                                   Time    ..........................................................................................................................................  Reviewed by Signature/Title    ...................................................              ..............................................  Date                                               Time          22EPIC Rev 08/18
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
## 2019-04-16 NOTE — DISCHARGE INSTRUCTIONS
Will follow culture, if bacteria grows out, will start on antibiotic. Call if symptoms worsen or fail to improve in 1-2 days.

## 2019-04-16 NOTE — ED TRIAGE NOTES
Pt stated her left breast hurts now. Right breast mastitis last week. Denies any fevers. Pt has an appointment on Tuesday with an lactation consultant. Pt states baby has thrush.

## 2019-04-17 RX ORDER — CLINDAMYCIN HCL 300 MG
300 CAPSULE ORAL 4 TIMES DAILY
Qty: 40 CAPSULE | Refills: 0 | Status: SHIPPED | OUTPATIENT
Start: 2019-04-17 | End: 2019-04-21

## 2019-04-17 NOTE — RESULT ENCOUNTER NOTE
Result discussed with Dr. Ruvalcaba, patient on clindamycin.  No need for further treatment per Dr. ruvalcaba.

## 2019-04-18 NOTE — PROGRESS NOTES
Late entry:  Left message with Dr Pimentel's nurse and on her voice mail, have not heard back from her yet.    Talked with mom yesterday afternoon, she has increasing redness to inferior left breast.  She continues to nurse, no fevers but has been taking tylenol for pain.  Breast milk culture shows coagulase neg staph.  Will start her on Clindamycin.  She will call today to schedule a follow up appointment with Dr Pimentel.  She will return to ED with worsening of symptoms or new concerns.   Sita MCKEON  12:05 PM  April 18, 2019

## 2019-04-19 LAB
BACTERIA SPEC CULT: NORMAL
SPECIMEN SOURCE: NORMAL

## 2019-04-21 ENCOUNTER — HOSPITAL ENCOUNTER (EMERGENCY)
Facility: HOSPITAL | Age: 34
Discharge: HOME | End: 2019-04-21
Attending: INTERNAL MEDICINE | Admitting: INTERNAL MEDICINE
Payer: COMMERCIAL

## 2019-04-21 ENCOUNTER — HOSPITAL ENCOUNTER (EMERGENCY)
Facility: HOSPITAL | Age: 34
Discharge: HOME | End: 2019-04-21
Attending: NURSE PRACTITIONER | Admitting: NURSE PRACTITIONER
Payer: COMMERCIAL

## 2019-04-21 VITALS
RESPIRATION RATE: 16 BRPM | SYSTOLIC BLOOD PRESSURE: 107 MMHG | DIASTOLIC BLOOD PRESSURE: 70 MMHG | OXYGEN SATURATION: 97 % | TEMPERATURE: 97.2 F

## 2019-04-21 VITALS
RESPIRATION RATE: 14 BRPM | OXYGEN SATURATION: 96 % | DIASTOLIC BLOOD PRESSURE: 79 MMHG | SYSTOLIC BLOOD PRESSURE: 126 MMHG | TEMPERATURE: 97.4 F

## 2019-04-21 DIAGNOSIS — N61.0 MASTITIS: ICD-10-CM

## 2019-04-21 DIAGNOSIS — B37.89 YEAST INFECTION OF NIPPLE, POSTPARTUM: ICD-10-CM

## 2019-04-21 PROCEDURE — 99213 OFFICE O/P EST LOW 20 MIN: CPT | Mod: Z6 | Performed by: NURSE PRACTITIONER

## 2019-04-21 PROCEDURE — G0463 HOSPITAL OUTPT CLINIC VISIT: HCPCS

## 2019-04-21 PROCEDURE — 99282 EMERGENCY DEPT VISIT SF MDM: CPT

## 2019-04-21 PROCEDURE — 99282 EMERGENCY DEPT VISIT SF MDM: CPT | Mod: Z6 | Performed by: INTERNAL MEDICINE

## 2019-04-21 RX ORDER — CLOTRIMAZOLE 1 %
CREAM (GRAM) TOPICAL 2 TIMES DAILY
Qty: 45 G | Refills: 0 | Status: SHIPPED | OUTPATIENT
Start: 2019-04-21 | End: 2019-05-24

## 2019-04-21 ASSESSMENT — ENCOUNTER SYMPTOMS
PSYCHIATRIC NEGATIVE: 1
CHILLS: 0
RESPIRATORY NEGATIVE: 1
NEUROLOGICAL NEGATIVE: 1
ALLERGIC/IMMUNOLOGIC NEGATIVE: 1
MUSCULOSKELETAL NEGATIVE: 1
EYES NEGATIVE: 1
FEVER: 0
GASTROINTESTINAL NEGATIVE: 1
HEMATOLOGIC/LYMPHATIC NEGATIVE: 1
ENDOCRINE NEGATIVE: 1
CARDIOVASCULAR NEGATIVE: 1
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
## 2019-04-21 NOTE — ED AVS SNAPSHOT
[facility]  [address]  [place] [address]  Phone:  [phone]                                    Barbi Vale   MRN: [id number]    Department:  [facility]   Date of Visit:  4/21/2019           After Visit Summary Signature Page    I have received my discharge instructions, and my questions have been answered. I have discussed any challenges I see with this plan with the nurse or doctor.    ..........................................................................................................................................  Patient/Patient Representative Signature      ..........................................................................................................................................  Patient Representative Print Name and Relationship to Patient    ..................................................               ................................................  Date                                   Time    ..........................................................................................................................................  Reviewed by Signature/Title    ...................................................              ..............................................  Date                                               Time          22EPIC Rev 08/18

## 2019-04-21 NOTE — ED AVS SNAPSHOT
[facility]  [address]  [place] [address]  Phone:  [phone]                                    Barbi Vale   MRN: [id number]    Department:  [facility]   Date of Visit:  4/21/2019           After Visit Summary Signature Page    I have received my discharge instructions, and my questions have been answered. I have discussed any challenges I see with this plan with the nurse or doctor.    ..........................................................................................................................................  Patient/Patient Representative Signature      ..........................................................................................................................................  Patient Representative Print Name and Relationship to Patient    ..................................................               ................................................  Date                                   Time    ..........................................................................................................................................  Reviewed by Signature/Title    ...................................................              ..............................................  Date                                               Time          22EPIC Rev 08/18

## 2019-04-22 ENCOUNTER — OFFICE VISIT (OUTPATIENT)
Dept: FAMILY MEDICINE | Facility: OTHER | Age: 34
End: 2019-04-22
Attending: FAMILY MEDICINE
Payer: COMMERCIAL

## 2019-04-22 VITALS
WEIGHT: 162 LBS | HEART RATE: 82 BPM | SYSTOLIC BLOOD PRESSURE: 90 MMHG | BODY MASS INDEX: 28.7 KG/M2 | TEMPERATURE: 97.4 F | OXYGEN SATURATION: 98 % | DIASTOLIC BLOOD PRESSURE: 58 MMHG | RESPIRATION RATE: 20 BRPM

## 2019-04-22 DIAGNOSIS — O92.79 CLOGGED DUCT, POSTPARTUM: ICD-10-CM

## 2019-04-22 DIAGNOSIS — N61.0 MASTITIS: Primary | ICD-10-CM

## 2019-04-22 PROCEDURE — 99213 OFFICE O/P EST LOW 20 MIN: CPT | Performed by: FAMILY MEDICINE

## 2019-04-22 ASSESSMENT — PAIN SCALES - GENERAL: PAINLEVEL: NO PAIN (0)

## 2019-04-22 NOTE — ED NOTES
Pt to the ED with c/o left breast mastitis.   has had it for 2 weeks and it started in her right breast and now in her left.  Has been seen in urgent care four times.  Last seen at 1300 today per pt.   completed atbx this am and when she saw the provider in urgent was told mastitis was gone.  Pt now c/o hardness in left breast.  Eraser size hard area noted proximal to left nipple and pt reports under that she has a large lump.  Stopped breast feeding since this am and was told by  provider she can start again tomorrow morning as babe has been fussy with mom on atbx.  Assessment is complete.  Call light given.

## 2019-04-22 NOTE — PROGRESS NOTES
SUBJECTIVE:   Barbi Vale is a 33 year old female who presents to clinic today for the following   health issues:        ED/UC Followup:    Facility:  [facility]  Date of visit: 19, 19, 19  Reason for visit: Mastitis of breasts  Current Status: Mother states just stopped antibiotic yesterday as her and baby were having a reaction. No pain, but noted a lump in left breast. States it is significant in size and just is unsure what is going on. No fevers for the last few weeks.      Great stress with infant not consolable  Did better with formula yesterday  Has gone 6 hrs without feeding, won't breast feed    Additional history: as documented    Reviewed  and updated as needed this visit by clinical staff  Tobacco  Allergies  Meds  Med Hx  Surg Hx  Fam Hx  Soc Hx        Reviewed and updated as needed this visit by Provider         Patient Active Problem List   Diagnosis     ACP (advance care planning)     Chronic pain disorder     Myofascial muscle pain     Paresthesia     Dysuria     Well woman exam with routine gynecological exam     Encounter for preconception consultation     Encounter for surveillance of contraceptives     Other chronic pain     Supervision of normal first pregnancy in first trimester     Chemical dermatitis     Encounter for triage in pregnant patient      delivery delivered     Anemia due to blood loss, acute, post --not complicated--3/15/2019     Clogged duct, postpartum     Mastitis     Past Surgical History:   Procedure Laterality Date      SECTION N/A 3/15/2019    Procedure:  SECTION;  Surgeon: Pedro Pablo Cantor MD;  Location: [facility]     FOOT SURGERY [facility]     [place], [place]       Social History     Tobacco Use     Smoking status: Never Smoker     Smokeless tobacco: Never Used   Substance Use Topics     Alcohol use: No     Family History   Problem Relation Age of Onset     Mental Illness Mother      Mental Illness Father      Mental  Illness Brother      Cerebrovascular Disease Maternal Grandmother      Cerebrovascular Disease Maternal Grandfather      Aneurysm Maternal Grandfather      Other - See Comments Paternal Grandmother 86        old age     Kidney Disease Paternal Grandfather         on dialysis         Current Outpatient Medications   Medication Sig Dispense Refill     B Complex Vitamins (VITAMIN B COMPLEX PO) Take by mouth daily       clotrimazole (LOTRIMIN) 1 % external cream Apply topically 2 times daily for 15 days 45 g 0     fish oil-omega-3 fatty acids 1000 MG capsule Take 1,000 mg by mouth daily       Prenatal Vit-Fe Fumarate-FA (PRENATAL MULTIVITAMIN PLUS IRON) 27-0.8 MG TABS per tablet Take 1 tablet by mouth daily       VITAMIN D, CHOLECALCIFEROL, PO Take 2,000 Units by mouth daily       Allergies   Allergen Reactions     Depo-Provera [Medroxyprogesterone] Swelling     Swelled up, headaches     BP Readings from Last 3 Encounters:   04/22/19 90/58   04/21/19 126/79   04/21/19 107/70    Wt Readings from Last 3 Encounters:   04/22/19 73.5 kg (162 lb)   04/16/19 68 kg (150 lb)   03/15/19 80.7 kg (178 lb)                  Labs reviewed in EPIC    ROS:  Constitutional, neuro, ENT, endocrine, pulmonary, cardiac, gastrointestinal, genitourinary, musculoskeletal, integument and psychiatric systems are negative, except as otherwise noted.    OBJECTIVE:                                                    BP 90/58 (BP Location: Left arm, Patient Position: Chair, Cuff Size: Adult Regular)   Pulse 82   Temp 97.4  F (36.3  C) (Tympanic)   Resp 20   Wt 73.5 kg (162 lb)   LMP 05/31/2018   SpO2 98%   BMI 28.70 kg/m    Body mass index is 28.7 kg/m .  GENERAL APPEARANCE: healthy, alert and no distress  BREAST: normal without masses, tenderness or nipple discharge, mass left breast upper outer and tenderness left upper outer  PSYCH: mentation appears normal and affect normal/bright       ASSESSMENT/PLAN:                                                     1. Mastitis  resolving    2. Clogged duct, postpartum  Trial pumping, cabbage leaves, heat, massage    Patient was agreeable to this plan and had no further questions.  See Patient Instructions    Caprice Pimentel MD  [facility] [place]    \

## 2019-04-22 NOTE — ED NOTES
Discharge papers given to pt.  Explained discharge instructions three times to pt as well as witnessed MD explain what to do.  Denies pain.  Declines last set of VS.

## 2019-04-22 NOTE — NURSING NOTE
"Chief Complaint   Patient presents with     ER F/U       Initial BP 90/58 (BP Location: Left arm, Patient Position: Chair, Cuff Size: Adult Regular)   Pulse 82   Temp 97.4  F (36.3  C) (Tympanic)   Resp 20   Wt 73.5 kg (162 lb)   LMP 05/31/2018   SpO2 98%   BMI 28.70 kg/m   Estimated body mass index is 28.7 kg/m  as calculated from the following:    Height as of 4/2/19: 1.6 m (5' 3\").    Weight as of this encounter: 73.5 kg (162 lb).  Medication Reconciliation: complete    Geeta Birmingham LPN    "

## 2019-04-23 ENCOUNTER — TELEPHONE (OUTPATIENT)
Dept: FAMILY MEDICINE | Facility: OTHER | Age: 34
End: 2019-04-23

## 2019-04-23 ENCOUNTER — HOSPITAL ENCOUNTER (OUTPATIENT)
Dept: OBGYN | Facility: HOSPITAL | Age: 34
Discharge: HOME | End: 2019-04-23
Attending: OBSTETRICS & GYNECOLOGY | Admitting: OBSTETRICS & GYNECOLOGY
Payer: COMMERCIAL

## 2019-04-23 PROCEDURE — G0463 HOSPITAL OUTPT CLINIC VISIT: HCPCS

## 2019-04-23 ASSESSMENT — ENCOUNTER SYMPTOMS
ABDOMINAL PAIN: 0
EYE REDNESS: 0
HEADACHES: 0
DIFFICULTY URINATING: 0
CONFUSION: 0
COLOR CHANGE: 0
FEVER: 0
ARTHRALGIAS: 0
SHORTNESS OF BREATH: 0
NECK STIFFNESS: 0

## 2019-04-23 NOTE — TELEPHONE ENCOUNTER
9:23 AM    Reason for Call: Phone Call    Description: Patient states  Has paperwork that needs to be faxed to [phone].    Was an appointment offered for this call? No  If yes : Appointment type              Date    Preferred method for responding to this message: Telephone Call  What is your phone number ?    If we cannot reach you directly, may we leave a detailed response at the number you provided? Yes    Can this message wait until your PCP/provider returns, if available today? YES    Citlali Tariq LPN

## 2019-04-23 NOTE — TELEPHONE ENCOUNTER
Pt called stating she needs a letter for her place of employment excusing her from work for the dates of 5/8/19-5/31/19.  She states her daughter is struggling with reflux and she has been dealing with Mastitis and will not be able to return to work as planned.  Pt states she and her daughter were recently seen by Dr. Pimentel for these issues.  ANABEL MADDOX

## 2019-04-23 NOTE — LACTATION NOTE
"Follow-up Lactation Consultation    Barbi Vale                                                                                                   [phone]      Consultation Date: 2019     Reason for Lactation Referral: Discuss how to treat a plugged duct    Baby's : 3/15/19    Primary Care Provider: Barbi-Dr. Cantor, Baby girl Callie-Dr. Pimentel    History of Present Illness: 5 1/2 week old Callie present with her mom, Barbi. Barbi gave a long history of dealing with a \"colicky\" baby, and the thing that seemed to calm her down was switching to formula ([place] Gentle with probiotics) for awhile. Barbi states she believes the antibiotics she's been on have been affecting baby. She would like to continue to breastfeed, but wants to wait for meds to clear her system and then start re-introducing breastmilk. Barbi states she's had mastitis, and now has another plugged duct and would like to know how to get it to release. She denies symptoms of infection. Callie was seen recently and she's growing normally. She recently was started on probiotics with feedings to help with fussiness. Barbi is already on a gluten-free and lactose-free diet.    MATERNAL HISTORY   History of Breast Surgery: No  Breast Changes During Pregnancy: Yes  Breast Feeding History: 1st baby  Maternal Meds: daily prenatal vitamin, Vitamin B complex, VItamin D, Fish oil, Kefir, Lotrimin cream for thrush    FEEDING PLAN    Home Feeding Plan:  Feed formula and start adding breast milk in increments.  Continue to apply expressed breast milk and Lanolin cream to nipples after feedings for healing and comfort.  Discussed techniques to do to try to release plugged duct. Discussed warmth, massage, feeding, pumping or hand expressing. Ibuprofen, using cool compresses after, or cabbage leaves.  Discussed positions to hold Callie when she seems uncomfortable.  Discussed positions to help with reflux.  Items included in the " education are:     proper positioning and latch    effectiveness of feeding    manual expression    handling and storing breastmilk    maintenance of breastfeeding for the first 6 months    sign/symptoms of infant feeding issues requiring referral to qualified health care provider    LACTATION COMMENTS   Deep latch explained for proper positioning of breast in infant's mouth, maximizing milk transfer and comfort.  Reassurance and encouragement provided in regard to mom's concerns about milk supply.  Follow-up support information provided.  Parents plan to keep [facility] Well-Child Check with Dr. Pimentel for further support and monitoring.    MATERNAL ASSESSMENT    Breast Size: average, symmetrical, soft after feeding and filling prior to feeding  Nipple Appearance - Left: intact  Nipple Appearance - Right: intact  Nipple Erectility - Left: erect with stimulation  Nipple Erectility - Right: erect with stimulation  Areolas Compressibility: soft  Nipple Size: average  Milk Supply: mature    INFANT ASSESSMENT    Oral Anatomy  Mouth: normal  Palate: normal  Jaw: normal  Tongue: normal    FEEDING   Volume of Intake:    Birth Weight: 7lb 2.3oz    Discharge from Hospital after delivery weight: 6lb 14oz   TODAY 2019    Total Intake: 2 oz of Similac with Iron  Output: No output during visit    FEEDING PLAN    Home Feeding Plan:  Feed formula and start adding breast milk in increments.  Continue to apply expressed breast milk and Lanolin cream to nipples after feedings for healing and comfort.  Discussed techniques to do to try to release plugged duct. Discussed warmth, massage, feeding, pumping or hand expressing. Ibuprofen, using cool compresses after, or cabbage leaves.  Discussed positions to hold Wrenne when she seems uncomfortable.  Items included in the education are:     proper positioning and latch    effectiveness of feeding    manual expression    handling and storing breastmilk    maintenance of breastfeeding  for the first 6 months    sign/symptoms of infant feeding issues requiring referral to qualified health care provider    LACTATION COMMENTS   Deep latch explained for proper positioning of breast in infant's mouth, maximizing milk transfer and comfort.  Reassurance and encouragement provided in regard to mom's concerns about milk supply.  Follow-up support information provided.  Parents plan to keep  Well-Child Check with Dr. Pimentel for further support and monitoring.      Face-to-face Time: 90 minutes with assessment and education.    ASUNCION STOCK RN, IBCLC  2019  1:32 PM
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
## 2019-04-23 NOTE — PATIENT INSTRUCTIONS
FEEDING PLAN    Home Feeding Plan:  Feed formula and start adding breast milk in increments.  Continue to apply expressed breast milk and Lanolin cream to nipples after feedings for healing and comfort.  Discussed techniques to do to try to release plugged duct. Discussed warmth, massage, feeding, pumping or hand expressing. Ibuprofen, using cool compresses after, or cabbage leaves.  Discussed positions to hold Wrenne when she seems uncomfortable.  Items included in the education are:     proper positioning and latch    effectiveness of feeding    manual expression    handling and storing breastmilk    maintenance of breastfeeding for the first 6 months    sign/symptoms of infant feeding issues requiring referral to qualified health care provider    LACTATION COMMENTS   Deep latch explained for proper positioning of breast in infant's mouth, maximizing milk transfer and comfort.  Reassurance and encouragement provided in regard to mom's concerns about milk supply.  Follow-up support information provided.  Parents plan to keep  Well-Child Check with Dr. Pimentel for further support and monitoring.

## 2019-04-24 ENCOUNTER — TELEPHONE (OUTPATIENT)
Dept: FAMILY MEDICINE | Facility: OTHER | Age: 34
End: 2019-04-24
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
## 2019-04-24 NOTE — TELEPHONE ENCOUNTER
Patient's note needs to state this,  Barbi Vale is unable to return to work effective 5/8/19-5/31/19 following a difficult post partum. Unresolved medical issues remain for both Barbi and her daughter that need time to attend to.    Please fax to [phone]

## 2019-04-26 ENCOUNTER — PRENATAL OFFICE VISIT (OUTPATIENT)
Dept: OBGYN | Facility: OTHER | Age: 34
End: 2019-04-26
Attending: OBSTETRICS & GYNECOLOGY
Payer: COMMERCIAL

## 2019-04-26 VITALS
HEART RATE: 72 BPM | SYSTOLIC BLOOD PRESSURE: 103 MMHG | HEIGHT: 63 IN | BODY MASS INDEX: 28.88 KG/M2 | OXYGEN SATURATION: 98 % | WEIGHT: 163 LBS | DIASTOLIC BLOOD PRESSURE: 68 MMHG

## 2019-04-26 PROBLEM — D62 ANEMIA DUE TO BLOOD LOSS, ACUTE: Status: RESOLVED | Noted: 2019-03-18 | Resolved: 2019-04-26

## 2019-04-26 PROCEDURE — 99207 ZZC POST PARTUM EXAM: CPT | Performed by: OBSTETRICS & GYNECOLOGY

## 2019-04-26 ASSESSMENT — MIFFLIN-ST. JEOR: SCORE: 1413.49

## 2019-04-26 ASSESSMENT — PAIN SCALES - GENERAL: PAINLEVEL: NO PAIN (0)

## 2019-04-26 NOTE — PROGRESS NOTES
"SUBJECTIVE:  Barbi Vale is a 33 year old female P1 here for a postpartum visit.  She had a  Section   delivering a healthy baby girl  Currently no complaints and doing well.  Had mastitis x 2 but now resolved and off abx.      Today's Depression Rating was No Value exists for the : [id number]    delivery complications:  No  breast feeding:  Yes  bladder problems:  No  bowel problems/hemorrhoids:  No  episiotomy/laceration/incision healed? Yes  vaginal flow:  Scant/light intermittent lochia  [place]:  No  contraception:  abstinence  emotional adjustment:  doing well and happy      OBJECTIVE:  Blood pressure 103/68, pulse 72, height 1.6 m (5' 3\"), weight 73.9 kg (163 lb), last menstrual period 2018, SpO2 98 %, currently breastfeeding.   General - pleasant female in no acute distress.  Breast - no nodularity, asymmetry or nipple discharge bilaterally.  Abdomen - soft, nontender, nondistended, no hepatosplenomegaly.  Rectovaginal - deferred.    ASSESSMENT:  normal postpartum exam.  Released from pregnancy related restrictions    PLAN:  May resume normal activities without restrictions  Pap smear Not Done today    The patient will use condoms for birth control. Full counseling was provided, and all questions answered. Compliance is strongly emphasized.  Return to clinic in one year for an annual, when due for a pap smear or PRN.    Pedro Pablo Cantor  "

## 2019-04-26 NOTE — NURSING NOTE
"Chief Complaint   Patient presents with     Postpartum Care     6wk pp/ c/s on 3/15/19 Girl-Callie LPS- 2/5/19 normal       Initial /68 (BP Location: Left arm, Cuff Size: Adult Regular)   Pulse 72   Ht 1.6 m (5' 3\")   Wt 73.9 kg (163 lb)   LMP 05/31/2018   SpO2 98%   BMI 28.87 kg/m   Estimated body mass index is 28.87 kg/m  as calculated from the following:    Height as of this encounter: 1.6 m (5' 3\").    Weight as of this encounter: 73.9 kg (163 lb).  Medication Reconciliation: complete    Taylor Foster LPN  "

## 2019-05-02 ENCOUNTER — TELEPHONE (OUTPATIENT)
Dept: FAMILY MEDICINE | Facility: OTHER | Age: 34
End: 2019-05-02

## 2019-05-02 ENCOUNTER — HOSPITAL ENCOUNTER (OUTPATIENT)
Dept: OBGYN | Facility: HOSPITAL | Age: 34
Discharge: HOME | End: 2019-05-02
Attending: OBSTETRICS & GYNECOLOGY | Admitting: OBSTETRICS & GYNECOLOGY
Payer: COMMERCIAL

## 2019-05-02 PROCEDURE — G0463 HOSPITAL OUTPT CLINIC VISIT: HCPCS

## 2019-05-02 NOTE — TELEPHONE ENCOUNTER
Notified mom of this information below, states this has been going on since birth and Dr Pimentel is aware and knows her situation. She will consult with the lactation consult and Dr Pimentel in a couple weeks.

## 2019-05-02 NOTE — PROGRESS NOTES
SUBJECTIVE:   Barbi Vale is a 33 year old female who presents to clinic today for the following   health issues:    Breastfeeding       Duration: Since birth of baby     Description (location/character/radiation): Breast tenderness and sharp pains, thrush possibly back again    Intensity:  moderate    Accompanying signs and symptoms: None    History (similar episodes/previous evaluation): Yes    Precipitating or alleviating factors: None    Therapies tried and outcome: 14 day course of Diflucan completed and lotrimin cream. Patient would like to continue to pump and breastfeed if possible.      Mom is quite fatigued, infant is fussy on and off  When mom has tried one ounce of breast milk, infant screams    Additional history: as documented    Reviewed  and updated as needed this visit by clinical staff  Tobacco  Allergies  Meds  Med Hx  Surg Hx  Fam Hx  Soc Hx        Reviewed and updated as needed this visit by Provider         Patient Active Problem List   Diagnosis     ACP (advance care planning)     Chronic pain disorder     Myofascial muscle pain     Paresthesia     Dysuria     Well woman exam with routine gynecological exam     Encounter for preconception consultation     Encounter for surveillance of contraceptives     Other chronic pain     Supervision of normal first pregnancy in first trimester     Chemical dermatitis     Encounter for triage in pregnant patient     Clogged duct, postpartum     Mastitis     Candidiasis of breast     Past Surgical History:   Procedure Laterality Date      SECTION N/A 3/15/2019    Procedure:  SECTION;  Surgeon: Pedro Pablo Cantor MD;  Location: [facility]     FOOT SURGERY Right     [place], [place]       Social History     Tobacco Use     Smoking status: Never Smoker     Smokeless tobacco: Never Used   Substance Use Topics     Alcohol use: No     Family History   Problem Relation Age of Onset     Mental Illness Mother      Mental Illness Father   "    Mental Illness Brother      Cerebrovascular Disease Maternal Grandmother      Cerebrovascular Disease Maternal Grandfather      Aneurysm Maternal Grandfather      Other - See Comments Paternal Grandmother 86        old age     Kidney Disease Paternal Grandfather         on dialysis         Current Outpatient Medications   Medication Sig Dispense Refill     B Complex Vitamins (VITAMIN B COMPLEX PO) Take by mouth daily       fluconazole (DIFLUCAN) 100 MG tablet Take 1 tablet (100 mg) by mouth daily for 15 days 15 tablet 0     Prenatal Vit-Fe Fumarate-FA (PRENATAL MULTIVITAMIN PLUS IRON) 27-0.8 MG TABS per tablet Take 1 tablet by mouth daily       VITAMIN D, CHOLECALCIFEROL, PO Take 2,000 Units by mouth daily       fish oil-omega-3 fatty acids 1000 MG capsule Take 1,000 mg by mouth daily       Allergies   Allergen Reactions     Depo-Provera [Medroxyprogesterone] Swelling     Swelled up, headaches     BP Readings from Last 3 Encounters:   05/16/19 96/58   04/26/19 103/68   04/22/19 90/58    Wt Readings from Last 3 Encounters:   05/16/19 75.1 kg (165 lb 9.6 oz)   04/26/19 73.9 kg (163 lb)   04/22/19 73.5 kg (162 lb)                  Labs reviewed in EPIC    ROS:  Constitutional, HEENT, cardiovascular, pulmonary, gi and gu systems are negative, except as otherwise noted.    OBJECTIVE:                                                    BP 96/58 (BP Location: Left arm, Patient Position: Sitting, Cuff Size: Adult Regular)   Pulse 68   Temp 97.6  F (36.4  C) (Tympanic)   Resp 20   Ht 1.6 m (5' 3\")   Wt 75.1 kg (165 lb 9.6 oz)   SpO2 96%   BMI 29.33 kg/m    Body mass index is 29.33 kg/m .  GENERAL APPEARANCE: healthy, alert and no distress  BREAST:  Bilateral tender to touch but not warm, nipples are tender  PSYCH: mentation appears normal and affect normal/bright       ASSESSMENT/PLAN:                                                    1. Candidiasis of breast  If not resolving she may need abx again  - fluconazole " (DIFLUCAN) 100 MG tablet; Take 1 tablet (100 mg) by mouth daily for 15 days  Dispense: 15 tablet; Refill: 0    2. Difficulty of mother performing breastfeeding  Reassurance given, see if she can visit her mother and get some help and rest.    Patient was agreeable to this plan and had no further questions.  See Patient Instructions    Caprice Pimentel MD  [facility] [place]

## 2019-05-02 NOTE — TELEPHONE ENCOUNTER
This patient calls today stating that she thinks her baby is not tolerating her breast milk. She states that her baby is 7 weeks old and she will scream after she attempts to feed her. She is doing fine on formula as well, but she would prefer to breastfeed. She has set up an appointment with the lactation consultant today as well as Dr. Pimentel in a couple weeks, but she is wondering if there is any sort of testing that can be done for her baby as she has never heard of a baby rejecting their mothers milk. Please Advise.

## 2019-05-02 NOTE — TELEPHONE ENCOUNTER
That would be unusual. evaluating the nursing and the infant to make sure nothing else is going on that causes physical discomfort would be the first step. Sometimes foods that mom is eating can cause issues, such as cauliflower, broccoli, orange juice which can cause fussiness and increased gas so eliminating these foods is a process that is helpful.

## 2019-05-02 NOTE — PATIENT INSTRUCTIONS
FEEDING   Feeding Time: 1300 took bottle of Mo Good Start with probiotic 3oz  Position: NA  Effort to Latch: awake and alert, latched easily  Duration of Breast Feeding: NA  Results: NA    Volume of Intake:    Birth Weight: 7lb 2.3oz    Discharge from Hospital after delivery weight: 6lb 14oz   TODAY 2019    Pre-Weight: NA    Post-Weight: 10 13.6oz    Total Intake: took 3oz of formula before weight  Output: 1 notable void diaper changed after feeding session      FEEDING PLAN    Home Feeding Plan: Continue to feed on demand when  elicits feeding cues with formula. In about a week, try introducing fresh breast milk with the formula.  Discussed stopping the fish oil, and clearing that out of your system. Barbi is stopping all vitamins for now.  Postpartum breastfeeding assessment completed and education provided.  Items included in the education are:     proper positioning and latch    effectiveness of feeding    manual expression    handling and storing breastmilk    maintenance of breastfeeding for the first 6 months    sign/symptoms of infant feeding issues requiring referral to qualified health care provider    LACTATION COMMENTS   Wrenne is taking 3-3.5 oz every 1.5-2 hours.   Barbi is pumping every 4 hours for about 30 min. Discussed decreasing amount of time pumping to 15 min.  Reassurance and encouragement provided in regard to mom's concerns about milk supply.  Follow-up support information provided.  [email or web address] articles given for weaning from formula, Help-My baby won't nurse.  Parents plan to keep [facility] Well-Child Check with Dr. Pimentel  for further support and monitoring.

## 2019-05-02 NOTE — LACTATION NOTE
Follow-up Lactation Consultatin    Barbi Vale                                                                                                   [id number]      Consultation Date: May 2, 2019     Reason for Lactation Referral: discuss feeding plan to try to wean off formula and resume breast milk/breastfeeding     Baby's : 3/15/19    Primary Care Provider: Dr. Cantor-mom, Barbi; Dr. Pimentel-Baby girl Callie    History of Present Illness: Barbi presents with 7 week old Callie. She has been feeding [place] Good Start with probiotics for a few weeks. She had been breastfeeding, but Callie was crying inconsolably after feedings, and Barbi decided to try formula to which she was much happier, slept better, and didn't cry like she had been. Barbi attributed the fussiness to having had 2 episodes of needing antibiotics for mastitis.      MATERNAL HISTORY   History of Breast Surgery: No  Breast Changes During Pregnancy: Yes  Breast Feeding History: 1st baby  Maternal Meds: stopping all vitamins, meds    MATERNAL ASSESSMENT    Breast Size: average, symmetrical, soft after feeding and filling prior to feeding  Nipple Appearance - Left: intact  Nipple Appearance - Right: intact  Nipple Erectility - Left: erect with stimulation  Nipple Erectility - Right: erect with stimulation  Areolas Compressibility: soft  Nipple Size: average  Milk Supply: mature    INFANT ASSESSMENT    Oral Anatomy  Mouth: normal  Palate: normal  Jaw: normal  Tongue: normal  Frenulum: normal     FEEDING   Feeding Time: 1300 took bottle of [place] Good Start with probiotic 3oz  Position: NA  Effort to Latch: awake and alert, latched easily  Duration of Breast Feeding: NA  Results: NA    Volume of Intake:    Birth Weight: 7lb 2.3oz    Discharge from Hospital after delivery weight: 6lb 14oz   TODAY 2019    Pre-Weight: NA    Post-Weight: 10 13.6oz    Total Intake: took 3oz of formula before weight  Output: 1 notable void diaper changed after  feeding session      FEEDING PLAN    Home Feeding Plan: Continue to feed on demand when  elicits feeding cues with formula. In about a week, try introducing fresh breast milk with the formula, and then continue until just breast milk.  Discussed stopping the fish oil, and clearing that out of your system. Barbi is stopping all vitamins for now.  Discussed if she is feeding fresh breast milk from fridge, or frozen, as sometimes a baby will refuse milk that has been frozen, because the taste is different, from it breaking down.  Postpartum breastfeeding assessment completed and education provided.  Items included in the education are:     proper positioning and latch    effectiveness of feeding    manual expression    handling and storing breastmilk    maintenance of breastfeeding for the first 6 months    sign/symptoms of infant feeding issues requiring referral to qualified health care provider    LACTATION COMMENTS   Callie is taking 3-3.5 oz every 1.5-2 hours.   Barbi is pumping every 4 hours for about 30 min.  Discussed decreasing amount of time pumping to 15 min.  Reassurance and encouragement provided in regard to mom's concerns about milk supply.  Follow-up support information provided.  [email or web address] articles given for weaning from formula, Help-My baby won't nurse.  Parents plan to keep  Well-Child Check with Dr. Pimentel  for further support and monitoring.       Face-to-face Time: 60 minutes with assessment and education.    ASUNCION STOCK RN, IBCLC  2019  12:48 PM

## 2019-05-03 ENCOUNTER — TELEPHONE (OUTPATIENT)
Dept: FAMILY MEDICINE | Facility: OTHER | Age: 34
End: 2019-05-03

## 2019-05-03 NOTE — TELEPHONE ENCOUNTER
Diflucan dose is often done 1 time.  Works in system for a while.  Continue topical antifungal.    Lactation note reviewed.

## 2019-05-03 NOTE — TELEPHONE ENCOUNTER
Patient called and she had seen the lactation consultant yesterday up on the [facility]. Patient and her daughter have been diagnosed with thrush. Patient has been using the cream on her nipples but was prescribed diflucan and only used one dose. Wondering if she should start taking that again. Please call her to let her know what to do. She is having shooting pain in her breast during and after nursing.

## 2019-05-08 ENCOUNTER — MYC MEDICAL ADVICE (OUTPATIENT)
Dept: OBGYN | Facility: OTHER | Age: 34
End: 2019-05-08

## 2019-05-14 ENCOUNTER — HOSPITAL ENCOUNTER (OUTPATIENT)
Dept: OBGYN | Facility: HOSPITAL | Age: 34
Discharge: HOME | End: 2019-05-14
Attending: FAMILY MEDICINE | Admitting: FAMILY MEDICINE
Payer: COMMERCIAL

## 2019-05-14 PROCEDURE — G0463 HOSPITAL OUTPT CLINIC VISIT: HCPCS

## 2019-05-14 NOTE — PATIENT INSTRUCTIONS
FEEDING   Feeding Time: Took 3 ounces Formula before session    Volume of Intake:    Birth Weight: 7lb 2.3oz    Discharge from Hospital after delivery weight: 6lb 14oz   TODAY 2019    Pre-Weight: NA    Post-Weight: 12 lb 7.3 oz    Total Intake: took a 3 oz bottle   Output: 1 notable void diaper changed after feeding session    FEEDING PLAN    Home Feeding Plan: Continue to feed on demand when  elicits feeding cues with the formula, or start trying a combination of breast milk and formula, and eventually getting back to breast is goal.  Try 1 Tablespoon virgin coconut oil with 1 teaspoon apple cider vinegar.   Information given on weaning off the pumping.  Postpartum breastfeeding assessment completed and education provided.  Items included in the education are:     proper positioning and latch    effectiveness of feeding    manual expression    handling and storing breastmilk    maintenance of breastfeeding for the first 6 months    sign/symptoms of infant feeding issues requiring referral to qualified health care provider    LACTATION COMMENTS   Reassurance and encouragement provided in regard to mom's concerns about milk supply.  Handouts from [email or web address] given on weaning.  Follow-up support information provided.  Parents plan to keep [place] Well-Child Check with Dr. Pimentel on Thursday for further support and monitoring.

## 2019-05-14 NOTE — LACTATION NOTE
Follow-up Lactation Consultation    Barbi Vale                                                                                                   [id number]      Consultation Date: May 14, 2019     Reason for Lactation Referral: Discuss thrush, weaning     Baby's : 3/15/19    Primary Care Provider: Dr. Cantor-Mom, Barbi; -Baby girl Callie    History of Present Illness: Barbi presents with 2 month old Callie. She is still pumping every 4 hours and storing milk. She is feeding Callie via bottle with [place] Good Start with probiotics. She and baby have been treated for thrush. Callie has been doing well. Barbi still has a goal of introducing breast milk back into feedings. She's waiting to be sure the thrush has resolved. Also, she would like info on weaning. In  she is starting a job, and if Callie is not taking breastmilk, she may decide to wean from pumping.    MATERNAL HISTORY   History of Breast Surgery: No  Breast Changes During Pregnancy: Yes  Breast Feeding History: None  Maternal Meds: daily prenatal vitamin, Vit B complex, Vit D, probiotic    MATERNAL ASSESSMENT    Breast Size: average, symmetrical, soft after feeding and filling prior to feeding  Nipple Appearance - Left: intact  Nipple Appearance - Right: intact  Nipple Erectility - Left: erect with stimulation  Nipple Erectility - Right: erect with stimulation  Areolas Compressibility: soft  Nipple Size: average  Milk Supply: mature    INFANT ASSESSMENT    Oral Anatomy  Mouth: normal  Palate: normal  Jaw: normal  Tongue: normal    FEEDING   Feeding Time: Took 3 ounces Formula before session    Volume of Intake:    Birth Weight: 7lb 2.3oz    Discharge from Hospital after delivery weight: 6lb 14oz   TODAY 2019    Pre-Weight: NA    Post-Weight: 12 lb 7.3 oz    Total Intake: took a 3 oz bottle   Output: 1 notable void diaper changed after feeding session    FEEDING PLAN    Home Feeding Plan: Continue to feed on demand when   elicits feeding cues with the formula, or start trying a combination of breast milk and formula, and eventually getting back to breast is goal.  Try 1 Tablespoon virgin coconut oil with 1 teaspoon apple cider vinegar to nipples.  Information given on weaning off the pumping.  Postpartum breastfeeding assessment completed and education provided.  Items included in the education are:     proper positioning and latch    effectiveness of feeding    manual expression    handling and storing breastmilk    maintenance of breastfeeding for the first 6 months    sign/symptoms of infant feeding issues requiring referral to qualified health care provider    LACTATION COMMENTS   Reassurance and encouragement provided in regard to mom's concerns about milk supply.  Handouts from [email or web address] given on weaning.  Follow-up support information provided.  Parents plan to keep [place] Well-Child Check with Dr. Pimentel on Thursday for further support and monitoring.      Face-to-face Time: 60 minutes with assessment and education.    ASUNCION STOCK RN, IBCLC  2019  2:28 PM

## 2019-05-16 ENCOUNTER — OFFICE VISIT (OUTPATIENT)
Dept: FAMILY MEDICINE | Facility: OTHER | Age: 34
End: 2019-05-16
Attending: FAMILY MEDICINE
Payer: COMMERCIAL

## 2019-05-16 VITALS
HEIGHT: 63 IN | SYSTOLIC BLOOD PRESSURE: 96 MMHG | OXYGEN SATURATION: 96 % | DIASTOLIC BLOOD PRESSURE: 58 MMHG | BODY MASS INDEX: 29.34 KG/M2 | HEART RATE: 68 BPM | TEMPERATURE: 97.6 F | WEIGHT: 165.6 LBS | RESPIRATION RATE: 20 BRPM

## 2019-05-16 DIAGNOSIS — B37.89 CANDIDIASIS OF BREAST: Primary | ICD-10-CM

## 2019-05-16 PROCEDURE — 99213 OFFICE O/P EST LOW 20 MIN: CPT | Performed by: FAMILY MEDICINE

## 2019-05-16 RX ORDER — FLUCONAZOLE 100 MG/1
100 TABLET ORAL DAILY
Qty: 15 TABLET | Refills: 0 | Status: SHIPPED | OUTPATIENT
Start: 2019-05-16 | End: 2019-05-24

## 2019-05-16 ASSESSMENT — PAIN SCALES - GENERAL: PAINLEVEL: NO PAIN (0)

## 2019-05-16 ASSESSMENT — MIFFLIN-ST. JEOR: SCORE: 1425.29

## 2019-05-17 ASSESSMENT — PATIENT HEALTH QUESTIONNAIRE - PHQ9: SUM OF ALL RESPONSES TO PHQ QUESTIONS 1-9: 1

## 2019-05-21 ENCOUNTER — TELEPHONE (OUTPATIENT)
Dept: OBGYN | Facility: OTHER | Age: 34
End: 2019-05-21

## 2019-05-21 NOTE — TELEPHONE ENCOUNTER
Patient called stating that she would like to get in to see you to make sure the thrush she had in her breasts is gone. States that she is still having nipple pain and burning. She is on diflucan and has two days left of it but would like to come in either Thursday or Friday and is also wondering if she can get her breast milk cultured.

## 2019-05-24 ENCOUNTER — OFFICE VISIT (OUTPATIENT)
Dept: OBGYN | Facility: OTHER | Age: 34
End: 2019-05-24
Attending: NURSE PRACTITIONER
Payer: COMMERCIAL

## 2019-05-24 VITALS
HEIGHT: 63 IN | BODY MASS INDEX: 29.23 KG/M2 | SYSTOLIC BLOOD PRESSURE: 102 MMHG | DIASTOLIC BLOOD PRESSURE: 62 MMHG | OXYGEN SATURATION: 98 % | HEART RATE: 68 BPM | WEIGHT: 165 LBS

## 2019-05-24 PROCEDURE — 99212 OFFICE O/P EST SF 10 MIN: CPT | Performed by: NURSE PRACTITIONER

## 2019-05-24 ASSESSMENT — MIFFLIN-ST. JEOR: SCORE: 1422.57

## 2019-05-24 ASSESSMENT — PAIN SCALES - GENERAL: PAINLEVEL: NO PAIN (0)

## 2019-05-24 NOTE — PATIENT INSTRUCTIONS
"BREASTFEEDING TIPS  1. Breastfeed every 2-4 hours. If your baby is sleepy - use breast compression, push on chin to \"start up\" baby, switch breasts, undress to diaper and wake before relatching.   Some babies \"cluster\" feed every 1 hour for a while- this is normal. Feed your baby whenever he/she is awake-  even if every hour for a while. This frequent feeding will help you make more milk and encourage your baby to sleep for longer stretches later in the evening or night.    - Position your baby close to you with pillows so he/she is facing you -belly to belly laying horizontally across your lap at the level of your breast and looking a bit \"upwards\" to your breast   -One hand holds the baby's neck behind the ears and the other hand holds your breast  -Baby's nose should start out pointing to your nipple before latching  - Hold your breast in a \"sandwich\" position by gently squeezing your breast in an oval shape and make sure your hands are not covering the areola  This \"nipple sandwich\" will make it easier for your breast to fit inside the baby's mouth-making latching more comfortable for you and baby and preventing sore nipples. Your baby should take a \"mouthful\" of breast!  - You may want to use hand expression to \"prime the pump\" and get a drip of milk out on your nipple to wake baby   (see website: newborns.[email or web address]/Breastfeeding/HandExpression.html)  - Swipe your nipple on baby's upper lip and wait for a BIG open mouth  - YOU bring baby to the breast (hold baby's neck with your fingers just below the ears) and bring baby's head to the breast--leading with the chin.  Try to avoid pushing your breast into baby's mouth- bring baby to you instead!  - Aim to get your baby's bottom lip LOW DOWN ON AREOLA (baby's upper lip just needs to \"clear\" the nipple) .   Your baby should latch onto the areola and NOT just the nipple. That way your baby gets more milk and you don't get sore nipples!      Useful web " sites:  Www.infantrisk.com  Www.aap.org  Www.ibreastfeeding.com  Www.health.[email or web address].[place].us

## 2019-05-24 NOTE — NURSING NOTE
"Chief Complaint   Patient presents with     Mouth/Lip Problem       Initial /62 (BP Location: Left arm, Patient Position: Sitting, Cuff Size: Adult Regular)   Pulse 68   Ht 1.6 m (5' 3\")   Wt 74.8 kg (165 lb)   SpO2 98%   BMI 29.23 kg/m   Estimated body mass index is 29.23 kg/m  as calculated from the following:    Height as of this encounter: 1.6 m (5' 3\").    Weight as of this encounter: 74.8 kg (165 lb).  Medication Reconciliation: complete    Anna Looney LPN    "

## 2019-05-28 NOTE — PROGRESS NOTES
"[facility]                HPI   Barbi presents to discuss options for weaning.  She has been pumping and storing milk and occasionally latching baby at breast.  She states she has been unable to get her baby to take breast milk without extreme fussiness.  She is working with an IBCLC and her pediatrician to try to fignur out why.  She would like to be done pumping in a short time if possible but wishes to avoid discomfort.  She is also interested in options for donating her milk.                Medications:     Current Outpatient Medications Ordered in Epic   Medication     B Complex Vitamins (VITAMIN B COMPLEX PO)     fish oil-omega-3 fatty acids 1000 MG capsule     Prenatal Vit-Fe Fumarate-FA (PRENATAL MULTIVITAMIN PLUS IRON) 27-0.8 MG TABS per tablet     VITAMIN D, CHOLECALCIFEROL, PO     No current Epic-ordered facility-administered medications on file.                 Allergies:   Depo-provera [medroxyprogesterone]         Review of Systems:   The 5 point Review of Systems is negative other than noted in the HPI                     Physical Exam:   Blood pressure 102/62, pulse 68, height 1.6 m (5' 3\"), weight 74.8 kg (165 lb), SpO2 98 %, currently breastfeeding.  Constitutional:   awake, alert, cooperative, no apparent distress, and appears stated age             Assessment and Plan:   Care of lactating woman - weaning schedule discussed with gradual lengthening of space between pumping and shortened duration of each pumping.  Signs of mastitis reviewed.  Information on the [place] Milk Bank provided.  Encouraged to call with any further questions or concerns.     STEFFANY Fan  5/28/2019  9:38 AM  "

## 2019-07-05 NOTE — PROGRESS NOTES
Subjective     Barbi Vale is a 33 year old female who presents to clinic today for the following health issues:    HPI   Musculoskeletal problem/pain      Duration: 15 years    Description  Location: Wrists bilateral    Intensity:  mild    Accompanying signs and symptoms: radiation of pain to fingers, numbness, tingling, weakness of  and swelling    History  Previous similar problem: no   Previous evaluation:  none    Precipitating or alleviating factors:  Trauma or overuse: YES  Aggravating factors include: lifting and overuse    Therapies tried and outcome: rest/inactivity and support wrap      Patient Active Problem List   Diagnosis     ACP (advance care planning)     Chronic pain disorder     Myofascial muscle pain     Paresthesia     Dysuria     Well woman exam with routine gynecological exam     Encounter for preconception consultation     Encounter for surveillance of contraceptives     Other chronic pain     Supervision of normal first pregnancy in first trimester     Chemical dermatitis     Encounter for triage in pregnant patient     Clogged duct, postpartum     Mastitis     Candidiasis of breast     Bilateral carpal tunnel syndrome     Past Surgical History:   Procedure Laterality Date      SECTION N/A 3/15/2019    Procedure:  SECTION;  Surgeon: Pedro Pablo Cantor MD;  Location: [facility] OR     FOOT SURGERY Right     [place], [place]       Social History     Tobacco Use     Smoking status: Never Smoker     Smokeless tobacco: Never Used   Substance Use Topics     Alcohol use: No     Family History   Problem Relation Age of Onset     Mental Illness Mother      Mental Illness Father      Mental Illness Brother      Cerebrovascular Disease Maternal Grandmother      Cerebrovascular Disease Maternal Grandfather      Aneurysm Maternal Grandfather      Other - See Comments Paternal Grandmother 86        old age     Kidney Disease Paternal Grandfather         on dialysis         Current  "Outpatient Medications   Medication Sig Dispense Refill     B Complex Vitamins (VITAMIN B COMPLEX PO) Take by mouth daily       fish oil-omega-3 fatty acids 1000 MG capsule Take 1,000 mg by mouth daily       Prenatal Vit-Fe Fumarate-FA (PRENATAL MULTIVITAMIN PLUS IRON) 27-0.8 MG TABS per tablet Take 1 tablet by mouth daily       VITAMIN D, CHOLECALCIFEROL, PO Take 2,000 Units by mouth daily       Allergies   Allergen Reactions     Depo-Provera [Medroxyprogesterone] Swelling     Swelled up, headaches     BP Readings from Last 3 Encounters:   07/22/19 98/56   05/24/19 102/62   05/16/19 96/58    Wt Readings from Last 3 Encounters:   07/22/19 76.7 kg (169 lb)   05/24/19 74.8 kg (165 lb)   05/16/19 75.1 kg (165 lb 9.6 oz)        Reviewed and updated as needed this visit by Provider         Review of Systems   ROS COMP: Constitutional, HEENT, cardiovascular, pulmonary, gi and gu systems are negative, except as otherwise noted.      Objective    BP 98/56 (BP Location: Right arm, Patient Position: Sitting, Cuff Size: Adult Regular)   Pulse 80   Temp 99.2  F (37.3  C) (Tympanic)   Ht 1.6 m (5' 3\")   Wt 76.7 kg (169 lb)   SpO2 98%   BMI 29.94 kg/m    There is no height or weight on file to calculate BMI.  Physical Exam   GENERAL: healthy, alert and no distress  MS: no gross musculoskeletal defects noted, no edema  PSYCH: mentation appears normal, affect normal/bright    Diagnostic Test Results:  Labs reviewed in Epic  Results for orders placed or performed during the hospital encounter of 04/16/19   Wound Culture Aerobic Bacterial   Result Value Ref Range    Specimen Description Breast milk     Culture Micro Moderate growth  Normal skin pam              Assessment & Plan     (G56.03) Bilateral carpal tunnel syndrome  (primary encounter diagnosis)  Comment:   Plan: OCCUPATIONAL THERAPY REFERRAL, NEUROLOGY ADULT         REFERRAL        Offered many options, determined above plan for now       BMI:   Estimated body mass " "index is 29.94 kg/m  as calculated from the following:    Height as of this encounter: 1.6 m (5' 3\").    Weight as of this encounter: 76.7 kg (169 lb).   Weight management plan: Discussed healthy diet and exercise guidelines      Patient was agreeable to this plan and had no further questions.  There are no Patient Instructions on file for this visit.    No follow-ups on file.    Caprice Pimentel MD  [facility]        "

## 2019-07-22 ENCOUNTER — OFFICE VISIT (OUTPATIENT)
Dept: FAMILY MEDICINE | Facility: OTHER | Age: 34
End: 2019-07-22
Attending: FAMILY MEDICINE
Payer: COMMERCIAL

## 2019-07-22 VITALS
HEART RATE: 80 BPM | HEIGHT: 63 IN | BODY MASS INDEX: 29.95 KG/M2 | OXYGEN SATURATION: 98 % | SYSTOLIC BLOOD PRESSURE: 98 MMHG | DIASTOLIC BLOOD PRESSURE: 56 MMHG | WEIGHT: 169 LBS | TEMPERATURE: 99.2 F

## 2019-07-22 DIAGNOSIS — G56.03 BILATERAL CARPAL TUNNEL SYNDROME: Primary | ICD-10-CM

## 2019-07-22 PROCEDURE — 99213 OFFICE O/P EST LOW 20 MIN: CPT | Performed by: FAMILY MEDICINE

## 2019-07-22 ASSESSMENT — PAIN SCALES - GENERAL: PAINLEVEL: NO PAIN (0)

## 2019-07-22 ASSESSMENT — MIFFLIN-ST. JEOR: SCORE: 1440.71

## 2019-07-22 NOTE — NURSING NOTE
"Chief Complaint   Patient presents with     Musculoskeletal Problem       Initial BP 98/56 (BP Location: Right arm, Patient Position: Sitting, Cuff Size: Adult Regular)   Pulse 80   Temp 99.2  F (37.3  C) (Tympanic)   Ht 1.6 m (5' 3\")   Wt 76.7 kg (169 lb)   SpO2 98%   BMI 29.94 kg/m   Estimated body mass index is 29.94 kg/m  as calculated from the following:    Height as of this encounter: 1.6 m (5' 3\").    Weight as of this encounter: 76.7 kg (169 lb).  Medication Reconciliation: complete  "

## 2019-07-30 ENCOUNTER — HOSPITAL ENCOUNTER (OUTPATIENT)
Dept: OCCUPATIONAL THERAPY | Facility: HOSPITAL | Age: 34
Setting detail: THERAPIES SERIES
End: 2019-07-30
Attending: FAMILY MEDICINE
Payer: COMMERCIAL

## 2019-07-30 DIAGNOSIS — G56.03 BILATERAL CARPAL TUNNEL SYNDROME: ICD-10-CM

## 2019-07-30 PROCEDURE — 97165 OT EVAL LOW COMPLEX 30 MIN: CPT | Mod: GO

## 2019-07-30 NOTE — PROGRESS NOTES
07/30/19 1400   General Information/History   Start Of Care Date 07/30/19   Referring Physician Caprice Pimentel MD   Orders Evaluate And Treat As Indicated   Orders Date 07/22/19   Medical Diagnosis B carpal tunnel   Additional Occupational Profile Info/Pertinent history of current problem Pt reports she has had carpal tunnel since she was 18 and that it hasn't ever really gone away   How/Where did it occur With repetition/overuse   Onset date of current episode/exacerbation 07/30/05   Chronicity Chronic   Hand Dominance Left   Affected side Bilateral  (R is worse)   Functional limitations perform activities of daily living;perform required work activities;perform desired leisure / sports activities   Reported Symptoms Tingling;Numbness;Pain   QuickDASH [Functional Disability Questionnaire; 0-100 (0=no dysfunction; 100=dysfunction)] Open Dash   Open Jar 2   Heavy Household Chores 3   Carry a shopping bag 1   Wash back 1   Cut food with knife 1   Recreational activities 2   Social activities 1   Work, daily activities 2   Pain 3   Tingling 5   Sleeping 3   QuickDASH Sum 24   QuickDASH Count 11   QuickDASH Disability/Symptom Score 29.55   Prior level of function Independent ADL;Independent IADL   Assistive devices none   Important Activities caring for her child, walking her dog   Living environment House[facility]   Patient role/Employment history Employed   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 Yes   Have you fallen and had an injury in the past year? Yes  (she did have a  fall from tripping on a hose)   Is patient a fall risk? No   Abuse Screen (yes response referral indicated)   Feels Unsafe at Home or Work/School no   Feels Threatened by Someone no   Does Anyone Try to Keep You From Having Contact with Others or Doing Things Outside Your Home? no   Physical Signs of Abuse Present no   Pain   Pain Primary Pain Report   Primary Pain Report   Location B wrists   Radiation Hand   Pain Quality Burning   Frequency Constant  (wrist brace helps)   Scale 1/10   Pain Is Worse At Night   Pain Is Exacerbated By Twisting , Pulling;Carrying;Lifting   Pain Is Relieved By Splints   Progression Since Onset Unchanged   ROM   ROM AROM   AROM   AROM Wrist   Wrist   Wrist Extension - Left 55   Wrist Extension - Right 45   Wrist Flexion- Left 70   Wrist Flexion - Right 65   Radial Deviation- Left 20   Radial Deviation - Right 25   Ulnar Deviation- Left 30   Ulnar Deviation - Right 27   Special Tests   Special Tests Assessed   Left Hand Special Tests Comments pt did not have a postive response for Tinel's, Phalen's or Finkelstein   Right Hand Special Tests Comments pt did not have a postive response for Tinel's, Phalen's or Finkelstein   Ligament Testing   Ligament Testing TFCC  Radio Carpal  (does have instability in B wrists)   Strength   Strength Strength    Avg - Left 55    Avg - Right 63   Lateral Pinch - Right 15   Lateral Pinch Comments 15   3 Point Pinch - Left 11   3 Point Pinch - Right 16   Resisted Muscle Testing   Resisted Muscle Testing Wrist   Manual Muscle Quick Adds   Manual Muscle Testing Quick Adds MMT: Wrist  (B Wrists 4/5 flex/ext, ulnar/radial deviation)   Education Assessment   Preferred Learning Style Pictures/video;Demonstration   Barriers to Learning No barriers   Therapy Interventions   Planned Therapy Interventions Ultrasound;Home Program;Joint Protection Instruction;Ergonomic Patient Education;Manual Therapy;Stretching;ROM;Education of splint wear, care, fit and  precautions   Clinical Impression   Criteria for Skilled Therapeutic Interventions Met yes   OT Diagnosis pain and buring in hands   Influenced by the following impairments Pain   Assessment of Occupational Performance 1-3 Performance Deficits   Identified Performance Deficits lifting, gripping, carrying   Clinical Decision Making (Complexity) Low complexity   Therapy Frequency 1-2x/wk   Predicted Duration of Therapy Intervention (days/wks) 4-6 week   Risks and Benefits of Treatment have been explained. Yes   Patient, Family & other staff in agreement with plan of care Yes   Clinical Impression Comments Pt did not have a positive response with Tinel's or Phalen's test. pt reports burning sensation that is worse at night but reports tingling at index and middle fingers that is worse at night. She did recently get a new splint for her R hand that seems to be helping. Pt is planning to schedule and EMG  Provided pt with nerve glide exercises and contrast baths.   Hand Goals   Hand Goals Sleeping;Household Chores   Household Chores   Current Functional Task Washing floors;Gripping;Carrying   Previous Performance Level Independent   Current Performance Level Moderate difficulty   Goal Target Task Hold rag and wash bathtub;Open a tight or new jar   Goal Target Performance Level No difficulty   Due Date 08/30/19   Sleeping   Current Functional Task Sleeping through the night   Previous Performance Level No difficulty   Current Performance Level Moderate difficulty   Goal Target Task Staying asleep   Goal Target Performance Level No difficulty   Due Date 08/30/19   Total Evaluation Time   OT Eval, Low Complexity Minutes ([id number]) 60

## 2019-08-09 ENCOUNTER — HOSPITAL ENCOUNTER (OUTPATIENT)
Dept: OCCUPATIONAL THERAPY | Facility: HOSPITAL | Age: 34
Setting detail: THERAPIES SERIES
End: 2019-08-09
Attending: FAMILY MEDICINE
Payer: COMMERCIAL

## 2019-08-09 PROCEDURE — 97110 THERAPEUTIC EXERCISES: CPT | Mod: GO

## 2019-08-09 PROCEDURE — 97035 APP MDLTY 1+ULTRASOUND EA 15: CPT | Mod: GO

## 2019-08-13 ENCOUNTER — HOSPITAL ENCOUNTER (OUTPATIENT)
Dept: OCCUPATIONAL THERAPY | Facility: HOSPITAL | Age: 34
Setting detail: THERAPIES SERIES
End: 2019-08-13
Attending: FAMILY MEDICINE
Payer: COMMERCIAL

## 2019-08-13 PROCEDURE — 97035 APP MDLTY 1+ULTRASOUND EA 15: CPT | Mod: GO

## 2019-08-13 PROCEDURE — 97535 SELF CARE MNGMENT TRAINING: CPT | Mod: GO

## 2019-08-21 ENCOUNTER — HOSPITAL ENCOUNTER (OUTPATIENT)
Dept: OCCUPATIONAL THERAPY | Facility: HOSPITAL | Age: 34
Setting detail: THERAPIES SERIES
End: 2019-08-21
Attending: FAMILY MEDICINE
Payer: COMMERCIAL

## 2019-08-21 PROCEDURE — 97110 THERAPEUTIC EXERCISES: CPT | Mod: GO

## 2019-08-21 PROCEDURE — 97035 APP MDLTY 1+ULTRASOUND EA 15: CPT | Mod: GO

## 2019-08-22 NOTE — PROGRESS NOTES
08/21/19 0500   Notes   Note Type Discharge Summary   Signing Clinician's Name / Credentials   Signing clinician's name / credentials Alexia Saleh OTR/L   Session Number   Session Number 4   General Information   Rxs Used 4   Medical Diagnosis B carpal tunnel   Orders Evaluate And Treat As Indicated   Start Of Care Date 07/30/19   Onset date of current episode/exacerbation 07/30/05   Subjective Measures   Subjective Pt seen from [id number], reported she is trying to work with a chiropractor to see if that will help her carpal tunnel. She asked about surgery and thought she might look into that option   Initial Pain level 0/10   Current Pain level 0/10   Modalities   Modalities Ultrasound   Ultrasound -Type Continuous;2 cm sound head   Ultrasound, Minutes (60387) 16 Minutes   Skilled Interventions To Decrease Pain   Intensity 1.0w/cm2   Frequency 3 MHz   Location B volar wrists   Positioning sitting   Therapeutic Exercise   Therapeutic Exercise Other Exercises/Activities   Therapeutic Procedure: strength, endurance, ROM, flexibillity minutes (75192) 10   Other Exer/Activities/Educ   Other Exer/Activities/Educ - All exercises are part of HEP unless otherwise noted Exercise 1   Exercise 1 median nerve glides   Description 1 stretches   Exercise 2 prayer stretch   Hand Goals   Hand Goals Sleeping;Household Chores   Household Chores   Current Functional Task Washing floors;Gripping;Carrying   Previous Performance Level Independent   Current Performance Level Moderate difficulty   Goal Target Task Hold rag and wash bathtub;Open a tight or new jar   Goal Target Performance Level No difficulty   Due Date 08/30/19   If goal not met, Why? these tasks can still be difficult   Sleeping   Current Functional Task Sleeping through the night   Previous Performance Level No difficulty   Current Performance Level Moderate difficulty   Goal Target Task Staying asleep   Goal Target Performance Level No difficulty   Due Date  08/30/19   If goal not met, Why? if she takes wrist braces off at night she will have pain and numbness   Assessment   Clinical Impression(s) Comments Pt feels OT has helped her to learn how to do things differently to avoid cuasing more pain in her hands.    Response to Therapy: Improvements Self Care Skills   Plan   Homework joint protection, nerve glides   Plan at this time plan to discharge from OT    Comments   Comments Pt was given education on nerve glides, body mechanics and joint protection. Pt is exploring chiropractic care and the considering the possiblity of surgery.   Total Session Time   Timed Code Treatment Minutes 30   Total Treatment Time (sum of timed and untimed services) 30

## 2019-10-07 ENCOUNTER — DOCUMENTATION ONLY (OUTPATIENT)
Dept: PEDIATRICS | Facility: OTHER | Age: 34
End: 2019-10-07

## 2019-10-07 NOTE — PROGRESS NOTES
[place]  Depression Scale (EPDS) Risk Assessment: Completed - Follow up as indicated     Ravalli Depression Scale  Thoughts of Harming Self:  sometimes  Total Score:  15    Appt made to follow up in 2 weeks with Nisa Cunningham (mom needs after hours due to work schedule).  Mom also wanting to resume birth control.  Mom denies harming herself or others at this time.  She will be talking to Dad about not doing the in home  as a start to reduce her load.

## 2019-10-22 NOTE — PROGRESS NOTES
Subjective     Barbi Vale is a 34 year old female who presents to clinic today for the following health issues:    HPI   Contraception      Nuevaring    LMS started today    No concerns for pregnancy         Patient Active Problem List   Diagnosis     ACP (advance care planning)     Chronic pain disorder     Myofascial muscle pain     Paresthesia     Dysuria     Well woman exam with routine gynecological exam     Encounter for preconception consultation     Encounter for surveillance of contraceptives     Other chronic pain     Supervision of normal first pregnancy in first trimester     Chemical dermatitis     Encounter for triage in pregnant patient     Clogged duct, postpartum     Mastitis     Candidiasis of breast     Bilateral carpal tunnel syndrome     Past Surgical History:   Procedure Laterality Date      SECTION N/A 3/15/2019    Procedure:  SECTION;  Surgeon: Pedro Pablo Cantor MD;  Location: [facility]     FOOT SURGERY Right     [place], [place]       Social History     Tobacco Use     Smoking status: Never Smoker     Smokeless tobacco: Never Used   Substance Use Topics     Alcohol use: No     Family History   Problem Relation Age of Onset     Mental Illness Mother      Mental Illness Father      Mental Illness Brother      Cerebrovascular Disease Maternal Grandmother      Cerebrovascular Disease Maternal Grandfather      Aneurysm Maternal Grandfather      Other - See Comments Paternal Grandmother 86        old age     Kidney Disease Paternal Grandfather         on dialysis         Current Outpatient Medications   Medication Sig Dispense Refill     B Complex Vitamins (VITAMIN B COMPLEX PO) Take by mouth daily       fish oil-omega-3 fatty acids 1000 MG capsule Take 1,000 mg by mouth daily       Prenatal Vit-Fe Fumarate-FA (PRENATAL MULTIVITAMIN PLUS IRON) 27-0.8 MG TABS per tablet Take 1 tablet by mouth daily       VITAMIN D, CHOLECALCIFEROL, PO Take 2,000 Units by mouth daily        Allergies   Allergen Reactions     Depo-Provera [Medroxyprogesterone] Swelling     Swelled up, headaches     BP Readings from Last 3 Encounters:   10/23/19 94/52   07/22/19 98/56   05/24/19 102/62    Wt Readings from Last 3 Encounters:   10/23/19 76.2 kg (168 lb)   07/22/19 76.7 kg (169 lb)   05/24/19 74.8 kg (165 lb)                  Reviewed and updated as needed this visit by Provider         Review of Systems   ROS COMP: CONSTITUTIONAL: NEGATIVE for fever, chills, change in weight  INTEGUMENTARY/SKIN: NEGATIVE for worrisome rashes, moles or lesions  RESP: NEGATIVE for significant cough or SOB  CV: NEGATIVE for chest pain, palpitations or peripheral edema  GI: NEGATIVE for nausea, abdominal pain, heartburn, or change in bowel habits  : normal menstrual cycles and denies dysuria       Objective    BP 94/52   Pulse 62   Temp 97.5  F (36.4  C)   Wt 76.2 kg (168 lb)   SpO2 98%   BMI 29.76 kg/m    Body mass index is 29.76 kg/m .  Physical Exam   GENERAL: healthy, alert and no distress  NECK: no adenopathy, no asymmetry, masses, or scars and thyroid normal to palpation  RESP: lungs clear to auscultation - no rales, rhonchi or wheezes  CV: regular rate and rhythm, normal S1 S2, no S3 or S4, no murmur, click or rub, no peripheral edema and peripheral pulses strong  ABDOMEN: soft, nontender, no hepatosplenomegaly, no masses and bowel sounds normal  SKIN: no suspicious lesions or rashes and dry skin  PSYCH: mentation appears normal, affect normal/bright    Diagnostic Test Results:  Labs reviewed in Epic        Assessment & Plan     1. Encounter for surveillance of vaginal ring hormonal contraceptive device  Would like to restart the nuvaring. She has used in the past without any problems. She is considering pregnancy again possible in a year.    - etonogestrel-ethinyl estradiol (NUVARING) 0.12-0.015 MG/24HR vaginal ring; Place 1 each vaginally every 28 days  Dispense: 3 each; Refill: 3     BMI:   Estimated body  "mass index is 29.76 kg/m  as calculated from the following:    Height as of 7/22/19: 1.6 m (5' 3\").    Weight as of this encounter: 76.2 kg (168 lb).           See Patient Instructions    No follow-ups on file.    GUERRERO Gordon [facility] - [place]        "

## 2019-10-23 ENCOUNTER — OFFICE VISIT (OUTPATIENT)
Dept: FAMILY MEDICINE | Facility: OTHER | Age: 34
End: 2019-10-23
Attending: NURSE PRACTITIONER
Payer: COMMERCIAL

## 2019-10-23 VITALS
BODY MASS INDEX: 29.76 KG/M2 | SYSTOLIC BLOOD PRESSURE: 94 MMHG | WEIGHT: 168 LBS | TEMPERATURE: 97.5 F | HEART RATE: 62 BPM | DIASTOLIC BLOOD PRESSURE: 52 MMHG | OXYGEN SATURATION: 98 %

## 2019-10-23 DIAGNOSIS — Z23 NEED FOR PROPHYLACTIC VACCINATION AND INOCULATION AGAINST INFLUENZA: ICD-10-CM

## 2019-10-23 DIAGNOSIS — Z30.44 ENCOUNTER FOR SURVEILLANCE OF VAGINAL RING HORMONAL CONTRACEPTIVE DEVICE: Primary | ICD-10-CM

## 2019-10-23 PROCEDURE — 90686 IIV4 VACC NO PRSV 0.5 ML IM: CPT | Performed by: NURSE PRACTITIONER

## 2019-10-23 PROCEDURE — 90471 IMMUNIZATION ADMIN: CPT | Performed by: NURSE PRACTITIONER

## 2019-10-23 PROCEDURE — 99213 OFFICE O/P EST LOW 20 MIN: CPT | Mod: 25 | Performed by: NURSE PRACTITIONER

## 2019-10-23 RX ORDER — ETONOGESTREL AND ETHINYL ESTRADIOL VAGINAL RING .015; .12 MG/D; MG/D
1 RING VAGINAL
Qty: 3 EACH | Refills: 3 | Status: SHIPPED | OUTPATIENT
Start: 2019-10-23 | End: 2020-08-17

## 2019-10-23 ASSESSMENT — EDINBURGH POSTNATAL DEPRESSION SCALE (EPDS)
THE THOUGHT OF HARMING MYSELF HAS OCCURRED TO ME: SOMETIMES
TOTAL SCORE: 15
I HAVE BLAMED MYSELF UNNECESSARILY WHEN THINGS WENT WRONG: YES, SOME OF THE TIME
I HAVE FELT SAD OR MISERABLE: YES, QUITE OFTEN
I HAVE BEEN SO UNHAPPY THAT I HAVE BEEN CRYING: YES, QUITE OFTEN
I HAVE LOOKED FORWARD WITH ENJOYMENT TO THINGS: RATHER LESS THAN I USED TO
I HAVE BEEN ABLE TO LAUGH AND SEE THE FUNNY SIDE OF THINGS: NOT QUITE SO MUCH NOW
THINGS HAVE BEEN GETTING ON TOP OF ME: YES, SOMETIMES I HAVEN'T BEEN COPING AS WELL AS USUAL
I HAVE FELT SCARED OR PANICKY FOR NO GOOD REASON: NO, NOT MUCH
I HAVE BEEN SO UNHAPPY THAT I HAVE HAD DIFFICULTY SLEEPING: NOT AT ALL
I HAVE BEEN ANXIOUS OR WORRIED FOR NO GOOD REASON: YES, SOMETIMES

## 2019-10-23 ASSESSMENT — PAIN SCALES - GENERAL: PAINLEVEL: NO PAIN (0)

## 2019-10-23 NOTE — PATIENT INSTRUCTIONS
Patient Education     Ethinyl Estradiol, Etonogestrel Vaginal insert  What is this medicine?  ETHINYL ESTRADIOL; ETONOGESTREL (ETH in il es tra DYE ole; et oh ramo SEMAJ trel) vaginal ring is a flexible, vaginal ring used as a contraceptive (birth control method). This medicine combines two types of female hormones, an estrogen and a progestin. This ring is used to prevent ovulation and pregnancy. Each ring is effective for one month.  This medicine may be used for other purposes; ask your health care provider or pharmacist if you have questions.  What should I tell my health care provider before I take this medicine?  They need to know if you have or ever had any of these conditions:    abnormal vaginal bleeding    blood vessel disease or blood clots    breast, cervical, endometrial, ovarian, liver, or uterine cancer    diabetes    gallbladder disease    heart disease or recent heart attack    high blood pressure    high cholesterol    kidney disease    liver disease    migraine headaches    stroke    systemic lupus erythematosus (SLE)    tobacco smoker    an unusual or allergic reaction to estrogens, progestins, other medicines, foods, dyes, or preservatives    pregnant or trying to get pregnant    breast-feeding  How should I use this medicine?  Insert the ring into your vagina as directed. Follow the directions on the prescription label. The ring will remain place for 3 weeks and is then removed for a 1-week break. A new ring is inserted 1 week after the last ring was removed, on the same day of the week. Do not use more often than directed.  A patient package insert for the product will be given with each prescription and refill. Read this sheet carefully each time. The sheet may change frequently.  Contact your pediatrician regarding the use of this medicine in children. Special care may be needed. This medicine has been used in female children who have started having menstrual periods.  Overdosage: If you think  you have taken too much of this medicine contact a poison control center or emergency room at once.  NOTE: This medicine is only for you. Do not share this medicine with others.  What if I miss a dose?  You will need to replace your vaginal ring once a month as directed. If the ring should slip out, or if you leave it in longer or shorter than you should, contact your health care professional for advice.  What may interact with this medicine?    acetaminophen    antibiotics or medicines for infections, especially rifampin, rifabutin, rifapentine, and griseofulvin, and possibly penicillins or tetracyclines    aprepitant    ascorbic acid (vitamin C)    atorvastatin    barbiturate medicines, such as phenobarbital    bosentan    carbamazepine    caffeine    clofibrate    cyclosporine    dantrolene    doxercalciferol    felbamate    grapefruit juice    hydrocortisone    medicines for anxiety or sleeping problems, such as diazepam or temazepam    medicines for diabetes, including pioglitazone    modafinil    mycophenolate    nefazodone    oxcarbazepine    phenytoin    prednisolone    ritonavir or other medicines for HIV infection or AIDS    rosuvastatin    selegiline    soy isoflavones supplements    [place]'s wort    tamoxifen or raloxifene    theophylline    thyroid hormones    topiramate    warfarin  This list may not describe all possible interactions. Give your health care provider a list of all the medicines, herbs, non-prescription drugs, or dietary supplements you use. Also tell them if you smoke, drink alcohol, or use illegal drugs. Some items may interact with your medicine.  What should I watch for while using this medicine?  Visit your doctor or health care professional for regular checks on your progress. You will need a regular breast and pelvic exam and Pap smear while on this medicine.  Use an additional method of contraception during the first cycle that you use this ring.  If you have any reason to  think you are pregnant, stop using this medicine right away and contact your doctor or health care professional.  If you are using this medicine for hormone related problems, it may take several cycles of use to see improvement in your condition.  Smoking increases the risk of getting a blood clot or having a stroke while you are using hormonal birth control, especially if you are more than 35 years old. You are strongly advised not to smoke.  This medicine can make your body retain fluid, making your fingers, hands, or ankles swell. Your blood pressure can go up. Contact your doctor or health care professional if you feel you are retaining fluid.  This medicine can make you more sensitive to the sun. Keep out of the sun. If you cannot avoid being in the sun, wear protective clothing and use sunscreen. Do not use sun lamps or tanning beds/booths.  If you wear contact lenses and notice visual changes, or if the lenses begin to feel uncomfortable, consult your eye care specialist.  In some women, tenderness, swelling, or minor bleeding of the gums may occur. Notify your dentist if this happens. Brushing and flossing your teeth regularly may help limit this. See your dentist regularly and inform your dentist of the medicines you are taking.  If you are going to have elective surgery, you may need to stop using this medicine before the surgery. Consult your health care professional for advice.  This medicine does not protect you against HIV infection (AIDS) or any other sexually transmitted diseases.  What side effects may I notice from receiving this medicine?  Side effects that you should report to your doctor or health care professional as soon as possible:    breast tissue changes or discharge    changes in vaginal bleeding during your period or between your periods    chest pain    coughing up blood    dizziness or fainting spells    headaches or migraines    leg, arm or groin pain    severe or sudden  headaches    stomach pain (severe)    sudden shortness of breath    sudden loss of coordination, especially on one side of the body    speech problems    symptoms of vaginal infection like itching, irritation or unusual discharge    tenderness in the upper abdomen    vomiting    weakness or numbness in the arms or legs, especially on one side of the body    yellowing of the eyes or skin  Side effects that usually do not require medical attention (report to your doctor or health care professional if they continue or are bothersome):    breakthrough bleeding and spotting that continues beyond the 3 initial cycles of pills    breast tenderness    mood changes, anxiety, depression, frustration, anger, or emotional outbursts    increased sensitivity to sun or ultraviolet light    nausea    skin rash, acne, or brown spots on the skin    weight gain (slight)  This list may not describe all possible side effects. Call your doctor for medical advice about side effects. You may report side effects to FDA at 6-307-FDA-5294.  Where should I keep my medicine?  Keep out of the reach of children.  Store at room temperature between 15 and 30 degrees C (59 and 86 degrees F) for up to 4 months. The product will  after 4 months. Protect from light. Throw away any unused medicine after the expiration date.  NOTE:This sheet is a summary. It may not cover all possible information. If you have questions about this medicine, talk to your doctor, pharmacist, or health care provider. Copyright  2016 Gold Standard

## 2019-10-23 NOTE — NURSING NOTE
"Chief Complaint   Patient presents with     Contraception       Initial BP 94/52   Pulse 62   Temp 97.5  F (36.4  C)   Wt 76.2 kg (168 lb)   SpO2 98%   BMI 29.76 kg/m   Estimated body mass index is 29.76 kg/m  as calculated from the following:    Height as of 7/22/19: 1.6 m (5' 3\").    Weight as of this encounter: 76.2 kg (168 lb).  Medication Reconciliation: complete  Jessa Behrman, LPN  "

## 2019-11-08 NOTE — PROGRESS NOTES
Subjective     Barbi Vale is a 34 year old female who presents to clinic today for the following health issues:    HPI   Anxiety Follow-Up    How are you doing with your anxiety since your last visit? Worsened     Are you having other symptoms that might be associated with anxiety? Yes:  very fatigue    Have you had a significant life event? Job Concerns     Are you feeling depressed? No    Do you have any concerns with your use of alcohol or other drugs? No    Social History     Tobacco Use     Smoking status: Never Smoker     Smokeless tobacco: Never Used   Substance Use Topics     Alcohol use: No     Drug use: No     SUHA-7 SCORE 7/31/2018 8/13/2018 9/24/2018   Total Score 0 0 0     PHQ 8/13/2018 9/24/2018 5/17/2019   PHQ-9 Total Score 2 2 1   Q9: Thoughts of better off dead/self-harm past 2 weeks Not at all Not at all Not at all     Last PHQ-9 5/17/2019   1.  Little interest or pleasure in doing things 0   2.  Feeling down, depressed, or hopeless 0   3.  Trouble falling or staying asleep, or sleeping too much 0   4.  Feeling tired or having little energy 1   5.  Poor appetite or overeating 0   6.  Feeling bad about yourself 0   7.  Trouble concentrating 0   8.  Moving slowly or restless 0   Q9: Thoughts of better off dead/self-harm past 2 weeks 0   PHQ-9 Total Score 1   Difficulty at work, home, or with people -     SUHA-7  3/21/2019   1. Feeling nervous, anxious, or on edge 0   2. Not being able to stop or control worrying 0   3. Worrying too much about different things 0   4. Trouble relaxing 0   5. Being so restless that it is hard to sit still 0   6. Becoming easily annoyed or irritable -   7. Feeling afraid, as if something awful might happen -   SUHA-7 Total Score -   If you checked any problems, how difficult have they made it for you to do your work, take care of things at home, or get along with other people? -         How many servings of fruits and vegetables do you eat daily?  2-3    On average,  how many sweetened beverages do you drink each day (soda, juice, sweet tea, etc)?   1    How many days per week do you miss taking your medication? 0        Patient Active Problem List   Diagnosis     ACP (advance care planning)     Chronic pain disorder     Myofascial muscle pain     Paresthesia     Dysuria     Well woman exam with routine gynecological exam     Encounter for preconception consultation     Encounter for surveillance of contraceptives     Other chronic pain     Supervision of normal first pregnancy in first trimester     Chemical dermatitis     Encounter for triage in pregnant patient     Clogged duct, postpartum     Mastitis     Candidiasis of breast     Bilateral carpal tunnel syndrome     Past Surgical History:   Procedure Laterality Date      SECTION N/A 3/15/2019    Procedure:  SECTION;  Surgeon: Pedro Pablo Cantor MD;  Location: [facility]     FOOT SURGERY Right     [place], [place]       Social History     Tobacco Use     Smoking status: Never Smoker     Smokeless tobacco: Never Used   Substance Use Topics     Alcohol use: No     Family History   Problem Relation Age of Onset     Mental Illness Mother      Mental Illness Father      Mental Illness Brother      Cerebrovascular Disease Maternal Grandmother      Cerebrovascular Disease Maternal Grandfather      Aneurysm Maternal Grandfather      Other - See Comments Paternal Grandmother 86        old age     Kidney Disease Paternal Grandfather         on dialysis         Current Outpatient Medications   Medication Sig Dispense Refill     B Complex Vitamins (VITAMIN B COMPLEX PO) Take by mouth daily       etonogestrel-ethinyl estradiol (NUVARING) 0.12-0.015 MG/24HR vaginal ring Place 1 each vaginally every 28 days 3 each 3     Prenatal Vit-Fe Fumarate-FA (PRENATAL MULTIVITAMIN PLUS IRON) 27-0.8 MG TABS per tablet Take 1 tablet by mouth daily       VITAMIN D, CHOLECALCIFEROL, PO Take 4,000 Units by mouth daily        Allergies  "  Allergen Reactions     Depo-Provera [Medroxyprogesterone] Swelling     Swelled up, headaches     BP Readings from Last 3 Encounters:   11/11/19 100/61   10/23/19 94/52   07/22/19 98/56    Wt Readings from Last 3 Encounters:   11/11/19 77.1 kg (170 lb)   10/23/19 76.2 kg (168 lb)   07/22/19 76.7 kg (169 lb)                 Reviewed and updated as needed this visit by Provider         Review of Systems   ROS COMP: CONSTITUTIONAL: NEGATIVE for fever, chills, change in weight  INTEGUMENTARY/SKIN: NEGATIVE for worrisome rashes, moles or lesions  RESP: NEGATIVE for significant cough or SOB  CV: NEGATIVE for chest pain, palpitations or peripheral edema  ENDOCRINE: increased fatigue  PSYCHIATRIC: increased stress and anxiety       Objective    /61 (BP Location: Left arm, Cuff Size: Adult Large)   Pulse 71   Temp 99.1  F (37.3  C) (Tympanic)   Ht 1.6 m (5' 3\")   Wt 77.1 kg (170 lb)   SpO2 96%   BMI 30.11 kg/m    Body mass index is 30.11 kg/m .  Physical Exam   GENERAL: healthy, alert and no distress  NECK: no adenopathy, no asymmetry, masses, or scars and thyroid normal to palpation  RESP: lungs clear to auscultation - no rales, rhonchi or wheezes  CV: regular rate and rhythm, normal S1 S2, no S3 or S4, no murmur, click or rub, no peripheral edema and peripheral pulses strong  ABDOMEN: soft, nontender, no hepatosplenomegaly, no masses and bowel sounds normal  SKIN: no suspicious lesions or rashes  PSYCH: mentation appears normal and anxious    Diagnostic Test Results:  No results found for this or any previous visit (from the past 24 hour(s)).  TSH and vitamin D levels pending        Assessment & Plan     1. Anxiety  Can use hydroxyzine as needed for anxiety or panic attacks.  Discussed symptomatic self care for anxiety  She does not feel like she needs a daily medication at this time. She states she is planning on changing up her  and decreasing her anxiety (she plans to fire the children that are " "increasing her anxiety)  - hydrOXYzine (ATARAX) 25 MG tablet; Take 1-2 tablets (25-50 mg) by mouth 3 times daily as needed for itching  Dispense: 20 tablet; Refill: 1    2. Low vitamin D level  History of low Vitamin D and she states the last time she had increased fatigue is when her vitamin D level was low. Plan to check vitamin D and TSH today. Plan to notify once results are available.   - Vitamin D Deficiency    3. Fatigue, unspecified type  As above  - Vitamin D Deficiency  - TSH with free T4 reflex     BMI:   Estimated body mass index is 30.11 kg/m  as calculated from the following:    Height as of this encounter: 1.6 m (5' 3\").    Weight as of this encounter: 77.1 kg (170 lb).           See Patient Instructions    No follow-ups on file.    GUERRERO Gordon [facility] - [place]      "

## 2019-11-11 ENCOUNTER — OFFICE VISIT (OUTPATIENT)
Dept: FAMILY MEDICINE | Facility: OTHER | Age: 34
End: 2019-11-11
Attending: NURSE PRACTITIONER
Payer: COMMERCIAL

## 2019-11-11 VITALS
HEART RATE: 71 BPM | TEMPERATURE: 99.1 F | BODY MASS INDEX: 30.12 KG/M2 | HEIGHT: 63 IN | DIASTOLIC BLOOD PRESSURE: 61 MMHG | OXYGEN SATURATION: 96 % | WEIGHT: 170 LBS | SYSTOLIC BLOOD PRESSURE: 100 MMHG

## 2019-11-11 DIAGNOSIS — R53.83 FATIGUE, UNSPECIFIED TYPE: ICD-10-CM

## 2019-11-11 DIAGNOSIS — R79.89 LOW VITAMIN D LEVEL: ICD-10-CM

## 2019-11-11 DIAGNOSIS — F41.9 ANXIETY: Primary | ICD-10-CM

## 2019-11-11 LAB — TSH SERPL DL<=0.005 MIU/L-ACNC: 3.41 MU/L (ref 0.4–4)

## 2019-11-11 PROCEDURE — 99213 OFFICE O/P EST LOW 20 MIN: CPT | Performed by: NURSE PRACTITIONER

## 2019-11-11 PROCEDURE — 36415 COLL VENOUS BLD VENIPUNCTURE: CPT | Performed by: NURSE PRACTITIONER

## 2019-11-11 PROCEDURE — 84443 ASSAY THYROID STIM HORMONE: CPT | Performed by: NURSE PRACTITIONER

## 2019-11-11 PROCEDURE — 82306 VITAMIN D 25 HYDROXY: CPT | Performed by: NURSE PRACTITIONER

## 2019-11-11 RX ORDER — HYDROXYZINE HYDROCHLORIDE 25 MG/1
25-50 TABLET, FILM COATED ORAL 3 TIMES DAILY PRN
Qty: 20 TABLET | Refills: 1 | Status: SHIPPED | OUTPATIENT
Start: 2019-11-11 | End: 2020-01-07

## 2019-11-11 ASSESSMENT — MIFFLIN-ST. JEOR: SCORE: 1440.24

## 2019-11-11 ASSESSMENT — PAIN SCALES - GENERAL: PAINLEVEL: NO PAIN (0)

## 2019-11-11 NOTE — PATIENT INSTRUCTIONS
Patient Education   Treatment for anxiety:    -meditation   APPS: (suggested by the ADA) can download on IPhone and Android    -Calm   -Headspace   -Insight Timer  -yoga  -journaling  -weighted blanket  -Epson Salt and lavender baths      Understanding Anxiety Disorders    Almost everyone gets nervous now and then. It s normal to have knots in your stomach before a test, or for your heart to race on a first date. But an anxiety disorder is much more than a case of nerves. In fact, its symptoms may be overwhelming. But treatment can relieve many of these symptoms. Talking to your healthcare provider is the first step.  What are anxiety disorders?  An anxiety disorder causes intense feelings of panic and fear. These feelings may arise for no apparent reason. And they tend to recur again and again. They may prevent you from coping with life and cause you great distress. As a result, you may avoid anything that triggers your fear. In extreme cases, you may never leave the house. Anxiety disorders may cause other symptoms, such as:    Obsessive thoughts you can t control    Constant nightmares or painful thoughts of the past    Nausea, sweating, and muscle tension    Trouble sleeping or concentrating  What causes anxiety disorders?  Anxiety disorders tend to run in families. For some people, childhood abuse or neglect may play a role. For others, stressful life events or trauma may trigger anxiety disorders. Anxiety can trigger low self-esteem and poor coping skills.  Common anxiety disorders    Panic disorder. This causes an intense fear of being in danger.    Phobias. These are extreme fears of certain objects, places, or events.    Obsessive-compulsive disorder. This causes you to have unwanted thoughts and urges. You also may perform certain actions over and over.    Posttraumatic stress disorder. This occurs in people who have survived a terrible ordeal. It can cause nightmares and flashbacks about the  event.    Generalized anxiety disorder. This causes constant worry that can greatly disrupt your life.   Getting better  You may believe that nothing can help you. Or, you might fear what others may think. But most anxiety symptoms can be eased. Having an anxiety disorder is nothing to be ashamed of. Most people do best with treatment that combines medicine and therapy. These aren t cures. But they can help you live a healthier life.  Date Last Reviewed: 2/1/2017 2000-2018 [employer]. [address] [place] [address], [place] [address]. All rights reserved. This information is not intended as a substitute for professional medical care. Always follow your healthcare professional's instructions.

## 2019-11-11 NOTE — NURSING NOTE
"Chief Complaint   Patient presents with     Anxiety       Initial /61 (BP Location: Left arm, Cuff Size: Adult Large)   Pulse 71   Temp 99.1  F (37.3  C) (Tympanic)   Ht 1.6 m (5' 3\")   Wt 77.1 kg (170 lb)   SpO2 96%   BMI 30.11 kg/m   Estimated body mass index is 30.11 kg/m  as calculated from the following:    Height as of this encounter: 1.6 m (5' 3\").    Weight as of this encounter: 77.1 kg (170 lb).  Medication Reconciliation: complete  Taylor Foster LPN  "

## 2019-11-12 ASSESSMENT — ANXIETY QUESTIONNAIRES
1. FEELING NERVOUS, ANXIOUS, OR ON EDGE: SEVERAL DAYS
4. TROUBLE RELAXING: NEARLY EVERY DAY
2. NOT BEING ABLE TO STOP OR CONTROL WORRYING: SEVERAL DAYS
5. BEING SO RESTLESS THAT IT IS HARD TO SIT STILL: NOT AT ALL
IF YOU CHECKED OFF ANY PROBLEMS ON THIS QUESTIONNAIRE, HOW DIFFICULT HAVE THESE PROBLEMS MADE IT FOR YOU TO DO YOUR WORK, TAKE CARE OF THINGS AT HOME, OR GET ALONG WITH OTHER PEOPLE: VERY DIFFICULT
3. WORRYING TOO MUCH ABOUT DIFFERENT THINGS: SEVERAL DAYS
6. BECOMING EASILY ANNOYED OR IRRITABLE: NEARLY EVERY DAY
7. FEELING AFRAID AS IF SOMETHING AWFUL MIGHT HAPPEN: NOT AT ALL
GAD7 TOTAL SCORE: 9

## 2019-11-12 ASSESSMENT — PATIENT HEALTH QUESTIONNAIRE - PHQ9: SUM OF ALL RESPONSES TO PHQ QUESTIONS 1-9: 12

## 2019-11-13 LAB — DEPRECATED CALCIDIOL+CALCIFEROL SERPL-MC: 29 UG/L (ref 20–75)

## 2019-11-13 ASSESSMENT — ANXIETY QUESTIONNAIRES: GAD7 TOTAL SCORE: 9

## 2020-01-07 ENCOUNTER — OFFICE VISIT (OUTPATIENT)
Dept: OBGYN | Facility: OTHER | Age: 35
End: 2020-01-07
Attending: NURSE PRACTITIONER
Payer: COMMERCIAL

## 2020-01-07 VITALS
BODY MASS INDEX: 30.65 KG/M2 | DIASTOLIC BLOOD PRESSURE: 52 MMHG | HEART RATE: 85 BPM | WEIGHT: 173 LBS | SYSTOLIC BLOOD PRESSURE: 90 MMHG | HEIGHT: 63 IN

## 2020-01-07 DIAGNOSIS — Z30.09 FAMILY PLANNING ADVICE: Primary | ICD-10-CM

## 2020-01-07 PROCEDURE — 99212 OFFICE O/P EST SF 10 MIN: CPT | Performed by: NURSE PRACTITIONER

## 2020-01-07 RX ORDER — FLUOXETINE 10 MG/1
TABLET, FILM COATED ORAL
COMMUNITY
Start: 2019-12-23 | End: 2020-09-11

## 2020-01-07 ASSESSMENT — MIFFLIN-ST. JEOR: SCORE: 1453.85

## 2020-01-07 ASSESSMENT — PAIN SCALES - GENERAL: PAINLEVEL: NO PAIN (0)

## 2020-01-07 NOTE — NURSING NOTE
"Chief Complaint   Patient presents with     family planning       Initial BP 90/52   Pulse 85   Ht 1.6 m (5' 3\")   Wt 78.5 kg (173 lb)   BMI 30.65 kg/m   Estimated body mass index is 30.65 kg/m  as calculated from the following:    Height as of this encounter: 1.6 m (5' 3\").    Weight as of this encounter: 78.5 kg (173 lb).  Medication Reconciliation: complete  Chandrika Suárez LPN    "

## 2020-01-09 ASSESSMENT — PATIENT HEALTH QUESTIONNAIRE - PHQ9: SUM OF ALL RESPONSES TO PHQ QUESTIONS 1-9: 7

## 2020-01-09 NOTE — PROGRESS NOTES
"[facility]                HPI   Barbi presents today with questions about child spacing and planning another pregnancy.  Her daughter is currently 5 months old.  She states that she is not planning on trying for another baby until her daughter is almost 2 years old.  She is worried that she may be too old at that time but soes not feel she would be ready prior to this.  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  She states the family is still trying to work out their roles and have recently begun counseling to help with this. She recently started prozac and she feels this may be helping but it has only been a week.  She is breastfeeding.              Medications:     Current Outpatient Medications Ordered in Epic   Medication     etonogestrel-ethinyl estradiol (NUVARING) 0.12-0.015 MG/24HR vaginal ring     FLUoxetine (PROZAC) 10 MG tablet     VITAMIN D, CHOLECALCIFEROL, PO     No current Epic-ordered facility-administered medications on file.                 Allergies:   Depo-provera [medroxyprogesterone]         Review of Systems:   The 5 point Review of Systems is negative other than noted in the HPI                     Physical Exam:   Blood pressure 90/52, pulse 85, height 1.6 m (5' 3\"), weight 78.5 kg (173 lb), currently breastfeeding.  Constitutional:   awake, alert, cooperative, no apparent distress, and appears stated age              Assessment and Plan:   Family planning - discussed child spacing and did not recommend pregnancy during her daughters first year of life.  Encouraged continued PNV. Continue counseling. Information on pregnancy and AMA provided.         Janice Jimenez NP, CFNP  1/9/2020  1:06 PM  "

## 2020-03-03 NOTE — PROGRESS NOTES
Subjective     Barbi Vale is a 34 year old female who presents to clinic today for the following health issues:    HPI   Anxiety Follow-Up    How are you doing with your anxiety since your last visit? Improved     Are you having other symptoms that might be associated with anxiety? No    Have you had a significant life event? No     Are you feeling depressed? No    Do you have any concerns with your use of alcohol or other drugs? No     Taking Fluoxetine (prozac) 10 mg daily    Social History     Tobacco Use     Smoking status: Never Smoker     Smokeless tobacco: Never Used   Substance Use Topics     Alcohol use: No     Drug use: No     SUHA-7 SCORE 8/13/2018 9/24/2018 11/12/2019   Total Score 0 0 9     PHQ 5/17/2019 11/12/2019 1/9/2020   PHQ-9 Total Score 1 12 7   Q9: Thoughts of better off dead/self-harm past 2 weeks Not at all Not at all Not at all     Last PHQ-9 1/9/2020   1.  Little interest or pleasure in doing things 0   2.  Feeling down, depressed, or hopeless 0   3.  Trouble falling or staying asleep, or sleeping too much 3   4.  Feeling tired or having little energy 3   5.  Poor appetite or overeating 1   6.  Feeling bad about yourself 0   7.  Trouble concentrating 0   8.  Moving slowly or restless 0   Q9: Thoughts of better off dead/self-harm past 2 weeks 0   PHQ-9 Total Score 7   Difficulty at work, home, or with people Somewhat difficult     SUHA-7  11/12/2019   1. Feeling nervous, anxious, or on edge 1   2. Not being able to stop or control worrying 1   3. Worrying too much about different things 1   4. Trouble relaxing 3   5. Being so restless that it is hard to sit still 0   6. Becoming easily annoyed or irritable 3   7. Feeling afraid, as if something awful might happen 0   SUHA-7 Total Score 9   If you checked any problems, how difficult have they made it for you to do your work, take care of things at home, or get along with other people? Very difficult           Patient Active Problem List    Diagnosis     ACP (advance care planning)     Chronic pain disorder     Myofascial muscle pain     Paresthesia     Dysuria     Well woman exam with routine gynecological exam     Encounter for preconception consultation     Encounter for surveillance of contraceptives     Other chronic pain     Supervision of normal first pregnancy in first trimester     Chemical dermatitis     Encounter for triage in pregnant patient     Clogged duct, postpartum     Mastitis     Candidiasis of breast     Bilateral carpal tunnel syndrome     Past Surgical History:   Procedure Laterality Date      SECTION N/A 3/15/2019    Procedure:  SECTION;  Surgeon: Pedro Pablo Cantor MD;  Location: [facility]     FOOT SURGERY Right     [place], [place]       Social History     Tobacco Use     Smoking status: Never Smoker     Smokeless tobacco: Never Used   Substance Use Topics     Alcohol use: No     Family History   Problem Relation Age of Onset     Mental Illness Mother      Mental Illness Father      Mental Illness Brother      Cerebrovascular Disease Maternal Grandmother      Cerebrovascular Disease Maternal Grandfather      Aneurysm Maternal Grandfather      Other - See Comments Paternal Grandmother 86        old age     Kidney Disease Paternal Grandfather         on dialysis         Current Outpatient Medications   Medication Sig Dispense Refill     etonogestrel-ethinyl estradiol (NUVARING) 0.12-0.015 MG/24HR vaginal ring Place 1 each vaginally every 28 days 3 each 3     FLUoxetine (PROZAC) 10 MG tablet        VITAMIN D, CHOLECALCIFEROL, PO Take 4,000 Units by mouth daily        Allergies   Allergen Reactions     Depo-Provera [Medroxyprogesterone] Swelling     Swelled up, headaches     BP Readings from Last 3 Encounters:   03/10/20 92/58   20 90/52   19 100/61    Wt Readings from Last 3 Encounters:   03/10/20 78.9 kg (174 lb)   20 78.5 kg (173 lb)   19 77.1 kg (170 lb)                    Reviewed and  "updated as needed this visit by Provider         Review of Systems   ROS COMP: CONSTITUTIONAL: NEGATIVE for fever, chills, change in weight  INTEGUMENTARY/SKIN: NEGATIVE for worrisome rashes, moles or lesions  RESP: NEGATIVE for significant cough or SOB  CV: NEGATIVE for chest pain, palpitations or peripheral edema  PSYCHIATRIC: HX anxiety and HX depression      Objective    BP 92/58 (Patient Position: Sitting)   Pulse 79   Ht 1.6 m (5' 3\")   Wt 78.9 kg (174 lb)   SpO2 97%   BMI 30.82 kg/m    Body mass index is 30.82 kg/m .  Physical Exam   GENERAL: healthy, alert and no distress  NECK: no adenopathy, no asymmetry, masses, or scars and thyroid normal to palpation  RESP: lungs clear to auscultation - no rales, rhonchi or wheezes  CV: regular rate and rhythm, normal S1 S2, no S3 or S4, no murmur, click or rub, no peripheral edema and peripheral pulses strong  ABDOMEN: soft, nontender, no hepatosplenomegaly, no masses and bowel sounds normal  SKIN: no suspicious lesions or rashes  PSYCH: mentation appears normal and anxious    Diagnostic Test Results:  Labs reviewed in Epic        Assessment & Plan     1. Anxiety  She has started on Prozac prescribed by Xuan Caldwell NP with psychiatry at [facility].  Plan to continue on current dose        BMI:   Estimated body mass index is 30.82 kg/m  as calculated from the following:    Height as of this encounter: 1.6 m (5' 3\").    Weight as of this encounter: 78.9 kg (174 lb).   Weight management plan: Discussed healthy diet and exercise guidelines        See Patient Instructions    Return in about 6 months (around 9/10/2020) for anxiety, depression.    GUERRERO Gordon [facility] [place]    "

## 2020-03-10 ENCOUNTER — OFFICE VISIT (OUTPATIENT)
Dept: FAMILY MEDICINE | Facility: OTHER | Age: 35
End: 2020-03-10
Attending: NURSE PRACTITIONER
Payer: COMMERCIAL

## 2020-03-10 VITALS
HEIGHT: 63 IN | DIASTOLIC BLOOD PRESSURE: 58 MMHG | OXYGEN SATURATION: 97 % | BODY MASS INDEX: 30.83 KG/M2 | WEIGHT: 174 LBS | SYSTOLIC BLOOD PRESSURE: 92 MMHG | HEART RATE: 79 BPM

## 2020-03-10 DIAGNOSIS — F41.9 ANXIETY: Primary | ICD-10-CM

## 2020-03-10 PROCEDURE — 99213 OFFICE O/P EST LOW 20 MIN: CPT | Performed by: NURSE PRACTITIONER

## 2020-03-10 ASSESSMENT — MIFFLIN-ST. JEOR: SCORE: 1458.39

## 2020-03-10 ASSESSMENT — PAIN SCALES - GENERAL: PAINLEVEL: NO PAIN (0)

## 2020-03-10 NOTE — NURSING NOTE
"Chief Complaint   Patient presents with     Anxiety       Initial BP 92/58 (Patient Position: Sitting)   Pulse 79   Ht 1.6 m (5' 3\")   Wt 78.9 kg (174 lb)   SpO2 97%   BMI 30.82 kg/m   Estimated body mass index is 30.82 kg/m  as calculated from the following:    Height as of this encounter: 1.6 m (5' 3\").    Weight as of this encounter: 78.9 kg (174 lb).  Medication Reconciliation: complete  Zakia Umana LPN  "

## 2020-08-15 DIAGNOSIS — Z30.44 ENCOUNTER FOR SURVEILLANCE OF VAGINAL RING HORMONAL CONTRACEPTIVE DEVICE: ICD-10-CM

## 2020-08-17 RX ORDER — ETONOGESTREL/ETHINYL ESTRADIOL .12-.015MG
RING, VAGINAL VAGINAL
Qty: 3 EACH | Refills: 0 | Status: SHIPPED | OUTPATIENT
Start: 2020-08-17 | End: 2020-09-11

## 2020-09-08 ENCOUNTER — TELEPHONE (OUTPATIENT)
Dept: FAMILY MEDICINE | Facility: OTHER | Age: 35
End: 2020-09-08

## 2020-09-08 NOTE — PROGRESS NOTES
"Subjective     Barbi Vale is a 34 year old female who presents to clinic today for the following health issues:    HPI       Depression and Anxiety Follow-Up    How are you doing with your depression since your last visit? { :727061::\"No change\"}    How are you doing with your anxiety since your last visit?  { :878748::\"No change\"}    Are you having other symptoms that might be associated with depression or anxiety? { :020897}    Have you had a significant life event? { :180877}     Do you have any concerns with your use of alcohol or other drugs? { :064981}    Social History     Tobacco Use     Smoking status: Never Smoker     Smokeless tobacco: Never Used   Substance Use Topics     Alcohol use: No     Drug use: No     PHQ 5/17/2019 11/12/2019 1/9/2020   PHQ-9 Total Score 1 12 7   Q9: Thoughts of better off dead/self-harm past 2 weeks Not at all Not at all Not at all     SUHA-7 SCORE 8/13/2018 9/24/2018 11/12/2019   Total Score 0 0 9     {Last PHQ9 or GAD7 Responses (Optional):063303}    Suicide Assessment Five-step Evaluation and Treatment (SAFE-T)      How many servings of fruits and vegetables do you eat daily?  { :352456}    On average, how many sweetened beverages do you drink each day (Examples: soda, juice, sweet tea, etc.  Do NOT count diet or artificially sweetened beverages)?   { 1-11:910710}    How many days per week do you exercise enough to make your heart beat faster? { :330632}    How many minutes a day do you exercise enough to make your heart beat faster? { :924118}    How many days per week do you miss taking your medication? {0-7 :337170}    {additonal problems for provider to add (Optional):129843}    Review of Systems   {ROS COMP (Optional):230696}      Objective    There were no vitals taken for this visit.  There is no height or weight on file to calculate BMI.  Physical Exam   {Exam List (Optional):499434}    {Diagnostic Test Results (Optional):889519}        {PROVIDER CHARTING " PREFERENCE:756586}

## 2020-09-08 NOTE — TELEPHONE ENCOUNTER
Left message to call back to reschedule appointment- patient was scheduled during symptomatic time and at a start time that does not exist. She could schedule all day on Wednesday or change to a telephone call on Friday. Lashonda Yoder LPN

## 2020-09-11 ENCOUNTER — OFFICE VISIT (OUTPATIENT)
Dept: FAMILY MEDICINE | Facility: OTHER | Age: 35
End: 2020-09-11
Attending: NURSE PRACTITIONER
Payer: COMMERCIAL

## 2020-09-11 DIAGNOSIS — F41.9 ANXIETY: Primary | ICD-10-CM

## 2020-09-11 DIAGNOSIS — Z30.44 ENCOUNTER FOR SURVEILLANCE OF VAGINAL RING HORMONAL CONTRACEPTIVE DEVICE: ICD-10-CM

## 2020-09-11 PROCEDURE — 99213 OFFICE O/P EST LOW 20 MIN: CPT | Mod: 95 | Performed by: NURSE PRACTITIONER

## 2020-09-11 RX ORDER — ETONOGESTREL AND ETHINYL ESTRADIOL VAGINAL RING .015; .12 MG/D; MG/D
RING VAGINAL
Qty: 3 EACH | Refills: 11 | Status: SHIPPED | OUTPATIENT
Start: 2020-09-11 | End: 2022-01-13

## 2020-09-11 ASSESSMENT — ANXIETY QUESTIONNAIRES
6. BECOMING EASILY ANNOYED OR IRRITABLE: NOT AT ALL
1. FEELING NERVOUS, ANXIOUS, OR ON EDGE: NOT AT ALL
3. WORRYING TOO MUCH ABOUT DIFFERENT THINGS: NOT AT ALL
4. TROUBLE RELAXING: SEVERAL DAYS
7. FEELING AFRAID AS IF SOMETHING AWFUL MIGHT HAPPEN: NOT AT ALL
5. BEING SO RESTLESS THAT IT IS HARD TO SIT STILL: NOT AT ALL
GAD7 TOTAL SCORE: 1
2. NOT BEING ABLE TO STOP OR CONTROL WORRYING: NOT AT ALL

## 2020-09-11 ASSESSMENT — PAIN SCALES - GENERAL: PAINLEVEL: NO PAIN (0)

## 2020-09-11 ASSESSMENT — PATIENT HEALTH QUESTIONNAIRE - PHQ9: SUM OF ALL RESPONSES TO PHQ QUESTIONS 1-9: 3

## 2020-09-11 NOTE — NURSING NOTE
"Chief Complaint   Patient presents with     Depression     Anxiety       Initial There were no vitals taken for this visit. Estimated body mass index is 30.82 kg/m  as calculated from the following:    Height as of 3/10/20: 1.6 m (5' 3\").    Weight as of 3/10/20: 78.9 kg (174 lb).  Medication Reconciliation: complete  Lashonda Yoder LPN  "

## 2020-09-11 NOTE — PROGRESS NOTES
"Barbi Vale is a 34 year old female who is being evaluated via a billable telephone visit.      The patient has been notified of following:     \"This telephone visit will be conducted via a call between you and your physician/provider. We have found that certain health care needs can be provided without the need for a physical exam.  This service lets us provide the care you need with a short phone conversation.  If a prescription is necessary we can send it directly to your pharmacy.  If lab work is needed we can place an order for that and you can then stop by our lab to have the test done at a later time.    Telephone visits are billed at different rates depending on your insurance coverage. During this emergency period, for some insurers they may be billed the same as an in-person visit.  Please reach out to your insurance provider with any questions.    If during the course of the call the physician/provider feels a telephone visit is not appropriate, you will not be charged for this service.\"    Patient has given verbal consent for Telephone visit?  Yes    What phone number would you like to be contacted at? [phone]    How would you like to obtain your AVS? [employer]ace[employer]    Subjective     Barbi Vale is a 34 year old female who presents via phone visit today for the following health issues:    HPI    Depression and Anxiety Follow-Up    How are you doing with your depression since your last visit? Improved     How are you doing with your anxiety since your last visit?  Improved     Are you having other symptoms that might be associated with depression or anxiety? No    Have you had a significant life event? No     Do you have any concerns with your use of alcohol or other drugs? No     Stopped taking prozac - states she feels fine without medication     Continuing with counseling - Nadia and considering marriage counseling    Social History     Tobacco Use     Smoking status: Never Smoker     " Smokeless tobacco: Never Used   Substance Use Topics     Alcohol use: No     Drug use: No     PHQ 11/12/2019 1/9/2020 9/11/2020   PHQ-9 Total Score 12 7 3   Q9: Thoughts of better off dead/self-harm past 2 weeks Not at all Not at all Not at all     SUHA-7 SCORE 9/24/2018 11/12/2019 9/11/2020   Total Score 0 9 1     Last PHQ-9 9/11/2020   1.  Little interest or pleasure in doing things 0   2.  Feeling down, depressed, or hopeless 0   3.  Trouble falling or staying asleep, or sleeping too much 1   4.  Feeling tired or having little energy 1   5.  Poor appetite or overeating 1   6.  Feeling bad about yourself 0   7.  Trouble concentrating 0   8.  Moving slowly or restless 0   Q9: Thoughts of better off dead/self-harm past 2 weeks 0   PHQ-9 Total Score 3   Difficulty at work, home, or with people -     SUHA-7  9/11/2020   1. Feeling nervous, anxious, or on edge 0   2. Not being able to stop or control worrying 0   3. Worrying too much about different things 0   4. Trouble relaxing 1   5. Being so restless that it is hard to sit still 0   6. Becoming easily annoyed or irritable 0   7. Feeling afraid, as if something awful might happen 0   SUHA-7 Total Score 1   If you checked any problems, how difficult have they made it for you to do your work, take care of things at home, or get along with other people? -       Suicide Assessment Five-step Evaluation and Treatment (SAFE-T)      How many servings of fruits and vegetables do you eat daily?  4 or more    On average, how many sweetened beverages do you drink each day (Examples: soda, juice, sweet tea, etc.  Do NOT count diet or artificially sweetened beverages)?   2    How many days per week do you exercise enough to make your heart beat faster? 3 or less    How many minutes a day do you exercise enough to make your heart beat faster? 9 or less    How many days per week do you miss taking your medication? 0             Review of Systems   CONSTITUTIONAL: NEGATIVE for fever,  "chills, change in weight  INTEGUMENTARY/SKIN: NEGATIVE for worrisome rashes, moles or lesions  RESP: NEGATIVE for significant cough or SOB  CV: NEGATIVE for chest pain, palpitations or peripheral edema  PSYCHIATRIC: HX anxiety       Objective   Vitals - Patient Reported  Pain Score: No Pain (0)        healthy, alert and no distress  PSYCH: Alert and oriented times 3; coherent speech, normal   rate and volume, able to articulate logical thoughts, able   to abstract reason, no tangential thoughts, no hallucinations   or delusions  Her affect is pleasant and anxious  RESP: No cough, no audible wheezing, able to talk in full sentences  Remainder of exam unable to be completed due to telephone visits    Checking vitamin D level        Assessment/Plan:    Assessment & Plan     Encounter for surveillance of vaginal ring hormonal contraceptive device  Continue current plan of care  - etonogestrel-ethinyl estradiol (NUVARING) 0.12-0.015 MG/24HR vaginal ring; INSERT 1 RING VAGINALLY EVERY 28 DAYS    Anxiety  No medications at this time  Continue with counseling   Will check vitamin D level   - Vitamin D Deficiency; Future     BMI:   Estimated body mass index is 30.82 kg/m  as calculated from the following:    Height as of 3/10/20: 1.6 m (5' 3\").    Weight as of 3/10/20: 78.9 kg (174 lb).       Plan to schedule physical in February 2021    See Patient Instructions    No follow-ups on file.    GUERRERO Gordon [facility] [place]    Phone call duration:  6 minutes  Call started 4:06  Call ended 4:12                "

## 2020-09-12 ASSESSMENT — ANXIETY QUESTIONNAIRES: GAD7 TOTAL SCORE: 1

## 2020-11-10 DIAGNOSIS — Z30.44 ENCOUNTER FOR SURVEILLANCE OF VAGINAL RING HORMONAL CONTRACEPTIVE DEVICE: ICD-10-CM

## 2020-11-10 RX ORDER — ETONOGESTREL AND ETHINYL ESTRADIOL VAGINAL RING .015; .12 MG/D; MG/D
RING VAGINAL
OUTPATIENT
Start: 2020-11-10

## 2020-11-10 NOTE — TELEPHONE ENCOUNTER
[facility]      Last Written Prescription Date:  09/11/20  Last Fill Quantity: 3,   # refills: 11  Last Office Visit: 09/11/20  Future Office visit:

## 2020-12-27 ENCOUNTER — HEALTH MAINTENANCE LETTER (OUTPATIENT)
Age: 35
End: 2020-12-27

## 2021-01-12 ENCOUNTER — OFFICE VISIT (OUTPATIENT)
Dept: OBGYN | Facility: OTHER | Age: 36
End: 2021-01-12
Attending: NURSE PRACTITIONER
Payer: COMMERCIAL

## 2021-01-12 VITALS
HEART RATE: 74 BPM | HEIGHT: 63 IN | WEIGHT: 182.2 LBS | BODY MASS INDEX: 32.28 KG/M2 | OXYGEN SATURATION: 99 % | DIASTOLIC BLOOD PRESSURE: 59 MMHG | SYSTOLIC BLOOD PRESSURE: 104 MMHG

## 2021-01-12 DIAGNOSIS — Z30.09 FAMILY PLANNING ADVICE: Primary | ICD-10-CM

## 2021-01-12 DIAGNOSIS — F41.9 ANXIETY: ICD-10-CM

## 2021-01-12 PROCEDURE — 99213 OFFICE O/P EST LOW 20 MIN: CPT | Performed by: NURSE PRACTITIONER

## 2021-01-12 PROCEDURE — 36415 COLL VENOUS BLD VENIPUNCTURE: CPT | Performed by: NURSE PRACTITIONER

## 2021-01-12 PROCEDURE — 82306 VITAMIN D 25 HYDROXY: CPT | Performed by: NURSE PRACTITIONER

## 2021-01-12 RX ORDER — MULTIPLE VITAMINS W/ MINERALS TAB 9MG-400MCG
1 TAB ORAL DAILY
COMMUNITY
End: 2023-09-14

## 2021-01-12 RX ORDER — FLUOXETINE 10 MG/1
10 CAPSULE ORAL DAILY
COMMUNITY
Start: 2020-08-17 | End: 2021-01-12

## 2021-01-12 ASSESSMENT — MIFFLIN-ST. JEOR: SCORE: 1490.58

## 2021-01-12 ASSESSMENT — PAIN SCALES - GENERAL: PAINLEVEL: NO PAIN (0)

## 2021-01-12 NOTE — NURSING NOTE
"Chief Complaint   Patient presents with     Contraception       Initial /59 (BP Location: Right arm, Patient Position: Sitting, Cuff Size: Adult Regular)   Pulse 74   Ht 1.6 m (5' 3\")   Wt 82.6 kg (182 lb 3.2 oz)   LMP 01/04/2021   SpO2 99%   BMI 32.28 kg/m   Estimated body mass index is 32.28 kg/m  as calculated from the following:    Height as of this encounter: 1.6 m (5' 3\").    Weight as of this encounter: 82.6 kg (182 lb 3.2 oz).  Medication Reconciliation: complete     Anna Looney LPN    "

## 2021-01-14 LAB — DEPRECATED CALCIDIOL+CALCIFEROL SERPL-MC: 60 UG/L (ref 20–75)

## 2021-01-14 NOTE — PATIENT INSTRUCTIONS
Patient Education     Treating Anxiety Disorders with Therapy    If you have an anxiety disorder, you don t have to suffer anymore. Treatment is available. Therapy (also called counseling) is often a helpful treatment for anxiety disorders. With therapy, a specially trained professional (therapist) helps you face and learn to manage your anxiety. Therapy can be short-term or long-term depending on your needs. In some cases, medicine may also be prescribed with therapy. It may take time before you notice how much therapy is helping, but stick with it. With therapy, you can feel better.  Cognitive behavioral therapy (CBT)  Cognitive behavioral therapy (CBT) teaches you to manage anxiety. It does this by helping you understand how you think and act when you re anxious. Research has shown CBT to be a very effective treatment for anxiety disorders. How CBT is run is almost like a class. It involves homework and activities to build skills that teach you to cope with anxiety step by step. It can be done in a group or one-on-one, and often takes place for a set number of sessions. CBT has two main parts:    Cognitive therapy helps you identify the negative, irrational thoughts that occur with your anxiety. You ll learn to replace these with more positive, realistic thoughts.    Behavioral therapy helps you change how you react to anxiety. You ll learn coping skills and methods for relaxing to help you better deal with anxiety.  Other forms of therapy  Other therapy methods may work better for you than CBT. Or, you may move from CBT to another form of therapy as your treatment needs change. This may mean meeting with a therapist by yourself or in a group. Therapy can also help you work through problems in your life, such as drug or alcohol dependence, that may be making your anxiety worse.  Getting better takes time  Therapy will help you feel better and teach you skills to help manage anxiety long term. But change doesn t  happen right away. It takes a commitment from you. And treatment only works if you learn to face the causes of your anxiety. So, you might feel worse before you feel better. This can sometimes make it hard to stick with it. But remember: Therapy is a very effective treatment. The results will be well worth it.  Helping yourself  If anxiety is wearing you down, here are some things you can do to cope:    Check with your doctor and rule out any physical problems that may be causing the anxiety symptoms.    If an anxiety disorder is diagnosed, seek mental healthcare. This is an illness and it can respond to treatment. Most types of anxiety disorders will respond to talk therapy and medicine.    Educate yourself about anxiety disorders. Keep track of helpful online resources and books you can use during stressful periods.    Try stress management techniques such as meditation.    Consider online or in-person support groups.    Don t fight your feelings. Anxiety feeds itself. The more you worry about it, the worse it gets. Instead, try to identify what might have triggered your anxiety. Then try to put this threat in perspective.    Keep in mind that you can t control everything about a situation. Change what you can and let the rest take its course.    Exercise -- it s a great way to relieve tension and help your body feel relaxed.    Examine your life for stress, and try to find ways to reduce it.    Avoid caffeine and nicotine, which can make anxiety symptoms worse.    Fight the temptation to turn to alcohol or unprescribed drugs for relief. They only make things worse in the long run.   [employer] last reviewed this educational content on 1/1/2017 2000-2020 [employer]. [address] [facility], [place] [address]. All rights reserved. This information is not intended as a substitute for professional medical care. Always follow your healthcare professional's instructions.           Patient Education      Your Body s Response to Anxiety    Normal anxiety is part of the body s natural defense system. It's an alert to a threat that is unknown, vague, or comes from your own internal fears. While you re in this state, your feelings can range from a vague sense of worry to physical sensations such as a pounding heartbeat. These feelings make you want to react to the threat. An anxiety response is normal in many situations. But when you have an anxiety disorder, the same response can occur at the wrong times.  Anxiety can be helpful  Normal anxiety is a signal from your brain that warns you of a threat and is a normal response to help you prevent something or decrease the bad effects of something you can't control. For example, anxiety is a normal response to situations that might damage your body, separate you from a loved one, or lose your job. The symptoms of anxiety can be physical and mental.  How does it feel?  At certain times, people with anxiety may have:    Dizziness    Muscle tension or pain    Restlessness    Sleeplessness    Trouble concentrating    Racing heartbeat    Fast breathing    Shaking or trembling    Stomachache    Diarrhea    Loss of energy    Sweating    Cold, clammy hands    Chest pain    Dry mouth  Anxiety can also be a problem  Anxiety can become a problem when it is hard to control, occurs for months, and interferes with important parts of your life. With an anxiety disorder, your body has the response described above, but in inappropriate ways. The response a person has depends on the anxiety disorder he or she has. With some disorders, the anxiety is way out of proportion to the threat that triggers it. With others, anxiety may occur even when there isn t a clear threat or trigger.  Who does it affect?  Some people are more prone to persistent anxiety than others. It tends to run in families, and it affects more younger people than older people, and more women than men. But no age, race,  "or gender is immune to anxiety problems.  Anxiety can be treated  The good news is that the anxiety that s disrupting your life can be treated. Check with your healthcare provider and rule out any physical problems that may be causing the anxiety symptoms. If an anxiety disorder is diagnosed seek mental healthcare. This is an illness and it can respond to treatment. Most types of anxiety disorders will respond to \"talk therapy\" and medicines. Working with your doctor or other healthcare provider, you can develop skills to help you cope with anxiety. You can also gain the perspective you need to overcome your fears. Note: Good sources of support or guidance can be found at your local hospital, mental health clinic, or an employee assistance program.  How to cope with anxiety  If anxiety is wearing you down, here are some things you can do to cope:    Keep in mind that you can t control everything about a situation. Change what you can and let the rest take its course.    Exercise--it s a great way to relieve tension and help your body feel relaxed.    Avoid caffeine and nicotine, which can make anxiety symptoms worse.    Fight the temptation to turn to alcohol or unprescribed drugs for relief. They only make things worse in the long run.    Educate yourself about anxiety disorders. Keep track of helpful online resources and books you can use during stressful periods.    Try stress management techniques such as meditation.    Consider online or in-person support groups.   [employer] last reviewed this educational content on 1/1/2017 2000-2020 [employer]. [address] [place] [address], [place] [address]. All rights reserved. This information is not intended as a substitute for professional medical care. Always follow your healthcare professional's instructions.           Patient Education     Preparing for Pregnancy  BIG: Even before you become pregnant, your health matters to your future baby. Adopt good " health habits today. And take care of any medical problems you have before becoming pregnant.  Remember: As soon as you know you are pregnant, get regular prenatal care.  Things to consider  Read through the list below. The more items that describe you, the healthier you may be:    I eat a balanced diet.    I keep physically active.    I have my health problems under control.    My weight is about right.    I don t smoke.    I don t use recreational drugs.    I don t have a drinking problem.  Think about the following:    Who will help you through pregnancy and with childcare?    Do you have health insurance?    Do you have the money needed to cover childcare and other day-to-day child expenses?    Will you be able to take the time you need away from your job for maternity needs and childcare?  Adopt a healthy lifestyle  Each of the following tips can improve your health as you prepare for pregnancy:    Take folic acid 400 to 800 micrograms or a prenatal vitamin daily.     Eat a healthy, well-balanced diet.    Exercise 3 or more times a week and at least 150 minutes weekly.    Get within 15 pounds of your ideal weight.  The first weeks of pregnancy are the most important time in a baby s development. Before you become pregnant:    Don t use recreational drugs.    Don t drink alcohol.    Don t smoke.    Get recommended vaccines.  Working with your healthcare provider  Your healthcare provider can help answer any questions you may have. Do you know when to stop taking birth control pills? Are any over-the-counter medicines safe for pregnant women? You can also ask about special care you may need if you have any of the following:    Sexually transmitted diseases (STDs), like herpes or chlamydia    Diabetes    High blood pressure    Other chronic health problems  [employer] last reviewed this educational content on 10/1/2017    3637-2849 [employer]. [address] [place] [address]. All rights  reserved. This information is not intended as a substitute for professional medical care. Always follow your healthcare professional's instructions.

## 2021-01-14 NOTE — PROGRESS NOTES
"[facility]                HPI   Barbi presents with questions regarding family planning and decreased libido.   She is P1,  delivery on 3/2019. She is currently using Nuvaring for contraception but is considering having another child soon but they are somewhat undecided. She states that she has been experiencing a very low sex drive.  She also states that she has been having some issues with anxiety, \"fogginess\", and fatigue.  She is seeing a counselor at [facility] and they are also in couples counseling.  She does have a history of vitamin D deficiency and has been meaning to see her PCP for follow up.  This is encouraged today for further evaluation of her current symptoms.                Medications:     Current Outpatient Medications Ordered in Epic   Medication     multivitamin w/minerals (THERA-VIT-M) tablet     etonogestrel-ethinyl estradiol (NUVARING) 0.12-0.015 MG/24HR vaginal ring     VITAMIN D, CHOLECALCIFEROL, PO     No current Epic-ordered facility-administered medications on file.                 Allergies:   Depo-provera [medroxyprogesterone]         Review of Systems:   CONSTITUTIONAL: NEGATIVE for fever, chills, change in weight  : normal menstrual cycles, negative for, dysparunia, incontinence and vaginal discharge                     Physical Exam:   Blood pressure 104/59, pulse 74, height 1.6 m (5' 3\"), weight 82.6 kg (182 lb 3.2 oz), last menstrual period 2021, SpO2 99 %, currently breastfeeding.  Constitutional:   awake, alert, cooperative, no apparent distress, and appears stated age              Data:   All laboratory data reviewed            Assessment and Plan:   Family planning - discussed family planning over the age of 35. And encouraged her to begin a daily PNV and folic acid.  Continue family counseling and consider when another child would be appropriate.  Discussed low libido in relation to stress, fatigue, and marital concerns.  She has been " "encouraged to continue with her PCP in looking into and managing anxiety, \"brain fog\", and fatigue.      Janice Jimenez NP, CFNP  1/14/2021  11:51 AM  "

## 2021-01-18 NOTE — PROGRESS NOTES
"  Assessment & Plan     Forgetfulness  Plan to check labs - has had normal thyroid in the past  Can try a memory vitamin such as adding a complex B or gingo  Continue to work counseling - continue working on self care such as regular exercise and better communication with spouse   - TSH with free T4 reflex  - Basic metabolic panel  - Vitamin B12    Anxiety  As above   Possible increased stress and anxiety causing increased forgetfulness. Can consider restarting antidepressant   - TSH with free T4 reflex  - Basic metabolic panel  - Vitamin B12       BMI:   Estimated body mass index is 32.06 kg/m  as calculated from the following:    Height as of this encounter: 1.6 m (5' 3\").    Weight as of this encounter: 82.1 kg (181 lb).         See Patient Instructions    No follow-ups on file.    GUERRERO Gordon [facility] - AARTI Landon is a 35 year old who presents to clinic today for the following health issues     HPI       Concern - memory loss   Onset: months  Description: short tem memory issue. Forgetting to do things, not remembering that were said not long after hearing  Intensity: N/A  Progression of Symptoms:  improving and intermittent  Accompanying Signs & Symptoms: see above  Previous history of similar problem: none  Precipitating factors:        Worsened by: stress  Alleviating factors:        Improved by: nothing  Therapies tried and outcome:  none   Feels like stress and anxiety have increased again  She is seeing a therapist regularly   She is exercising - decreased forgetfulness on those days  She is trying to get into couples therapy too - she states her and her  are on good terms, but does feel like they do have issues.  She is a stay at home mom.  Does not have a support group with family and friends.    She is writing list - to focus   States she is eating a healthy diet, staying hydrated and drinking plenty of water, she is drinking 2 cups of " "coffee per day (will decrease the sugar in coffee)   Chronic fatigue, states she is sleeping well, but does wake up during the night due to carpel tunnel     Previously diagnosed with anxiety. She did have improvement and stopped medication. She was continuing with counseling         Review of Systems   CONSTITUTIONAL:fatigue  INTEGUMENTARY/SKIN: extreme dry skin on hands   RESP:NEGATIVE for significant cough or SOB  CV: NEGATIVE for chest pain, palpitations or peripheral edema  GI: NEGATIVE for nausea, abdominal pain, heartburn, or change in bowel habits  : normal menstrual cycles and denies dysuria   NEURO: occasional headache   PSYCHIATRIC: HX anxiety      Objective    /62 (BP Location: Right arm, Patient Position: Sitting, Cuff Size: Adult Large)   Pulse 75   Temp 98.4  F (36.9  C) (Tympanic)   Resp 18   Ht 1.6 m (5' 3\")   Wt 82.1 kg (181 lb)   LMP 01/04/2021   SpO2 99%   BMI 32.06 kg/m    Body mass index is 32.06 kg/m .  Physical Exam   GENERAL: healthy, alert and no distress  NECK: no adenopathy, no asymmetry, masses, or scars and thyroid normal to palpation  RESP: lungs clear to auscultation - no rales, rhonchi or wheezes  CV: regular rate and rhythm, normal S1 S2, no S3 or S4, no murmur, click or rub, no peripheral edema and peripheral pulses strong  ABDOMEN: soft, nontender, no hepatosplenomegaly, no masses and bowel sounds normal  SKIN: no suspicious lesions or rashes  NEURO: Normal strength and tone, mentation intact and speech normal  PSYCH: mentation appears normal and anxious    Labs pending will notify once available             "

## 2021-01-19 ENCOUNTER — OFFICE VISIT (OUTPATIENT)
Dept: FAMILY MEDICINE | Facility: OTHER | Age: 36
End: 2021-01-19
Attending: NURSE PRACTITIONER
Payer: COMMERCIAL

## 2021-01-19 VITALS
HEIGHT: 63 IN | TEMPERATURE: 98.4 F | WEIGHT: 181 LBS | SYSTOLIC BLOOD PRESSURE: 106 MMHG | RESPIRATION RATE: 18 BRPM | BODY MASS INDEX: 32.07 KG/M2 | HEART RATE: 75 BPM | OXYGEN SATURATION: 99 % | DIASTOLIC BLOOD PRESSURE: 62 MMHG

## 2021-01-19 DIAGNOSIS — F41.9 ANXIETY: ICD-10-CM

## 2021-01-19 DIAGNOSIS — R68.89 FORGETFULNESS: Primary | ICD-10-CM

## 2021-01-19 LAB
ANION GAP SERPL CALCULATED.3IONS-SCNC: 5 MMOL/L (ref 3–14)
BUN SERPL-MCNC: 11 MG/DL (ref 7–30)
CALCIUM SERPL-MCNC: 9.1 MG/DL (ref 8.5–10.1)
CHLORIDE SERPL-SCNC: 104 MMOL/L (ref 94–109)
CO2 SERPL-SCNC: 29 MMOL/L (ref 20–32)
CREAT SERPL-MCNC: 0.74 MG/DL (ref 0.52–1.04)
GFR SERPL CREATININE-BSD FRML MDRD: >90 ML/MIN/{1.73_M2}
GLUCOSE SERPL-MCNC: 84 MG/DL (ref 70–99)
POTASSIUM SERPL-SCNC: 3.8 MMOL/L (ref 3.4–5.3)
SODIUM SERPL-SCNC: 138 MMOL/L (ref 133–144)
TSH SERPL DL<=0.005 MIU/L-ACNC: 2.76 MU/L (ref 0.4–4)

## 2021-01-19 PROCEDURE — 84443 ASSAY THYROID STIM HORMONE: CPT | Performed by: NURSE PRACTITIONER

## 2021-01-19 PROCEDURE — 36415 COLL VENOUS BLD VENIPUNCTURE: CPT | Performed by: NURSE PRACTITIONER

## 2021-01-19 PROCEDURE — 80048 BASIC METABOLIC PNL TOTAL CA: CPT | Performed by: NURSE PRACTITIONER

## 2021-01-19 PROCEDURE — 82607 VITAMIN B-12: CPT | Performed by: NURSE PRACTITIONER

## 2021-01-19 PROCEDURE — 99214 OFFICE O/P EST MOD 30 MIN: CPT | Performed by: NURSE PRACTITIONER

## 2021-01-19 ASSESSMENT — MIFFLIN-ST. JEOR: SCORE: 1485.14

## 2021-01-19 ASSESSMENT — PAIN SCALES - GENERAL: PAINLEVEL: NO PAIN (0)

## 2021-01-19 NOTE — NURSING NOTE
"Chief Complaint   Patient presents with     Memory Loss       Initial /62 (BP Location: Right arm, Patient Position: Sitting, Cuff Size: Adult Large)   Pulse 75   Temp 98.4  F (36.9  C) (Tympanic)   Resp 18   Ht 1.6 m (5' 3\")   Wt 82.1 kg (181 lb)   LMP 01/04/2021   SpO2 99%   BMI 32.06 kg/m   Estimated body mass index is 32.06 kg/m  as calculated from the following:    Height as of this encounter: 1.6 m (5' 3\").    Weight as of this encounter: 82.1 kg (181 lb).  Medication Reconciliation: complete  Citlali Tariq LPN  "

## 2021-01-20 LAB — VIT B12 SERPL-MCNC: 514 PG/ML (ref 193–986)

## 2021-01-28 ENCOUNTER — TRANSFERRED RECORDS (OUTPATIENT)
Dept: HEALTH INFORMATION MANAGEMENT | Facility: CLINIC | Age: 36
End: 2021-01-28
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
## 2021-02-09 ENCOUNTER — OFFICE VISIT (OUTPATIENT)
Dept: FAMILY MEDICINE | Facility: OTHER | Age: 36
End: 2021-02-09
Attending: NURSE PRACTITIONER
Payer: COMMERCIAL

## 2021-02-09 VITALS
WEIGHT: 181 LBS | BODY MASS INDEX: 32.06 KG/M2 | DIASTOLIC BLOOD PRESSURE: 60 MMHG | HEART RATE: 70 BPM | SYSTOLIC BLOOD PRESSURE: 94 MMHG | OXYGEN SATURATION: 99 % | TEMPERATURE: 96.2 F

## 2021-02-09 DIAGNOSIS — Z00.00 ROUTINE GENERAL MEDICAL EXAMINATION AT A HEALTH CARE FACILITY: ICD-10-CM

## 2021-02-09 DIAGNOSIS — F32.A ANXIETY AND DEPRESSION: Primary | ICD-10-CM

## 2021-02-09 DIAGNOSIS — F41.9 ANXIETY AND DEPRESSION: Primary | ICD-10-CM

## 2021-02-09 DIAGNOSIS — Z12.4 SCREENING FOR CERVICAL CANCER: ICD-10-CM

## 2021-02-09 PROCEDURE — 99395 PREV VISIT EST AGE 18-39: CPT | Performed by: NURSE PRACTITIONER

## 2021-02-09 PROCEDURE — 87624 HPV HI-RISK TYP POOLED RSLT: CPT | Performed by: NURSE PRACTITIONER

## 2021-02-09 PROCEDURE — G0123 SCREEN CERV/VAG THIN LAYER: HCPCS | Performed by: NURSE PRACTITIONER

## 2021-02-09 ASSESSMENT — ANXIETY QUESTIONNAIRES
5. BEING SO RESTLESS THAT IT IS HARD TO SIT STILL: NOT AT ALL
1. FEELING NERVOUS, ANXIOUS, OR ON EDGE: MORE THAN HALF THE DAYS
6. BECOMING EASILY ANNOYED OR IRRITABLE: SEVERAL DAYS
4. TROUBLE RELAXING: SEVERAL DAYS
3. WORRYING TOO MUCH ABOUT DIFFERENT THINGS: SEVERAL DAYS
GAD7 TOTAL SCORE: 7
2. NOT BEING ABLE TO STOP OR CONTROL WORRYING: MORE THAN HALF THE DAYS
7. FEELING AFRAID AS IF SOMETHING AWFUL MIGHT HAPPEN: NOT AT ALL

## 2021-02-09 ASSESSMENT — PAIN SCALES - GENERAL: PAINLEVEL: NO PAIN (0)

## 2021-02-09 ASSESSMENT — PATIENT HEALTH QUESTIONNAIRE - PHQ9: SUM OF ALL RESPONSES TO PHQ QUESTIONS 1-9: 10

## 2021-02-09 NOTE — LETTER
February 16, 2021      Justus Rodriguez  [address]  [facility] [address]        Dear ,    We are writing to inform you of your test results.    Your test results fall within the expected range(s) or remain unchanged from previous results.  Please continue with current treatment plan.    Resulted Orders   A pap thin layer screen with  HPV - recommended age 30 - 65 years (select HPV order below)   Result Value Ref Range    PAP NIL     Copath Report         Patient Name: JUSTUS RODRIGUEZ  MR#: [id number]  Specimen #: [id number]  Collected: 2/9/2021  Received: 2/11/2021  Reported: 2/12/2021 09:14  Ordering Phy(s): MADALYN DIAZ    For improved result formatting, select 'View Enhanced Report Format' under   Linked Documents section.    SPECIMEN/STAIN PROCESS:  Pap thin layer prep screening ([facility])       Pap-Cyto x 1, HPV ordered x 1    SOURCE: Cervical  ----------------------------------------------------------------   Pap thin layer prep screening ([facility])  SPECIMEN ADEQUACY:  Satisfactory for evaluation.  -Transformation zone component absent.    CYTOLOGIC INTERPRETATION:    Negative for intraepithelial lesion or malignancy    Electronically signed out by:  BLANCO Thomason (ASCP)    CLINICAL HISTORY:    A previous normal pap  Date of Last Pap: 2/5/18,    Papanicolaou Test Limitations:  Cervical cytology is a screening test with   limited sensitivity; regular  screening is critical for cancer prevention; Pap tests are  primarily   effective for the diagnosis/prevention of  squamous cell carcinoma, not adenocarcinomas or other cancers.    COLLECTION SITE:  Client:  [facility]  Location: [facility] ([facility])    The technical component of this testing was completed at [facility], with the professional  component performed at [facility], [address] [place] [address], [place] [address] ([phone])           If you have any questions or  concerns, please call the clinic at the number listed above.       Sincerely,      GUERRERO Cuadra CNP

## 2021-02-09 NOTE — LETTER
My Depression Action Plan  Name: Barbi Vale   Date of Birth 1985  Date: 2/16/2021    My doctor: Nisa Washington   My clinic: [facility] [place]  [address] [place] [place] [address]  [phone]          GREEN    ZONE   Good Control    What it looks like:     Things are going generally well. You have normal ups and downs. You may even feel depressed from time to time, but bad moods usually last less than a day.   What you need to do:  1. Continue to care for yourself (see self care plan)  2. Check your depression survival kit and update it as needed  3. Follow your physician s recommendations including any medication.  4. Do not stop taking medication unless you consult with your physician first.           YELLOW         ZONE Getting Worse    What it looks like:     Depression is starting to interfere with your life.     It may be hard to get out of bed; you may be starting to isolate yourself from others.    Symptoms of depression are starting to last most all day and this has happened for several days.     You may have suicidal thoughts but they are not constant.   What you need to do:     1. Call your care team. Your response to treatment will improve if you keep your care team informed of your progress. Yellow periods are signs an adjustment may need to be made.     2. Continue your self-care.  Just get dressed and ready for the day.  Don't give yourself time to talk yourself out of it.    3. Talk to someone in your support network.    4. Open up your Depression Self-Care Plan/Wellness Kit.           RED    ZONE Medical Alert - Get Help    What it looks like:     Depression is seriously interfering with your life.     You may experience these or other symptoms: You can t get out of bed most days, can t work or engage in other necessary activities, you have trouble taking care of basic hygiene, or basic responsibilities, thoughts of suicide or death that will not  go away, self-injurious behavior.     What you need to do:  1. Call your care team and request a same-day appointment. If they are not available (weekends or after hours) call your local crisis line, emergency room or 911.          Depression Self-Care Plan / Wellness Kit    Many people find that medication and therapy are helpful treatments for managing depression. In addition, making small changes to your everyday life can help to boost your mood and improve your wellbeing. Below are some tips for you to consider. Be sure to talk with your medical provider and/or behavioral health consultant if your symptoms are worsening or not improving.     Sleep   Sleep hygiene  means all of the habits that support good, restful sleep. It includes maintaining a consistent bedtime and wake time, using your bedroom only for sleeping or sex, and keeping the bedroom dark and free of distractions like a computer, smartphone, or television.     Develop a Healthy Routine  Maintain good hygiene. Get out of bed in the morning, make your bed, brush your teeth, take a shower, and get dressed. Don t spend too much time viewing media that makes you feel stressed. Find time to relax each day.    Exercise  Get some form of exercise every day. This will help reduce pain and release endorphins, the  feel good  chemicals in your brain. It can be as simple as just going for a walk or doing some gardening, anything that will get you moving.      Diet  Strive to eat healthy foods, including fruits and vegetables. Drink plenty of water. Avoid excessive sugar, caffeine, alcohol, and other mood-altering substances.     Stay Connected with Others  Stay in touch with friends and family members.    Manage Your Mood  Try deep breathing, massage therapy, biofeedback, or meditation. Take part in fun activities when you can. Try to find something to smile about each day.     Psychotherapy  Be open to working with a therapist if your provider recommends it.      Medication  Be sure to take your medication as prescribed. Most anti-depressants need to be taken every day. It usually takes several weeks for medications to work. Not all medicines work for all people. It is important to follow-up with your provider to make sure you have a treatment plan that is working for you. Do not stop your medication abruptly without first discussing it with your provider.    Crisis Resources   These hotlines are for both adults and children. They and are open 24 hours a day, 7 days a week unless noted otherwise.      National Suicide Prevention Lifeline   8-963-846-TALK (0764)      Crisis Text Line    www.crisistextline.org  Text HOME to 305616 from anywhere in the [place], anytime, about any type of crisis. A live, trained crisis counselor will receive the text and respond quickly.      [facility] Lifeline for LGBTQ Youth  A national crisis intervention and suicide lifeline for LGBTQ youth under 25. Provides a safe place to talk without judgement. Call 1-935.358.4606; text START to 949707 or visit www.thetrevorproject.org to talk to a trained counselor.      For [facility] crisis numbers, visit the [facility] website at:  https://[place].gov/dhs/people-we-serve/adults/health-care/mental-health/resources/crisis-contacts.jsp

## 2021-02-09 NOTE — NURSING NOTE
"Chief Complaint   Patient presents with     Physical       Initial BP 94/60   Pulse 70   Temp 96.2  F (35.7  C) (Tympanic)   Wt 82.1 kg (181 lb)   SpO2 99%   BMI 32.06 kg/m   Estimated body mass index is 32.06 kg/m  as calculated from the following:    Height as of 1/19/21: 1.6 m (5' 3\").    Weight as of this encounter: 82.1 kg (181 lb).  Medication Reconciliation: complete  Lashonda Yoder LPN  "

## 2021-02-10 ASSESSMENT — ANXIETY QUESTIONNAIRES: GAD7 TOTAL SCORE: 7

## 2021-02-12 LAB
COPATH REPORT: NORMAL
PAP: NORMAL

## 2021-04-20 ENCOUNTER — TELEPHONE (OUTPATIENT)
Dept: FAMILY MEDICINE | Facility: OTHER | Age: 36
End: 2021-04-20

## 2021-04-20 NOTE — TELEPHONE ENCOUNTER
Call from patient inquiring on scheduling MoCA testing per Dr. Nails.     Spoke with Nisa Washington, received notification the MoCA testing is done through OT. Requesting to send a message to Lashonda, and may assist patient with coordinating testing through OT.     Patient verbalized understanding.     Patient can be reached at [phone]

## 2021-04-20 NOTE — TELEPHONE ENCOUNTER
Patient declined to see Nisa Washington states she wants to see OT and states Dr. Nails put in a referral for OT to get a MOCA test done. Patient was notified we can do it in office and she states no she wants to go to OT. Was asked if she wanted Nisa to put in the order and she said no she contacted Dr. Nails's office this morning and they are going to put in an order to OT in [place] so she will wait for them to schedule it. If she does not hear anything from them in a week she will call us back. Lashonda Yoder LPN

## 2021-04-21 ENCOUNTER — MEDICAL CORRESPONDENCE (OUTPATIENT)
Dept: HEALTH INFORMATION MANAGEMENT | Facility: HOSPITAL | Age: 36
End: 2021-04-21

## 2021-05-18 ENCOUNTER — OFFICE VISIT (OUTPATIENT)
Dept: PODIATRY | Facility: OTHER | Age: 36
End: 2021-05-18
Attending: PODIATRIST
Payer: COMMERCIAL

## 2021-05-18 VITALS
SYSTOLIC BLOOD PRESSURE: 126 MMHG | HEART RATE: 88 BPM | TEMPERATURE: 98.6 F | OXYGEN SATURATION: 98 % | BODY MASS INDEX: 32.06 KG/M2 | WEIGHT: 181 LBS | DIASTOLIC BLOOD PRESSURE: 74 MMHG

## 2021-05-18 DIAGNOSIS — L60.0 INGROWN TOENAIL: Primary | ICD-10-CM

## 2021-05-18 DIAGNOSIS — L60.3 ONYCHODYSTROPHY: ICD-10-CM

## 2021-05-18 PROCEDURE — 99203 OFFICE O/P NEW LOW 30 MIN: CPT | Mod: 25 | Performed by: PODIATRIST

## 2021-05-18 PROCEDURE — 11750 EXCISION NAIL&NAIL MATRIX: CPT | Mod: T5 | Performed by: PODIATRIST

## 2021-05-18 PROCEDURE — 87101 SKIN FUNGI CULTURE: CPT | Performed by: PODIATRIST

## 2021-05-18 PROCEDURE — 11750 EXCISION NAIL&NAIL MATRIX: CPT | Mod: TA | Performed by: PODIATRIST

## 2021-05-18 PROCEDURE — 87107 FUNGI IDENTIFICATION MOLD: CPT | Performed by: PODIATRIST

## 2021-05-18 RX ORDER — DULOXETIN HYDROCHLORIDE 30 MG/1
30 CAPSULE, DELAYED RELEASE ORAL
COMMUNITY
Start: 2021-05-17 | End: 2021-05-18

## 2021-05-18 RX ORDER — DULOXETIN HYDROCHLORIDE 30 MG/1
CAPSULE, DELAYED RELEASE ORAL
COMMUNITY
Start: 2021-05-11 | End: 2022-01-13

## 2021-05-18 ASSESSMENT — PAIN SCALES - GENERAL: PAINLEVEL: NO PAIN (1)

## 2021-05-18 NOTE — PROGRESS NOTES
"    {PROVIDER CHARTING PREFERENCE:334829}    Eb Landon is a 35 year old who presents for the following health issues {ACCOMPANIED BY STATEMENT (Optional):474922}    HPI     New Patient/Transfer of Care    Depression and Anxiety Follow-Up    How are you doing with your depression since your last visit? { :363796::\"No change\"}    How are you doing with your anxiety since your last visit?  { :255334::\"No change\"}    Are you having other symptoms that might be associated with depression or anxiety? { :468404}    Have you had a significant life event? { :840576}     Do you have any concerns with your use of alcohol or other drugs? { :481550}    Social History     Tobacco Use     Smoking status: Never Smoker     Smokeless tobacco: Never Used   Substance Use Topics     Alcohol use: No     Drug use: No     PHQ 1/9/2020 9/11/2020 2/9/2021   PHQ-9 Total Score 7 3 10   Q9: Thoughts of better off dead/self-harm past 2 weeks Not at all Not at all Not at all     SUHA-7 SCORE 11/12/2019 9/11/2020 2/9/2021   Total Score 9 1 7     {Last PHQ9 or GAD7 Responses (Optional):844621}    Suicide Assessment Five-step Evaluation and Treatment (SAFE-T)  {Provider  Link to Depression Care Package SmartSet :718881}    How many servings of fruits and vegetables do you eat daily?  { :216060}    On average, how many sweetened beverages do you drink each day (Examples: soda, juice, sweet tea, etc.  Do NOT count diet or artificially sweetened beverages)?   { 1-11:439101}    How many days per week do you exercise enough to make your heart beat faster? { :035883}    How many minutes a day do you exercise enough to make your heart beat faster? { :877951}    How many days per week do you miss taking your medication? {0-7 :335643}    {additonal problems for provider to add (Optional):850833}    Review of Systems   {ROS COMP (Optional):842586}      Objective    There were no vitals taken for this visit.  There is no height or weight on file to " calculate BMI.  Physical Exam   {Exam List (Optional):131988}    {Diagnostic Test Results (Optional):079344}    {AMBULATORY ATTESTATION (Optional):279625}

## 2021-05-18 NOTE — PROGRESS NOTES
Chief complaint: Patient presents with:  Ingrown Toenail      History of Present Illness: This 35 year old female is seen at the request of No ref. provider found for evaluation and suggestions of management of recurrence of nail spicules on the bilateral border of the bilateral hallux. She says she has had 14 nail procedures in the past, but the only time she thinks she had a procedure that was intended to permanently remove the toenail, silver nitrate was used while she was pregnant. The toenails grew back on the bilateral borders.    Last night, the patient noticed a little purulence pocket on the RIGHT dorsal hallux medial nail border. She popped it and a lot of purulence came out of the area. She thinks a toenail keeps regrowing beneath the surface of the skin. She also has a little spicule on the RIGHT hallux lateral nail border and she has a moderate nail regrowth on the bilateral border of the LEFT hallux.    She would like all the nail spicules removed permanently.    Secondly, patient says her lesser digit toenails are thickened and discolored. She would like to know what is causing the discoloration and nail changes.    No further pedal complaints today.       /74   Pulse 88   Temp 98.6  F (37  C) (Tympanic)   Wt 82.1 kg (181 lb)   SpO2 98%   BMI 32.06 kg/m      Patient Active Problem List   Diagnosis     ACP (advance care planning)     Chronic pain disorder     Myofascial muscle pain     Paresthesia     Dysuria     Well woman exam with routine gynecological exam     Encounter for preconception consultation     Encounter for surveillance of contraceptives     Other chronic pain     Supervision of normal first pregnancy in first trimester     Chemical dermatitis     Encounter for triage in pregnant patient     Clogged duct, postpartum     Mastitis     Candidiasis of breast     Bilateral carpal tunnel syndrome     Anxiety and depression       Past Surgical History:   Procedure Laterality Date       SECTION N/A 3/15/2019    Procedure:  SECTION;  Surgeon: Pedro Pablo Cantor MD;  Location: [facility]     FOOT SURGERY Right     [place], [place]       Current Outpatient Medications   Medication     DULoxetine (CYMBALTA) 30 MG capsule     etonogestrel-ethinyl estradiol (NUVARING) 0.12-0.015 MG/24HR vaginal ring     multivitamin w/minerals (THERA-VIT-M) tablet     vitamin D3 (CHOLECALCIFEROL) 1.25 MG (12133 UT) capsule     No current facility-administered medications for this visit.           Allergies   Allergen Reactions     Depo-Provera [Medroxyprogesterone] Swelling     Swelled up, headaches       Family History   Problem Relation Age of Onset     Mental Illness Mother      Mental Illness Father      Mental Illness Brother      Cerebrovascular Disease Maternal Grandmother      Cerebrovascular Disease Maternal Grandfather      Aneurysm Maternal Grandfather      Other - See Comments Paternal Grandmother 86        old age     Kidney Disease Paternal Grandfather         on dialysis       Social History     Socioeconomic History     Marital status:      Spouse name: Glenn     Number of children: 0     Years of education: 14     Highest education level: None   Occupational History     Occupation:      Employer: [email or web address]   Social Needs     Financial resource strain: None     Food insecurity     Worry: None     Inability: None     Transportation needs     Medical: None     Non-medical: None   Tobacco Use     Smoking status: Never Smoker     Smokeless tobacco: Never Used   Substance and Sexual Activity     Alcohol use: No     Drug use: No     Sexual activity: Yes     Partners: Male     Birth control/protection: None     Comment: nuva ring done in 2018   Lifestyle     Physical activity     Days per week: None     Minutes per session: None     Stress: None   Relationships     Social connections     Talks on phone: None     Gets together: None     Attends Advent service: None      Active member of club or organization: None     Attends meetings of clubs or organizations: None     Relationship status: None     Intimate partner violence     Fear of current or ex partner: None     Emotionally abused: None     Physically abused: None     Forced sexual activity: None   Other Topics Concern     Parent/sibling w/ CABG, MI or angioplasty before 65F 55M? No   Social History Narrative     None       ROS: 10 point ROS neg other than the symptoms noted above in the HPI.  EXAM  Constitutional: healthy, alert and no distress    Psychiatric: mentation appears normal and affect normal/bright    VASCULAR:  -Dorsalis pedis pulse +2/4 b/l  -Posterior tibial pulse +2/4 b/l  -Capillary refill time < 3 seconds to b/l hallux    NEURO:  -Light touch sensation intact to b/l plantar forefoot  DERM:  -Skin temperature, texture and turgor WNL b/l  -Toenails mildly thickened, dystrophic and discolored x 10  ---Incurvated nail spicules to the bilateral border of the bilateral hallux  MSK:  -Tenderness on palpation to bilateral borders of bilateral hallux toenail  -Muscle strength of ankles +5/5 for dorsiflexion, plantarflexion, ABDUction and ADDuction b/l  ============================================================    ASSESSMENT:  (L60.0) Ingrown toenail  (primary encounter diagnosis)    (L60.3) Onychodystrophy      PLAN:  -Patient evaluated and examined. Treatment options discussed with no educational barriers noted.  -Discussed nail procedure options and etiologies and treatments for ingrown toenails. Conservatively, patient could opt for a slant back today and keep monitoring the toe since there are no SOI. Discussed risks and benefits and healing course of a nail border avulsion vs. Matrixectomy including post procedure infection or a non-healing wound, both of which could lead to a life threatening infection or amputation of the foot or leg or a proximal amputation. Patient understands the risks and benefits and  "has decided to proceed with a bilateral border bilateral hallux toenail matrixectomy.    -Matrixectomy of bilateral hallux Medial and Lateral border nail spicules: Written and verbal consent obtained after reviewing risks and benefits of the procedure. Patient understands that although phenol is used in attempt to prevent regrowth of the ingrown toenail, the nail can still grow back. There is also a risk of post procedure infection. A severe foot infection could lead to a proximal foot or leg amputation or loss of life, so the patient is advised to return to podiatry or the ED immediately if the patient notices any SOI. The patient is in agreement with this plan and wishes to proceed with the procedure. A time-out was performed to identify the correct patient, limb, digit and procedure.    An alcohol prep pad was applied to  to the base of the bilateral hallux. Each digit was injected with 6 mL of 1:1 of 2% Lidocaine plain and 0.5% marcaine plain. Adequate local anesthesia was obtained. A ring tournicot was applied to the digit and a chloroprep was applied to the hallux. A freer was used to loosen the nail from the underlying nail bed. An English Anvil and a hemostat were then used to remove the bilateral hallux bilateral nail spicule borders. A total of three applications of phenol were applied for 30 seconds per application. The digit was rinsed thoroughly with alcohol. The tournicot was removed from the toe and there was a prompt hyperemic response to the hallux. The wound was then dressed with an Silvadene, gauze and 1\" coban. The patient was educated on after procedure care including daily epsom salt soaks starting tomorrow followed by dressing of the toe with an antibiotic cream and a bandaid until the wound site on the toe stops draining (2-3 weeks). Provided education on how to look for signs of infection (redness, swelling, pain, purulence, fever, chills, nausea, vomiting) and the patient was instructed to " return to the clinic or Emergency Department immediately if there are any signs of infection.    Onychodystrophy  -Discussed etiologies and treatment options for onychodystrophy. There may be fungi in the toenail, but it is not known until a nail culture is performed. Treatment options consist of leaving the toenail as is, treating the nail for fungi (culture would be taken today to confirm nail fungi), a nail avulsion and treating the fungi as the nail grows back in, or removing the toenail permanently. The oral medication can sometimes fluctuate liver values so the patient would need to get a liver blood draw before, during and after the 90 day treatment. Topical anti-fungals can be used, but they do not always full cure the toenail fungi and they must be used daily and sometimes for 6-12 months before noticing a difference in the toenail. Recurrence rate of toenail fungi is high. After reviewing risks and benefits, patient has decided to proceed with a topical solution if the nail culture is positive for fungi.    -Fungal culture of toenails sent on 05/18/2021 -- patient will schedule a follow-up if the nail culture is positive for fungi  -Patient in agreement with the above treatment plan and all of patient's questions were answered.      RTC as needed  Patient will schedule a follow-up if the nail culture is positive for fungi      Cynthia Rivas DPM

## 2021-05-18 NOTE — NURSING NOTE
"Chief Complaint   Patient presents with     Ingrown Toenail       Initial /74   Pulse 88   Temp 98.6  F (37  C) (Tympanic)   Wt 82.1 kg (181 lb)   SpO2 98%   BMI 32.06 kg/m   Estimated body mass index is 32.06 kg/m  as calculated from the following:    Height as of 1/19/21: 1.6 m (5' 3\").    Weight as of this encounter: 82.1 kg (181 lb).  Medication Reconciliation: complete  Laura Christy LPN    "

## 2021-05-26 DIAGNOSIS — B35.1 ONYCHOMYCOSIS: Primary | ICD-10-CM

## 2021-05-26 RX ORDER — CLOTRIMAZOLE 1 G/ML
SOLUTION TOPICAL 2 TIMES DAILY
Qty: 60 ML | Refills: 3 | Status: ON HOLD | OUTPATIENT
Start: 2021-05-26 | End: 2023-01-24

## 2021-05-26 NOTE — PROGRESS NOTES
Patient stated she wanted to try a topical cream after she was called with his positive toenail fungal results. Clotrimazole cream was ordered on 05/26/2021.

## 2021-06-02 ENCOUNTER — OFFICE VISIT (OUTPATIENT)
Dept: FAMILY MEDICINE | Facility: OTHER | Age: 36
End: 2021-06-02
Attending: ORTHOPAEDIC SURGERY
Payer: COMMERCIAL

## 2021-06-02 DIAGNOSIS — Z20.822 COVID-19 RULED OUT: Primary | ICD-10-CM

## 2021-06-02 PROCEDURE — U0003 INFECTIOUS AGENT DETECTION BY NUCLEIC ACID (DNA OR RNA); SEVERE ACUTE RESPIRATORY SYNDROME CORONAVIRUS 2 (SARS-COV-2) (CORONAVIRUS DISEASE [COVID-19]), AMPLIFIED PROBE TECHNIQUE, MAKING USE OF HIGH THROUGHPUT TECHNOLOGIES AS DESCRIBED BY CMS-2020-01-R: HCPCS | Performed by: FAMILY MEDICINE

## 2021-06-02 PROCEDURE — U0005 INFEC AGEN DETEC AMPLI PROBE: HCPCS | Performed by: FAMILY MEDICINE

## 2021-06-03 ENCOUNTER — OFFICE VISIT (OUTPATIENT)
Dept: FAMILY MEDICINE | Facility: OTHER | Age: 36
End: 2021-06-03
Attending: FAMILY MEDICINE
Payer: COMMERCIAL

## 2021-06-03 VITALS
HEIGHT: 60 IN | BODY MASS INDEX: 35.14 KG/M2 | TEMPERATURE: 98.8 F | SYSTOLIC BLOOD PRESSURE: 110 MMHG | OXYGEN SATURATION: 97 % | HEART RATE: 71 BPM | WEIGHT: 179 LBS | DIASTOLIC BLOOD PRESSURE: 70 MMHG

## 2021-06-03 DIAGNOSIS — Z01.818 PREOP GENERAL PHYSICAL EXAM: Primary | ICD-10-CM

## 2021-06-03 LAB
ANION GAP SERPL CALCULATED.3IONS-SCNC: 5 MMOL/L (ref 3–14)
BUN SERPL-MCNC: 10 MG/DL (ref 7–30)
CALCIUM SERPL-MCNC: 9.1 MG/DL (ref 8.5–10.1)
CHLORIDE SERPL-SCNC: 106 MMOL/L (ref 94–109)
CO2 SERPL-SCNC: 28 MMOL/L (ref 20–32)
CREAT SERPL-MCNC: 0.73 MG/DL (ref 0.52–1.04)
ERYTHROCYTE [DISTWIDTH] IN BLOOD BY AUTOMATED COUNT: 12.3 % (ref 10–15)
GFR SERPL CREATININE-BSD FRML MDRD: >90 ML/MIN/{1.73_M2}
GLUCOSE SERPL-MCNC: 85 MG/DL (ref 70–99)
HCT VFR BLD AUTO: 41.6 % (ref 35–47)
HGB BLD-MCNC: 14.2 G/DL (ref 11.7–15.7)
LABORATORY COMMENT REPORT: NORMAL
MCH RBC QN AUTO: 31.4 PG (ref 26.5–33)
MCHC RBC AUTO-ENTMCNC: 34.1 G/DL (ref 31.5–36.5)
MCV RBC AUTO: 92 FL (ref 78–100)
PLATELET # BLD AUTO: 276 10E9/L (ref 150–450)
POTASSIUM SERPL-SCNC: 3.6 MMOL/L (ref 3.4–5.3)
RBC # BLD AUTO: 4.52 10E12/L (ref 3.8–5.2)
SARS-COV-2 RNA RESP QL NAA+PROBE: NEGATIVE
SARS-COV-2 RNA RESP QL NAA+PROBE: NORMAL
SODIUM SERPL-SCNC: 139 MMOL/L (ref 133–144)
SPECIMEN SOURCE: NORMAL
SPECIMEN SOURCE: NORMAL
WBC # BLD AUTO: 6 10E9/L (ref 4–11)

## 2021-06-03 PROCEDURE — 85027 COMPLETE CBC AUTOMATED: CPT | Performed by: FAMILY MEDICINE

## 2021-06-03 PROCEDURE — 80048 BASIC METABOLIC PNL TOTAL CA: CPT | Performed by: FAMILY MEDICINE

## 2021-06-03 PROCEDURE — 99214 OFFICE O/P EST MOD 30 MIN: CPT | Performed by: FAMILY MEDICINE

## 2021-06-03 PROCEDURE — 36415 COLL VENOUS BLD VENIPUNCTURE: CPT | Performed by: FAMILY MEDICINE

## 2021-06-03 RX ORDER — MULTIVITAMIN WITH IRON
1 TABLET ORAL DAILY
COMMUNITY
End: 2022-01-13

## 2021-06-03 SDOH — HEALTH STABILITY: PHYSICAL HEALTH: ON AVERAGE, HOW MANY DAYS PER WEEK DO YOU ENGAGE IN MODERATE TO STRENUOUS EXERCISE (LIKE A BRISK WALK)?: 0 DAYS

## 2021-06-03 SDOH — SOCIAL STABILITY: SOCIAL INSECURITY: WITHIN THE LAST YEAR, HAVE YOU BEEN AFRAID OF YOUR PARTNER OR EX-PARTNER?: NO

## 2021-06-03 SDOH — SOCIAL STABILITY: SOCIAL NETWORK: HOW OFTEN DO YOU ATTENT MEETINGS OF THE CLUB OR ORGANIZATION YOU BELONG TO?: NOT ASKED

## 2021-06-03 SDOH — SOCIAL STABILITY: SOCIAL INSECURITY: WITHIN THE LAST YEAR, HAVE YOU BEEN HUMILIATED OR EMOTIONALLY ABUSED IN OTHER WAYS BY YOUR PARTNER OR EX-PARTNER?: NO

## 2021-06-03 SDOH — HEALTH STABILITY: PHYSICAL HEALTH: ON AVERAGE, HOW MANY MINUTES DO YOU ENGAGE IN EXERCISE AT THIS LEVEL?: 0 MIN

## 2021-06-03 SDOH — SOCIAL STABILITY: SOCIAL INSECURITY
WITHIN THE LAST YEAR, HAVE YOU BEEN KICKED, HIT, SLAPPED, OR OTHERWISE PHYSICALLY HURT BY YOUR PARTNER OR EX-PARTNER?: NO

## 2021-06-03 SDOH — SOCIAL STABILITY: SOCIAL NETWORK: IN A TYPICAL WEEK, HOW MANY TIMES DO YOU TALK ON THE PHONE WITH FAMILY, FRIENDS, OR NEIGHBORS?: NOT ASKED

## 2021-06-03 SDOH — HEALTH STABILITY: MENTAL HEALTH
STRESS IS WHEN SOMEONE FEELS TENSE, NERVOUS, ANXIOUS, OR CAN'T SLEEP AT NIGHT BECAUSE THEIR MIND IS TROUBLED. HOW STRESSED ARE YOU?: NOT ASKED

## 2021-06-03 SDOH — ECONOMIC STABILITY: TRANSPORTATION INSECURITY
IN THE PAST 12 MONTHS, HAS THE LACK OF TRANSPORTATION KEPT YOU FROM MEDICAL APPOINTMENTS OR FROM GETTING MEDICATIONS?: NOT ASKED

## 2021-06-03 SDOH — ECONOMIC STABILITY: TRANSPORTATION INSECURITY
IN THE PAST 12 MONTHS, HAS LACK OF TRANSPORTATION KEPT YOU FROM MEETINGS, WORK, OR FROM GETTING THINGS NEEDED FOR DAILY LIVING?: NOT ASKED

## 2021-06-03 SDOH — SOCIAL STABILITY: SOCIAL NETWORK: ARE YOU MARRIED, WIDOWED, DIVORCED, SEPARATED, NEVER MARRIED, OR LIVING WITH A PARTNER?: NOT ASKED

## 2021-06-03 SDOH — SOCIAL STABILITY: SOCIAL NETWORK: HOW OFTEN DO YOU ATTEND CHURCH OR RELIGIOUS SERVICES?: NOT ASKED

## 2021-06-03 SDOH — ECONOMIC STABILITY: INCOME INSECURITY: HOW HARD IS IT FOR YOU TO PAY FOR THE VERY BASICS LIKE FOOD, HOUSING, MEDICAL CARE, AND HEATING?: NOT ASKED

## 2021-06-03 SDOH — HEALTH STABILITY: MENTAL HEALTH: HOW OFTEN DO YOU HAVE 6 OR MORE DRINKS ON ONE OCCASION?: NOT ASKED

## 2021-06-03 SDOH — SOCIAL STABILITY: SOCIAL NETWORK
DO YOU BELONG TO ANY CLUBS OR ORGANIZATIONS SUCH AS CHURCH GROUPS UNIONS, FRATERNAL OR ATHLETIC GROUPS, OR SCHOOL GROUPS?: NOT ASKED

## 2021-06-03 SDOH — HEALTH STABILITY: MENTAL HEALTH: HOW OFTEN DO YOU HAVE A DRINK CONTAINING ALCOHOL?: MONTHLY OR LESS

## 2021-06-03 SDOH — ECONOMIC STABILITY: FOOD INSECURITY: WITHIN THE PAST 12 MONTHS, YOU WORRIED THAT YOUR FOOD WOULD RUN OUT BEFORE YOU GOT MONEY TO BUY MORE.: NOT ASKED

## 2021-06-03 SDOH — SOCIAL STABILITY: SOCIAL INSECURITY
WITHIN THE LAST YEAR, HAVE TO BEEN RAPED OR FORCED TO HAVE ANY KIND OF SEXUAL ACTIVITY BY YOUR PARTNER OR EX-PARTNER?: NO

## 2021-06-03 SDOH — SOCIAL STABILITY: SOCIAL NETWORK: HOW OFTEN DO YOU GET TOGETHER WITH FRIENDS OR RELATIVES?: NOT ASKED

## 2021-06-03 SDOH — HEALTH STABILITY: MENTAL HEALTH: HOW MANY STANDARD DRINKS CONTAINING ALCOHOL DO YOU HAVE ON A TYPICAL DAY?: 1 OR 2

## 2021-06-03 SDOH — ECONOMIC STABILITY: FOOD INSECURITY: WITHIN THE PAST 12 MONTHS, THE FOOD YOU BOUGHT JUST DIDN'T LAST AND YOU DIDN'T HAVE MONEY TO GET MORE.: NOT ASKED

## 2021-06-03 ASSESSMENT — MIFFLIN-ST. JEOR: SCORE: 1432.19

## 2021-06-03 ASSESSMENT — ANXIETY QUESTIONNAIRES
IF YOU CHECKED OFF ANY PROBLEMS ON THIS QUESTIONNAIRE, HOW DIFFICULT HAVE THESE PROBLEMS MADE IT FOR YOU TO DO YOUR WORK, TAKE CARE OF THINGS AT HOME, OR GET ALONG WITH OTHER PEOPLE: SOMEWHAT DIFFICULT
4. TROUBLE RELAXING: NOT AT ALL
GAD7 TOTAL SCORE: 2
5. BEING SO RESTLESS THAT IT IS HARD TO SIT STILL: NOT AT ALL
3. WORRYING TOO MUCH ABOUT DIFFERENT THINGS: NOT AT ALL
6. BECOMING EASILY ANNOYED OR IRRITABLE: SEVERAL DAYS
2. NOT BEING ABLE TO STOP OR CONTROL WORRYING: NOT AT ALL
1. FEELING NERVOUS, ANXIOUS, OR ON EDGE: SEVERAL DAYS
7. FEELING AFRAID AS IF SOMETHING AWFUL MIGHT HAPPEN: NOT AT ALL

## 2021-06-03 ASSESSMENT — PATIENT HEALTH QUESTIONNAIRE - PHQ9: SUM OF ALL RESPONSES TO PHQ QUESTIONS 1-9: 11

## 2021-06-03 ASSESSMENT — PAIN SCALES - GENERAL: PAINLEVEL: NO PAIN (0)

## 2021-06-03 NOTE — NURSING NOTE
"Chief Complaint   Patient presents with     Establish Care       Initial /70   Pulse 71   Temp 98.8  F (37.1  C)   Ht 1.53 m (5' 0.24\")   Wt 79.4 kg (175 lb)   SpO2 97%   BMI 33.91 kg/m   Estimated body mass index is 33.91 kg/m  as calculated from the following:    Height as of this encounter: 1.53 m (5' 0.24\").    Weight as of this encounter: 79.4 kg (175 lb).  Medication Reconciliation: complete  Tito Reid LPN  "
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
## 2021-06-03 NOTE — PATIENT INSTRUCTIONS

## 2021-06-04 ASSESSMENT — ANXIETY QUESTIONNAIRES: GAD7 TOTAL SCORE: 2

## 2021-06-10 LAB
BACTERIA SPEC CULT: ABNORMAL
BACTERIA SPEC CULT: ABNORMAL
SPECIMEN SOURCE: ABNORMAL

## 2021-06-14 ENCOUNTER — OFFICE VISIT (OUTPATIENT)
Dept: PSYCHOLOGY | Facility: OTHER | Age: 36
End: 2021-06-14
Attending: MARRIAGE & FAMILY THERAPIST
Payer: COMMERCIAL

## 2021-06-14 DIAGNOSIS — F32.A DEPRESSION: Primary | ICD-10-CM

## 2021-06-14 PROCEDURE — 90791 PSYCH DIAGNOSTIC EVALUATION: CPT | Performed by: MARRIAGE & FAMILY THERAPIST

## 2021-06-21 ENCOUNTER — OFFICE VISIT (OUTPATIENT)
Dept: PSYCHOLOGY | Facility: OTHER | Age: 36
End: 2021-06-21
Attending: MARRIAGE & FAMILY THERAPIST
Payer: COMMERCIAL

## 2021-06-21 DIAGNOSIS — F41.9 ANXIETY: ICD-10-CM

## 2021-06-21 PROCEDURE — 90837 PSYTX W PT 60 MINUTES: CPT | Performed by: MARRIAGE & FAMILY THERAPIST

## 2021-06-21 NOTE — PROGRESS NOTES
Progress Note - Initial Visit    Client Name:  Barbi Vale Date: 6/14/21         Service Type: Individual     Visit Start Time: 0900  Visit End Time: 1020    Visit #: 1    Attendees: Client    Service Modality:  In-person     Diagnosis:Depression & Anxiety     DATA:   Interactive Complexity: No   Crisis: No     Presenting Concerns/  Current Stressors:   Postpartum anxiety & 's anxiety level      ASSESSMENT:  Mental Status Assessment:  Appearance:   Appropriate   Eye Contact:   Good   Psychomotor Behavior: Normal   Attitude:   Cooperative  Interested Playful Friendly Pleasant Attentive Jose  Orientation:   All  Speech   Rate / Production: Normal/ Responsive   Volume:  Normal   Mood:    Anxious  Depressed   Affect:    Worrisome   Thought Content:  Referential Thinking  Rumination   Thought Form:  Goal Directed   Insight:    Fair       Safety Issues and Plan for Safety and Risk Management:     [place] Suicide Severity Rating Scale (Short Version)  [place] Suicide Severity Rating (Short Version) 3/10/2020 9/11/2020 1/12/2021 1/19/2021 2/9/2021 5/18/2021 6/3/2021   Over the past 2 weeks have you felt down, depressed, or hopeless? no no no no no no yes   Over the past 2 weeks have you had thoughts of killing yourself? no no no no no no no   Have you ever attempted to kill yourself? no no no no no no no   Q1 Wished to be Dead (Past Month) - - - - - - -   Q2 Suicidal Thoughts (Past Month) - - - - - - -   Q3 Suicidal Thought Method - - - - - - -   Q4 Suicidal Intent without Specific Plan - - - - - - -   Q5 Suicide Intent with Specific Plan - - - - - - -   Q6 Suicide Behavior (Lifetime) - - - - - - -     Patient denies current fears or concerns for personal safety.  Patient denies current or recent suicidal ideation or behaviors.  Patient denies current or recent homicidal ideation or behaviors.  Patient denies current or recent self injurious behavior or ideation.  Patient denies other safety  concerns.  Recommended that patient call 911 or go to the local ED should there be a change in any of these risk factors.  Patient reports there are no firearms in the house.     Diagnostic Criteria:  A. Depressed mood for most of the day, for more days than not, as indicated either by subjective account or observation by others, for at least 2 years. Note: In children and adolescents, mood can be irritable and duration must be at least 1 year.        - poor appetite or overeating        - insomnia or hypersomnia       - low energy or fatigue        - low self-esteem        - poor concentration or difficulty making decisions       DSM5 Diagnoses: (Sustained by DSM5 Criteria Listed Above)  Diagnoses: 296.31 (F33.0) Major Depressive Disorder, Recurrent Episode, Mild _ and With anxious distress  Psychosocial & Contextual Factors: Postpartum anxiety  WHODAS 2.0 (12 item): No flowsheet data found.  Intervention:   Mindfulness- Patient was educated on relaxation and mindfulness techniques  Collateral Reports Completed:  Not Applicable      PLAN: (Homework, other):  1. Provider will continue Diagnostic Assessment.  Patient was given the following to do until next session:  Identify 6 skills that patient does better than most people.    2. Provider recommended the following referrals: none at this time.      3.  Safety plan created.  Provider recommended that patient  Call 9-1-1 or Er when needed.       CHON Rodriguez  June 21, 2021

## 2021-06-21 NOTE — PROGRESS NOTES
Progress Note    Client Name: Barbi Vale  Date: June 21, 2021         Service Type: Individual      Session Start Time: 0900  Session End Time: 0955      Session Length: 55 minutes     Session #: 2     Attendees: Client     DSM V Dx: Depression with anxiety  DA Date: 6/14/21    Treatment Plan Due: 9/14/21  PHQ-9 :   PHQ-9 SCORE 9/11/2020 2/9/2021 6/3/2021   PHQ-9 Total Score 3 10 11      SUHA-7 :    SUHA-7 SCORE 9/11/2020 2/9/2021 6/3/2021   Total Score 1 7 2           DATA      Progress Since Last Session (Related to Symptoms / Goals / Homework):   Symptoms: No change Very anxious & depressed    Homework: Achieved / completed to satisfaction     Current / Ongoing Stressors and Concerns:   Judging self     Treatment Objective(s) Addressed in This Session:   How perfectionism is feeling like a failure and how it affects self-esteem     Intervention:   To accept the present with goals for th future     Response to Interventions:   Willing to try and work on it     ASSESSMENT: Current Emotional / Mental Status (status of significant symptoms):   Risk status (Self / Other harm or suicidal ideation)   Client denies current fears or concerns for personal safety.   Client denies current or recent suicidal ideation or behaviors.   Client denies current or recent homicidal ideation or behaviors.   Client denies current or recent self injurious behavior or ideation.   Client denies other safety concerns.   Recommended that patient call 911 or go to the local ED should there be a change in any of these risk factors.     Appearance:   Appropriate    Eye Contact:   Good    Psychomotor Behavior: Restless    Attitude:   Cooperative  Interested Playful Friendly Pleasant Attentive Jose   Orientation:   All   Speech    Rate / Production: Normal     Volume:  Normal    Mood:    Anxious  Depressed    Affect:    Subdued    Thought Content:  Clear    Thought Form:  Coherent  Logical  "   Insight:    Fair         Medication Compliance:   NA     Changes in Health Issues:   None reported     Chemical Use Review:   Substance Use: Chemical use reviewed, no active concerns identified      Tobacco Use: No current tobacco use.       Collateral Reports Completed:   Not Applicable    PLAN: (Client Tasks / Therapist Tasks / Other)  Identify 5 pet peeves / introduce \"Anger Log\"    CHON Rodriguez                 Progress Note - Initial Visit    Client Name:  Barbi Vale Date: 6/14/21         Service Type: Individual     Visit Start Time: 0900  Visit End Time: 1020    Visit #: 1    Attendees: Client    Service Modality:  In-person     Diagnosis:Depression & Anxiety     DATA:   Interactive Complexity: No   Crisis: No     Presenting Concerns/  Current Stressors:   Postpartum anxiety & 's anxiety level      ASSESSMENT:  Mental Status Assessment:  Appearance:   Appropriate   Eye Contact:   Good   Psychomotor Behavior: Normal   Attitude:   Cooperative  Interested Playful Friendly Pleasant Attentive Jose  Orientation:   All  Speech   Rate / Production: Normal/ Responsive   Volume:  Normal   Mood:    Anxious  Depressed   Affect:    Worrisome   Thought Content:  Referential Thinking  Rumination   Thought Form:  Goal Directed   Insight:    Fair       Safety Issues and Plan for Safety and Risk Management:     [place] Suicide Severity Rating Scale (Short Version)  [place] Suicide Severity Rating (Short Version) 3/10/2020 9/11/2020 1/12/2021 1/19/2021 2/9/2021 5/18/2021 6/3/2021   Over the past 2 weeks have you felt down, depressed, or hopeless? no no no no no no yes   Over the past 2 weeks have you had thoughts of killing yourself? no no no no no no no   Have you ever attempted to kill yourself? no no no no no no no   Q1 Wished to be Dead (Past Month) - - - - - - -   Q2 Suicidal Thoughts (Past Month) - - - - - - -   Q3 Suicidal Thought Method - - - - - - -   Q4 Suicidal Intent without Specific " Plan - - - - - - -   Q5 Suicide Intent with Specific Plan - - - - - - -   Q6 Suicide Behavior (Lifetime) - - - - - - -     Patient denies current fears or concerns for personal safety.  Patient denies current or recent suicidal ideation or behaviors.  Patient denies current or recent homicidal ideation or behaviors.  Patient denies current or recent self injurious behavior or ideation.  Patient denies other safety concerns.  Recommended that patient call 911 or go to the local ED should there be a change in any of these risk factors.  Patient reports there are no firearms in the house.     Diagnostic Criteria:  A. Depressed mood for most of the day, for more days than not, as indicated either by subjective account or observation by others, for at least 2 years. Note: In children and adolescents, mood can be irritable and duration must be at least 1 year.        - poor appetite or overeating        - insomnia or hypersomnia       - low energy or fatigue        - low self-esteem        - poor concentration or difficulty making decisions       DSM5 Diagnoses: (Sustained by DSM5 Criteria Listed Above)  Diagnoses: 296.31 (F33.0) Major Depressive Disorder, Recurrent Episode, Mild _ and With anxious distress  Psychosocial & Contextual Factors: Postpartum anxiety  WHODAS 2.0 (12 item): No flowsheet data found.  Intervention:   Mindfulness- Patient was educated on relaxation and mindfulness techniques  Collateral Reports Completed:  Not Applicable      PLAN: (Homework, other):  1. Provider will continue Diagnostic Assessment.  Patient was given the following to do until next session:  Identify 6 skills that patient does better than most people.    2. Provider recommended the following referrals: none at this time.      3.  Safety plan created.  Provider recommended that patient  Call 9-1-1 or Er when needed.       CHON Rodriguez  June 21, 2021

## 2021-06-24 ENCOUNTER — TRANSFERRED RECORDS (OUTPATIENT)
Dept: HEALTH INFORMATION MANAGEMENT | Facility: CLINIC | Age: 36
End: 2021-06-24

## 2021-06-28 ENCOUNTER — OFFICE VISIT (OUTPATIENT)
Dept: PSYCHOLOGY | Facility: OTHER | Age: 36
End: 2021-06-28
Attending: MARRIAGE & FAMILY THERAPIST
Payer: COMMERCIAL

## 2021-06-28 DIAGNOSIS — F41.9 ANXIETY: ICD-10-CM

## 2021-06-28 PROCEDURE — 90837 PSYTX W PT 60 MINUTES: CPT | Performed by: MARRIAGE & FAMILY THERAPIST

## 2021-06-28 NOTE — PROGRESS NOTES
Progress Note    Client Name: Barbi Vale  Date: June 28, 2021         Service Type: Individual      Session Start Time: 0900  Session End Time: 1000      Session Length: 60 minutes     Session #: 3     Attendees: Client     DSM V Dx: Postpartum Depression & Anxiety DA Date: 6/14/21    Treatment Plan Due: 9/14/21  PHQ-9 :   PHQ-9 SCORE 9/11/2020 2/9/2021 6/3/2021   PHQ-9 Total Score 3 10 11      SUHA-7 :    SUHA-7 SCORE 9/11/2020 2/9/2021 6/3/2021   Total Score 1 7 2           DATA      Progress Since Last Session (Related to Symptoms / Goals / Homework):   Symptoms: Improving gaining insight regarding reasons for various actions    Homework: Achieved / completed to satisfaction     Current / Ongoing Stressors and Concerns:   Low self-esteem     Treatment Objective(s) Addressed in This Session:   Sources of anger and responses to them     Intervention:   Walk through anger log (greatest source of anger is people that see self as being superior)     Response to Interventions:   Enthusiastic and will to work on self-esteem     ASSESSMENT: Current Emotional / Mental Status (status of significant symptoms):   Risk status (Self / Other harm or suicidal ideation)   Client denies current fears or concerns for personal safety.   Client denies current or recent suicidal ideation or behaviors.   Client denies current or recent homicidal ideation or behaviors.   Client denies current or recent self injurious behavior or ideation.   Client denies other safety concerns.   Recommended that patient call 911 or go to the local ED should there be a change in any of these risk factors.     Appearance:   Appropriate    Eye Contact:   Good    Psychomotor Behavior: Normal    Attitude:   Cooperative  Interested Playful Friendly Pleasant Attentive Jose   Orientation:   All   Speech    Rate / Production: Normal     Volume:  Normal    Mood:    Agitated   Affect:    Appropriate    Thought  Content:  Clear    Thought Form:  Coherent  Logical    Insight:    Good         Medication Compliance:   Yes     Changes in Health Issues:   None reported     Chemical Use Review:   Substance Use: Chemical use reviewed, no active concerns identified      Tobacco Use: No current tobacco use.       Collateral Reports Completed:   Not Applicable    PLAN: (Client Tasks / Therapist Tasks / Other)  Develop list of three times felt taken advantage of / work on skills to stand up for own rights as a person    CHON Rodriguez                                             Progress Note    Client Name: Barbi Vale  Date: June 21, 2021         Service Type: Individual      Session Start Time: 0900  Session End Time: 0955      Session Length: 55 minutes     Session #: 2     Attendees: Client     DSM V Dx: Depression with anxiety  DA Date: 6/14/21    Treatment Plan Due: 9/14/21  PHQ-9 :   PHQ-9 SCORE 9/11/2020 2/9/2021 6/3/2021   PHQ-9 Total Score 3 10 11      SUHA-7 :    SUHA-7 SCORE 9/11/2020 2/9/2021 6/3/2021   Total Score 1 7 2           DATA      Progress Since Last Session (Related to Symptoms / Goals / Homework):   Symptoms: No change Very anxious & depressed    Homework: Achieved / completed to satisfaction     Current / Ongoing Stressors and Concerns:   Judging self     Treatment Objective(s) Addressed in This Session:   How perfectionism is feeling like a failure and how it affects self-esteem     Intervention:   To accept the present with goals for th future     Response to Interventions:   Willing to try and work on it     ASSESSMENT: Current Emotional / Mental Status (status of significant symptoms):   Risk status (Self / Other harm or suicidal ideation)   Client denies current fears or concerns for personal safety.   Client denies current or recent suicidal ideation or behaviors.   Client denies current or recent homicidal ideation or behaviors.   Client denies current or recent self injurious behavior or  "ideation.   Client denies other safety concerns.   Recommended that patient call 911 or go to the local ED should there be a change in any of these risk factors.     Appearance:   Appropriate    Eye Contact:   Good    Psychomotor Behavior: Restless    Attitude:   Cooperative  Interested Playful Friendly Pleasant Attentive Jose   Orientation:   All   Speech    Rate / Production: Normal     Volume:  Normal    Mood:    Anxious  Depressed    Affect:    Subdued    Thought Content:  Clear    Thought Form:  Coherent  Logical    Insight:    Fair         Medication Compliance:   NA     Changes in Health Issues:   None reported     Chemical Use Review:   Substance Use: Chemical use reviewed, no active concerns identified      Tobacco Use: No current tobacco use.       Collateral Reports Completed:   Not Applicable    PLAN: (Client Tasks / Therapist Tasks / Other)  Identify 5 pet peeves / introduce \"Anger Log\"    CHON Rodriguez                 Progress Note - Initial Visit    Client Name:  Brabi Vale Date: 6/14/21         Service Type: Individual     Visit Start Time: 0900  Visit End Time: 1020    Visit #: 1    Attendees: Client    Service Modality:  In-person     Diagnosis:Depression & Anxiety     DATA:   Interactive Complexity: No   Crisis: No     Presenting Concerns/  Current Stressors:   Postpartum anxiety & 's anxiety level      ASSESSMENT:  Mental Status Assessment:  Appearance:   Appropriate   Eye Contact:   Good   Psychomotor Behavior: Normal   Attitude:   Cooperative  Interested Playful Friendly Pleasant Attentive Jose  Orientation:   All  Speech   Rate / Production: Normal/ Responsive   Volume:  Normal   Mood:    Anxious  Depressed   Affect:    Worrisome   Thought Content:  Referential Thinking  Rumination   Thought Form:  Goal Directed   Insight:    Fair       Safety Issues and Plan for Safety and Risk Management:     [place] Suicide Severity Rating Scale (Short Version)  [place] Suicide " Severity Rating (Short Version) 3/10/2020 9/11/2020 1/12/2021 1/19/2021 2/9/2021 5/18/2021 6/3/2021   Over the past 2 weeks have you felt down, depressed, or hopeless? no no no no no no yes   Over the past 2 weeks have you had thoughts of killing yourself? no no no no no no no   Have you ever attempted to kill yourself? no no no no no no no   Q1 Wished to be Dead (Past Month) - - - - - - -   Q2 Suicidal Thoughts (Past Month) - - - - - - -   Q3 Suicidal Thought Method - - - - - - -   Q4 Suicidal Intent without Specific Plan - - - - - - -   Q5 Suicide Intent with Specific Plan - - - - - - -   Q6 Suicide Behavior (Lifetime) - - - - - - -     Patient denies current fears or concerns for personal safety.  Patient denies current or recent suicidal ideation or behaviors.  Patient denies current or recent homicidal ideation or behaviors.  Patient denies current or recent self injurious behavior or ideation.  Patient denies other safety concerns.  Recommended that patient call 911 or go to the local ED should there be a change in any of these risk factors.  Patient reports there are no firearms in the house.     Diagnostic Criteria:  A. Depressed mood for most of the day, for more days than not, as indicated either by subjective account or observation by others, for at least 2 years. Note: In children and adolescents, mood can be irritable and duration must be at least 1 year.        - poor appetite or overeating        - insomnia or hypersomnia       - low energy or fatigue        - low self-esteem        - poor concentration or difficulty making decisions       DSM5 Diagnoses: (Sustained by DSM5 Criteria Listed Above)  Diagnoses: 296.31 (F33.0) Major Depressive Disorder, Recurrent Episode, Mild _ and With anxious distress  Psychosocial & Contextual Factors: Postpartum anxiety  WHODAS 2.0 (12 item): No flowsheet data found.  Intervention:   Mindfulness- Patient was educated on relaxation and mindfulness  techniques  Collateral Reports Completed:  Not Applicable      PLAN: (Homework, other):  1. Provider will continue Diagnostic Assessment.  Patient was given the following to do until next session:  Identify 6 skills that patient does better than most people.    2. Provider recommended the following referrals: none at this time.      3.  Safety plan created.  Provider recommended that patient  Call 9-1-1 or Er when needed.       CHON Rodriguez  June 21, 2021

## 2021-07-19 ENCOUNTER — OFFICE VISIT (OUTPATIENT)
Dept: PSYCHOLOGY | Facility: OTHER | Age: 36
End: 2021-07-19
Attending: MARRIAGE & FAMILY THERAPIST
Payer: COMMERCIAL

## 2021-07-19 DIAGNOSIS — F41.9 ANXIETY: Primary | ICD-10-CM

## 2021-07-19 PROCEDURE — 90837 PSYTX W PT 60 MINUTES: CPT | Performed by: MARRIAGE & FAMILY THERAPIST

## 2021-07-19 NOTE — PROGRESS NOTES
Progress Note    Client Name: Barbi Vale  Date: July 19, 2021         Service Type: Individual      Session Start Time: 4:05  Session End Time: 5:00      Session Length: 55 minutes     Session #: 4     Attendees: Client     DSM V Dx: Anxiety    DA Date: 6/14/21    Treatment Plan Due: 9/14/21  PHQ-9 :   PHQ-9 SCORE 9/11/2020 2/9/2021 6/3/2021   PHQ-9 Total Score 3 10 11      SUHA-7 :    SUHA-7 SCORE 9/11/2020 2/9/2021 6/3/2021   Total Score 1 7 2           DATA      Progress Since Last Session (Related to Symptoms / Goals / Homework):   Symptoms: No change restlessness    Homework: Achieved / completed to satisfaction     Current / Ongoing Stressors and Concerns:   Not standing up for own rights / anger management     Treatment Objective(s) Addressed in This Session:   Sources of anger     Intervention:   How to express anger in a healthy way     Response to Interventions:   Cooperative     ASSESSMENT: Current Emotional / Mental Status (status of significant symptoms):   Risk status (Self / Other harm or suicidal ideation)   Client denies current fears or concerns for personal safety.   Client denies current or recent suicidal ideation or behaviors.   Client denies current or recent homicidal ideation or behaviors.   Client denies current or recent self injurious behavior or ideation.   Client denies other safety concerns.   Recommended that patient call 911 or go to the local ED should there be a change in any of these risk factors.     Appearance:   Appropriate    Eye Contact:   Good    Psychomotor Behavior: Agitated    Attitude:   Cooperative  Interested Friendly Pleasant   Orientation:   All   Speech    Rate / Production: Normal     Volume:  Normal    Mood:    Anxious  Agitated   Affect:    Appropriate    Thought Content:  Clear    Thought Form:  Flight of Ideas    Insight:    Fair         Medication Compliance:   NA     Changes in Health Issues:   None  reported     Chemical Use Review:   Substance Use: Chemical use reviewed, no active concerns identified      Tobacco Use: No current tobacco use.       Collateral Reports Completed:   Not Applicable    PLAN: (Client Tasks / Therapist Tasks / Other)  Pt. To take a minimum of 2 hours for self in a week / possible marriage counseling    CHON Rodriguez                                             Progress Note    Client Name: Barbi Vale  Date: June 28, 2021         Service Type: Individual      Session Start Time: 0900  Session End Time: 1000      Session Length: 60 minutes     Session #: 3     Attendees: Client     DSM V Dx: Postpartum Depression & Anxiety DA Date: 6/14/21    Treatment Plan Due: 9/14/21  PHQ-9 :   PHQ-9 SCORE 9/11/2020 2/9/2021 6/3/2021   PHQ-9 Total Score 3 10 11      SUHA-7 :    SUHA-7 SCORE 9/11/2020 2/9/2021 6/3/2021   Total Score 1 7 2           DATA      Progress Since Last Session (Related to Symptoms / Goals / Homework):   Symptoms: Improving gaining insight regarding reasons for various actions    Homework: Achieved / completed to satisfaction     Current / Ongoing Stressors and Concerns:   Low self-esteem     Treatment Objective(s) Addressed in This Session:   Sources of anger and responses to them     Intervention:   Walk through anger log (greatest source of anger is people that see self as being superior)     Response to Interventions:   Enthusiastic and will to work on self-esteem     ASSESSMENT: Current Emotional / Mental Status (status of significant symptoms):   Risk status (Self / Other harm or suicidal ideation)   Client denies current fears or concerns for personal safety.   Client denies current or recent suicidal ideation or behaviors.   Client denies current or recent homicidal ideation or behaviors.   Client denies current or recent self injurious behavior or ideation.   Client denies other safety concerns.   Recommended that patient call 911 or go to the local ED  should there be a change in any of these risk factors.     Appearance:   Appropriate    Eye Contact:   Good    Psychomotor Behavior: Normal    Attitude:   Cooperative  Interested Playful Friendly Pleasant Attentive Jose   Orientation:   All   Speech    Rate / Production: Normal     Volume:  Normal    Mood:    Agitated   Affect:    Appropriate    Thought Content:  Clear    Thought Form:  Coherent  Logical    Insight:    Good         Medication Compliance:   Yes     Changes in Health Issues:   None reported     Chemical Use Review:   Substance Use: Chemical use reviewed, no active concerns identified      Tobacco Use: No current tobacco use.       Collateral Reports Completed:   Not Applicable    PLAN: (Client Tasks / Therapist Tasks / Other)  Develop list of three times felt taken advantage of / work on skills to stand up for own rights as a person    CHON Rodriguez                                             Progress Note    Client Name: Barbi Vale  Date: June 21, 2021         Service Type: Individual      Session Start Time: 0900  Session End Time: 0955      Session Length: 55 minutes     Session #: 2     Attendees: Client     DSM V Dx: Depression with anxiety  DA Date: 6/14/21    Treatment Plan Due: 9/14/21  PHQ-9 :   PHQ-9 SCORE 9/11/2020 2/9/2021 6/3/2021   PHQ-9 Total Score 3 10 11      SUHA-7 :    SUHA-7 SCORE 9/11/2020 2/9/2021 6/3/2021   Total Score 1 7 2           DATA      Progress Since Last Session (Related to Symptoms / Goals / Homework):   Symptoms: No change Very anxious & depressed    Homework: Achieved / completed to satisfaction     Current / Ongoing Stressors and Concerns:   Judging self     Treatment Objective(s) Addressed in This Session:   How perfectionism is feeling like a failure and how it affects self-esteem     Intervention:   To accept the present with goals for th future     Response to Interventions:   Willing to try and work on it     ASSESSMENT: Current Emotional /  "Mental Status (status of significant symptoms):   Risk status (Self / Other harm or suicidal ideation)   Client denies current fears or concerns for personal safety.   Client denies current or recent suicidal ideation or behaviors.   Client denies current or recent homicidal ideation or behaviors.   Client denies current or recent self injurious behavior or ideation.   Client denies other safety concerns.   Recommended that patient call 911 or go to the local ED should there be a change in any of these risk factors.     Appearance:   Appropriate    Eye Contact:   Good    Psychomotor Behavior: Restless    Attitude:   Cooperative  Interested Playful Friendly Pleasant Attentive Jose   Orientation:   All   Speech    Rate / Production: Normal     Volume:  Normal    Mood:    Anxious  Depressed    Affect:    Subdued    Thought Content:  Clear    Thought Form:  Coherent  Logical    Insight:    Fair         Medication Compliance:   NA     Changes in Health Issues:   None reported     Chemical Use Review:   Substance Use: Chemical use reviewed, no active concerns identified      Tobacco Use: No current tobacco use.       Collateral Reports Completed:   Not Applicable    PLAN: (Client Tasks / Therapist Tasks / Other)  Identify 5 pet peeves / introduce \"Anger Log\"    CHON Rodriguez                 Progress Note - Initial Visit    Client Name:  Barbi Vale Date: 6/14/21         Service Type: Individual     Visit Start Time: 0900  Visit End Time: 1020    Visit #: 1    Attendees: Client    Service Modality:  In-person     Diagnosis:Depression & Anxiety     DATA:   Interactive Complexity: No   Crisis: No     Presenting Concerns/  Current Stressors:   Postpartum anxiety & 's anxiety level      ASSESSMENT:  Mental Status Assessment:  Appearance:   Appropriate   Eye Contact:   Good   Psychomotor Behavior: Normal   Attitude:   Cooperative  Interested Playful Friendly Pleasant Attentive " Jose  Orientation:   All  Speech   Rate / Production: Normal/ Responsive   Volume:  Normal   Mood:    Anxious  Depressed   Affect:    Worrisome   Thought Content:  Referential Thinking  Rumination   Thought Form:  Goal Directed   Insight:    Fair       Safety Issues and Plan for Safety and Risk Management:     [place] Suicide Severity Rating Scale (Short Version)  [place] Suicide Severity Rating (Short Version) 3/10/2020 9/11/2020 1/12/2021 1/19/2021 2/9/2021 5/18/2021 6/3/2021   Over the past 2 weeks have you felt down, depressed, or hopeless? no no no no no no yes   Over the past 2 weeks have you had thoughts of killing yourself? no no no no no no no   Have you ever attempted to kill yourself? no no no no no no no   Q1 Wished to be Dead (Past Month) - - - - - - -   Q2 Suicidal Thoughts (Past Month) - - - - - - -   Q3 Suicidal Thought Method - - - - - - -   Q4 Suicidal Intent without Specific Plan - - - - - - -   Q5 Suicide Intent with Specific Plan - - - - - - -   Q6 Suicide Behavior (Lifetime) - - - - - - -     Patient denies current fears or concerns for personal safety.  Patient denies current or recent suicidal ideation or behaviors.  Patient denies current or recent homicidal ideation or behaviors.  Patient denies current or recent self injurious behavior or ideation.  Patient denies other safety concerns.  Recommended that patient call 911 or go to the local ED should there be a change in any of these risk factors.  Patient reports there are no firearms in the house.     Diagnostic Criteria:  A. Depressed mood for most of the day, for more days than not, as indicated either by subjective account or observation by others, for at least 2 years. Note: In children and adolescents, mood can be irritable and duration must be at least 1 year.        - poor appetite or overeating        - insomnia or hypersomnia       - low energy or fatigue        - low self-esteem        - poor concentration or difficulty  making decisions       DSM5 Diagnoses: (Sustained by DSM5 Criteria Listed Above)  Diagnoses: 296.31 (F33.0) Major Depressive Disorder, Recurrent Episode, Mild _ and With anxious distress  Psychosocial & Contextual Factors: Postpartum anxiety  WHODAS 2.0 (12 item): No flowsheet data found.  Intervention:   Mindfulness- Patient was educated on relaxation and mindfulness techniques  Collateral Reports Completed:  Not Applicable      PLAN: (Homework, other):  1. Provider will continue Diagnostic Assessment.  Patient was given the following to do until next session:  Identify 6 skills that patient does better than most people.    2. Provider recommended the following referrals: none at this time.      3.  Safety plan created.  Provider recommended that patient  Call 9-1-1 or Er when needed.       CHON Rodriguez  June 21, 2021                                                 Progress Note    Client Name: Barbi Vale  Date: June 28, 2021         Service Type: Individual      Session Start Time: 0900  Session End Time: 1000      Session Length: 60 minutes     Session #: 3     Attendees: Client     DSM V Dx: Postpartum Depression & Anxiety DA Date: 6/14/21    Treatment Plan Due: 9/14/21  PHQ-9 :   PHQ-9 SCORE 9/11/2020 2/9/2021 6/3/2021   PHQ-9 Total Score 3 10 11      SUHA-7 :    SUHA-7 SCORE 9/11/2020 2/9/2021 6/3/2021   Total Score 1 7 2           DATA      Progress Since Last Session (Related to Symptoms / Goals / Homework):   Symptoms: Improving gaining insight regarding reasons for various actions    Homework: Achieved / completed to satisfaction     Current / Ongoing Stressors and Concerns:   Low self-esteem     Treatment Objective(s) Addressed in This Session:   Sources of anger and responses to them     Intervention:   Walk through anger log (greatest source of anger is people that see self as being superior)     Response to Interventions:   Enthusiastic and will to work on self-esteem     ASSESSMENT:  Current Emotional / Mental Status (status of significant symptoms):   Risk status (Self / Other harm or suicidal ideation)   Client denies current fears or concerns for personal safety.   Client denies current or recent suicidal ideation or behaviors.   Client denies current or recent homicidal ideation or behaviors.   Client denies current or recent self injurious behavior or ideation.   Client denies other safety concerns.   Recommended that patient call 911 or go to the local ED should there be a change in any of these risk factors.     Appearance:   Appropriate    Eye Contact:   Good    Psychomotor Behavior: Normal    Attitude:   Cooperative  Interested Playful Friendly Pleasant Attentive Jose   Orientation:   All   Speech    Rate / Production: Normal     Volume:  Normal    Mood:    Agitated   Affect:    Appropriate    Thought Content:  Clear    Thought Form:  Coherent  Logical    Insight:    Good         Medication Compliance:   Yes     Changes in Health Issues:   None reported     Chemical Use Review:   Substance Use: Chemical use reviewed, no active concerns identified      Tobacco Use: No current tobacco use.       Collateral Reports Completed:   Not Applicable    PLAN: (Client Tasks / Therapist Tasks / Other)  Develop list of three times felt taken advantage of / work on skills to stand up for own rights as a person    CHON Rodriguez                                             Progress Note    Client Name: Barbi Vale  Date: June 21, 2021         Service Type: Individual      Session Start Time: 0900  Session End Time: 0955      Session Length: 55 minutes     Session #: 2     Attendees: Client     DSM V Dx: Depression with anxiety  DA Date: 6/14/21    Treatment Plan Due: 9/14/21  PHQ-9 :   PHQ-9 SCORE 9/11/2020 2/9/2021 6/3/2021   PHQ-9 Total Score 3 10 11      SUHA-7 :    SUHA-7 SCORE 9/11/2020 2/9/2021 6/3/2021   Total Score 1 7 2           DATA      Progress Since Last Session (Related to  "Symptoms / Goals / Homework):   Symptoms: No change Very anxious & depressed    Homework: Achieved / completed to satisfaction     Current / Ongoing Stressors and Concerns:   Judging self     Treatment Objective(s) Addressed in This Session:   How perfectionism is feeling like a failure and how it affects self-esteem     Intervention:   To accept the present with goals for th future     Response to Interventions:   Willing to try and work on it     ASSESSMENT: Current Emotional / Mental Status (status of significant symptoms):   Risk status (Self / Other harm or suicidal ideation)   Client denies current fears or concerns for personal safety.   Client denies current or recent suicidal ideation or behaviors.   Client denies current or recent homicidal ideation or behaviors.   Client denies current or recent self injurious behavior or ideation.   Client denies other safety concerns.   Recommended that patient call 911 or go to the local ED should there be a change in any of these risk factors.     Appearance:   Appropriate    Eye Contact:   Good    Psychomotor Behavior: Restless    Attitude:   Cooperative  Interested Playful Friendly Pleasant Attentive Jose   Orientation:   All   Speech    Rate / Production: Normal     Volume:  Normal    Mood:    Anxious  Depressed    Affect:    Subdued    Thought Content:  Clear    Thought Form:  Coherent  Logical    Insight:    Fair         Medication Compliance:   NA     Changes in Health Issues:   None reported     Chemical Use Review:   Substance Use: Chemical use reviewed, no active concerns identified      Tobacco Use: No current tobacco use.       Collateral Reports Completed:   Not Applicable    PLAN: (Client Tasks / Therapist Tasks / Other)  Identify 5 pet peeves / introduce \"Anger Log\"    CHON Rodriguez                 Progress Note - Initial Visit    Client Name:  Barbi Vale Date: 6/14/21         Service Type: Individual     Visit Start Time: 0900  Visit " End Time: 1020    Visit #: 1    Attendees: Client    Service Modality:  In-person     Diagnosis:Depression & Anxiety     DATA:   Interactive Complexity: No   Crisis: No     Presenting Concerns/  Current Stressors:   Postpartum anxiety & 's anxiety level      ASSESSMENT:  Mental Status Assessment:  Appearance:   Appropriate   Eye Contact:   Good   Psychomotor Behavior: Normal   Attitude:   Cooperative  Interested Playful Friendly Pleasant Attentive Jose  Orientation:   All  Speech   Rate / Production: Normal/ Responsive   Volume:  Normal   Mood:    Anxious  Depressed   Affect:    Worrisome   Thought Content:  Referential Thinking  Rumination   Thought Form:  Goal Directed   Insight:    Fair       Safety Issues and Plan for Safety and Risk Management:     [place] Suicide Severity Rating Scale (Short Version)  [place] Suicide Severity Rating (Short Version) 3/10/2020 9/11/2020 1/12/2021 1/19/2021 2/9/2021 5/18/2021 6/3/2021   Over the past 2 weeks have you felt down, depressed, or hopeless? no no no no no no yes   Over the past 2 weeks have you had thoughts of killing yourself? no no no no no no no   Have you ever attempted to kill yourself? no no no no no no no   Q1 Wished to be Dead (Past Month) - - - - - - -   Q2 Suicidal Thoughts (Past Month) - - - - - - -   Q3 Suicidal Thought Method - - - - - - -   Q4 Suicidal Intent without Specific Plan - - - - - - -   Q5 Suicide Intent with Specific Plan - - - - - - -   Q6 Suicide Behavior (Lifetime) - - - - - - -     Patient denies current fears or concerns for personal safety.  Patient denies current or recent suicidal ideation or behaviors.  Patient denies current or recent homicidal ideation or behaviors.  Patient denies current or recent self injurious behavior or ideation.  Patient denies other safety concerns.  Recommended that patient call 911 or go to the local ED should there be a change in any of these risk factors.  Patient reports there are no firearms  in the house.     Diagnostic Criteria:  A. Depressed mood for most of the day, for more days than not, as indicated either by subjective account or observation by others, for at least 2 years. Note: In children and adolescents, mood can be irritable and duration must be at least 1 year.        - poor appetite or overeating        - insomnia or hypersomnia       - low energy or fatigue        - low self-esteem        - poor concentration or difficulty making decisions       DSM5 Diagnoses: (Sustained by DSM5 Criteria Listed Above)  Diagnoses: 296.31 (F33.0) Major Depressive Disorder, Recurrent Episode, Mild _ and With anxious distress  Psychosocial & Contextual Factors: Postpartum anxiety  WHODAS 2.0 (12 item): No flowsheet data found.  Intervention:   Mindfulness- Patient was educated on relaxation and mindfulness techniques  Collateral Reports Completed:  Not Applicable      PLAN: (Homework, other):  1. Provider will continue Diagnostic Assessment.  Patient was given the following to do until next session:  Identify 6 skills that patient does better than most people.    2. Provider recommended the following referrals: none at this time.      3.  Safety plan created.  Provider recommended that patient  Call 9-1-1 or Er when needed.       CHON Rodriguez  June 21, 2021

## 2021-08-02 ENCOUNTER — OFFICE VISIT (OUTPATIENT)
Dept: PSYCHOLOGY | Facility: OTHER | Age: 36
End: 2021-08-02
Attending: MARRIAGE & FAMILY THERAPIST
Payer: COMMERCIAL

## 2021-08-02 DIAGNOSIS — F41.9 ANXIETY: Primary | ICD-10-CM

## 2021-08-02 PROCEDURE — 90837 PSYTX W PT 60 MINUTES: CPT | Performed by: MARRIAGE & FAMILY THERAPIST

## 2021-08-07 NOTE — PROGRESS NOTES
Progress Note    Client Name: Barbi Vale  Date: August 2, 2021         Service Type: Individual      Session Start Time: 1300  Session End Time: 1400      Session Length: 60 minutes     Session #: 5     Attendees: Client     DSM V Dx: Anxiety & Postpartum depression  DA Date: 6/28/21    Treatment Plan Due: 9/28/21  PHQ-9 :   PHQ-9 SCORE 9/11/2020 2/9/2021 6/3/2021   PHQ-9 Total Score 3 10 11      SUHA-7 :    SUHA-7 SCORE 9/11/2020 2/9/2021 6/3/2021   Total Score 1 7 2           DATA      Progress Since Last Session (Related to Symptoms / Goals / Homework):   Symptoms: Improving was able to take some quality time for self    Homework: Achieved / completed to satisfaction     Current / Ongoing Stressors and Concerns:   Relationship conflict along with agreement on parenting skills     Treatment Objective(s) Addressed in This Session:   To shift focus from how spouse is doing to how she is doing     Intervention:   Look at own needs     Response to Interventions:   Workable and positive     ASSESSMENT: Current Emotional / Mental Status (status of significant symptoms):   Risk status (Self / Other harm or suicidal ideation)   Client denies current fears or concerns for personal safety.   Client denies current or recent suicidal ideation or behaviors.   Client denies current or recent homicidal ideation or behaviors.   Client denies current or recent self injurious behavior or ideation.   Client denies other safety concerns.   Recommended that patient call 911 or go to the local ED should there be a change in any of these risk factors.     Appearance:   Appropriate    Eye Contact:   Good    Psychomotor Behavior: Restless    Attitude:   Cooperative  Playful Friendly Pleasant Guarded  Attentive   Orientation:   All   Speech    Rate / Production: Normal     Volume:  Normal    Mood:    Anxious  Depressed    Affect:    Restricted    Thought Content:  Obsessions  Compulsions   Thought Form:  Obsessive  Circumstantial   Insight:    Fair         Medication Compliance:   NA     Changes in Health Issues:   None reported     Chemical Use Review:   Substance Use: Chemical use reviewed, no active concerns identified      Tobacco Use: No current tobacco use.       Collateral Reports Completed:   Not Applicable    PLAN: (Client Tasks / Therapist Tasks / Other)  Work on taking more time for self also possible marriage counseling with spouse in attendance    CHON Rodriguez                                             Progress Note    Client Name: Barbi Vale  Date: July 19, 2021         Service Type: Individual      Session Start Time: 4:05  Session End Time: 5:00      Session Length: 55 minutes     Session #: 4     Attendees: Client     DSM V Dx: Anxiety    DA Date: 6/14/21    Treatment Plan Due: 9/14/21  PHQ-9 :   PHQ-9 SCORE 9/11/2020 2/9/2021 6/3/2021   PHQ-9 Total Score 3 10 11      SUHA-7 :    SUHA-7 SCORE 9/11/2020 2/9/2021 6/3/2021   Total Score 1 7 2           DATA      Progress Since Last Session (Related to Symptoms / Goals / Homework):   Symptoms: No change restlessness    Homework: Achieved / completed to satisfaction     Current / Ongoing Stressors and Concerns:   Not standing up for own rights / anger management     Treatment Objective(s) Addressed in This Session:   Sources of anger     Intervention:   How to express anger in a healthy way     Response to Interventions:   Cooperative     ASSESSMENT: Current Emotional / Mental Status (status of significant symptoms):   Risk status (Self / Other harm or suicidal ideation)   Client denies current fears or concerns for personal safety.   Client denies current or recent suicidal ideation or behaviors.   Client denies current or recent homicidal ideation or behaviors.   Client denies current or recent self injurious behavior or ideation.   Client denies other safety concerns.   Recommended that patient call 911 or  go to the local ED should there be a change in any of these risk factors.     Appearance:   Appropriate    Eye Contact:   Good    Psychomotor Behavior: Agitated    Attitude:   Cooperative  Interested Friendly Pleasant   Orientation:   All   Speech    Rate / Production: Normal     Volume:  Normal    Mood:    Anxious  Agitated   Affect:    Appropriate    Thought Content:  Clear    Thought Form:  Flight of Ideas    Insight:    Fair         Medication Compliance:   NA     Changes in Health Issues:   None reported     Chemical Use Review:   Substance Use: Chemical use reviewed, no active concerns identified      Tobacco Use: No current tobacco use.       Collateral Reports Completed:   Not Applicable    PLAN: (Client Tasks / Therapist Tasks / Other)  Pt. To take a minimum of 2 hours for self in a week / possible marriage counseling    CHON Rodriguez                                             Progress Note    Client Name: Barbi Vale  Date: June 28, 2021         Service Type: Individual      Session Start Time: 0900  Session End Time: 1000      Session Length: 60 minutes     Session #: 3     Attendees: Client     DSM V Dx: Postpartum Depression & Anxiety DA Date: 6/14/21    Treatment Plan Due: 9/14/21  PHQ-9 :   PHQ-9 SCORE 9/11/2020 2/9/2021 6/3/2021   PHQ-9 Total Score 3 10 11      SUHA-7 :    SUHA-7 SCORE 9/11/2020 2/9/2021 6/3/2021   Total Score 1 7 2           DATA      Progress Since Last Session (Related to Symptoms / Goals / Homework):   Symptoms: Improving gaining insight regarding reasons for various actions    Homework: Achieved / completed to satisfaction     Current / Ongoing Stressors and Concerns:   Low self-esteem     Treatment Objective(s) Addressed in This Session:   Sources of anger and responses to them     Intervention:   Walk through anger log (greatest source of anger is people that see self as being superior)     Response to Interventions:   Enthusiastic and will to work on  self-esteem     ASSESSMENT: Current Emotional / Mental Status (status of significant symptoms):   Risk status (Self / Other harm or suicidal ideation)   Client denies current fears or concerns for personal safety.   Client denies current or recent suicidal ideation or behaviors.   Client denies current or recent homicidal ideation or behaviors.   Client denies current or recent self injurious behavior or ideation.   Client denies other safety concerns.   Recommended that patient call 911 or go to the local ED should there be a change in any of these risk factors.     Appearance:   Appropriate    Eye Contact:   Good    Psychomotor Behavior: Normal    Attitude:   Cooperative  Interested Playful Friendly Pleasant Attentive Jose   Orientation:   All   Speech    Rate / Production: Normal     Volume:  Normal    Mood:    Agitated   Affect:    Appropriate    Thought Content:  Clear    Thought Form:  Coherent  Logical    Insight:    Good         Medication Compliance:   Yes     Changes in Health Issues:   None reported     Chemical Use Review:   Substance Use: Chemical use reviewed, no active concerns identified      Tobacco Use: No current tobacco use.       Collateral Reports Completed:   Not Applicable    PLAN: (Client Tasks / Therapist Tasks / Other)  Develop list of three times felt taken advantage of / work on skills to stand up for own rights as a person    CHON Rodriguez                                             Progress Note    Client Name: Barbi Vale  Date: June 21, 2021         Service Type: Individual      Session Start Time: 0900  Session End Time: 0955      Session Length: 55 minutes     Session #: 2     Attendees: Client     DSM V Dx: Depression with anxiety  DA Date: 6/14/21    Treatment Plan Due: 9/14/21  PHQ-9 :   PHQ-9 SCORE 9/11/2020 2/9/2021 6/3/2021   PHQ-9 Total Score 3 10 11      SUHA-7 :    SUHA-7 SCORE 9/11/2020 2/9/2021 6/3/2021   Total Score 1 7 2           DATA      Progress  "Since Last Session (Related to Symptoms / Goals / Homework):   Symptoms: No change Very anxious & depressed    Homework: Achieved / completed to satisfaction     Current / Ongoing Stressors and Concerns:   Judging self     Treatment Objective(s) Addressed in This Session:   How perfectionism is feeling like a failure and how it affects self-esteem     Intervention:   To accept the present with goals for th future     Response to Interventions:   Willing to try and work on it     ASSESSMENT: Current Emotional / Mental Status (status of significant symptoms):   Risk status (Self / Other harm or suicidal ideation)   Client denies current fears or concerns for personal safety.   Client denies current or recent suicidal ideation or behaviors.   Client denies current or recent homicidal ideation or behaviors.   Client denies current or recent self injurious behavior or ideation.   Client denies other safety concerns.   Recommended that patient call 911 or go to the local ED should there be a change in any of these risk factors.     Appearance:   Appropriate    Eye Contact:   Good    Psychomotor Behavior: Restless    Attitude:   Cooperative  Interested Playful Friendly Pleasant Attentive Jose   Orientation:   All   Speech    Rate / Production: Normal     Volume:  Normal    Mood:    Anxious  Depressed    Affect:    Subdued    Thought Content:  Clear    Thought Form:  Coherent  Logical    Insight:    Fair         Medication Compliance:   NA     Changes in Health Issues:   None reported     Chemical Use Review:   Substance Use: Chemical use reviewed, no active concerns identified      Tobacco Use: No current tobacco use.       Collateral Reports Completed:   Not Applicable    PLAN: (Client Tasks / Therapist Tasks / Other)  Identify 5 pet peeves / introduce \"Anger Log\"    CHON Rodriguez                 Progress Note - Initial Visit    Client Name:  Barbi Vale Date: 6/14/21         Service " Type: Individual     Visit Start Time: 0900  Visit End Time: 1020    Visit #: 1    Attendees: Client    Service Modality:  In-person     Diagnosis:Depression & Anxiety     DATA:   Interactive Complexity: No   Crisis: No     Presenting Concerns/  Current Stressors:   Postpartum anxiety & 's anxiety level      ASSESSMENT:  Mental Status Assessment:  Appearance:   Appropriate   Eye Contact:   Good   Psychomotor Behavior: Normal   Attitude:   Cooperative  Interested Playful Friendly Pleasant Attentive Jose  Orientation:   All  Speech   Rate / Production: Normal/ Responsive   Volume:  Normal   Mood:    Anxious  Depressed   Affect:    Worrisome   Thought Content:  Referential Thinking  Rumination   Thought Form:  Goal Directed   Insight:    Fair       Safety Issues and Plan for Safety and Risk Management:     [place] Suicide Severity Rating Scale (Short Version)  [place] Suicide Severity Rating (Short Version) 3/10/2020 9/11/2020 1/12/2021 1/19/2021 2/9/2021 5/18/2021 6/3/2021   Over the past 2 weeks have you felt down, depressed, or hopeless? no no no no no no yes   Over the past 2 weeks have you had thoughts of killing yourself? no no no no no no no   Have you ever attempted to kill yourself? no no no no no no no   Q1 Wished to be Dead (Past Month) - - - - - - -   Q2 Suicidal Thoughts (Past Month) - - - - - - -   Q3 Suicidal Thought Method - - - - - - -   Q4 Suicidal Intent without Specific Plan - - - - - - -   Q5 Suicide Intent with Specific Plan - - - - - - -   Q6 Suicide Behavior (Lifetime) - - - - - - -     Patient denies current fears or concerns for personal safety.  Patient denies current or recent suicidal ideation or behaviors.  Patient denies current or recent homicidal ideation or behaviors.  Patient denies current or recent self injurious behavior or ideation.  Patient denies other safety concerns.  Recommended that patient call 911 or go to the local ED should there be a change in any of these  risk factors.  Patient reports there are no firearms in the house.     Diagnostic Criteria:  A. Depressed mood for most of the day, for more days than not, as indicated either by subjective account or observation by others, for at least 2 years. Note: In children and adolescents, mood can be irritable and duration must be at least 1 year.        - poor appetite or overeating        - insomnia or hypersomnia       - low energy or fatigue        - low self-esteem        - poor concentration or difficulty making decisions       DSM5 Diagnoses: (Sustained by DSM5 Criteria Listed Above)  Diagnoses: 296.31 (F33.0) Major Depressive Disorder, Recurrent Episode, Mild _ and With anxious distress  Psychosocial & Contextual Factors: Postpartum anxiety  WHODAS 2.0 (12 item): No flowsheet data found.  Intervention:   Mindfulness- Patient was educated on relaxation and mindfulness techniques  Collateral Reports Completed:  Not Applicable      PLAN: (Homework, other):  1. Provider will continue Diagnostic Assessment.  Patient was given the following to do until next session:  Identify 6 skills that patient does better than most people.    2. Provider recommended the following referrals: none at this time.      3.  Safety plan created.  Provider recommended that patient  Call 9-1-1 or Er when needed.       CHON Rodriguez  June 21, 2021                                                 Progress Note    Client Name: Barbi Vale  Date: June 28, 2021         Service Type: Individual      Session Start Time: 0900  Session End Time: 1000      Session Length: 60 minutes     Session #: 3     Attendees: Client     DSM V Dx: Postpartum Depression & Anxiety DA Date: 6/14/21    Treatment Plan Due: 9/14/21  PHQ-9 :   PHQ-9 SCORE 9/11/2020 2/9/2021 6/3/2021   PHQ-9 Total Score 3 10 11      SUHA-7 :    SUHA-7 SCORE 9/11/2020 2/9/2021 6/3/2021   Total Score 1 7 2           DATA      Progress Since Last Session (Related to Symptoms /  Goals / Homework):   Symptoms: Improving gaining insight regarding reasons for various actions    Homework: Achieved / completed to satisfaction     Current / Ongoing Stressors and Concerns:   Low self-esteem     Treatment Objective(s) Addressed in This Session:   Sources of anger and responses to them     Intervention:   Walk through anger log (greatest source of anger is people that see self as being superior)     Response to Interventions:   Enthusiastic and will to work on self-esteem     ASSESSMENT: Current Emotional / Mental Status (status of significant symptoms):   Risk status (Self / Other harm or suicidal ideation)   Client denies current fears or concerns for personal safety.   Client denies current or recent suicidal ideation or behaviors.   Client denies current or recent homicidal ideation or behaviors.   Client denies current or recent self injurious behavior or ideation.   Client denies other safety concerns.   Recommended that patient call 911 or go to the local ED should there be a change in any of these risk factors.     Appearance:   Appropriate    Eye Contact:   Good    Psychomotor Behavior: Normal    Attitude:   Cooperative  Interested Playful Friendly Pleasant Attentive Jose   Orientation:   All   Speech    Rate / Production: Normal     Volume:  Normal    Mood:    Agitated   Affect:    Appropriate    Thought Content:  Clear    Thought Form:  Coherent  Logical    Insight:    Good         Medication Compliance:   Yes     Changes in Health Issues:   None reported     Chemical Use Review:   Substance Use: Chemical use reviewed, no active concerns identified      Tobacco Use: No current tobacco use.       Collateral Reports Completed:   Not Applicable    PLAN: (Client Tasks / Therapist Tasks / Other)  Develop list of three times felt taken advantage of / work on skills to stand up for own rights as a person    CHON Rodriguez                                             Progress  Note    Client Name: Barbi Vale  Date: June 21, 2021         Service Type: Individual      Session Start Time: 0900  Session End Time: 0955      Session Length: 55 minutes     Session #: 2     Attendees: Client     DSM V Dx: Depression with anxiety  DA Date: 6/14/21    Treatment Plan Due: 9/14/21  PHQ-9 :   PHQ-9 SCORE 9/11/2020 2/9/2021 6/3/2021   PHQ-9 Total Score 3 10 11      SUHA-7 :    SUHA-7 SCORE 9/11/2020 2/9/2021 6/3/2021   Total Score 1 7 2           DATA      Progress Since Last Session (Related to Symptoms / Goals / Homework):   Symptoms: No change Very anxious & depressed    Homework: Achieved / completed to satisfaction     Current / Ongoing Stressors and Concerns:   Judging self     Treatment Objective(s) Addressed in This Session:   How perfectionism is feeling like a failure and how it affects self-esteem     Intervention:   To accept the present with goals for th future     Response to Interventions:   Willing to try and work on it     ASSESSMENT: Current Emotional / Mental Status (status of significant symptoms):   Risk status (Self / Other harm or suicidal ideation)   Client denies current fears or concerns for personal safety.   Client denies current or recent suicidal ideation or behaviors.   Client denies current or recent homicidal ideation or behaviors.   Client denies current or recent self injurious behavior or ideation.   Client denies other safety concerns.   Recommended that patient call 911 or go to the local ED should there be a change in any of these risk factors.     Appearance:   Appropriate    Eye Contact:   Good    Psychomotor Behavior: Restless    Attitude:   Cooperative  Interested Playful Friendly Pleasant Attentive Jose   Orientation:   All   Speech    Rate / Production: Normal     Volume:  Normal    Mood:    Anxious  Depressed    Affect:    Subdued    Thought Content:  Clear    Thought Form:  Coherent  Logical    Insight:    Fair         Medication  "Compliance:   NA     Changes in Health Issues:   None reported     Chemical Use Review:   Substance Use: Chemical use reviewed, no active concerns identified      Tobacco Use: No current tobacco use.       Collateral Reports Completed:   Not Applicable    PLAN: (Client Tasks / Therapist Tasks / Other)  Identify 5 pet peeves / introduce \"Anger Log\"    CHON Rodriguez                 Progress Note - Initial Visit    Client Name:  Barbi Vale Date: 6/14/21         Service Type: Individual     Visit Start Time: 0900  Visit End Time: 1020    Visit #: 1    Attendees: Client    Service Modality:  In-person     Diagnosis:Depression & Anxiety     DATA:   Interactive Complexity: No   Crisis: No     Presenting Concerns/  Current Stressors:   Postpartum anxiety & 's anxiety level      ASSESSMENT:  Mental Status Assessment:  Appearance:   Appropriate   Eye Contact:   Good   Psychomotor Behavior: Normal   Attitude:   Cooperative  Interested Playful Friendly Pleasant Attentive Jose  Orientation:   All  Speech   Rate / Production: Normal/ Responsive   Volume:  Normal   Mood:    Anxious  Depressed   Affect:    Worrisome   Thought Content:  Referential Thinking  Rumination   Thought Form:  Goal Directed   Insight:    Fair       Safety Issues and Plan for Safety and Risk Management:     [place] Suicide Severity Rating Scale (Short Version)  [place] Suicide Severity Rating (Short Version) 3/10/2020 9/11/2020 1/12/2021 1/19/2021 2/9/2021 5/18/2021 6/3/2021   Over the past 2 weeks have you felt down, depressed, or hopeless? no no no no no no yes   Over the past 2 weeks have you had thoughts of killing yourself? no no no no no no no   Have you ever attempted to kill yourself? no no no no no no no   Q1 Wished to be Dead (Past Month) - - - - - - -   Q2 Suicidal Thoughts (Past Month) - - - - - - -   Q3 Suicidal Thought Method - - - - - - -   Q4 Suicidal Intent without Specific Plan - - - - - - -   Q5 Suicide Intent " with Specific Plan - - - - - - -   Q6 Suicide Behavior (Lifetime) - - - - - - -     Patient denies current fears or concerns for personal safety.  Patient denies current or recent suicidal ideation or behaviors.  Patient denies current or recent homicidal ideation or behaviors.  Patient denies current or recent self injurious behavior or ideation.  Patient denies other safety concerns.  Recommended that patient call 911 or go to the local ED should there be a change in any of these risk factors.  Patient reports there are no firearms in the house.     Diagnostic Criteria:  A. Depressed mood for most of the day, for more days than not, as indicated either by subjective account or observation by others, for at least 2 years. Note: In children and adolescents, mood can be irritable and duration must be at least 1 year.        - poor appetite or overeating        - insomnia or hypersomnia       - low energy or fatigue        - low self-esteem        - poor concentration or difficulty making decisions       DSM5 Diagnoses: (Sustained by DSM5 Criteria Listed Above)  Diagnoses: 296.31 (F33.0) Major Depressive Disorder, Recurrent Episode, Mild _ and With anxious distress  Psychosocial & Contextual Factors: Postpartum anxiety  WHODAS 2.0 (12 item): No flowsheet data found.  Intervention:   Mindfulness- Patient was educated on relaxation and mindfulness techniques  Collateral Reports Completed:  Not Applicable      PLAN: (Homework, other):  1. Provider will continue Diagnostic Assessment.  Patient was given the following to do until next session:  Identify 6 skills that patient does better than most people.    2. Provider recommended the following referrals: none at this time.      3.  Safety plan created.  Provider recommended that patient  Call 9-1-1 or Er when needed.       CHON Rodriguez  June 21, 2021

## 2021-08-16 ENCOUNTER — OFFICE VISIT (OUTPATIENT)
Dept: PSYCHOLOGY | Facility: OTHER | Age: 36
End: 2021-08-16
Attending: MARRIAGE & FAMILY THERAPIST
Payer: COMMERCIAL

## 2021-08-16 DIAGNOSIS — F41.9 ANXIETY: ICD-10-CM

## 2021-08-16 PROCEDURE — 90837 PSYTX W PT 60 MINUTES: CPT | Performed by: MARRIAGE & FAMILY THERAPIST

## 2021-08-23 ENCOUNTER — OFFICE VISIT (OUTPATIENT)
Dept: PSYCHOLOGY | Facility: OTHER | Age: 36
End: 2021-08-23
Attending: MARRIAGE & FAMILY THERAPIST
Payer: COMMERCIAL

## 2021-08-23 PROCEDURE — 90847 FAMILY PSYTX W/PT 50 MIN: CPT | Performed by: MARRIAGE & FAMILY THERAPIST
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
## 2021-08-28 NOTE — PROGRESS NOTES
Progress Note    Client Name: Barbi Vale  Date: August 23, 2021         Service Type: Couple      Session Start Time: 1615  Session End Time: 1645      Session Length: 30 minutes     Session #: 7     Attendees: Client and Spouse / Significant Other     DSM V Dx: Anxiety & Depression    DA Date: 6/28/21    Treatment Plan Due: 9/28/21  PHQ-9 :   PHQ-9 SCORE 9/11/2020 2/9/2021 6/3/2021   PHQ-9 Total Score 3 10 11      SUHA-7 :    SUHA-7 SCORE 9/11/2020 2/9/2021 6/3/2021   Total Score 1 7 2           DATA      Progress Since Last Session (Related to Symptoms / Goals / Homework):   Symptoms: Improving coouple communication is improving    Homework: Completed in session     Current / Ongoing Stressors and Concerns:   Not spending quality time together with spouse     Treatment Objective(s) Addressed in This Session:   Importance of taking time for self and for relationship     Intervention:   Identifying possible time to take time     Response to Interventions:   Will work on assignment and also schedule a date with each other     ASSESSMENT: Current Emotional / Mental Status (status of significant symptoms):   Risk status (Self / Other harm or suicidal ideation)   Client denies current fears or concerns for personal safety.   Client denies current or recent suicidal ideation or behaviors.   Client denies current or recent homicidal ideation or behaviors.   Client denies current or recent self injurious behavior or ideation.   Client denies other safety concerns.   Recommended that patient call 911 or go to the local ED should there be a change in any of these risk factors.     Appearance:   Appropriate    Eye Contact:   Good    Psychomotor Behavior: Normal    Attitude:   Cooperative  Interested Friendly Pleasant   Orientation:   All   Speech    Rate / Production: Normal     Volume:  Normal    Mood:    Anxious  Sad    Affect:    Restricted    Thought Content:  Clear     Thought Form:  Coherent  Logical    Insight:    Good         Medication Compliance:   NA     Changes in Health Issues:   None reported     Chemical Use Review:   Substance Use: Chemical use reviewed, no active concerns identified      Tobacco Use: No current tobacco use.       Collateral Reports Completed:   Not Applicable    PLAN: (Client Tasks / Therapist Tasks / Other)  Plan a date with each other and also take time for self / work on awareness wheel    CHON Rodriguez                                             Progress Note    Client Name: Barbi Vale  Date: August 2, 2021         Service Type: Individual      Session Start Time: 1300  Session End Time: 1400      Session Length: 60 minutes     Session #: 5     Attendees: Client     DSM V Dx: Anxiety & Postpartum depression  DA Date: 6/28/21    Treatment Plan Due: 9/28/21  PHQ-9 :   PHQ-9 SCORE 9/11/2020 2/9/2021 6/3/2021   PHQ-9 Total Score 3 10 11      SUHA-7 :    SUHA-7 SCORE 9/11/2020 2/9/2021 6/3/2021   Total Score 1 7 2           DATA      Progress Since Last Session (Related to Symptoms / Goals / Homework):   Symptoms: Improving was able to take some quality time for self    Homework: Achieved / completed to satisfaction     Current / Ongoing Stressors and Concerns:   Relationship conflict along with agreement on parenting skills     Treatment Objective(s) Addressed in This Session:   To shift focus from how spouse is doing to how she is doing     Intervention:   Look at own needs     Response to Interventions:   Workable and positive     ASSESSMENT: Current Emotional / Mental Status (status of significant symptoms):   Risk status (Self / Other harm or suicidal ideation)   Client denies current fears or concerns for personal safety.   Client denies current or recent suicidal ideation or behaviors.   Client denies current or recent homicidal ideation or behaviors.   Client denies current or recent self injurious behavior or  ideation.   Client denies other safety concerns.   Recommended that patient call 911 or go to the local ED should there be a change in any of these risk factors.     Appearance:   Appropriate    Eye Contact:   Good    Psychomotor Behavior: Restless    Attitude:   Cooperative  Playful Friendly Pleasant Guarded  Attentive   Orientation:   All   Speech    Rate / Production: Normal     Volume:  Normal    Mood:    Anxious  Depressed    Affect:    Restricted    Thought Content:  Obsessions Compulsions   Thought Form:  Obsessive  Circumstantial   Insight:    Fair         Medication Compliance:   NA     Changes in Health Issues:   None reported     Chemical Use Review:   Substance Use: Chemical use reviewed, no active concerns identified      Tobacco Use: No current tobacco use.       Collateral Reports Completed:   Not Applicable    PLAN: (Client Tasks / Therapist Tasks / Other)  Work on taking more time for self also possible marriage counseling with spouse in attendance    CHON Rodriguez                                             Progress Note    Client Name: Barbi Vale  Date: July 19, 2021         Service Type: Individual      Session Start Time: 4:05  Session End Time: 5:00      Session Length: 55 minutes     Session #: 4     Attendees: Client     DSM V Dx: Anxiety    DA Date: 6/14/21    Treatment Plan Due: 9/14/21  PHQ-9 :   PHQ-9 SCORE 9/11/2020 2/9/2021 6/3/2021   PHQ-9 Total Score 3 10 11      SUHA-7 :    SUHA-7 SCORE 9/11/2020 2/9/2021 6/3/2021   Total Score 1 7 2           DATA      Progress Since Last Session (Related to Symptoms / Goals / Homework):   Symptoms: No change restlessness    Homework: Achieved / completed to satisfaction     Current / Ongoing Stressors and Concerns:   Not standing up for own rights / anger management     Treatment Objective(s) Addressed in This Session:   Sources of anger     Intervention:   How to express anger in a healthy way     Response to  Interventions:   Cooperative     ASSESSMENT: Current Emotional / Mental Status (status of significant symptoms):   Risk status (Self / Other harm or suicidal ideation)   Client denies current fears or concerns for personal safety.   Client denies current or recent suicidal ideation or behaviors.   Client denies current or recent homicidal ideation or behaviors.   Client denies current or recent self injurious behavior or ideation.   Client denies other safety concerns.   Recommended that patient call 911 or go to the local ED should there be a change in any of these risk factors.     Appearance:   Appropriate    Eye Contact:   Good    Psychomotor Behavior: Agitated    Attitude:   Cooperative  Interested Friendly Pleasant   Orientation:   All   Speech    Rate / Production: Normal     Volume:  Normal    Mood:    Anxious  Agitated   Affect:    Appropriate    Thought Content:  Clear    Thought Form:  Flight of Ideas    Insight:    Fair         Medication Compliance:   NA     Changes in Health Issues:   None reported     Chemical Use Review:   Substance Use: Chemical use reviewed, no active concerns identified      Tobacco Use: No current tobacco use.       Collateral Reports Completed:   Not Applicable    PLAN: (Client Tasks / Therapist Tasks / Other)  Pt. To take a minimum of 2 hours for self in a week / possible marriage counseling    CHON Rodriguez                                             Progress Note    Client Name: Barbi Vale  Date: June 28, 2021         Service Type: Individual      Session Start Time: 0900  Session End Time: 1000      Session Length: 60 minutes     Session #: 3     Attendees: Client     DSM V Dx: Postpartum Depression & Anxiety DA Date: 6/14/21    Treatment Plan Due: 9/14/21  PHQ-9 :   PHQ-9 SCORE 9/11/2020 2/9/2021 6/3/2021   PHQ-9 Total Score 3 10 11      SUHA-7 :    SUHA-7 SCORE 9/11/2020 2/9/2021 6/3/2021   Total Score 1 7 2           DATA      Progress Since Last Session  (Related to Symptoms / Goals / Homework):   Symptoms: Improving gaining insight regarding reasons for various actions    Homework: Achieved / completed to satisfaction     Current / Ongoing Stressors and Concerns:   Low self-esteem     Treatment Objective(s) Addressed in This Session:   Sources of anger and responses to them     Intervention:   Walk through anger log (greatest source of anger is people that see self as being superior)     Response to Interventions:   Enthusiastic and will to work on self-esteem     ASSESSMENT: Current Emotional / Mental Status (status of significant symptoms):   Risk status (Self / Other harm or suicidal ideation)   Client denies current fears or concerns for personal safety.   Client denies current or recent suicidal ideation or behaviors.   Client denies current or recent homicidal ideation or behaviors.   Client denies current or recent self injurious behavior or ideation.   Client denies other safety concerns.   Recommended that patient call 911 or go to the local ED should there be a change in any of these risk factors.     Appearance:   Appropriate    Eye Contact:   Good    Psychomotor Behavior: Normal    Attitude:   Cooperative  Interested Playful Friendly Pleasant Attentive Jose   Orientation:   All   Speech    Rate / Production: Normal     Volume:  Normal    Mood:    Agitated   Affect:    Appropriate    Thought Content:  Clear    Thought Form:  Coherent  Logical    Insight:    Good         Medication Compliance:   Yes     Changes in Health Issues:   None reported     Chemical Use Review:   Substance Use: Chemical use reviewed, no active concerns identified      Tobacco Use: No current tobacco use.       Collateral Reports Completed:   Not Applicable    PLAN: (Client Tasks / Therapist Tasks / Other)  Develop list of three times felt taken advantage of / work on skills to stand up for own rights as a person    CHON Rodriguez                                              Progress Note    Client Name: Barbi Vale  Date: June 21, 2021         Service Type: Individual      Session Start Time: 0900  Session End Time: 0955      Session Length: 55 minutes     Session #: 2     Attendees: Client     DSM V Dx: Depression with anxiety  DA Date: 6/14/21    Treatment Plan Due: 9/14/21  PHQ-9 :   PHQ-9 SCORE 9/11/2020 2/9/2021 6/3/2021   PHQ-9 Total Score 3 10 11      SUHA-7 :    SUHA-7 SCORE 9/11/2020 2/9/2021 6/3/2021   Total Score 1 7 2           DATA      Progress Since Last Session (Related to Symptoms / Goals / Homework):   Symptoms: No change Very anxious & depressed    Homework: Achieved / completed to satisfaction     Current / Ongoing Stressors and Concerns:   Judging self     Treatment Objective(s) Addressed in This Session:   How perfectionism is feeling like a failure and how it affects self-esteem     Intervention:   To accept the present with goals for th future     Response to Interventions:   Willing to try and work on it     ASSESSMENT: Current Emotional / Mental Status (status of significant symptoms):   Risk status (Self / Other harm or suicidal ideation)   Client denies current fears or concerns for personal safety.   Client denies current or recent suicidal ideation or behaviors.   Client denies current or recent homicidal ideation or behaviors.   Client denies current or recent self injurious behavior or ideation.   Client denies other safety concerns.   Recommended that patient call 911 or go to the local ED should there be a change in any of these risk factors.     Appearance:   Appropriate    Eye Contact:   Good    Psychomotor Behavior: Restless    Attitude:   Cooperative  Interested Playful Friendly Pleasant Attentive Joes   Orientation:   All   Speech    Rate / Production: Normal     Volume:  Normal    Mood:    Anxious  Depressed    Affect:    Subdued    Thought Content:  Clear    Thought Form:  Coherent  Logical    Insight:    Fair         Medication  "Compliance:   NA     Changes in Health Issues:   None reported     Chemical Use Review:   Substance Use: Chemical use reviewed, no active concerns identified      Tobacco Use: No current tobacco use.       Collateral Reports Completed:   Not Applicable    PLAN: (Client Tasks / Therapist Tasks / Other)  Identify 5 pet peeves / introduce \"Anger Log\"    CHON Rodriguez                 Progress Note - Initial Visit    Client Name:  Barbi Vale Date: 6/14/21         Service Type: Individual     Visit Start Time: 0900  Visit End Time: 1020    Visit #: 1    Attendees: Client    Service Modality:  In-person     Diagnosis:Depression & Anxiety     DATA:   Interactive Complexity: No   Crisis: No     Presenting Concerns/  Current Stressors:   Postpartum anxiety & 's anxiety level      ASSESSMENT:  Mental Status Assessment:  Appearance:   Appropriate   Eye Contact:   Good   Psychomotor Behavior: Normal   Attitude:   Cooperative  Interested Playful Friendly Pleasant Attentive Jose  Orientation:   All  Speech   Rate / Production: Normal/ Responsive   Volume:  Normal   Mood:    Anxious  Depressed   Affect:    Worrisome   Thought Content:  Referential Thinking  Rumination   Thought Form:  Goal Directed   Insight:    Fair       Safety Issues and Plan for Safety and Risk Management:     [place] Suicide Severity Rating Scale (Short Version)  [place] Suicide Severity Rating (Short Version) 3/10/2020 9/11/2020 1/12/2021 1/19/2021 2/9/2021 5/18/2021 6/3/2021   Over the past 2 weeks have you felt down, depressed, or hopeless? no no no no no no yes   Over the past 2 weeks have you had thoughts of killing yourself? no no no no no no no   Have you ever attempted to kill yourself? no no no no no no no   Q1 Wished to be Dead (Past Month) - - - - - - -   Q2 Suicidal Thoughts (Past Month) - - - - - - -   Q3 Suicidal Thought Method - - - - - - -   Q4 Suicidal Intent without Specific Plan - - - - - - -   Q5 Suicide Intent " with Specific Plan - - - - - - -   Q6 Suicide Behavior (Lifetime) - - - - - - -     Patient denies current fears or concerns for personal safety.  Patient denies current or recent suicidal ideation or behaviors.  Patient denies current or recent homicidal ideation or behaviors.  Patient denies current or recent self injurious behavior or ideation.  Patient denies other safety concerns.  Recommended that patient call 911 or go to the local ED should there be a change in any of these risk factors.  Patient reports there are no firearms in the house.     Diagnostic Criteria:  A. Depressed mood for most of the day, for more days than not, as indicated either by subjective account or observation by others, for at least 2 years. Note: In children and adolescents, mood can be irritable and duration must be at least 1 year.        - poor appetite or overeating        - insomnia or hypersomnia       - low energy or fatigue        - low self-esteem        - poor concentration or difficulty making decisions       DSM5 Diagnoses: (Sustained by DSM5 Criteria Listed Above)  Diagnoses: 296.31 (F33.0) Major Depressive Disorder, Recurrent Episode, Mild _ and With anxious distress  Psychosocial & Contextual Factors: Postpartum anxiety  WHODAS 2.0 (12 item): No flowsheet data found.  Intervention:   Mindfulness- Patient was educated on relaxation and mindfulness techniques  Collateral Reports Completed:  Not Applicable      PLAN: (Homework, other):  1. Provider will continue Diagnostic Assessment.  Patient was given the following to do until next session:  Identify 6 skills that patient does better than most people.    2. Provider recommended the following referrals: none at this time.      3.  Safety plan created.  Provider recommended that patient  Call 9-1-1 or Er when needed.       CHON Rodriguez  June 21, 2021                                                 Progress Note    Client Name: Barbi Vale  Date: June 28,  2021         Service Type: Individual      Session Start Time: 0900  Session End Time: 1000      Session Length: 60 minutes     Session #: 3     Attendees: Client     DSM V Dx: Postpartum Depression & Anxiety DA Date: 6/14/21    Treatment Plan Due: 9/14/21  PHQ-9 :   PHQ-9 SCORE 9/11/2020 2/9/2021 6/3/2021   PHQ-9 Total Score 3 10 11      SUHA-7 :    SUHA-7 SCORE 9/11/2020 2/9/2021 6/3/2021   Total Score 1 7 2           DATA      Progress Since Last Session (Related to Symptoms / Goals / Homework):   Symptoms: Improving gaining insight regarding reasons for various actions    Homework: Achieved / completed to satisfaction     Current / Ongoing Stressors and Concerns:   Low self-esteem     Treatment Objective(s) Addressed in This Session:   Sources of anger and responses to them     Intervention:   Walk through anger log (greatest source of anger is people that see self as being superior)     Response to Interventions:   Enthusiastic and will to work on self-esteem     ASSESSMENT: Current Emotional / Mental Status (status of significant symptoms):   Risk status (Self / Other harm or suicidal ideation)   Client denies current fears or concerns for personal safety.   Client denies current or recent suicidal ideation or behaviors.   Client denies current or recent homicidal ideation or behaviors.   Client denies current or recent self injurious behavior or ideation.   Client denies other safety concerns.   Recommended that patient call 911 or go to the local ED should there be a change in any of these risk factors.     Appearance:   Appropriate    Eye Contact:   Good    Psychomotor Behavior: Normal    Attitude:   Cooperative  Interested Playful Friendly Pleasant Attentive Jose   Orientation:   All   Speech    Rate / Production: Normal     Volume:  Normal    Mood:    Agitated   Affect:    Appropriate    Thought Content:  Clear    Thought Form:  Coherent  Logical    Insight:    Good         Medication  Compliance:   Yes     Changes in Health Issues:   None reported     Chemical Use Review:   Substance Use: Chemical use reviewed, no active concerns identified      Tobacco Use: No current tobacco use.       Collateral Reports Completed:   Not Applicable    PLAN: (Client Tasks / Therapist Tasks / Other)  Develop list of three times felt taken advantage of / work on skills to stand up for own rights as a person    CHON Rodriguez                                             Progress Note    Client Name: Barbi Vale  Date: June 21, 2021         Service Type: Individual      Session Start Time: 0900  Session End Time: 0955      Session Length: 55 minutes     Session #: 2     Attendees: Client     DSM V Dx: Depression with anxiety  DA Date: 6/14/21    Treatment Plan Due: 9/14/21  PHQ-9 :   PHQ-9 SCORE 9/11/2020 2/9/2021 6/3/2021   PHQ-9 Total Score 3 10 11      SUHA-7 :    SUHA-7 SCORE 9/11/2020 2/9/2021 6/3/2021   Total Score 1 7 2           DATA      Progress Since Last Session (Related to Symptoms / Goals / Homework):   Symptoms: No change Very anxious & depressed    Homework: Achieved / completed to satisfaction     Current / Ongoing Stressors and Concerns:   Judging self     Treatment Objective(s) Addressed in This Session:   How perfectionism is feeling like a failure and how it affects self-esteem     Intervention:   To accept the present with goals for th future     Response to Interventions:   Willing to try and work on it     ASSESSMENT: Current Emotional / Mental Status (status of significant symptoms):   Risk status (Self / Other harm or suicidal ideation)   Client denies current fears or concerns for personal safety.   Client denies current or recent suicidal ideation or behaviors.   Client denies current or recent homicidal ideation or behaviors.   Client denies current or recent self injurious behavior or ideation.   Client denies other safety concerns.   Recommended that patient call 911 or go  "to the local ED should there be a change in any of these risk factors.     Appearance:   Appropriate    Eye Contact:   Good    Psychomotor Behavior: Restless    Attitude:   Cooperative  Interested Playful Friendly Pleasant Attentive Jose   Orientation:   All   Speech    Rate / Production: Normal     Volume:  Normal    Mood:    Anxious  Depressed    Affect:    Subdued    Thought Content:  Clear    Thought Form:  Coherent  Logical    Insight:    Fair         Medication Compliance:   NA     Changes in Health Issues:   None reported     Chemical Use Review:   Substance Use: Chemical use reviewed, no active concerns identified      Tobacco Use: No current tobacco use.       Collateral Reports Completed:   Not Applicable    PLAN: (Client Tasks / Therapist Tasks / Other)  Identify 5 pet peeves / introduce \"Anger Log\"    HCON Rodriguez                 Progress Note - Initial Visit    Client Name:  Barbi Vale Date: 6/14/21         Service Type: Individual     Visit Start Time: 0900  Visit End Time: 1020    Visit #: 1    Attendees: Client    Service Modality:  In-person     Diagnosis:Depression & Anxiety     DATA:   Interactive Complexity: No   Crisis: No     Presenting Concerns/  Current Stressors:   Postpartum anxiety & 's anxiety level      ASSESSMENT:  Mental Status Assessment:  Appearance:   Appropriate   Eye Contact:   Good   Psychomotor Behavior: Normal   Attitude:   Cooperative  Interested Playful Friendly Pleasant Attentive Jose  Orientation:   All  Speech   Rate / Production: Normal/ Responsive   Volume:  Normal   Mood:    Anxious  Depressed   Affect:    Worrisome   Thought Content:  Referential Thinking  Rumination   Thought Form:  Goal Directed   Insight:    Fair       Safety Issues and Plan for Safety and Risk Management:     [place] Suicide Severity Rating Scale (Short Version)  [place] Suicide Severity Rating (Short Version) 3/10/2020 9/11/2020 1/12/2021 1/19/2021 2/9/2021 5/18/2021 " 6/3/2021   Over the past 2 weeks have you felt down, depressed, or hopeless? no no no no no no yes   Over the past 2 weeks have you had thoughts of killing yourself? no no no no no no no   Have you ever attempted to kill yourself? no no no no no no no   Q1 Wished to be Dead (Past Month) - - - - - - -   Q2 Suicidal Thoughts (Past Month) - - - - - - -   Q3 Suicidal Thought Method - - - - - - -   Q4 Suicidal Intent without Specific Plan - - - - - - -   Q5 Suicide Intent with Specific Plan - - - - - - -   Q6 Suicide Behavior (Lifetime) - - - - - - -     Patient denies current fears or concerns for personal safety.  Patient denies current or recent suicidal ideation or behaviors.  Patient denies current or recent homicidal ideation or behaviors.  Patient denies current or recent self injurious behavior or ideation.  Patient denies other safety concerns.  Recommended that patient call 911 or go to the local ED should there be a change in any of these risk factors.  Patient reports there are no firearms in the house.     Diagnostic Criteria:  A. Depressed mood for most of the day, for more days than not, as indicated either by subjective account or observation by others, for at least 2 years. Note: In children and adolescents, mood can be irritable and duration must be at least 1 year.        - poor appetite or overeating        - insomnia or hypersomnia       - low energy or fatigue        - low self-esteem        - poor concentration or difficulty making decisions       DSM5 Diagnoses: (Sustained by DSM5 Criteria Listed Above)  Diagnoses: 296.31 (F33.0) Major Depressive Disorder, Recurrent Episode, Mild _ and With anxious distress  Psychosocial & Contextual Factors: Postpartum anxiety  WHODAS 2.0 (12 item): No flowsheet data found.  Intervention:   Mindfulness- Patient was educated on relaxation and mindfulness techniques  Collateral Reports Completed:  Not Applicable      PLAN: (Homework, other):  1. Provider will  continue Diagnostic Assessment.  Patient was given the following to do until next session:  Identify 6 skills that patient does better than most people.    2. Provider recommended the following referrals: none at this time.      3.  Safety plan created.  Provider recommended that patient  Call 9-1-1 or Er when needed.       CHON Rodriguez  June 21, 2021

## 2021-08-28 NOTE — PROGRESS NOTES
Progress Note    Client Name: Barbi Vale  Date: August 2, 2021         Service Type: Individual      Session Start Time: 1300  Session End Time: 1400      Session Length: 60 minutes     Session #: 5     Attendees: Client     DSM V Dx: Anxiety & Postpartum depression  DA Date: 6/28/21    Treatment Plan Due: 9/28/21  PHQ-9 :   PHQ-9 SCORE 9/11/2020 2/9/2021 6/3/2021   PHQ-9 Total Score 3 10 11      SUHA-7 :    SUHA-7 SCORE 9/11/2020 2/9/2021 6/3/2021   Total Score 1 7 2           DATA      Progress Since Last Session (Related to Symptoms / Goals / Homework):   Symptoms: Improving was able to take some quality time for self    Homework: Achieved / completed to satisfaction     Current / Ongoing Stressors and Concerns:   Relationship conflict along with agreement on parenting skills     Treatment Objective(s) Addressed in This Session:   To shift focus from how spouse is doing to how she is doing     Intervention:   Look at own needs     Response to Interventions:   Workable and positive     ASSESSMENT: Current Emotional / Mental Status (status of significant symptoms):   Risk status (Self / Other harm or suicidal ideation)   Client denies current fears or concerns for personal safety.   Client denies current or recent suicidal ideation or behaviors.   Client denies current or recent homicidal ideation or behaviors.   Client denies current or recent self injurious behavior or ideation.   Client denies other safety concerns.   Recommended that patient call 911 or go to the local ED should there be a change in any of these risk factors.     Appearance:   Appropriate    Eye Contact:   Good    Psychomotor Behavior: Restless    Attitude:   Cooperative  Playful Friendly Pleasant Guarded  Attentive   Orientation:   All   Speech    Rate / Production: Normal     Volume:  Normal    Mood:    Anxious  Depressed    Affect:    Restricted    Thought Content:  Obsessions  Compulsions   Thought Form:  Obsessive  Circumstantial   Insight:    Fair         Medication Compliance:   NA     Changes in Health Issues:   None reported     Chemical Use Review:   Substance Use: Chemical use reviewed, no active concerns identified      Tobacco Use: No current tobacco use.       Collateral Reports Completed:   Not Applicable    PLAN: (Client Tasks / Therapist Tasks / Other)  Work on taking more time for self also possible marriage counseling with spouse in attendance    CHON Rodriguez                                             Progress Note    Client Name: Barbi Vale  Date: July 19, 2021         Service Type: Individual      Session Start Time: 4:05  Session End Time: 5:00      Session Length: 55 minutes     Session #: 4     Attendees: Client     DSM V Dx: Anxiety    DA Date: 6/14/21    Treatment Plan Due: 9/14/21  PHQ-9 :   PHQ-9 SCORE 9/11/2020 2/9/2021 6/3/2021   PHQ-9 Total Score 3 10 11      SUHA-7 :    SUHA-7 SCORE 9/11/2020 2/9/2021 6/3/2021   Total Score 1 7 2           DATA      Progress Since Last Session (Related to Symptoms / Goals / Homework):   Symptoms: No change restlessness    Homework: Achieved / completed to satisfaction     Current / Ongoing Stressors and Concerns:   Not standing up for own rights / anger management     Treatment Objective(s) Addressed in This Session:   Sources of anger     Intervention:   How to express anger in a healthy way     Response to Interventions:   Cooperative     ASSESSMENT: Current Emotional / Mental Status (status of significant symptoms):   Risk status (Self / Other harm or suicidal ideation)   Client denies current fears or concerns for personal safety.   Client denies current or recent suicidal ideation or behaviors.   Client denies current or recent homicidal ideation or behaviors.   Client denies current or recent self injurious behavior or ideation.   Client denies other safety concerns.   Recommended that patient call 911 or  go to the local ED should there be a change in any of these risk factors.     Appearance:   Appropriate    Eye Contact:   Good    Psychomotor Behavior: Agitated    Attitude:   Cooperative  Interested Friendly Pleasant   Orientation:   All   Speech    Rate / Production: Normal     Volume:  Normal    Mood:    Anxious  Agitated   Affect:    Appropriate    Thought Content:  Clear    Thought Form:  Flight of Ideas    Insight:    Fair         Medication Compliance:   NA     Changes in Health Issues:   None reported     Chemical Use Review:   Substance Use: Chemical use reviewed, no active concerns identified      Tobacco Use: No current tobacco use.       Collateral Reports Completed:   Not Applicable    PLAN: (Client Tasks / Therapist Tasks / Other)  Pt. To take a minimum of 2 hours for self in a week / possible marriage counseling    CHON Rodriguez                                             Progress Note    Client Name: Barbi Vale  Date: June 28, 2021         Service Type: Individual      Session Start Time: 0900  Session End Time: 1000      Session Length: 60 minutes     Session #: 3     Attendees: Client     DSM V Dx: Postpartum Depression & Anxiety DA Date: 6/14/21    Treatment Plan Due: 9/14/21  PHQ-9 :   PHQ-9 SCORE 9/11/2020 2/9/2021 6/3/2021   PHQ-9 Total Score 3 10 11      SUHA-7 :    SUHA-7 SCORE 9/11/2020 2/9/2021 6/3/2021   Total Score 1 7 2           DATA      Progress Since Last Session (Related to Symptoms / Goals / Homework):   Symptoms: Improving gaining insight regarding reasons for various actions    Homework: Achieved / completed to satisfaction     Current / Ongoing Stressors and Concerns:   Low self-esteem     Treatment Objective(s) Addressed in This Session:   Sources of anger and responses to them     Intervention:   Walk through anger log (greatest source of anger is people that see self as being superior)     Response to Interventions:   Enthusiastic and will to work on  self-esteem     ASSESSMENT: Current Emotional / Mental Status (status of significant symptoms):   Risk status (Self / Other harm or suicidal ideation)   Client denies current fears or concerns for personal safety.   Client denies current or recent suicidal ideation or behaviors.   Client denies current or recent homicidal ideation or behaviors.   Client denies current or recent self injurious behavior or ideation.   Client denies other safety concerns.   Recommended that patient call 911 or go to the local ED should there be a change in any of these risk factors.     Appearance:   Appropriate    Eye Contact:   Good    Psychomotor Behavior: Normal    Attitude:   Cooperative  Interested Playful Friendly Pleasant Attentive Jose   Orientation:   All   Speech    Rate / Production: Normal     Volume:  Normal    Mood:    Agitated   Affect:    Appropriate    Thought Content:  Clear    Thought Form:  Coherent  Logical    Insight:    Good         Medication Compliance:   Yes     Changes in Health Issues:   None reported     Chemical Use Review:   Substance Use: Chemical use reviewed, no active concerns identified      Tobacco Use: No current tobacco use.       Collateral Reports Completed:   Not Applicable    PLAN: (Client Tasks / Therapist Tasks / Other)  Develop list of three times felt taken advantage of / work on skills to stand up for own rights as a person    CHON Rodriguez                                             Progress Note    Client Name: Barbi Vale  Date: June 21, 2021         Service Type: Individual      Session Start Time: 0900  Session End Time: 0955      Session Length: 55 minutes     Session #: 2     Attendees: Client     DSM V Dx: Depression with anxiety  DA Date: 6/14/21    Treatment Plan Due: 9/14/21  PHQ-9 :   PHQ-9 SCORE 9/11/2020 2/9/2021 6/3/2021   PHQ-9 Total Score 3 10 11      SUHA-7 :    SUHA-7 SCORE 9/11/2020 2/9/2021 6/3/2021   Total Score 1 7 2           DATA      Progress  "Since Last Session (Related to Symptoms / Goals / Homework):   Symptoms: No change Very anxious & depressed    Homework: Achieved / completed to satisfaction     Current / Ongoing Stressors and Concerns:   Judging self     Treatment Objective(s) Addressed in This Session:   How perfectionism is feeling like a failure and how it affects self-esteem     Intervention:   To accept the present with goals for th future     Response to Interventions:   Willing to try and work on it     ASSESSMENT: Current Emotional / Mental Status (status of significant symptoms):   Risk status (Self / Other harm or suicidal ideation)   Client denies current fears or concerns for personal safety.   Client denies current or recent suicidal ideation or behaviors.   Client denies current or recent homicidal ideation or behaviors.   Client denies current or recent self injurious behavior or ideation.   Client denies other safety concerns.   Recommended that patient call 911 or go to the local ED should there be a change in any of these risk factors.     Appearance:   Appropriate    Eye Contact:   Good    Psychomotor Behavior: Restless    Attitude:   Cooperative  Interested Playful Friendly Pleasant Attentive Jose   Orientation:   All   Speech    Rate / Production: Normal     Volume:  Normal    Mood:    Anxious  Depressed    Affect:    Subdued    Thought Content:  Clear    Thought Form:  Coherent  Logical    Insight:    Fair         Medication Compliance:   NA     Changes in Health Issues:   None reported     Chemical Use Review:   Substance Use: Chemical use reviewed, no active concerns identified      Tobacco Use: No current tobacco use.       Collateral Reports Completed:   Not Applicable    PLAN: (Client Tasks / Therapist Tasks / Other)  Identify 5 pet peeves / introduce \"Anger Log\"    CHON Rodriguez                 Progress Note - Initial Visit    Client Name:  Barbi Vale Date: 6/14/21         Service " Type: Individual     Visit Start Time: 0900  Visit End Time: 1020    Visit #: 1    Attendees: Client    Service Modality:  In-person     Diagnosis:Depression & Anxiety     DATA:   Interactive Complexity: No   Crisis: No     Presenting Concerns/  Current Stressors:   Postpartum anxiety & 's anxiety level      ASSESSMENT:  Mental Status Assessment:  Appearance:   Appropriate   Eye Contact:   Good   Psychomotor Behavior: Normal   Attitude:   Cooperative  Interested Playful Friendly Pleasant Attentive Jose  Orientation:   All  Speech   Rate / Production: Normal/ Responsive   Volume:  Normal   Mood:    Anxious  Depressed   Affect:    Worrisome   Thought Content:  Referential Thinking  Rumination   Thought Form:  Goal Directed   Insight:    Fair       Safety Issues and Plan for Safety and Risk Management:     [place] Suicide Severity Rating Scale (Short Version)  [place] Suicide Severity Rating (Short Version) 3/10/2020 9/11/2020 1/12/2021 1/19/2021 2/9/2021 5/18/2021 6/3/2021   Over the past 2 weeks have you felt down, depressed, or hopeless? no no no no no no yes   Over the past 2 weeks have you had thoughts of killing yourself? no no no no no no no   Have you ever attempted to kill yourself? no no no no no no no   Q1 Wished to be Dead (Past Month) - - - - - - -   Q2 Suicidal Thoughts (Past Month) - - - - - - -   Q3 Suicidal Thought Method - - - - - - -   Q4 Suicidal Intent without Specific Plan - - - - - - -   Q5 Suicide Intent with Specific Plan - - - - - - -   Q6 Suicide Behavior (Lifetime) - - - - - - -     Patient denies current fears or concerns for personal safety.  Patient denies current or recent suicidal ideation or behaviors.  Patient denies current or recent homicidal ideation or behaviors.  Patient denies current or recent self injurious behavior or ideation.  Patient denies other safety concerns.  Recommended that patient call 911 or go to the local ED should there be a change in any of these  risk factors.  Patient reports there are no firearms in the house.     Diagnostic Criteria:  A. Depressed mood for most of the day, for more days than not, as indicated either by subjective account or observation by others, for at least 2 years. Note: In children and adolescents, mood can be irritable and duration must be at least 1 year.        - poor appetite or overeating        - insomnia or hypersomnia       - low energy or fatigue        - low self-esteem        - poor concentration or difficulty making decisions       DSM5 Diagnoses: (Sustained by DSM5 Criteria Listed Above)  Diagnoses: 296.31 (F33.0) Major Depressive Disorder, Recurrent Episode, Mild _ and With anxious distress  Psychosocial & Contextual Factors: Postpartum anxiety  WHODAS 2.0 (12 item): No flowsheet data found.  Intervention:   Mindfulness- Patient was educated on relaxation and mindfulness techniques  Collateral Reports Completed:  Not Applicable      PLAN: (Homework, other):  1. Provider will continue Diagnostic Assessment.  Patient was given the following to do until next session:  Identify 6 skills that patient does better than most people.    2. Provider recommended the following referrals: none at this time.      3.  Safety plan created.  Provider recommended that patient  Call 9-1-1 or Er when needed.       CHON Rodriguez  June 21, 2021                                                 Progress Note    Client Name: Barbi Vale  Date: June 28, 2021         Service Type: Individual      Session Start Time: 0900  Session End Time: 1000      Session Length: 60 minutes     Session #: 3     Attendees: Client     DSM V Dx: Postpartum Depression & Anxiety DA Date: 6/14/21    Treatment Plan Due: 9/14/21  PHQ-9 :   PHQ-9 SCORE 9/11/2020 2/9/2021 6/3/2021   PHQ-9 Total Score 3 10 11      SUHA-7 :    SUHA-7 SCORE 9/11/2020 2/9/2021 6/3/2021   Total Score 1 7 2           DATA      Progress Since Last Session (Related to Symptoms /  Goals / Homework):   Symptoms: Improving gaining insight regarding reasons for various actions    Homework: Achieved / completed to satisfaction     Current / Ongoing Stressors and Concerns:   Low self-esteem     Treatment Objective(s) Addressed in This Session:   Sources of anger and responses to them     Intervention:   Walk through anger log (greatest source of anger is people that see self as being superior)     Response to Interventions:   Enthusiastic and will to work on self-esteem     ASSESSMENT: Current Emotional / Mental Status (status of significant symptoms):   Risk status (Self / Other harm or suicidal ideation)   Client denies current fears or concerns for personal safety.   Client denies current or recent suicidal ideation or behaviors.   Client denies current or recent homicidal ideation or behaviors.   Client denies current or recent self injurious behavior or ideation.   Client denies other safety concerns.   Recommended that patient call 911 or go to the local ED should there be a change in any of these risk factors.     Appearance:   Appropriate    Eye Contact:   Good    Psychomotor Behavior: Normal    Attitude:   Cooperative  Interested Playful Friendly Pleasant Attentive Jose   Orientation:   All   Speech    Rate / Production: Normal     Volume:  Normal    Mood:    Agitated   Affect:    Appropriate    Thought Content:  Clear    Thought Form:  Coherent  Logical    Insight:    Good         Medication Compliance:   Yes     Changes in Health Issues:   None reported     Chemical Use Review:   Substance Use: Chemical use reviewed, no active concerns identified      Tobacco Use: No current tobacco use.       Collateral Reports Completed:   Not Applicable    PLAN: (Client Tasks / Therapist Tasks / Other)  Develop list of three times felt taken advantage of / work on skills to stand up for own rights as a person    CHON Rodriguez                                             Progress  Note    Client Name: Barbi Vale  Date: June 21, 2021         Service Type: Individual      Session Start Time: 0900  Session End Time: 0955      Session Length: 55 minutes     Session #: 2     Attendees: Client     DSM V Dx: Depression with anxiety  DA Date: 6/14/21    Treatment Plan Due: 9/14/21  PHQ-9 :   PHQ-9 SCORE 9/11/2020 2/9/2021 6/3/2021   PHQ-9 Total Score 3 10 11      SUHA-7 :    SUHA-7 SCORE 9/11/2020 2/9/2021 6/3/2021   Total Score 1 7 2           DATA      Progress Since Last Session (Related to Symptoms / Goals / Homework):   Symptoms: No change Very anxious & depressed    Homework: Achieved / completed to satisfaction     Current / Ongoing Stressors and Concerns:   Judging self     Treatment Objective(s) Addressed in This Session:   How perfectionism is feeling like a failure and how it affects self-esteem     Intervention:   To accept the present with goals for th future     Response to Interventions:   Willing to try and work on it     ASSESSMENT: Current Emotional / Mental Status (status of significant symptoms):   Risk status (Self / Other harm or suicidal ideation)   Client denies current fears or concerns for personal safety.   Client denies current or recent suicidal ideation or behaviors.   Client denies current or recent homicidal ideation or behaviors.   Client denies current or recent self injurious behavior or ideation.   Client denies other safety concerns.   Recommended that patient call 911 or go to the local ED should there be a change in any of these risk factors.     Appearance:   Appropriate    Eye Contact:   Good    Psychomotor Behavior: Restless    Attitude:   Cooperative  Interested Playful Friendly Pleasant Attentive Jose   Orientation:   All   Speech    Rate / Production: Normal     Volume:  Normal    Mood:    Anxious  Depressed    Affect:    Subdued    Thought Content:  Clear    Thought Form:  Coherent  Logical    Insight:    Fair         Medication  "Compliance:   NA     Changes in Health Issues:   None reported     Chemical Use Review:   Substance Use: Chemical use reviewed, no active concerns identified      Tobacco Use: No current tobacco use.       Collateral Reports Completed:   Not Applicable    PLAN: (Client Tasks / Therapist Tasks / Other)  Identify 5 pet peeves / introduce \"Anger Log\"    CHON Rodriguez                 Progress Note - Initial Visit    Client Name:  Barbi Vale Date: 6/14/21         Service Type: Individual     Visit Start Time: 0900  Visit End Time: 1020    Visit #: 1    Attendees: Client    Service Modality:  In-person     Diagnosis:Depression & Anxiety     DATA:   Interactive Complexity: No   Crisis: No     Presenting Concerns/  Current Stressors:   Postpartum anxiety & 's anxiety level      ASSESSMENT:  Mental Status Assessment:  Appearance:   Appropriate   Eye Contact:   Good   Psychomotor Behavior: Normal   Attitude:   Cooperative  Interested Playful Friendly Pleasant Attentive Jose  Orientation:   All  Speech   Rate / Production: Normal/ Responsive   Volume:  Normal   Mood:    Anxious  Depressed   Affect:    Worrisome   Thought Content:  Referential Thinking  Rumination   Thought Form:  Goal Directed   Insight:    Fair       Safety Issues and Plan for Safety and Risk Management:     [place] Suicide Severity Rating Scale (Short Version)  [place] Suicide Severity Rating (Short Version) 3/10/2020 9/11/2020 1/12/2021 1/19/2021 2/9/2021 5/18/2021 6/3/2021   Over the past 2 weeks have you felt down, depressed, or hopeless? no no no no no no yes   Over the past 2 weeks have you had thoughts of killing yourself? no no no no no no no   Have you ever attempted to kill yourself? no no no no no no no   Q1 Wished to be Dead (Past Month) - - - - - - -   Q2 Suicidal Thoughts (Past Month) - - - - - - -   Q3 Suicidal Thought Method - - - - - - -   Q4 Suicidal Intent without Specific Plan - - - - - - -   Q5 Suicide Intent " with Specific Plan - - - - - - -   Q6 Suicide Behavior (Lifetime) - - - - - - -     Patient denies current fears or concerns for personal safety.  Patient denies current or recent suicidal ideation or behaviors.  Patient denies current or recent homicidal ideation or behaviors.  Patient denies current or recent self injurious behavior or ideation.  Patient denies other safety concerns.  Recommended that patient call 911 or go to the local ED should there be a change in any of these risk factors.  Patient reports there are no firearms in the house.     Diagnostic Criteria:  A. Depressed mood for most of the day, for more days than not, as indicated either by subjective account or observation by others, for at least 2 years. Note: In children and adolescents, mood can be irritable and duration must be at least 1 year.        - poor appetite or overeating        - insomnia or hypersomnia       - low energy or fatigue        - low self-esteem        - poor concentration or difficulty making decisions       DSM5 Diagnoses: (Sustained by DSM5 Criteria Listed Above)  Diagnoses: 296.31 (F33.0) Major Depressive Disorder, Recurrent Episode, Mild _ and With anxious distress  Psychosocial & Contextual Factors: Postpartum anxiety  WHODAS 2.0 (12 item): No flowsheet data found.  Intervention:   Mindfulness- Patient was educated on relaxation and mindfulness techniques  Collateral Reports Completed:  Not Applicable      PLAN: (Homework, other):  1. Provider will continue Diagnostic Assessment.  Patient was given the following to do until next session:  Identify 6 skills that patient does better than most people.    2. Provider recommended the following referrals: none at this time.      3.  Safety plan created.  Provider recommended that patient  Call 9-1-1 or Er when needed.       CHON Rodriguez  June 21, 2021                                               Progress Note    Client Name: Barbi Vale  Date: August 16,  2021         Service Type: Couple      Session Start Time: 1600  Session End Time: 1500      Session Length: 60 minutes     Session #: 6     Attendees: Client and Spouse / Significant Other     DSM V Dx: Anxiety & Depression  DA Date: 6/28/21    Treatment Plan Due: 9/28/21  PHQ-9 :   PHQ-9 SCORE 9/11/2020 2/9/2021 6/3/2021   PHQ-9 Total Score 3 10 11      SUHA-7 :    SUHA-7 SCORE 9/11/2020 2/9/2021 6/3/2021   Total Score 1 7 2           DATA      Progress Since Last Session (Related to Symptoms / Goals / Homework):   Symptoms: Improving communication is improving along with trust    Homework: Completed in session     Current / Ongoing Stressors and Concerns:   Different parenting styles - lack of partnership     Treatment Objective(s) Addressed in This Session:   How marriage is a partnership     Intervention:   What does it mean to be ?     Response to Interventions:   Will work on partnership     ASSESSMENT: Current Emotional / Mental Status (status of significant symptoms):   Risk status (Self / Other harm or suicidal ideation)   Client denies current fears or concerns for personal safety.   Client denies current or recent suicidal ideation or behaviors.   Client denies current or recent homicidal ideation or behaviors.   Client denies current or recent self injurious behavior or ideation.   Client denies other safety concerns.   Recommended that patient call 911 or go to the local ED should there be a change in any of these risk factors.     Appearance:   Appropriate    Eye Contact:   Good    Psychomotor Behavior: Agitated    Attitude:   Cooperative  Interested Playful Friendly Pleasant Attentive Jose   Orientation:   All   Speech    Rate / Production: Normal     Volume:  Normal    Mood:    Angry  Anxious    Affect:    Appropriate    Thought Content:  Clear    Thought Form:  Coherent  Logical    Insight:    Fair         Medication Compliance:   NA     Changes in Health Issues:   None reported     Chemical  Use Review:   Substance Use: Chemical use reviewed, no active concerns identified      Tobacco Use: No current tobacco use.       Collateral Reports Completed:   Not Applicable    PLAN: (Client Tasks / Therapist Tasks / Other)  Introduce Awareness Wheel to improve communication    CHON Rodriguez

## 2021-09-27 ENCOUNTER — OFFICE VISIT (OUTPATIENT)
Dept: PSYCHOLOGY | Facility: OTHER | Age: 36
End: 2021-09-27
Attending: MARRIAGE & FAMILY THERAPIST
Payer: COMMERCIAL

## 2021-10-08 NOTE — PROGRESS NOTES
"Barbi Vale is a 32 year old female  Here today for dysuria.  Pt reports urinary frequency and fullness.  Pt also reports difficulty in voiding; she feels she needs to force urine out.    Pt reports having a UTI in July, 2017 without symptoms.  She has had a similar episodes like the current one in July and August 2017 with negative UA results.  Symptoms seem to be associated with the beginning of menstruation and go away after menstruation.      Pt has been using the Nuva ring for birth control for the past 12 years.      Pt is planning to discontinue use of the Nuva ring in April.  She is planning a pregnancy.    Discussed menstrual cycle.      O:   /66 (BP Location: Left arm, Patient Position: Chair, Cuff Size: Adult Regular)  Pulse 79  Ht 5' 3\" (1.6 m)  Wt 165 lb (74.8 kg)  LMP 01/31/2018  SpO2 99%  BMI 29.23 kg/m2   Pleasant without acute distress.    A:    Dysuria, fullness associated with menstruation  Nuva ring for past 12 years  Negative UA  Need of annual exam  Planning a pregnancy in near future    P:    UA with micro reflex to culture  Ibuprofen 800 mg every 8 hours 2-3 during menstruation  Schedule annual exam with preconception visit    RTO for annual visit and as needed.      Greater than 20 minutes were spent face to face counseling this patient    GUERRERO Beasley, MICHELLE    " See ultrasound report. Images and report reviewed and I agree with comments and findings. No changes or additions. See ultrasound report. Images and report reviewed and I agree with comments and findings. No changes or additions.

## 2021-10-09 ENCOUNTER — HEALTH MAINTENANCE LETTER (OUTPATIENT)
Age: 36
End: 2021-10-09

## 2021-10-25 ENCOUNTER — OFFICE VISIT (OUTPATIENT)
Dept: PSYCHOLOGY | Facility: OTHER | Age: 36
End: 2021-10-25
Attending: MARRIAGE & FAMILY THERAPIST
Payer: COMMERCIAL

## 2021-10-25 DIAGNOSIS — F41.9 ANXIETY: Primary | ICD-10-CM

## 2021-10-25 PROCEDURE — 90837 PSYTX W PT 60 MINUTES: CPT | Performed by: MARRIAGE & FAMILY THERAPIST

## 2021-11-01 NOTE — PROGRESS NOTES
Progress Note    Client Name: Barbi Vale  Date: November 1, 2021         Service Type: Individual      Session Start Time: 1600  Session End Time: 1655      Session Length: 55 minutes     Session #: 8     Attendees: Client     DSM V Dx: Anxiety    DA Date: 10/25/21    Treatment Plan Due: 1/25/22  PHQ-9 :   PHQ-9 SCORE 9/11/2020 2/9/2021 6/3/2021   PHQ-9 Total Score 3 10 11      SUHA-7 :    SUHA-7 SCORE 9/11/2020 2/9/2021 6/3/2021   Total Score 1 7 2           DATA      Progress Since Last Session (Related to Symptoms / Goals / Homework):   Symptoms: Worsening spouse doesn't trust and doesn't want marriage counseling    Homework: Completed in session     Current / Ongoing Stressors and Concerns:   Marital conflict     Treatment Objective(s) Addressed in This Session:   Feelings associated with marriage     Intervention:   Sources of anger and ways to express anger     Response to Interventions:   Will pursure individual counseling without spouse participation     ASSESSMENT: Current Emotional / Mental Status (status of significant symptoms):   Risk status (Self / Other harm or suicidal ideation)   Client denies current fears or concerns for personal safety.   Client denies current or recent suicidal ideation or behaviors.   Client denies current or recent homicidal ideation or behaviors.   Client denies current or recent self injurious behavior or ideation.   Client denies other safety concerns.   Recommended that patient call 911 or go to the local ED should there be a change in any of these risk factors.     Appearance:   Appropriate    Eye Contact:   Good    Psychomotor Behavior: Agitated  Restless    Attitude:   Cooperative  Interested Friendly Pleasant Attentive Jose   Orientation:   All   Speech    Rate / Production: Normal     Volume:  Normal    Mood:    Angry  Anxious  Depressed  Irritable  Sad    Affect:    Constricted  Flat    Thought Content:  Referential  Thinking  Rumination    Thought Form:  Obsessive  Circumstantial   Insight:    Fair         Medication Compliance:   NA     Changes in Health Issues:   None reported     Chemical Use Review:   Substance Use: Chemical use reviewed, no active concerns identified      Tobacco Use: No current tobacco use.       Collateral Reports Completed:   Not Applicable    PLAN: (Client Tasks / Therapist Tasks / Other)  Make at least three additional individual counseling sessions    CHON Rodriguez                                             Progress Note    Client Name: Barbi Vale  Date: August 23, 2021         Service Type: Couple      Session Start Time: 1615  Session End Time: 1645      Session Length: 30 minutes     Session #: 7     Attendees: Client and Spouse / Significant Other     DSM V Dx: Anxiety & Depression    DA Date: 6/28/21    Treatment Plan Due: 9/28/21  PHQ-9 :   PHQ-9 SCORE 9/11/2020 2/9/2021 6/3/2021   PHQ-9 Total Score 3 10 11      SUHA-7 :    SUHA-7 SCORE 9/11/2020 2/9/2021 6/3/2021   Total Score 1 7 2           DATA      Progress Since Last Session (Related to Symptoms / Goals / Homework):   Symptoms: Improving coouple communication is improving    Homework: Completed in session     Current / Ongoing Stressors and Concerns:   Not spending quality time together with spouse     Treatment Objective(s) Addressed in This Session:   Importance of taking time for self and for relationship     Intervention:   Identifying possible time to take time     Response to Interventions:   Will work on assignment and also schedule a date with each other     ASSESSMENT: Current Emotional / Mental Status (status of significant symptoms):   Risk status (Self / Other harm or suicidal ideation)   Client denies current fears or concerns for personal safety.   Client denies current or recent suicidal ideation or behaviors.   Client denies current or recent homicidal ideation or behaviors.   Client denies current or  recent self injurious behavior or ideation.   Client denies other safety concerns.   Recommended that patient call 911 or go to the local ED should there be a change in any of these risk factors.     Appearance:   Appropriate    Eye Contact:   Good    Psychomotor Behavior: Normal    Attitude:   Cooperative  Interested Friendly Pleasant   Orientation:   All   Speech    Rate / Production: Normal     Volume:  Normal    Mood:    Anxious  Sad    Affect:    Restricted    Thought Content:  Clear    Thought Form:  Coherent  Logical    Insight:    Good         Medication Compliance:   NA     Changes in Health Issues:   None reported     Chemical Use Review:   Substance Use: Chemical use reviewed, no active concerns identified      Tobacco Use: No current tobacco use.       Collateral Reports Completed:   Not Applicable    PLAN: (Client Tasks / Therapist Tasks / Other)  Plan a date with each other and also take time for self / work on awareness wheel    CHON Rodriguez                                             Progress Note    Client Name: Barbi Vale  Date: August 2, 2021         Service Type: Individual      Session Start Time: 1300  Session End Time: 1400      Session Length: 60 minutes     Session #: 5     Attendees: Client     DSM V Dx: Anxiety & Postpartum depression  DA Date: 6/28/21    Treatment Plan Due: 9/28/21  PHQ-9 :   PHQ-9 SCORE 9/11/2020 2/9/2021 6/3/2021   PHQ-9 Total Score 3 10 11      SUHA-7 :    SUHA-7 SCORE 9/11/2020 2/9/2021 6/3/2021   Total Score 1 7 2           DATA      Progress Since Last Session (Related to Symptoms / Goals / Homework):   Symptoms: Improving was able to take some quality time for self    Homework: Achieved / completed to satisfaction     Current / Ongoing Stressors and Concerns:   Relationship conflict along with agreement on parenting skills     Treatment Objective(s) Addressed in This Session:   To shift focus from how spouse is doing to how she is  doing     Intervention:   Look at own needs     Response to Interventions:   Workable and positive     ASSESSMENT: Current Emotional / Mental Status (status of significant symptoms):   Risk status (Self / Other harm or suicidal ideation)   Client denies current fears or concerns for personal safety.   Client denies current or recent suicidal ideation or behaviors.   Client denies current or recent homicidal ideation or behaviors.   Client denies current or recent self injurious behavior or ideation.   Client denies other safety concerns.   Recommended that patient call 911 or go to the local ED should there be a change in any of these risk factors.     Appearance:   Appropriate    Eye Contact:   Good    Psychomotor Behavior: Restless    Attitude:   Cooperative  Playful Friendly Pleasant Guarded  Attentive   Orientation:   All   Speech    Rate / Production: Normal     Volume:  Normal    Mood:    Anxious  Depressed    Affect:    Restricted    Thought Content:  Obsessions Compulsions   Thought Form:  Obsessive  Circumstantial   Insight:    Fair         Medication Compliance:   NA     Changes in Health Issues:   None reported     Chemical Use Review:   Substance Use: Chemical use reviewed, no active concerns identified      Tobacco Use: No current tobacco use.       Collateral Reports Completed:   Not Applicable    PLAN: (Client Tasks / Therapist Tasks / Other)  Work on taking more time for self also possible marriage counseling with spouse in attendance    CHON Rodriguez                                             Progress Note    Client Name: Barbi Vale  Date: July 19, 2021         Service Type: Individual      Session Start Time: 4:05  Session End Time: 5:00      Session Length: 55 minutes     Session #: 4     Attendees: Client     DSM V Dx: Anxiety    DA Date: 6/14/21    Treatment Plan Due: 9/14/21  PHQ-9 :   PHQ-9 SCORE 9/11/2020 2/9/2021 6/3/2021   PHQ-9 Total Score 3 10 11      SUHA-7 :    SUHA-7  SCORE 9/11/2020 2/9/2021 6/3/2021   Total Score 1 7 2           DATA      Progress Since Last Session (Related to Symptoms / Goals / Homework):   Symptoms: No change restlessness    Homework: Achieved / completed to satisfaction     Current / Ongoing Stressors and Concerns:   Not standing up for own rights / anger management     Treatment Objective(s) Addressed in This Session:   Sources of anger     Intervention:   How to express anger in a healthy way     Response to Interventions:   Cooperative     ASSESSMENT: Current Emotional / Mental Status (status of significant symptoms):   Risk status (Self / Other harm or suicidal ideation)   Client denies current fears or concerns for personal safety.   Client denies current or recent suicidal ideation or behaviors.   Client denies current or recent homicidal ideation or behaviors.   Client denies current or recent self injurious behavior or ideation.   Client denies other safety concerns.   Recommended that patient call 911 or go to the local ED should there be a change in any of these risk factors.     Appearance:   Appropriate    Eye Contact:   Good    Psychomotor Behavior: Agitated    Attitude:   Cooperative  Interested Friendly Pleasant   Orientation:   All   Speech    Rate / Production: Normal     Volume:  Normal    Mood:    Anxious  Agitated   Affect:    Appropriate    Thought Content:  Clear    Thought Form:  Flight of Ideas    Insight:    Fair         Medication Compliance:   NA     Changes in Health Issues:   None reported     Chemical Use Review:   Substance Use: Chemical use reviewed, no active concerns identified      Tobacco Use: No current tobacco use.       Collateral Reports Completed:   Not Applicable    PLAN: (Client Tasks / Therapist Tasks / Other)  Pt. To take a minimum of 2 hours for self in a week / possible marriage counseling    CHON Rodriguez                                             Progress Note    Client Name: Barbi WOLFE  Akanksha  Date: June 28, 2021         Service Type: Individual      Session Start Time: 0900  Session End Time: 1000      Session Length: 60 minutes     Session #: 3     Attendees: Client     DSM V Dx: Postpartum Depression & Anxiety DA Date: 6/14/21    Treatment Plan Due: 9/14/21  PHQ-9 :   PHQ-9 SCORE 9/11/2020 2/9/2021 6/3/2021   PHQ-9 Total Score 3 10 11      SUHA-7 :    SUHA-7 SCORE 9/11/2020 2/9/2021 6/3/2021   Total Score 1 7 2           DATA      Progress Since Last Session (Related to Symptoms / Goals / Homework):   Symptoms: Improving gaining insight regarding reasons for various actions    Homework: Achieved / completed to satisfaction     Current / Ongoing Stressors and Concerns:   Low self-esteem     Treatment Objective(s) Addressed in This Session:   Sources of anger and responses to them     Intervention:   Walk through anger log (greatest source of anger is people that see self as being superior)     Response to Interventions:   Enthusiastic and will to work on self-esteem     ASSESSMENT: Current Emotional / Mental Status (status of significant symptoms):   Risk status (Self / Other harm or suicidal ideation)   Client denies current fears or concerns for personal safety.   Client denies current or recent suicidal ideation or behaviors.   Client denies current or recent homicidal ideation or behaviors.   Client denies current or recent self injurious behavior or ideation.   Client denies other safety concerns.   Recommended that patient call 911 or go to the local ED should there be a change in any of these risk factors.     Appearance:   Appropriate    Eye Contact:   Good    Psychomotor Behavior: Normal    Attitude:   Cooperative  Interested Playful Friendly Pleasant Attentive Jose   Orientation:   All   Speech    Rate / Production: Normal     Volume:  Normal    Mood:    Agitated   Affect:    Appropriate    Thought Content:  Clear    Thought Form:  Coherent  Logical    Insight:    Good          Medication Compliance:   Yes     Changes in Health Issues:   None reported     Chemical Use Review:   Substance Use: Chemical use reviewed, no active concerns identified      Tobacco Use: No current tobacco use.       Collateral Reports Completed:   Not Applicable    PLAN: (Client Tasks / Therapist Tasks / Other)  Develop list of three times felt taken advantage of / work on skills to stand up for own rights as a person    CHON Rodriguez                                             Progress Note    Client Name: Barbi Vale  Date: June 21, 2021         Service Type: Individual      Session Start Time: 0900  Session End Time: 0955      Session Length: 55 minutes     Session #: 2     Attendees: Client     DSM V Dx: Depression with anxiety  DA Date: 6/14/21    Treatment Plan Due: 9/14/21  PHQ-9 :   PHQ-9 SCORE 9/11/2020 2/9/2021 6/3/2021   PHQ-9 Total Score 3 10 11      SUHA-7 :    SUHA-7 SCORE 9/11/2020 2/9/2021 6/3/2021   Total Score 1 7 2           DATA      Progress Since Last Session (Related to Symptoms / Goals / Homework):   Symptoms: No change Very anxious & depressed    Homework: Achieved / completed to satisfaction     Current / Ongoing Stressors and Concerns:   Judging self     Treatment Objective(s) Addressed in This Session:   How perfectionism is feeling like a failure and how it affects self-esteem     Intervention:   To accept the present with goals for th future     Response to Interventions:   Willing to try and work on it     ASSESSMENT: Current Emotional / Mental Status (status of significant symptoms):   Risk status (Self / Other harm or suicidal ideation)   Client denies current fears or concerns for personal safety.   Client denies current or recent suicidal ideation or behaviors.   Client denies current or recent homicidal ideation or behaviors.   Client denies current or recent self injurious behavior or ideation.   Client denies other safety concerns.   Recommended that  "patient call 911 or go to the local ED should there be a change in any of these risk factors.     Appearance:   Appropriate    Eye Contact:   Good    Psychomotor Behavior: Restless    Attitude:   Cooperative  Interested Playful Friendly Pleasant Attentive Jose   Orientation:   All   Speech    Rate / Production: Normal     Volume:  Normal    Mood:    Anxious  Depressed    Affect:    Subdued    Thought Content:  Clear    Thought Form:  Coherent  Logical    Insight:    Fair         Medication Compliance:   NA     Changes in Health Issues:   None reported     Chemical Use Review:   Substance Use: Chemical use reviewed, no active concerns identified      Tobacco Use: No current tobacco use.       Collateral Reports Completed:   Not Applicable    PLAN: (Client Tasks / Therapist Tasks / Other)  Identify 5 pet peeves / introduce \"Anger Log\"    CHON Rodriguez                 Progress Note - Initial Visit    Client Name:  Barbi Vale Date: 6/14/21         Service Type: Individual     Visit Start Time: 0900  Visit End Time: 1020    Visit #: 1    Attendees: Client    Service Modality:  In-person     Diagnosis:Depression & Anxiety     DATA:   Interactive Complexity: No   Crisis: No     Presenting Concerns/  Current Stressors:   Postpartum anxiety & 's anxiety level      ASSESSMENT:  Mental Status Assessment:  Appearance:   Appropriate   Eye Contact:   Good   Psychomotor Behavior: Normal   Attitude:   Cooperative  Interested Playful Friendly Pleasant Attentive Jose  Orientation:   All  Speech   Rate / Production: Normal/ Responsive   Volume:  Normal   Mood:    Anxious  Depressed   Affect:    Worrisome   Thought Content:  Referential Thinking  Rumination   Thought Form:  Goal Directed   Insight:    Fair       Safety Issues and Plan for Safety and Risk Management:     [place] Suicide Severity Rating Scale (Short Version)  [place] Suicide Severity Rating (Short Version) 3/10/2020 9/11/2020 1/12/2021 " 1/19/2021 2/9/2021 5/18/2021 6/3/2021   Over the past 2 weeks have you felt down, depressed, or hopeless? no no no no no no yes   Over the past 2 weeks have you had thoughts of killing yourself? no no no no no no no   Have you ever attempted to kill yourself? no no no no no no no   Q1 Wished to be Dead (Past Month) - - - - - - -   Q2 Suicidal Thoughts (Past Month) - - - - - - -   Q3 Suicidal Thought Method - - - - - - -   Q4 Suicidal Intent without Specific Plan - - - - - - -   Q5 Suicide Intent with Specific Plan - - - - - - -   Q6 Suicide Behavior (Lifetime) - - - - - - -     Patient denies current fears or concerns for personal safety.  Patient denies current or recent suicidal ideation or behaviors.  Patient denies current or recent homicidal ideation or behaviors.  Patient denies current or recent self injurious behavior or ideation.  Patient denies other safety concerns.  Recommended that patient call 911 or go to the local ED should there be a change in any of these risk factors.  Patient reports there are no firearms in the house.     Diagnostic Criteria:  A. Depressed mood for most of the day, for more days than not, as indicated either by subjective account or observation by others, for at least 2 years. Note: In children and adolescents, mood can be irritable and duration must be at least 1 year.        - poor appetite or overeating        - insomnia or hypersomnia       - low energy or fatigue        - low self-esteem        - poor concentration or difficulty making decisions       DSM5 Diagnoses: (Sustained by DSM5 Criteria Listed Above)  Diagnoses: 296.31 (F33.0) Major Depressive Disorder, Recurrent Episode, Mild _ and With anxious distress  Psychosocial & Contextual Factors: Postpartum anxiety  WHODAS 2.0 (12 item): No flowsheet data found.  Intervention:   Mindfulness- Patient was educated on relaxation and mindfulness techniques  Collateral Reports Completed:  Not Applicable      PLAN: (Homework,  other):  1. Provider will continue Diagnostic Assessment.  Patient was given the following to do until next session:  Identify 6 skills that patient does better than most people.    2. Provider recommended the following referrals: none at this time.      3.  Safety plan created.  Provider recommended that patient  Call [id number] or Er when needed.       CHON Rodriguez  June 21, 2021                                                 Progress Note    Client Name: Barbi Vale  Date: June 28, 2021         Service Type: Individual      Session Start Time: 0900  Session End Time: 1000      Session Length: 60 minutes     Session #: 3     Attendees: Client     DSM V Dx: Postpartum Depression & Anxiety DA Date: 6/14/21    Treatment Plan Due: 9/14/21  PHQ-9 :   PHQ-9 SCORE 9/11/2020 2/9/2021 6/3/2021   PHQ-9 Total Score 3 10 11      SUHA-7 :    SUHA-7 SCORE 9/11/2020 2/9/2021 6/3/2021   Total Score 1 7 2           DATA      Progress Since Last Session (Related to Symptoms / Goals / Homework):   Symptoms: Improving gaining insight regarding reasons for various actions    Homework: Achieved / completed to satisfaction     Current / Ongoing Stressors and Concerns:   Low self-esteem     Treatment Objective(s) Addressed in This Session:   Sources of anger and responses to them     Intervention:   Walk through anger log (greatest source of anger is people that see self as being superior)     Response to Interventions:   Enthusiastic and will to work on self-esteem     ASSESSMENT: Current Emotional / Mental Status (status of significant symptoms):   Risk status (Self / Other harm or suicidal ideation)   Client denies current fears or concerns for personal safety.   Client denies current or recent suicidal ideation or behaviors.   Client denies current or recent homicidal ideation or behaviors.   Client denies current or recent self injurious behavior or ideation.   Client denies other safety concerns.   Recommended that  patient call 911 or go to the local ED should there be a change in any of these risk factors.     Appearance:   Appropriate    Eye Contact:   Good    Psychomotor Behavior: Normal    Attitude:   Cooperative  Interested Playful Friendly Pleasant Attentive Jose   Orientation:   All   Speech    Rate / Production: Normal     Volume:  Normal    Mood:    Agitated   Affect:    Appropriate    Thought Content:  Clear    Thought Form:  Coherent  Logical    Insight:    Good         Medication Compliance:   Yes     Changes in Health Issues:   None reported     Chemical Use Review:   Substance Use: Chemical use reviewed, no active concerns identified      Tobacco Use: No current tobacco use.       Collateral Reports Completed:   Not Applicable    PLAN: (Client Tasks / Therapist Tasks / Other)  Develop list of three times felt taken advantage of / work on skills to stand up for own rights as a person    CHON Rodriguez                                             Progress Note    Client Name: Barbi Vale  Date: June 21, 2021         Service Type: Individual      Session Start Time: 0900  Session End Time: 0955      Session Length: 55 minutes     Session #: 2     Attendees: Client     DSM V Dx: Depression with anxiety  DA Date: 6/14/21    Treatment Plan Due: 9/14/21  PHQ-9 :   PHQ-9 SCORE 9/11/2020 2/9/2021 6/3/2021   PHQ-9 Total Score 3 10 11      SUHA-7 :    SUHA-7 SCORE 9/11/2020 2/9/2021 6/3/2021   Total Score 1 7 2           DATA      Progress Since Last Session (Related to Symptoms / Goals / Homework):   Symptoms: No change Very anxious & depressed    Homework: Achieved / completed to satisfaction     Current / Ongoing Stressors and Concerns:   Judging self     Treatment Objective(s) Addressed in This Session:   How perfectionism is feeling like a failure and how it affects self-esteem     Intervention:   To accept the present with goals for th future     Response to Interventions:   Willing to try and work on  "it     ASSESSMENT: Current Emotional / Mental Status (status of significant symptoms):   Risk status (Self / Other harm or suicidal ideation)   Client denies current fears or concerns for personal safety.   Client denies current or recent suicidal ideation or behaviors.   Client denies current or recent homicidal ideation or behaviors.   Client denies current or recent self injurious behavior or ideation.   Client denies other safety concerns.   Recommended that patient call 911 or go to the local ED should there be a change in any of these risk factors.     Appearance:   Appropriate    Eye Contact:   Good    Psychomotor Behavior: Restless    Attitude:   Cooperative  Interested Playful Friendly Pleasant Attentive Jose   Orientation:   All   Speech    Rate / Production: Normal     Volume:  Normal    Mood:    Anxious  Depressed    Affect:    Subdued    Thought Content:  Clear    Thought Form:  Coherent  Logical    Insight:    Fair         Medication Compliance:   NA     Changes in Health Issues:   None reported     Chemical Use Review:   Substance Use: Chemical use reviewed, no active concerns identified      Tobacco Use: No current tobacco use.       Collateral Reports Completed:   Not Applicable    PLAN: (Client Tasks / Therapist Tasks / Other)  Identify 5 pet peeves / introduce \"Anger Log\"    CHON Rodriguez                 Progress Note - Initial Visit    Client Name:  Barbi Vale Date: 6/14/21         Service Type: Individual     Visit Start Time: 0900  Visit End Time: 1020    Visit #: 1    Attendees: Client    Service Modality:  In-person     Diagnosis:Depression & Anxiety     DATA:   Interactive Complexity: No   Crisis: No     Presenting Concerns/  Current Stressors:   Postpartum anxiety & 's anxiety level      ASSESSMENT:  Mental Status Assessment:  Appearance:   Appropriate   Eye Contact:   Good   Psychomotor Behavior: Normal   Attitude:   Cooperative  Interested Playful Friendly " Pleasant Attentive Jose  Orientation:   All  Speech   Rate / Production: Normal/ Responsive   Volume:  Normal   Mood:    Anxious  Depressed   Affect:    Worrisome   Thought Content:  Referential Thinking  Rumination   Thought Form:  Goal Directed   Insight:    Fair       Safety Issues and Plan for Safety and Risk Management:     [place] Suicide Severity Rating Scale (Short Version)  [place] Suicide Severity Rating (Short Version) 3/10/2020 9/11/2020 1/12/2021 1/19/2021 2/9/2021 5/18/2021 6/3/2021   Over the past 2 weeks have you felt down, depressed, or hopeless? no no no no no no yes   Over the past 2 weeks have you had thoughts of killing yourself? no no no no no no no   Have you ever attempted to kill yourself? no no no no no no no   Q1 Wished to be Dead (Past Month) - - - - - - -   Q2 Suicidal Thoughts (Past Month) - - - - - - -   Q3 Suicidal Thought Method - - - - - - -   Q4 Suicidal Intent without Specific Plan - - - - - - -   Q5 Suicide Intent with Specific Plan - - - - - - -   Q6 Suicide Behavior (Lifetime) - - - - - - -     Patient denies current fears or concerns for personal safety.  Patient denies current or recent suicidal ideation or behaviors.  Patient denies current or recent homicidal ideation or behaviors.  Patient denies current or recent self injurious behavior or ideation.  Patient denies other safety concerns.  Recommended that patient call 911 or go to the local ED should there be a change in any of these risk factors.  Patient reports there are no firearms in the house.     Diagnostic Criteria:  A. Depressed mood for most of the day, for more days than not, as indicated either by subjective account or observation by others, for at least 2 years. Note: In children and adolescents, mood can be irritable and duration must be at least 1 year.        - poor appetite or overeating        - insomnia or hypersomnia       - low energy or fatigue        - low self-esteem        - poor concentration  or difficulty making decisions       DSM5 Diagnoses: (Sustained by DSM5 Criteria Listed Above)  Diagnoses: 296.31 (F33.0) Major Depressive Disorder, Recurrent Episode, Mild _ and With anxious distress  Psychosocial & Contextual Factors: Postpartum anxiety  WHODAS 2.0 (12 item): No flowsheet data found.  Intervention:   Mindfulness- Patient was educated on relaxation and mindfulness techniques  Collateral Reports Completed:  Not Applicable      PLAN: (Homework, other):  1. Provider will continue Diagnostic Assessment.  Patient was given the following to do until next session:  Identify 6 skills that patient does better than most people.    2. Provider recommended the following referrals: none at this time.      3.  Safety plan created.  Provider recommended that patient  Call [id number] or Er when needed.       CHON Rodriguez  June 21, 2021

## 2021-11-08 ENCOUNTER — OFFICE VISIT (OUTPATIENT)
Dept: PSYCHOLOGY | Facility: OTHER | Age: 36
End: 2021-11-08
Attending: MARRIAGE & FAMILY THERAPIST
Payer: COMMERCIAL

## 2021-11-08 DIAGNOSIS — F41.9 ANXIETY: Primary | ICD-10-CM

## 2021-11-08 PROCEDURE — 90837 PSYTX W PT 60 MINUTES: CPT | Performed by: MARRIAGE & FAMILY THERAPIST

## 2021-11-08 NOTE — PROGRESS NOTES
Progress Note    Client Name: Barbi Vale  Date: November 8, 2021         Service Type: Individual      Session Start Time: 1600  Session End Time: 1700      Session Length: 60 minutes     Session #: 9     Attendees: Client     DSM V Dx: Anxiety    DA Date: 10/25/21    Treatment Plan Due: 1/25/21  PHQ-9 :   PHQ-9 SCORE 9/11/2020 2/9/2021 6/3/2021   PHQ-9 Total Score 3 10 11      SUHA-7 :    SUHA-7 SCORE 9/11/2020 2/9/2021 6/3/2021   Total Score 1 7 2           DATA      Progress Since Last Session (Related to Symptoms / Goals / Homework):   Symptoms: No change Giving spouse to much power to control feelings and behavior    Homework: Completed in session     Current / Ongoing Stressors and Concerns:   Marital conflict     Treatment Objective(s) Addressed in This Session:   To realize own strengths and abilities     Intervention:   Identifying who she is     Response to Interventions:   Thought Provoking     ASSESSMENT: Current Emotional / Mental Status (status of significant symptoms):   Risk status (Self / Other harm or suicidal ideation)   Client denies current fears or concerns for personal safety.   Client denies current or recent suicidal ideation or behaviors.   Client denies current or recent homicidal ideation or behaviors.   Client denies current or recent self injurious behavior or ideation.   Client denies other safety concerns.   Recommended that patient call 911 or go to the local ED should there be a change in any of these risk factors.     Appearance:   Appropriate    Eye Contact:   Good    Psychomotor Behavior: Restless    Attitude:   Cooperative  Interested Friendly Pleasant Attentive   Orientation:   All   Speech    Rate / Production: Normal     Volume:  Normal    Mood:    Angry  Anxious  Fearful   Affect:    Restricted  Subdued    Thought Content:  Clear    Thought Form:  Coherent  Circumstantial   Insight:    Fair         Medication  Compliance:   NA     Changes in Health Issues:   None reported     Chemical Use Review:   Substance Use: Chemical use reviewed, no active concerns identified      Tobacco Use: No current tobacco use.       Collateral Reports Completed:   Not Applicable    PLAN: (Client Tasks / Therapist Tasks / Other)  To look at and define who she is by way of talents and abilities    CHON Rodriguez                                             Progress Note    Client Name: Barbi Vale  Date: November 1, 2021         Service Type: Individual      Session Start Time: 1600  Session End Time: 1655      Session Length: 55 minutes     Session #: 8     Attendees: Client     DSM V Dx: Anxiety    DA Date: 10/25/21    Treatment Plan Due: 1/25/22  PHQ-9 :   PHQ-9 SCORE 9/11/2020 2/9/2021 6/3/2021   PHQ-9 Total Score 3 10 11      SUHA-7 :    SUHA-7 SCORE 9/11/2020 2/9/2021 6/3/2021   Total Score 1 7 2           DATA      Progress Since Last Session (Related to Symptoms / Goals / Homework):   Symptoms: Worsening spouse doesn't trust and doesn't want marriage counseling    Homework: Completed in session     Current / Ongoing Stressors and Concerns:   Marital conflict     Treatment Objective(s) Addressed in This Session:   Feelings associated with marriage     Intervention:   Sources of anger and ways to express anger     Response to Interventions:   Will pursure individual counseling without spouse participation     ASSESSMENT: Current Emotional / Mental Status (status of significant symptoms):   Risk status (Self / Other harm or suicidal ideation)   Client denies current fears or concerns for personal safety.   Client denies current or recent suicidal ideation or behaviors.   Client denies current or recent homicidal ideation or behaviors.   Client denies current or recent self injurious behavior or ideation.   Client denies other safety concerns.   Recommended that patient call 911 or go to the local ED should there be a change in  any of these risk factors.     Appearance:   Appropriate    Eye Contact:   Good    Psychomotor Behavior: Agitated  Restless    Attitude:   Cooperative  Interested Friendly Pleasant Attentive Jose   Orientation:   All   Speech    Rate / Production: Normal     Volume:  Normal    Mood:    Angry  Anxious  Depressed  Irritable  Sad    Affect:    Constricted  Flat    Thought Content:  Referential Thinking  Rumination    Thought Form:  Obsessive  Circumstantial   Insight:    Fair         Medication Compliance:   NA     Changes in Health Issues:   None reported     Chemical Use Review:   Substance Use: Chemical use reviewed, no active concerns identified      Tobacco Use: No current tobacco use.       Collateral Reports Completed:   Not Applicable    PLAN: (Client Tasks / Therapist Tasks / Other)  Make at least three additional individual counseling sessions    CHON Rodriguez                                             Progress Note    Client Name: Barbi Vale  Date: August 23, 2021         Service Type: Couple      Session Start Time: 1615  Session End Time: 1645      Session Length: 30 minutes     Session #: 7     Attendees: Client and Spouse / Significant Other     DSM V Dx: Anxiety & Depression    DA Date: 6/28/21    Treatment Plan Due: 9/28/21  PHQ-9 :   PHQ-9 SCORE 9/11/2020 2/9/2021 6/3/2021   PHQ-9 Total Score 3 10 11      SUHA-7 :    SUHA-7 SCORE 9/11/2020 2/9/2021 6/3/2021   Total Score 1 7 2           DATA      Progress Since Last Session (Related to Symptoms / Goals / Homework):   Symptoms: Improving coouple communication is improving    Homework: Completed in session     Current / Ongoing Stressors and Concerns:   Not spending quality time together with spouse     Treatment Objective(s) Addressed in This Session:   Importance of taking time for self and for relationship     Intervention:   Identifying possible time to take time     Response to Interventions:   Will work on assignment and also  schedule a date with each other     ASSESSMENT: Current Emotional / Mental Status (status of significant symptoms):   Risk status (Self / Other harm or suicidal ideation)   Client denies current fears or concerns for personal safety.   Client denies current or recent suicidal ideation or behaviors.   Client denies current or recent homicidal ideation or behaviors.   Client denies current or recent self injurious behavior or ideation.   Client denies other safety concerns.   Recommended that patient call 911 or go to the local ED should there be a change in any of these risk factors.     Appearance:   Appropriate    Eye Contact:   Good    Psychomotor Behavior: Normal    Attitude:   Cooperative  Interested Friendly Pleasant   Orientation:   All   Speech    Rate / Production: Normal     Volume:  Normal    Mood:    Anxious  Sad    Affect:    Restricted    Thought Content:  Clear    Thought Form:  Coherent  Logical    Insight:    Good         Medication Compliance:   NA     Changes in Health Issues:   None reported     Chemical Use Review:   Substance Use: Chemical use reviewed, no active concerns identified      Tobacco Use: No current tobacco use.       Collateral Reports Completed:   Not Applicable    PLAN: (Client Tasks / Therapist Tasks / Other)  Plan a date with each other and also take time for self / work on awareness wheel    CHON Rodriguez                                             Progress Note    Client Name: Barbi Vale  Date: August 2, 2021         Service Type: Individual      Session Start Time: 1300  Session End Time: 1400      Session Length: 60 minutes     Session #: 5     Attendees: Client     DSM V Dx: Anxiety & Postpartum depression  DA Date: 6/28/21    Treatment Plan Due: 9/28/21  PHQ-9 :   PHQ-9 SCORE 9/11/2020 2/9/2021 6/3/2021   PHQ-9 Total Score 3 10 11      SUHA-7 :    SUHA-7 SCORE 9/11/2020 2/9/2021 6/3/2021   Total Score 1 7 2           DATA      Progress Since Last Session  (Related to Symptoms / Goals / Homework):   Symptoms: Improving was able to take some quality time for self    Homework: Achieved / completed to satisfaction     Current / Ongoing Stressors and Concerns:   Relationship conflict along with agreement on parenting skills     Treatment Objective(s) Addressed in This Session:   To shift focus from how spouse is doing to how she is doing     Intervention:   Look at own needs     Response to Interventions:   Workable and positive     ASSESSMENT: Current Emotional / Mental Status (status of significant symptoms):   Risk status (Self / Other harm or suicidal ideation)   Client denies current fears or concerns for personal safety.   Client denies current or recent suicidal ideation or behaviors.   Client denies current or recent homicidal ideation or behaviors.   Client denies current or recent self injurious behavior or ideation.   Client denies other safety concerns.   Recommended that patient call 911 or go to the local ED should there be a change in any of these risk factors.     Appearance:   Appropriate    Eye Contact:   Good    Psychomotor Behavior: Restless    Attitude:   Cooperative  Playful Friendly Pleasant Guarded  Attentive   Orientation:   All   Speech    Rate / Production: Normal     Volume:  Normal    Mood:    Anxious  Depressed    Affect:    Restricted    Thought Content:  Obsessions Compulsions   Thought Form:  Obsessive  Circumstantial   Insight:    Fair         Medication Compliance:   NA     Changes in Health Issues:   None reported     Chemical Use Review:   Substance Use: Chemical use reviewed, no active concerns identified      Tobacco Use: No current tobacco use.       Collateral Reports Completed:   Not Applicable    PLAN: (Client Tasks / Therapist Tasks / Other)  Work on taking more time for self also possible marriage counseling with spouse in attendance    CHON Rodriguez                                             Progress Note    Client  Name: Barbi Vale  Date: July 19, 2021         Service Type: Individual      Session Start Time: 4:05  Session End Time: 5:00      Session Length: 55 minutes     Session #: 4     Attendees: Client     DSM V Dx: Anxiety    DA Date: 6/14/21    Treatment Plan Due: 9/14/21  PHQ-9 :   PHQ-9 SCORE 9/11/2020 2/9/2021 6/3/2021   PHQ-9 Total Score 3 10 11      SUHA-7 :    SUHA-7 SCORE 9/11/2020 2/9/2021 6/3/2021   Total Score 1 7 2           DATA      Progress Since Last Session (Related to Symptoms / Goals / Homework):   Symptoms: No change restlessness    Homework: Achieved / completed to satisfaction     Current / Ongoing Stressors and Concerns:   Not standing up for own rights / anger management     Treatment Objective(s) Addressed in This Session:   Sources of anger     Intervention:   How to express anger in a healthy way     Response to Interventions:   Cooperative     ASSESSMENT: Current Emotional / Mental Status (status of significant symptoms):   Risk status (Self / Other harm or suicidal ideation)   Client denies current fears or concerns for personal safety.   Client denies current or recent suicidal ideation or behaviors.   Client denies current or recent homicidal ideation or behaviors.   Client denies current or recent self injurious behavior or ideation.   Client denies other safety concerns.   Recommended that patient call 911 or go to the local ED should there be a change in any of these risk factors.     Appearance:   Appropriate    Eye Contact:   Good    Psychomotor Behavior: Agitated    Attitude:   Cooperative  Interested Friendly Pleasant   Orientation:   All   Speech    Rate / Production: Normal     Volume:  Normal    Mood:    Anxious  Agitated   Affect:    Appropriate    Thought Content:  Clear    Thought Form:  Flight of Ideas    Insight:    Fair         Medication Compliance:   NA     Changes in Health Issues:   None reported     Chemical Use Review:   Substance Use: Chemical use reviewed, no  active concerns identified      Tobacco Use: No current tobacco use.       Collateral Reports Completed:   Not Applicable    PLAN: (Client Tasks / Therapist Tasks / Other)  Pt. To take a minimum of 2 hours for self in a week / possible marriage counseling    CHON Rodriguez                                             Progress Note    Client Name: Barbi Vale  Date: June 28, 2021         Service Type: Individual      Session Start Time: 0900  Session End Time: 1000      Session Length: 60 minutes     Session #: 3     Attendees: Client     DSM V Dx: Postpartum Depression & Anxiety DA Date: 6/14/21    Treatment Plan Due: 9/14/21  PHQ-9 :   PHQ-9 SCORE 9/11/2020 2/9/2021 6/3/2021   PHQ-9 Total Score 3 10 11      SUHA-7 :    SUHA-7 SCORE 9/11/2020 2/9/2021 6/3/2021   Total Score 1 7 2           DATA      Progress Since Last Session (Related to Symptoms / Goals / Homework):   Symptoms: Improving gaining insight regarding reasons for various actions    Homework: Achieved / completed to satisfaction     Current / Ongoing Stressors and Concerns:   Low self-esteem     Treatment Objective(s) Addressed in This Session:   Sources of anger and responses to them     Intervention:   Walk through anger log (greatest source of anger is people that see self as being superior)     Response to Interventions:   Enthusiastic and will to work on self-esteem     ASSESSMENT: Current Emotional / Mental Status (status of significant symptoms):   Risk status (Self / Other harm or suicidal ideation)   Client denies current fears or concerns for personal safety.   Client denies current or recent suicidal ideation or behaviors.   Client denies current or recent homicidal ideation or behaviors.   Client denies current or recent self injurious behavior or ideation.   Client denies other safety concerns.   Recommended that patient call 911 or go to the local ED should there be a change in any of these risk  factors.     Appearance:   Appropriate    Eye Contact:   Good    Psychomotor Behavior: Normal    Attitude:   Cooperative  Interested Playful Friendly Pleasant Attentive Jose   Orientation:   All   Speech    Rate / Production: Normal     Volume:  Normal    Mood:    Agitated   Affect:    Appropriate    Thought Content:  Clear    Thought Form:  Coherent  Logical    Insight:    Good         Medication Compliance:   Yes     Changes in Health Issues:   None reported     Chemical Use Review:   Substance Use: Chemical use reviewed, no active concerns identified      Tobacco Use: No current tobacco use.       Collateral Reports Completed:   Not Applicable    PLAN: (Client Tasks / Therapist Tasks / Other)  Develop list of three times felt taken advantage of / work on skills to stand up for own rights as a person    CHON Rodriguez                                             Progress Note    Client Name: Barbi Vale  Date: June 21, 2021         Service Type: Individual      Session Start Time: 0900  Session End Time: 0955      Session Length: 55 minutes     Session #: 2     Attendees: Client     DSM V Dx: Depression with anxiety  DA Date: 6/14/21    Treatment Plan Due: 9/14/21  PHQ-9 :   PHQ-9 SCORE 9/11/2020 2/9/2021 6/3/2021   PHQ-9 Total Score 3 10 11      SUHA-7 :    SUHA-7 SCORE 9/11/2020 2/9/2021 6/3/2021   Total Score 1 7 2           DATA      Progress Since Last Session (Related to Symptoms / Goals / Homework):   Symptoms: No change Very anxious & depressed    Homework: Achieved / completed to satisfaction     Current / Ongoing Stressors and Concerns:   Judging self     Treatment Objective(s) Addressed in This Session:   How perfectionism is feeling like a failure and how it affects self-esteem     Intervention:   To accept the present with goals for th future     Response to Interventions:   Willing to try and work on it     ASSESSMENT: Current Emotional / Mental Status (status of significant  "symptoms):   Risk status (Self / Other harm or suicidal ideation)   Client denies current fears or concerns for personal safety.   Client denies current or recent suicidal ideation or behaviors.   Client denies current or recent homicidal ideation or behaviors.   Client denies current or recent self injurious behavior or ideation.   Client denies other safety concerns.   Recommended that patient call 911 or go to the local ED should there be a change in any of these risk factors.     Appearance:   Appropriate    Eye Contact:   Good    Psychomotor Behavior: Restless    Attitude:   Cooperative  Interested Playful Friendly Pleasant Attentive Jose   Orientation:   All   Speech    Rate / Production: Normal     Volume:  Normal    Mood:    Anxious  Depressed    Affect:    Subdued    Thought Content:  Clear    Thought Form:  Coherent  Logical    Insight:    Fair         Medication Compliance:   NA     Changes in Health Issues:   None reported     Chemical Use Review:   Substance Use: Chemical use reviewed, no active concerns identified      Tobacco Use: No current tobacco use.       Collateral Reports Completed:   Not Applicable    PLAN: (Client Tasks / Therapist Tasks / Other)  Identify 5 pet peeves / introduce \"Anger Log\"    CHON Rodriguez                 Progress Note - Initial Visit    Client Name:  Barbi Vale Date: 6/14/21         Service Type: Individual     Visit Start Time: 0900  Visit End Time: 1020    Visit #: 1    Attendees: Client    Service Modality:  In-person     Diagnosis:Depression & Anxiety     DATA:   Interactive Complexity: No   Crisis: No     Presenting Concerns/  Current Stressors:   Postpartum anxiety & 's anxiety level      ASSESSMENT:  Mental Status Assessment:  Appearance:   Appropriate   Eye Contact:   Good   Psychomotor Behavior: Normal   Attitude:   Cooperative  Interested Playful Friendly Pleasant Attentive Jose  Orientation:   All  Speech   Rate / Production: Normal/ " Responsive   Volume:  Normal   Mood:    Anxious  Depressed   Affect:    Worrisome   Thought Content:  Referential Thinking  Rumination   Thought Form:  Goal Directed   Insight:    Fair       Safety Issues and Plan for Safety and Risk Management:     [place] Suicide Severity Rating Scale (Short Version)  [place] Suicide Severity Rating (Short Version) 3/10/2020 9/11/2020 1/12/2021 1/19/2021 2/9/2021 5/18/2021 6/3/2021   Over the past 2 weeks have you felt down, depressed, or hopeless? no no no no no no yes   Over the past 2 weeks have you had thoughts of killing yourself? no no no no no no no   Have you ever attempted to kill yourself? no no no no no no no   Q1 Wished to be Dead (Past Month) - - - - - - -   Q2 Suicidal Thoughts (Past Month) - - - - - - -   Q3 Suicidal Thought Method - - - - - - -   Q4 Suicidal Intent without Specific Plan - - - - - - -   Q5 Suicide Intent with Specific Plan - - - - - - -   Q6 Suicide Behavior (Lifetime) - - - - - - -     Patient denies current fears or concerns for personal safety.  Patient denies current or recent suicidal ideation or behaviors.  Patient denies current or recent homicidal ideation or behaviors.  Patient denies current or recent self injurious behavior or ideation.  Patient denies other safety concerns.  Recommended that patient call 911 or go to the local ED should there be a change in any of these risk factors.  Patient reports there are no firearms in the house.     Diagnostic Criteria:  A. Depressed mood for most of the day, for more days than not, as indicated either by subjective account or observation by others, for at least 2 years. Note: In children and adolescents, mood can be irritable and duration must be at least 1 year.        - poor appetite or overeating        - insomnia or hypersomnia       - low energy or fatigue        - low self-esteem        - poor concentration or difficulty making decisions       DSM5 Diagnoses: (Sustained by DSM5 Criteria  Listed Above)  Diagnoses: 296.31 (F33.0) Major Depressive Disorder, Recurrent Episode, Mild _ and With anxious distress  Psychosocial & Contextual Factors: Postpartum anxiety  WHODAS 2.0 (12 item): No flowsheet data found.  Intervention:   Mindfulness- Patient was educated on relaxation and mindfulness techniques  Collateral Reports Completed:  Not Applicable      PLAN: (Homework, other):  1. Provider will continue Diagnostic Assessment.  Patient was given the following to do until next session:  Identify 6 skills that patient does better than most people.    2. Provider recommended the following referrals: none at this time.      3.  Safety plan created.  Provider recommended that patient  Call [id number] or Er when needed.       CHON Rodriguez  June 21, 2021                                                 Progress Note    Client Name: Barbi Vale  Date: June 28, 2021         Service Type: Individual      Session Start Time: 0900  Session End Time: 1000      Session Length: 60 minutes     Session #: 3     Attendees: Client     DSM V Dx: Postpartum Depression & Anxiety DA Date: 6/14/21    Treatment Plan Due: 9/14/21  PHQ-9 :   PHQ-9 SCORE 9/11/2020 2/9/2021 6/3/2021   PHQ-9 Total Score 3 10 11      SUHA-7 :    SUHA-7 SCORE 9/11/2020 2/9/2021 6/3/2021   Total Score 1 7 2           DATA      Progress Since Last Session (Related to Symptoms / Goals / Homework):   Symptoms: Improving gaining insight regarding reasons for various actions    Homework: Achieved / completed to satisfaction     Current / Ongoing Stressors and Concerns:   Low self-esteem     Treatment Objective(s) Addressed in This Session:   Sources of anger and responses to them     Intervention:   Walk through anger log (greatest source of anger is people that see self as being superior)     Response to Interventions:   Enthusiastic and will to work on self-esteem     ASSESSMENT: Current Emotional / Mental Status (status of significant  symptoms):   Risk status (Self / Other harm or suicidal ideation)   Client denies current fears or concerns for personal safety.   Client denies current or recent suicidal ideation or behaviors.   Client denies current or recent homicidal ideation or behaviors.   Client denies current or recent self injurious behavior or ideation.   Client denies other safety concerns.   Recommended that patient call 911 or go to the local ED should there be a change in any of these risk factors.     Appearance:   Appropriate    Eye Contact:   Good    Psychomotor Behavior: Normal    Attitude:   Cooperative  Interested Playful Friendly Pleasant Attentive Jose   Orientation:   All   Speech    Rate / Production: Normal     Volume:  Normal    Mood:    Agitated   Affect:    Appropriate    Thought Content:  Clear    Thought Form:  Coherent  Logical    Insight:    Good         Medication Compliance:   Yes     Changes in Health Issues:   None reported     Chemical Use Review:   Substance Use: Chemical use reviewed, no active concerns identified      Tobacco Use: No current tobacco use.       Collateral Reports Completed:   Not Applicable    PLAN: (Client Tasks / Therapist Tasks / Other)  Develop list of three times felt taken advantage of / work on skills to stand up for own rights as a person    CHON Rodriguez                                             Progress Note    Client Name: Barbi Vale  Date: June 21, 2021         Service Type: Individual      Session Start Time: 0900  Session End Time: 0955      Session Length: 55 minutes     Session #: 2     Attendees: Client     DSM V Dx: Depression with anxiety  DA Date: 6/14/21    Treatment Plan Due: 9/14/21  PHQ-9 :   PHQ-9 SCORE 9/11/2020 2/9/2021 6/3/2021   PHQ-9 Total Score 3 10 11      USHA-7 :    SUHA-7 SCORE 9/11/2020 2/9/2021 6/3/2021   Total Score 1 7 2           DATA      Progress Since Last Session (Related to Symptoms / Goals / Homework):   Symptoms: No change Very  "anxious & depressed    Homework: Achieved / completed to satisfaction     Current / Ongoing Stressors and Concerns:   Judging self     Treatment Objective(s) Addressed in This Session:   How perfectionism is feeling like a failure and how it affects self-esteem     Intervention:   To accept the present with goals for th future     Response to Interventions:   Willing to try and work on it     ASSESSMENT: Current Emotional / Mental Status (status of significant symptoms):   Risk status (Self / Other harm or suicidal ideation)   Client denies current fears or concerns for personal safety.   Client denies current or recent suicidal ideation or behaviors.   Client denies current or recent homicidal ideation or behaviors.   Client denies current or recent self injurious behavior or ideation.   Client denies other safety concerns.   Recommended that patient call 911 or go to the local ED should there be a change in any of these risk factors.     Appearance:   Appropriate    Eye Contact:   Good    Psychomotor Behavior: Restless    Attitude:   Cooperative  Interested Playful Friendly Pleasant Attentive Jose   Orientation:   All   Speech    Rate / Production: Normal     Volume:  Normal    Mood:    Anxious  Depressed    Affect:    Subdued    Thought Content:  Clear    Thought Form:  Coherent  Logical    Insight:    Fair         Medication Compliance:   NA     Changes in Health Issues:   None reported     Chemical Use Review:   Substance Use: Chemical use reviewed, no active concerns identified      Tobacco Use: No current tobacco use.       Collateral Reports Completed:   Not Applicable    PLAN: (Client Tasks / Therapist Tasks / Other)  Identify 5 pet peeves / introduce \"Anger Log\"    CHON Rodriguez                 Progress Note - Initial Visit    Client Name:  Barbi Vale Date: 6/14/21         Service Type: Individual     Visit Start Time: 0900  Visit End Time: 1020    Visit " #: 1    Attendees: Client    Service Modality:  In-person     Diagnosis:Depression & Anxiety     DATA:   Interactive Complexity: No   Crisis: No     Presenting Concerns/  Current Stressors:   Postpartum anxiety & 's anxiety level      ASSESSMENT:  Mental Status Assessment:  Appearance:   Appropriate   Eye Contact:   Good   Psychomotor Behavior: Normal   Attitude:   Cooperative  Interested Playful Friendly Pleasant Attentive Jose  Orientation:   All  Speech   Rate / Production: Normal/ Responsive   Volume:  Normal   Mood:    Anxious  Depressed   Affect:    Worrisome   Thought Content:  Referential Thinking  Rumination   Thought Form:  Goal Directed   Insight:    Fair       Safety Issues and Plan for Safety and Risk Management:     [place] Suicide Severity Rating Scale (Short Version)  [place] Suicide Severity Rating (Short Version) 3/10/2020 9/11/2020 1/12/2021 1/19/2021 2/9/2021 5/18/2021 6/3/2021   Over the past 2 weeks have you felt down, depressed, or hopeless? no no no no no no yes   Over the past 2 weeks have you had thoughts of killing yourself? no no no no no no no   Have you ever attempted to kill yourself? no no no no no no no   Q1 Wished to be Dead (Past Month) - - - - - - -   Q2 Suicidal Thoughts (Past Month) - - - - - - -   Q3 Suicidal Thought Method - - - - - - -   Q4 Suicidal Intent without Specific Plan - - - - - - -   Q5 Suicide Intent with Specific Plan - - - - - - -   Q6 Suicide Behavior (Lifetime) - - - - - - -     Patient denies current fears or concerns for personal safety.  Patient denies current or recent suicidal ideation or behaviors.  Patient denies current or recent homicidal ideation or behaviors.  Patient denies current or recent self injurious behavior or ideation.  Patient denies other safety concerns.  Recommended that patient call 911 or go to the local ED should there be a change in any of these risk factors.  Patient reports there are no firearms in the  house.     Diagnostic Criteria:  A. Depressed mood for most of the day, for more days than not, as indicated either by subjective account or observation by others, for at least 2 years. Note: In children and adolescents, mood can be irritable and duration must be at least 1 year.        - poor appetite or overeating        - insomnia or hypersomnia       - low energy or fatigue        - low self-esteem        - poor concentration or difficulty making decisions       DSM5 Diagnoses: (Sustained by DSM5 Criteria Listed Above)  Diagnoses: 296.31 (F33.0) Major Depressive Disorder, Recurrent Episode, Mild _ and With anxious distress  Psychosocial & Contextual Factors: Postpartum anxiety  WHODAS 2.0 (12 item): No flowsheet data found.  Intervention:   Mindfulness- Patient was educated on relaxation and mindfulness techniques  Collateral Reports Completed:  Not Applicable      PLAN: (Homework, other):  1. Provider will continue Diagnostic Assessment.  Patient was given the following to do until next session:  Identify 6 skills that patient does better than most people.    2. Provider recommended the following referrals: none at this time.      3.  Safety plan created.  Provider recommended that patient  Call [id number] or Er when needed.       CHON Rodriguez  June 21, 2021

## 2021-11-22 ENCOUNTER — OFFICE VISIT (OUTPATIENT)
Dept: PSYCHOLOGY | Facility: OTHER | Age: 36
End: 2021-11-22
Attending: MARRIAGE & FAMILY THERAPIST
Payer: COMMERCIAL

## 2021-11-22 DIAGNOSIS — F41.9 ANXIETY: Primary | ICD-10-CM

## 2021-11-22 PROCEDURE — 90837 PSYTX W PT 60 MINUTES: CPT | Performed by: MARRIAGE & FAMILY THERAPIST

## 2021-11-22 NOTE — PROGRESS NOTES
Progress Note    Client Name: Barbi Vale  Date: November 22, 2021         Service Type: Individual      Session Start Time: 1600  Session End Time: 1700      Session Length: 60 minutes     Session #: 10     Attendees: Client     DSM V Dx: Anxiety    DA Date: 10/25/21    Treatment Plan Due: 1/25/22  PHQ-9 :   PHQ-9 SCORE 9/11/2020 2/9/2021 6/3/2021   PHQ-9 Total Score 3 10 11      SUHA-7 :    SUHA-7 SCORE 9/11/2020 2/9/2021 6/3/2021   Total Score 1 7 2           DATA      Progress Since Last Session (Related to Symptoms / Goals / Homework):   Symptoms: Improving Taking better care of self    Homework: Did not complete     Current / Ongoing Stressors and Concerns:   Care Taking personality & not taking enough time for self     Treatment Objective(s) Addressed in This Session:   Taking better care of self     Intervention:   Look at own personal emotional needs     Response to Interventions:   Very energetic     ASSESSMENT: Current Emotional / Mental Status (status of significant symptoms):   Risk status (Self / Other harm or suicidal ideation)   Client denies current fears or concerns for personal safety.   Client denies current or recent suicidal ideation or behaviors.   Client denies current or recent homicidal ideation or behaviors.   Client denies current or recent self injurious behavior or ideation.   Client denies other safety concerns.   Recommended that patient call 911 or go to the local ED should there be a change in any of these risk factors.     Appearance:   Appropriate    Eye Contact:   Good    Psychomotor Behavior: Normal    Attitude:   Cooperative  Interested Playful Friendly Pleasant Attentive Jose   Orientation:   All   Speech    Rate / Production: Normal     Volume:  Normal    Mood:    Normal   Affect:    Appropriate    Thought Content:  Clear    Thought Form:  Coherent  Logical    Insight:    Good         Medication Compliance:   Yes     Changes in  Health Issues:   None reported     Chemical Use Review:   Substance Use: Chemical use reviewed, no active concerns identified      Tobacco Use: No current tobacco use.       Collateral Reports Completed:   Not Applicable    PLAN: (Client Tasks / Therapist Tasks / Other)  Continue to be assertive and set own personal boundaries    CHON Rodriguez                                             Progress Note    Client Name: Barbi Vale  Date: November 8, 2021         Service Type: Individual      Session Start Time: 1600  Session End Time: 1700      Session Length: 60 minutes     Session #: 9     Attendees: Client     DSM V Dx: Anxiety    DA Date: 10/25/21    Treatment Plan Due: 1/25/21  PHQ-9 :   PHQ-9 SCORE 9/11/2020 2/9/2021 6/3/2021   PHQ-9 Total Score 3 10 11      SUHA-7 :    SUHA-7 SCORE 9/11/2020 2/9/2021 6/3/2021   Total Score 1 7 2           DATA      Progress Since Last Session (Related to Symptoms / Goals / Homework):   Symptoms: No change Giving spouse to much power to control feelings and behavior    Homework: Completed in session     Current / Ongoing Stressors and Concerns:   Marital conflict     Treatment Objective(s) Addressed in This Session:   To realize own strengths and abilities     Intervention:   Identifying who she is     Response to Interventions:   Thought Provoking     ASSESSMENT: Current Emotional / Mental Status (status of significant symptoms):   Risk status (Self / Other harm or suicidal ideation)   Client denies current fears or concerns for personal safety.   Client denies current or recent suicidal ideation or behaviors.   Client denies current or recent homicidal ideation or behaviors.   Client denies current or recent self injurious behavior or ideation.   Client denies other safety concerns.   Recommended that patient call 911 or go to the local ED should there be a change in any of these risk factors.     Appearance:   Appropriate    Eye Contact:   Good    Psychomotor  Behavior: Restless    Attitude:   Cooperative  Interested Friendly Pleasant Attentive   Orientation:   All   Speech    Rate / Production: Normal     Volume:  Normal    Mood:    Angry  Anxious  Fearful   Affect:    Restricted  Subdued    Thought Content:  Clear    Thought Form:  Coherent  Circumstantial   Insight:    Fair         Medication Compliance:   NA     Changes in Health Issues:   None reported     Chemical Use Review:   Substance Use: Chemical use reviewed, no active concerns identified      Tobacco Use: No current tobacco use.       Collateral Reports Completed:   Not Applicable    PLAN: (Client Tasks / Therapist Tasks / Other)  To look at and define who she is by way of talents and abilities    CHON Rodriguez                                             Progress Note    Client Name: Barbi Vale  Date: November 1, 2021         Service Type: Individual      Session Start Time: 1600  Session End Time: 1655      Session Length: 55 minutes     Session #: 8     Attendees: Client     DSM V Dx: Anxiety    DA Date: 10/25/21    Treatment Plan Due: 1/25/22  PHQ-9 :   PHQ-9 SCORE 9/11/2020 2/9/2021 6/3/2021   PHQ-9 Total Score 3 10 11      SUHA-7 :    SUHA-7 SCORE 9/11/2020 2/9/2021 6/3/2021   Total Score 1 7 2           DATA      Progress Since Last Session (Related to Symptoms / Goals / Homework):   Symptoms: Worsening spouse doesn't trust and doesn't want marriage counseling    Homework: Completed in session     Current / Ongoing Stressors and Concerns:   Marital conflict     Treatment Objective(s) Addressed in This Session:   Feelings associated with marriage     Intervention:   Sources of anger and ways to express anger     Response to Interventions:   Will pursure individual counseling without spouse participation     ASSESSMENT: Current Emotional / Mental Status (status of significant symptoms):   Risk status (Self / Other harm or suicidal ideation)   Client denies current fears or concerns for  personal safety.   Client denies current or recent suicidal ideation or behaviors.   Client denies current or recent homicidal ideation or behaviors.   Client denies current or recent self injurious behavior or ideation.   Client denies other safety concerns.   Recommended that patient call 911 or go to the local ED should there be a change in any of these risk factors.     Appearance:   Appropriate    Eye Contact:   Good    Psychomotor Behavior: Agitated  Restless    Attitude:   Cooperative  Interested Friendly Pleasant Attentive Jose   Orientation:   All   Speech    Rate / Production: Normal     Volume:  Normal    Mood:    Angry  Anxious  Depressed  Irritable  Sad    Affect:    Constricted  Flat    Thought Content:  Referential Thinking  Rumination    Thought Form:  Obsessive  Circumstantial   Insight:    Fair         Medication Compliance:   NA     Changes in Health Issues:   None reported     Chemical Use Review:   Substance Use: Chemical use reviewed, no active concerns identified      Tobacco Use: No current tobacco use.       Collateral Reports Completed:   Not Applicable    PLAN: (Client Tasks / Therapist Tasks / Other)  Make at least three additional individual counseling sessions    CHON Rodriguez                                             Progress Note    Client Name: Barbi Vale  Date: August 23, 2021         Service Type: Couple      Session Start Time: 1615  Session End Time: 1645      Session Length: 30 minutes     Session #: 7     Attendees: Client and Spouse / Significant Other     DSM V Dx: Anxiety & Depression    DA Date: 6/28/21    Treatment Plan Due: 9/28/21  PHQ-9 :   PHQ-9 SCORE 9/11/2020 2/9/2021 6/3/2021   PHQ-9 Total Score 3 10 11      SUHA-7 :    SUHA-7 SCORE 9/11/2020 2/9/2021 6/3/2021   Total Score 1 7 2           DATA      Progress Since Last Session (Related to Symptoms / Goals / Homework):   Symptoms: Improving coouple communication is improving    Homework: Completed  in session     Current / Ongoing Stressors and Concerns:   Not spending quality time together with spouse     Treatment Objective(s) Addressed in This Session:   Importance of taking time for self and for relationship     Intervention:   Identifying possible time to take time     Response to Interventions:   Will work on assignment and also schedule a date with each other     ASSESSMENT: Current Emotional / Mental Status (status of significant symptoms):   Risk status (Self / Other harm or suicidal ideation)   Client denies current fears or concerns for personal safety.   Client denies current or recent suicidal ideation or behaviors.   Client denies current or recent homicidal ideation or behaviors.   Client denies current or recent self injurious behavior or ideation.   Client denies other safety concerns.   Recommended that patient call 911 or go to the local ED should there be a change in any of these risk factors.     Appearance:   Appropriate    Eye Contact:   Good    Psychomotor Behavior: Normal    Attitude:   Cooperative  Interested Friendly Pleasant   Orientation:   All   Speech    Rate / Production: Normal     Volume:  Normal    Mood:    Anxious  Sad    Affect:    Restricted    Thought Content:  Clear    Thought Form:  Coherent  Logical    Insight:    Good         Medication Compliance:   NA     Changes in Health Issues:   None reported     Chemical Use Review:   Substance Use: Chemical use reviewed, no active concerns identified      Tobacco Use: No current tobacco use.       Collateral Reports Completed:   Not Applicable    PLAN: (Client Tasks / Therapist Tasks / Other)  Plan a date with each other and also take time for self / work on awareness wheel    CHON Rodriguez                                             Progress Note    Client Name: Barbi Vale  Date: August 2, 2021         Service Type: Individual      Session Start Time: 1300  Session End Time: 1400      Session Length: 60  minutes     Session #: 5     Attendees: Client     DSM V Dx: Anxiety & Postpartum depression  DA Date: 6/28/21    Treatment Plan Due: 9/28/21  PHQ-9 :   PHQ-9 SCORE 9/11/2020 2/9/2021 6/3/2021   PHQ-9 Total Score 3 10 11      SUHA-7 :    SUHA-7 SCORE 9/11/2020 2/9/2021 6/3/2021   Total Score 1 7 2           DATA      Progress Since Last Session (Related to Symptoms / Goals / Homework):   Symptoms: Improving was able to take some quality time for self    Homework: Achieved / completed to satisfaction     Current / Ongoing Stressors and Concerns:   Relationship conflict along with agreement on parenting skills     Treatment Objective(s) Addressed in This Session:   To shift focus from how spouse is doing to how she is doing     Intervention:   Look at own needs     Response to Interventions:   Workable and positive     ASSESSMENT: Current Emotional / Mental Status (status of significant symptoms):   Risk status (Self / Other harm or suicidal ideation)   Client denies current fears or concerns for personal safety.   Client denies current or recent suicidal ideation or behaviors.   Client denies current or recent homicidal ideation or behaviors.   Client denies current or recent self injurious behavior or ideation.   Client denies other safety concerns.   Recommended that patient call 911 or go to the local ED should there be a change in any of these risk factors.     Appearance:   Appropriate    Eye Contact:   Good    Psychomotor Behavior: Restless    Attitude:   Cooperative  Playful Friendly Pleasant Guarded  Attentive   Orientation:   All   Speech    Rate / Production: Normal     Volume:  Normal    Mood:    Anxious  Depressed    Affect:    Restricted    Thought Content:  Obsessions Compulsions   Thought Form:  Obsessive  Circumstantial   Insight:    Fair         Medication Compliance:   NA     Changes in Health Issues:   None reported     Chemical Use Review:   Substance Use: Chemical use reviewed, no active concerns  identified      Tobacco Use: No current tobacco use.       Collateral Reports Completed:   Not Applicable    PLAN: (Client Tasks / Therapist Tasks / Other)  Work on taking more time for self also possible marriage counseling with spouse in attendance    CHON Rodriguez                                             Progress Note    Client Name: Barbi Vale  Date: July 19, 2021         Service Type: Individual      Session Start Time: 4:05  Session End Time: 5:00      Session Length: 55 minutes     Session #: 4     Attendees: Client     DSM V Dx: Anxiety    DA Date: 6/14/21    Treatment Plan Due: 9/14/21  PHQ-9 :   PHQ-9 SCORE 9/11/2020 2/9/2021 6/3/2021   PHQ-9 Total Score 3 10 11      SUHA-7 :    SUHA-7 SCORE 9/11/2020 2/9/2021 6/3/2021   Total Score 1 7 2           DATA      Progress Since Last Session (Related to Symptoms / Goals / Homework):   Symptoms: No change restlessness    Homework: Achieved / completed to satisfaction     Current / Ongoing Stressors and Concerns:   Not standing up for own rights / anger management     Treatment Objective(s) Addressed in This Session:   Sources of anger     Intervention:   How to express anger in a healthy way     Response to Interventions:   Cooperative     ASSESSMENT: Current Emotional / Mental Status (status of significant symptoms):   Risk status (Self / Other harm or suicidal ideation)   Client denies current fears or concerns for personal safety.   Client denies current or recent suicidal ideation or behaviors.   Client denies current or recent homicidal ideation or behaviors.   Client denies current or recent self injurious behavior or ideation.   Client denies other safety concerns.   Recommended that patient call 911 or go to the local ED should there be a change in any of these risk factors.     Appearance:   Appropriate    Eye Contact:   Good    Psychomotor Behavior: Agitated    Attitude:   Cooperative  Interested Friendly  Pleasant   Orientation:   All   Speech    Rate / Production: Normal     Volume:  Normal    Mood:    Anxious  Agitated   Affect:    Appropriate    Thought Content:  Clear    Thought Form:  Flight of Ideas    Insight:    Fair         Medication Compliance:   NA     Changes in Health Issues:   None reported     Chemical Use Review:   Substance Use: Chemical use reviewed, no active concerns identified      Tobacco Use: No current tobacco use.       Collateral Reports Completed:   Not Applicable    PLAN: (Client Tasks / Therapist Tasks / Other)  Pt. To take a minimum of 2 hours for self in a week / possible marriage counseling    CHON Rodriguez                                             Progress Note    Client Name: Barbi Vale  Date: June 28, 2021         Service Type: Individual      Session Start Time: 0900  Session End Time: 1000      Session Length: 60 minutes     Session #: 3     Attendees: Client     DSM V Dx: Postpartum Depression & Anxiety DA Date: 6/14/21    Treatment Plan Due: 9/14/21  PHQ-9 :   PHQ-9 SCORE 9/11/2020 2/9/2021 6/3/2021   PHQ-9 Total Score 3 10 11      SUHA-7 :    SUHA-7 SCORE 9/11/2020 2/9/2021 6/3/2021   Total Score 1 7 2           DATA      Progress Since Last Session (Related to Symptoms / Goals / Homework):   Symptoms: Improving gaining insight regarding reasons for various actions    Homework: Achieved / completed to satisfaction     Current / Ongoing Stressors and Concerns:   Low self-esteem     Treatment Objective(s) Addressed in This Session:   Sources of anger and responses to them     Intervention:   Walk through anger log (greatest source of anger is people that see self as being superior)     Response to Interventions:   Enthusiastic and will to work on self-esteem     ASSESSMENT: Current Emotional / Mental Status (status of significant symptoms):   Risk status (Self / Other harm or suicidal ideation)   Client denies current fears or concerns for personal  safety.   Client denies current or recent suicidal ideation or behaviors.   Client denies current or recent homicidal ideation or behaviors.   Client denies current or recent self injurious behavior or ideation.   Client denies other safety concerns.   Recommended that patient call 911 or go to the local ED should there be a change in any of these risk factors.     Appearance:   Appropriate    Eye Contact:   Good    Psychomotor Behavior: Normal    Attitude:   Cooperative  Interested Playful Friendly Pleasant Attentive Jose   Orientation:   All   Speech    Rate / Production: Normal     Volume:  Normal    Mood:    Agitated   Affect:    Appropriate    Thought Content:  Clear    Thought Form:  Coherent  Logical    Insight:    Good         Medication Compliance:   Yes     Changes in Health Issues:   None reported     Chemical Use Review:   Substance Use: Chemical use reviewed, no active concerns identified      Tobacco Use: No current tobacco use.       Collateral Reports Completed:   Not Applicable    PLAN: (Client Tasks / Therapist Tasks / Other)  Develop list of three times felt taken advantage of / work on skills to stand up for own rights as a person    CHON Rodriguez                                             Progress Note    Client Name: Barbi Vale  Date: June 21, 2021         Service Type: Individual      Session Start Time: 0900  Session End Time: 0955      Session Length: 55 minutes     Session #: 2     Attendees: Client     DSM V Dx: Depression with anxiety  DA Date: 6/14/21    Treatment Plan Due: 9/14/21  PHQ-9 :   PHQ-9 SCORE 9/11/2020 2/9/2021 6/3/2021   PHQ-9 Total Score 3 10 11      SUHA-7 :    SUHA-7 SCORE 9/11/2020 2/9/2021 6/3/2021   Total Score 1 7 2           DATA      Progress Since Last Session (Related to Symptoms / Goals / Homework):   Symptoms: No change Very anxious & depressed    Homework: Achieved / completed to satisfaction     Current / Ongoing Stressors and  "Concerns:   Judging self     Treatment Objective(s) Addressed in This Session:   How perfectionism is feeling like a failure and how it affects self-esteem     Intervention:   To accept the present with goals for th future     Response to Interventions:   Willing to try and work on it     ASSESSMENT: Current Emotional / Mental Status (status of significant symptoms):   Risk status (Self / Other harm or suicidal ideation)   Client denies current fears or concerns for personal safety.   Client denies current or recent suicidal ideation or behaviors.   Client denies current or recent homicidal ideation or behaviors.   Client denies current or recent self injurious behavior or ideation.   Client denies other safety concerns.   Recommended that patient call 911 or go to the local ED should there be a change in any of these risk factors.     Appearance:   Appropriate    Eye Contact:   Good    Psychomotor Behavior: Restless    Attitude:   Cooperative  Interested Playful Friendly Pleasant Attentive Jose   Orientation:   All   Speech    Rate / Production: Normal     Volume:  Normal    Mood:    Anxious  Depressed    Affect:    Subdued    Thought Content:  Clear    Thought Form:  Coherent  Logical    Insight:    Fair         Medication Compliance:   NA     Changes in Health Issues:   None reported     Chemical Use Review:   Substance Use: Chemical use reviewed, no active concerns identified      Tobacco Use: No current tobacco use.       Collateral Reports Completed:   Not Applicable    PLAN: (Client Tasks / Therapist Tasks / Other)  Identify 5 pet peeves / introduce \"Anger Log\"    CHON Rodriguez                 Progress Note - Initial Visit    Client Name:  Barbi Vale Date: 6/14/21         Service Type: Individual     Visit Start Time: 0900  Visit End Time: 1020    Visit #: 1    Attendees: Client    Service Modality:  In-person     Diagnosis:Depression & Anxiety     DATA:   Interactive Complexity: " No   Crisis: No     Presenting Concerns/  Current Stressors:   Postpartum anxiety & 's anxiety level      ASSESSMENT:  Mental Status Assessment:  Appearance:   Appropriate   Eye Contact:   Good   Psychomotor Behavior: Normal   Attitude:   Cooperative  Interested Playful Friendly Pleasant Attentive Jose  Orientation:   All  Speech   Rate / Production: Normal/ Responsive   Volume:  Normal   Mood:    Anxious  Depressed   Affect:    Worrisome   Thought Content:  Referential Thinking  Rumination   Thought Form:  Goal Directed   Insight:    Fair       Safety Issues and Plan for Safety and Risk Management:     [place] Suicide Severity Rating Scale (Short Version)  [place] Suicide Severity Rating (Short Version) 3/10/2020 9/11/2020 1/12/2021 1/19/2021 2/9/2021 5/18/2021 6/3/2021   Over the past 2 weeks have you felt down, depressed, or hopeless? no no no no no no yes   Over the past 2 weeks have you had thoughts of killing yourself? no no no no no no no   Have you ever attempted to kill yourself? no no no no no no no   Q1 Wished to be Dead (Past Month) - - - - - - -   Q2 Suicidal Thoughts (Past Month) - - - - - - -   Q3 Suicidal Thought Method - - - - - - -   Q4 Suicidal Intent without Specific Plan - - - - - - -   Q5 Suicide Intent with Specific Plan - - - - - - -   Q6 Suicide Behavior (Lifetime) - - - - - - -     Patient denies current fears or concerns for personal safety.  Patient denies current or recent suicidal ideation or behaviors.  Patient denies current or recent homicidal ideation or behaviors.  Patient denies current or recent self injurious behavior or ideation.  Patient denies other safety concerns.  Recommended that patient call 911 or go to the local ED should there be a change in any of these risk factors.  Patient reports there are no firearms in the house.     Diagnostic Criteria:  A. Depressed mood for most of the day, for more days than not, as indicated either by subjective account or  observation by others, for at least 2 years. Note: In children and adolescents, mood can be irritable and duration must be at least 1 year.        - poor appetite or overeating        - insomnia or hypersomnia       - low energy or fatigue        - low self-esteem        - poor concentration or difficulty making decisions       DSM5 Diagnoses: (Sustained by DSM5 Criteria Listed Above)  Diagnoses: 296.31 (F33.0) Major Depressive Disorder, Recurrent Episode, Mild _ and With anxious distress  Psychosocial & Contextual Factors: Postpartum anxiety  WHODAS 2.0 (12 item): No flowsheet data found.  Intervention:   Mindfulness- Patient was educated on relaxation and mindfulness techniques  Collateral Reports Completed:  Not Applicable      PLAN: (Homework, other):  1. Provider will continue Diagnostic Assessment.  Patient was given the following to do until next session:  Identify 6 skills that patient does better than most people.    2. Provider recommended the following referrals: none at this time.      3.  Safety plan created.  Provider recommended that patient  Call [id number] or Er when needed.       CHON Rodriguez  June 21, 2021                                                 Progress Note    Client Name: Barbi Vale  Date: June 28, 2021         Service Type: Individual      Session Start Time: 0900  Session End Time: 1000      Session Length: 60 minutes     Session #: 3     Attendees: Client     DSM V Dx: Postpartum Depression & Anxiety DA Date: 6/14/21    Treatment Plan Due: 9/14/21  PHQ-9 :   PHQ-9 SCORE 9/11/2020 2/9/2021 6/3/2021   PHQ-9 Total Score 3 10 11      SUHA-7 :    SHUA-7 SCORE 9/11/2020 2/9/2021 6/3/2021   Total Score 1 7 2           DATA      Progress Since Last Session (Related to Symptoms / Goals / Homework):   Symptoms: Improving gaining insight regarding reasons for various actions    Homework: Achieved / completed to satisfaction     Current / Ongoing Stressors and Concerns:   Low  self-esteem     Treatment Objective(s) Addressed in This Session:   Sources of anger and responses to them     Intervention:   Walk through anger log (greatest source of anger is people that see self as being superior)     Response to Interventions:   Enthusiastic and will to work on self-esteem     ASSESSMENT: Current Emotional / Mental Status (status of significant symptoms):   Risk status (Self / Other harm or suicidal ideation)   Client denies current fears or concerns for personal safety.   Client denies current or recent suicidal ideation or behaviors.   Client denies current or recent homicidal ideation or behaviors.   Client denies current or recent self injurious behavior or ideation.   Client denies other safety concerns.   Recommended that patient call 911 or go to the local ED should there be a change in any of these risk factors.     Appearance:   Appropriate    Eye Contact:   Good    Psychomotor Behavior: Normal    Attitude:   Cooperative  Interested Playful Friendly Pleasant Attentive Jose   Orientation:   All   Speech    Rate / Production: Normal     Volume:  Normal    Mood:    Agitated   Affect:    Appropriate    Thought Content:  Clear    Thought Form:  Coherent  Logical    Insight:    Good         Medication Compliance:   Yes     Changes in Health Issues:   None reported     Chemical Use Review:   Substance Use: Chemical use reviewed, no active concerns identified      Tobacco Use: No current tobacco use.       Collateral Reports Completed:   Not Applicable    PLAN: (Client Tasks / Therapist Tasks / Other)  Develop list of three times felt taken advantage of / work on skills to stand up for own rights as a person    CHON Rodriguez                                             Progress Note    Client Name: Barbi Vale  Date: June 21, 2021         Service Type: Individual      Session Start Time: 0900  Session End Time: 0955      Session Length: 55 minutes     Session  #: 2     Attendees: Client     DSM V Dx: Depression with anxiety  DA Date: 6/14/21    Treatment Plan Due: 9/14/21  PHQ-9 :   PHQ-9 SCORE 9/11/2020 2/9/2021 6/3/2021   PHQ-9 Total Score 3 10 11      SUHA-7 :    SUHA-7 SCORE 9/11/2020 2/9/2021 6/3/2021   Total Score 1 7 2           DATA      Progress Since Last Session (Related to Symptoms / Goals / Homework):   Symptoms: No change Very anxious & depressed    Homework: Achieved / completed to satisfaction     Current / Ongoing Stressors and Concerns:   Judging self     Treatment Objective(s) Addressed in This Session:   How perfectionism is feeling like a failure and how it affects self-esteem     Intervention:   To accept the present with goals for th future     Response to Interventions:   Willing to try and work on it     ASSESSMENT: Current Emotional / Mental Status (status of significant symptoms):   Risk status (Self / Other harm or suicidal ideation)   Client denies current fears or concerns for personal safety.   Client denies current or recent suicidal ideation or behaviors.   Client denies current or recent homicidal ideation or behaviors.   Client denies current or recent self injurious behavior or ideation.   Client denies other safety concerns.   Recommended that patient call 911 or go to the local ED should there be a change in any of these risk factors.     Appearance:   Appropriate    Eye Contact:   Good    Psychomotor Behavior: Restless    Attitude:   Cooperative  Interested Playful Friendly Pleasant Attentive Jose   Orientation:   All   Speech    Rate / Production: Normal     Volume:  Normal    Mood:    Anxious  Depressed    Affect:    Subdued    Thought Content:  Clear    Thought Form:  Coherent  Logical    Insight:    Fair         Medication Compliance:   NA     Changes in Health Issues:   None reported     Chemical Use Review:   Substance Use: Chemical use reviewed, no active concerns identified      Tobacco Use: No current tobacco use.   "     Collateral Reports Completed:   Not Applicable    PLAN: (Client Tasks / Therapist Tasks / Other)  Identify 5 pet peeves / introduce \"Anger Log\"    CHON Rodriguez                 Progress Note - Initial Visit    Client Name:  Barbi Vale Date: 6/14/21         Service Type: Individual     Visit Start Time: 0900  Visit End Time: 1020    Visit #: 1    Attendees: Client    Service Modality:  In-person     Diagnosis:Depression & Anxiety     DATA:   Interactive Complexity: No   Crisis: No     Presenting Concerns/  Current Stressors:   Postpartum anxiety & 's anxiety level      ASSESSMENT:  Mental Status Assessment:  Appearance:   Appropriate   Eye Contact:   Good   Psychomotor Behavior: Normal   Attitude:   Cooperative  Interested Playful Friendly Pleasant Attentive Jose  Orientation:   All  Speech   Rate / Production: Normal/ Responsive   Volume:  Normal   Mood:    Anxious  Depressed   Affect:    Worrisome   Thought Content:  Referential Thinking  Rumination   Thought Form:  Goal Directed   Insight:    Fair       Safety Issues and Plan for Safety and Risk Management:     [place] Suicide Severity Rating Scale (Short Version)  [place] Suicide Severity Rating (Short Version) 3/10/2020 9/11/2020 1/12/2021 1/19/2021 2/9/2021 5/18/2021 6/3/2021   Over the past 2 weeks have you felt down, depressed, or hopeless? no no no no no no yes   Over the past 2 weeks have you had thoughts of killing yourself? no no no no no no no   Have you ever attempted to kill yourself? no no no no no no no   Q1 Wished to be Dead (Past Month) - - - - - - -   Q2 Suicidal Thoughts (Past Month) - - - - - - -   Q3 Suicidal Thought Method - - - - - - -   Q4 Suicidal Intent without Specific Plan - - - - - - -   Q5 Suicide Intent with Specific Plan - - - - - - -   Q6 Suicide Behavior (Lifetime) - - - - - - -     Patient denies current fears or concerns for personal safety.  Patient denies current or recent suicidal ideation or " behaviors.  Patient denies current or recent homicidal ideation or behaviors.  Patient denies current or recent self injurious behavior or ideation.  Patient denies other safety concerns.  Recommended that patient call 911 or go to the local ED should there be a change in any of these risk factors.  Patient reports there are no firearms in the house.     Diagnostic Criteria:  A. Depressed mood for most of the day, for more days than not, as indicated either by subjective account or observation by others, for at least 2 years. Note: In children and adolescents, mood can be irritable and duration must be at least 1 year.        - poor appetite or overeating        - insomnia or hypersomnia       - low energy or fatigue        - low self-esteem        - poor concentration or difficulty making decisions       DSM5 Diagnoses: (Sustained by DSM5 Criteria Listed Above)  Diagnoses: 296.31 (F33.0) Major Depressive Disorder, Recurrent Episode, Mild _ and With anxious distress  Psychosocial & Contextual Factors: Postpartum anxiety  WHODAS 2.0 (12 item): No flowsheet data found.  Intervention:   Mindfulness- Patient was educated on relaxation and mindfulness techniques  Collateral Reports Completed:  Not Applicable      PLAN: (Homework, other):  1. Provider will continue Diagnostic Assessment.  Patient was given the following to do until next session:  Identify 6 skills that patient does better than most people.    2. Provider recommended the following referrals: none at this time.      3.  Safety plan created.  Provider recommended that patient  Call [id number] or Er when needed.       CHON Rodriguez  June 21, 2021

## 2021-12-13 ENCOUNTER — OFFICE VISIT (OUTPATIENT)
Dept: PSYCHOLOGY | Facility: OTHER | Age: 36
End: 2021-12-13
Attending: MARRIAGE & FAMILY THERAPIST
Payer: COMMERCIAL

## 2021-12-13 DIAGNOSIS — F41.9 ANXIETY: Primary | ICD-10-CM

## 2021-12-13 PROCEDURE — 90837 PSYTX W PT 60 MINUTES: CPT | Performed by: MARRIAGE & FAMILY THERAPIST

## 2021-12-18 NOTE — PROGRESS NOTES
Progress Note    Client Name: Barbi Vale  Date: 2021         Service Type: Individual      Session Start Time: 1600  Session End Time: 1700      Session Length: 60 minutes     Session #: 11     Attendees: Client     DSM V Dx: Anxiety    DA Date: 10/25/21    Treatment Plan Due: 22  PHQ-9 :   PHQ-9 SCORE 2020 2021 6/3/2021   PHQ-9 Total Score 3 10 11      SUHA-7 :    SUHA-7 SCORE 2020 2021 6/3/2021   Total Score 1 7 2           DATA      Progress Since Last Session (Related to Symptoms / Goals / Homework):   Symptoms: Improving setting better personal boundaries    Homework: Achieved / completed to satisfaction     Current / Ongoing Stressors and Concerns:   Being power of  for grandmother who      Treatment Objective(s) Addressed in This Session:   Ways of handling & reducing stress in making  arrangements for grandmother     Intervention:   Brainstorm ideas     Response to Interventions:   Energetic and willing to work on reducing stress     ASSESSMENT: Current Emotional / Mental Status (status of significant symptoms):   Risk status (Self / Other harm or suicidal ideation)   Client denies current fears or concerns for personal safety.   Client denies current or recent suicidal ideation or behaviors.   Client denies current or recent homicidal ideation or behaviors.   Client denies current or recent self injurious behavior or ideation.   Client denies other safety concerns.   Recommended that patient call 911 or go to the local ED should there be a change in any of these risk factors.     Appearance:   Appropriate    Eye Contact:   Good    Psychomotor Behavior: Normal    Attitude:   Cooperative  Interested Friendly Pleasant Attentive Jose   Orientation:   All   Speech    Rate / Production: Talkative    Volume:  Normal    Mood:    Anxious  Elevated    Affect:    Appropriate    Thought Content:  Rumination   Obsessions Compulsions   Thought Form:  Goal Directed  Obsessive  Circumstantial   Insight:    Fair         Medication Compliance:   NA     Changes in Health Issues:   None reported     Chemical Use Review:   Substance Use: Chemical use reviewed, no active concerns identified      Tobacco Use: No current tobacco use.       Collateral Reports Completed:   Not Applicable    PLAN: (Client Tasks / Therapist Tasks / Other)  Making  arrangements for grandmother as her power of     CHON Rodriguez                                             Progress Note    Client Name: Barbi Vale  Date: 2021         Service Type: Individual      Session Start Time: 1600  Session End Time: 1700      Session Length: 60 minutes     Session #: 10     Attendees: Client     DSM V Dx: Anxiety    DA Date: 10/25/21    Treatment Plan Due: 22  PHQ-9 :   PHQ-9 SCORE 2020 2021 6/3/2021   PHQ-9 Total Score 3 10 11      SUHA-7 :    SUHA-7 SCORE 2020 2021 6/3/2021   Total Score 1 7 2           DATA      Progress Since Last Session (Related to Symptoms / Goals / Homework):   Symptoms: Improving Taking better care of self    Homework: Did not complete     Current / Ongoing Stressors and Concerns:   Care Taking personality & not taking enough time for self     Treatment Objective(s) Addressed in This Session:   Taking better care of self     Intervention:   Look at own personal emotional needs     Response to Interventions:   Very energetic     ASSESSMENT: Current Emotional / Mental Status (status of significant symptoms):   Risk status (Self / Other harm or suicidal ideation)   Client denies current fears or concerns for personal safety.   Client denies current or recent suicidal ideation or behaviors.   Client denies current or recent homicidal ideation or behaviors.   Client denies current or recent self injurious behavior or ideation.   Client denies other safety concerns.   Recommended  that patient call 911 or go to the local ED should there be a change in any of these risk factors.     Appearance:   Appropriate    Eye Contact:   Good    Psychomotor Behavior: Normal    Attitude:   Cooperative  Interested Playful Friendly Pleasant Attentive Jose   Orientation:   All   Speech    Rate / Production: Normal     Volume:  Normal    Mood:    Normal   Affect:    Appropriate    Thought Content:  Clear    Thought Form:  Coherent  Logical    Insight:    Good         Medication Compliance:   Yes     Changes in Health Issues:   None reported     Chemical Use Review:   Substance Use: Chemical use reviewed, no active concerns identified      Tobacco Use: No current tobacco use.       Collateral Reports Completed:   Not Applicable    PLAN: (Client Tasks / Therapist Tasks / Other)  Continue to be assertive and set own personal boundaries    CHON Rodriguez                                             Progress Note    Client Name: Barbi Vale  Date: November 8, 2021         Service Type: Individual      Session Start Time: 1600  Session End Time: 1700      Session Length: 60 minutes     Session #: 9     Attendees: Client     DSM V Dx: Anxiety    DA Date: 10/25/21    Treatment Plan Due: 1/25/21  PHQ-9 :   PHQ-9 SCORE 9/11/2020 2/9/2021 6/3/2021   PHQ-9 Total Score 3 10 11      SUHA-7 :    SUHA-7 SCORE 9/11/2020 2/9/2021 6/3/2021   Total Score 1 7 2           DATA      Progress Since Last Session (Related to Symptoms / Goals / Homework):   Symptoms: No change Giving spouse to much power to control feelings and behavior    Homework: Completed in session     Current / Ongoing Stressors and Concerns:   Marital conflict     Treatment Objective(s) Addressed in This Session:   To realize own strengths and abilities     Intervention:   Identifying who she is     Response to Interventions:   Thought Provoking     ASSESSMENT: Current Emotional / Mental Status (status of significant symptoms):   Risk status (Self /  Other harm or suicidal ideation)   Client denies current fears or concerns for personal safety.   Client denies current or recent suicidal ideation or behaviors.   Client denies current or recent homicidal ideation or behaviors.   Client denies current or recent self injurious behavior or ideation.   Client denies other safety concerns.   Recommended that patient call 911 or go to the local ED should there be a change in any of these risk factors.     Appearance:   Appropriate    Eye Contact:   Good    Psychomotor Behavior: Restless    Attitude:   Cooperative  Interested Friendly Pleasant Attentive   Orientation:   All   Speech    Rate / Production: Normal     Volume:  Normal    Mood:    Angry  Anxious  Fearful   Affect:    Restricted  Subdued    Thought Content:  Clear    Thought Form:  Coherent  Circumstantial   Insight:    Fair         Medication Compliance:   NA     Changes in Health Issues:   None reported     Chemical Use Review:   Substance Use: Chemical use reviewed, no active concerns identified      Tobacco Use: No current tobacco use.       Collateral Reports Completed:   Not Applicable    PLAN: (Client Tasks / Therapist Tasks / Other)  To look at and define who she is by way of talents and abilities    CHON Rodriguez                                             Progress Note    Client Name: Barbi Vale  Date: November 1, 2021         Service Type: Individual      Session Start Time: 1600  Session End Time: 1655      Session Length: 55 minutes     Session #: 8     Attendees: Client     DSM V Dx: Anxiety    DA Date: 10/25/21    Treatment Plan Due: 1/25/22  PHQ-9 :   PHQ-9 SCORE 9/11/2020 2/9/2021 6/3/2021   PHQ-9 Total Score 3 10 11      SUHA-7 :    SUHA-7 SCORE 9/11/2020 2/9/2021 6/3/2021   Total Score 1 7 2           DATA      Progress Since Last Session (Related to Symptoms / Goals / Homework):   Symptoms: Worsening spouse doesn't trust and doesn't want marriage counseling    Homework:  Completed in session     Current / Ongoing Stressors and Concerns:   Marital conflict     Treatment Objective(s) Addressed in This Session:   Feelings associated with marriage     Intervention:   Sources of anger and ways to express anger     Response to Interventions:   Will pursure individual counseling without spouse participation     ASSESSMENT: Current Emotional / Mental Status (status of significant symptoms):   Risk status (Self / Other harm or suicidal ideation)   Client denies current fears or concerns for personal safety.   Client denies current or recent suicidal ideation or behaviors.   Client denies current or recent homicidal ideation or behaviors.   Client denies current or recent self injurious behavior or ideation.   Client denies other safety concerns.   Recommended that patient call 911 or go to the local ED should there be a change in any of these risk factors.     Appearance:   Appropriate    Eye Contact:   Good    Psychomotor Behavior: Agitated  Restless    Attitude:   Cooperative  Interested Friendly Pleasant Attentive Jose   Orientation:   All   Speech    Rate / Production: Normal     Volume:  Normal    Mood:    Angry  Anxious  Depressed  Irritable  Sad    Affect:    Constricted  Flat    Thought Content:  Referential Thinking  Rumination    Thought Form:  Obsessive  Circumstantial   Insight:    Fair         Medication Compliance:   NA     Changes in Health Issues:   None reported     Chemical Use Review:   Substance Use: Chemical use reviewed, no active concerns identified      Tobacco Use: No current tobacco use.       Collateral Reports Completed:   Not Applicable    PLAN: (Client Tasks / Therapist Tasks / Other)  Make at least three additional individual counseling sessions    CHON Rodriguez                                             Progress Note    Client Name: Barbi Vale  Date: August 23, 2021         Service Type: Couple      Session Start Time: 1615  Session End  Time: 1645      Session Length: 30 minutes     Session #: 7     Attendees: Client and Spouse / Significant Other     DSM V Dx: Anxiety & Depression    DA Date: 6/28/21    Treatment Plan Due: 9/28/21  PHQ-9 :   PHQ-9 SCORE 9/11/2020 2/9/2021 6/3/2021   PHQ-9 Total Score 3 10 11      SUHA-7 :    SUHA-7 SCORE 9/11/2020 2/9/2021 6/3/2021   Total Score 1 7 2           DATA      Progress Since Last Session (Related to Symptoms / Goals / Homework):   Symptoms: Improving coouple communication is improving    Homework: Completed in session     Current / Ongoing Stressors and Concerns:   Not spending quality time together with spouse     Treatment Objective(s) Addressed in This Session:   Importance of taking time for self and for relationship     Intervention:   Identifying possible time to take time     Response to Interventions:   Will work on assignment and also schedule a date with each other     ASSESSMENT: Current Emotional / Mental Status (status of significant symptoms):   Risk status (Self / Other harm or suicidal ideation)   Client denies current fears or concerns for personal safety.   Client denies current or recent suicidal ideation or behaviors.   Client denies current or recent homicidal ideation or behaviors.   Client denies current or recent self injurious behavior or ideation.   Client denies other safety concerns.   Recommended that patient call 911 or go to the local ED should there be a change in any of these risk factors.     Appearance:   Appropriate    Eye Contact:   Good    Psychomotor Behavior: Normal    Attitude:   Cooperative  Interested Friendly Pleasant   Orientation:   All   Speech    Rate / Production: Normal     Volume:  Normal    Mood:    Anxious  Sad    Affect:    Restricted    Thought Content:  Clear    Thought Form:  Coherent  Logical    Insight:    Good         Medication Compliance:   NA     Changes in Health Issues:   None reported     Chemical Use Review:   Substance Use: Chemical use  reviewed, no active concerns identified      Tobacco Use: No current tobacco use.       Collateral Reports Completed:   Not Applicable    PLAN: (Client Tasks / Therapist Tasks / Other)  Plan a date with each other and also take time for self / work on awareness CHON Lagunas                                             Progress Note    Client Name: Barbi Vale  Date: August 2, 2021         Service Type: Individual      Session Start Time: 1300  Session End Time: 1400      Session Length: 60 minutes     Session #: 5     Attendees: Client     DSM V Dx: Anxiety & Postpartum depression  DA Date: 6/28/21    Treatment Plan Due: 9/28/21  PHQ-9 :   PHQ-9 SCORE 9/11/2020 2/9/2021 6/3/2021   PHQ-9 Total Score 3 10 11      SUHA-7 :    SUHA-7 SCORE 9/11/2020 2/9/2021 6/3/2021   Total Score 1 7 2           DATA      Progress Since Last Session (Related to Symptoms / Goals / Homework):   Symptoms: Improving was able to take some quality time for self    Homework: Achieved / completed to satisfaction     Current / Ongoing Stressors and Concerns:   Relationship conflict along with agreement on parenting skills     Treatment Objective(s) Addressed in This Session:   To shift focus from how spouse is doing to how she is doing     Intervention:   Look at own needs     Response to Interventions:   Workable and positive     ASSESSMENT: Current Emotional / Mental Status (status of significant symptoms):   Risk status (Self / Other harm or suicidal ideation)   Client denies current fears or concerns for personal safety.   Client denies current or recent suicidal ideation or behaviors.   Client denies current or recent homicidal ideation or behaviors.   Client denies current or recent self injurious behavior or ideation.   Client denies other safety concerns.   Recommended that patient call 911 or go to the local ED should there be a change in any of these risk factors.     Appearance:   Appropriate    Eye  Contact:   Good    Psychomotor Behavior: Restless    Attitude:   Cooperative  Playful Friendly Pleasant Guarded  Attentive   Orientation:   All   Speech    Rate / Production: Normal     Volume:  Normal    Mood:    Anxious  Depressed    Affect:    Restricted    Thought Content:  Obsessions Compulsions   Thought Form:  Obsessive  Circumstantial   Insight:    Fair         Medication Compliance:   NA     Changes in Health Issues:   None reported     Chemical Use Review:   Substance Use: Chemical use reviewed, no active concerns identified      Tobacco Use: No current tobacco use.       Collateral Reports Completed:   Not Applicable    PLAN: (Client Tasks / Therapist Tasks / Other)  Work on taking more time for self also possible marriage counseling with spouse in attendance    CHON Rodriguez                                             Progress Note    Client Name: Barbi Vale  Date: July 19, 2021         Service Type: Individual      Session Start Time: 4:05  Session End Time: 5:00      Session Length: 55 minutes     Session #: 4     Attendees: Client     DSM V Dx: Anxiety    DA Date: 6/14/21    Treatment Plan Due: 9/14/21  PHQ-9 :   PHQ-9 SCORE 9/11/2020 2/9/2021 6/3/2021   PHQ-9 Total Score 3 10 11      SUHA-7 :    SUHA-7 SCORE 9/11/2020 2/9/2021 6/3/2021   Total Score 1 7 2           DATA      Progress Since Last Session (Related to Symptoms / Goals / Homework):   Symptoms: No change restlessness    Homework: Achieved / completed to satisfaction     Current / Ongoing Stressors and Concerns:   Not standing up for own rights / anger management     Treatment Objective(s) Addressed in This Session:   Sources of anger     Intervention:   How to express anger in a healthy way     Response to Interventions:   Cooperative     ASSESSMENT: Current Emotional / Mental Status (status of significant symptoms):   Risk status (Self / Other harm or suicidal ideation)   Client denies current fears or concerns for personal  safety.   Client denies current or recent suicidal ideation or behaviors.   Client denies current or recent homicidal ideation or behaviors.   Client denies current or recent self injurious behavior or ideation.   Client denies other safety concerns.   Recommended that patient call 911 or go to the local ED should there be a change in any of these risk factors.     Appearance:   Appropriate    Eye Contact:   Good    Psychomotor Behavior: Agitated    Attitude:   Cooperative  Interested Friendly Pleasant   Orientation:   All   Speech    Rate / Production: Normal     Volume:  Normal    Mood:    Anxious  Agitated   Affect:    Appropriate    Thought Content:  Clear    Thought Form:  Flight of Ideas    Insight:    Fair         Medication Compliance:   NA     Changes in Health Issues:   None reported     Chemical Use Review:   Substance Use: Chemical use reviewed, no active concerns identified      Tobacco Use: No current tobacco use.       Collateral Reports Completed:   Not Applicable    PLAN: (Client Tasks / Therapist Tasks / Other)  Pt. To take a minimum of 2 hours for self in a week / possible marriage counseling    CHON Rodriguez                                             Progress Note    Client Name: Barbi Vale  Date: June 28, 2021         Service Type: Individual      Session Start Time: 0900  Session End Time: 1000      Session Length: 60 minutes     Session #: 3     Attendees: Client     DSM V Dx: Postpartum Depression & Anxiety DA Date: 6/14/21    Treatment Plan Due: 9/14/21  PHQ-9 :   PHQ-9 SCORE 9/11/2020 2/9/2021 6/3/2021   PHQ-9 Total Score 3 10 11      SUHA-7 :    SUHA-7 SCORE 9/11/2020 2/9/2021 6/3/2021   Total Score 1 7 2           DATA      Progress Since Last Session (Related to Symptoms / Goals / Homework):   Symptoms: Improving gaining insight regarding reasons for various actions    Homework: Achieved / completed to satisfaction     Current / Ongoing Stressors and Concerns:   Low  self-esteem     Treatment Objective(s) Addressed in This Session:   Sources of anger and responses to them     Intervention:   Walk through anger log (greatest source of anger is people that see self as being superior)     Response to Interventions:   Enthusiastic and will to work on self-esteem     ASSESSMENT: Current Emotional / Mental Status (status of significant symptoms):   Risk status (Self / Other harm or suicidal ideation)   Client denies current fears or concerns for personal safety.   Client denies current or recent suicidal ideation or behaviors.   Client denies current or recent homicidal ideation or behaviors.   Client denies current or recent self injurious behavior or ideation.   Client denies other safety concerns.   Recommended that patient call 911 or go to the local ED should there be a change in any of these risk factors.     Appearance:   Appropriate    Eye Contact:   Good    Psychomotor Behavior: Normal    Attitude:   Cooperative  Interested Playful Friendly Pleasant Attentive Jose   Orientation:   All   Speech    Rate / Production: Normal     Volume:  Normal    Mood:    Agitated   Affect:    Appropriate    Thought Content:  Clear    Thought Form:  Coherent  Logical    Insight:    Good         Medication Compliance:   Yes     Changes in Health Issues:   None reported     Chemical Use Review:   Substance Use: Chemical use reviewed, no active concerns identified      Tobacco Use: No current tobacco use.       Collateral Reports Completed:   Not Applicable    PLAN: (Client Tasks / Therapist Tasks / Other)  Develop list of three times felt taken advantage of / work on skills to stand up for own rights as a person    CHON Rodriguez                                             Progress Note    Client Name: Barbi Vale  Date: June 21, 2021         Service Type: Individual      Session Start Time: 0900  Session End Time: 0955      Session Length: 55 minutes     Session  #: 2     Attendees: Client     DSM V Dx: Depression with anxiety  DA Date: 6/14/21    Treatment Plan Due: 9/14/21  PHQ-9 :   PHQ-9 SCORE 9/11/2020 2/9/2021 6/3/2021   PHQ-9 Total Score 3 10 11      SUHA-7 :    SUHA-7 SCORE 9/11/2020 2/9/2021 6/3/2021   Total Score 1 7 2           DATA      Progress Since Last Session (Related to Symptoms / Goals / Homework):   Symptoms: No change Very anxious & depressed    Homework: Achieved / completed to satisfaction     Current / Ongoing Stressors and Concerns:   Judging self     Treatment Objective(s) Addressed in This Session:   How perfectionism is feeling like a failure and how it affects self-esteem     Intervention:   To accept the present with goals for th future     Response to Interventions:   Willing to try and work on it     ASSESSMENT: Current Emotional / Mental Status (status of significant symptoms):   Risk status (Self / Other harm or suicidal ideation)   Client denies current fears or concerns for personal safety.   Client denies current or recent suicidal ideation or behaviors.   Client denies current or recent homicidal ideation or behaviors.   Client denies current or recent self injurious behavior or ideation.   Client denies other safety concerns.   Recommended that patient call 911 or go to the local ED should there be a change in any of these risk factors.     Appearance:   Appropriate    Eye Contact:   Good    Psychomotor Behavior: Restless    Attitude:   Cooperative  Interested Playful Friendly Pleasant Attentive Jose   Orientation:   All   Speech    Rate / Production: Normal     Volume:  Normal    Mood:    Anxious  Depressed    Affect:    Subdued    Thought Content:  Clear    Thought Form:  Coherent  Logical    Insight:    Fair         Medication Compliance:   NA     Changes in Health Issues:   None reported     Chemical Use Review:   Substance Use: Chemical use reviewed, no active concerns identified      Tobacco Use: No current tobacco use.   "     Collateral Reports Completed:   Not Applicable    PLAN: (Client Tasks / Therapist Tasks / Other)  Identify 5 pet peeves / introduce \"Anger Log\"    CHON Rodriguez                 Progress Note - Initial Visit    Client Name:  Barbi Vale Date: 6/14/21         Service Type: Individual     Visit Start Time: 0900  Visit End Time: 1020    Visit #: 1    Attendees: Client    Service Modality:  In-person     Diagnosis:Depression & Anxiety     DATA:   Interactive Complexity: No   Crisis: No     Presenting Concerns/  Current Stressors:   Postpartum anxiety & 's anxiety level      ASSESSMENT:  Mental Status Assessment:  Appearance:   Appropriate   Eye Contact:   Good   Psychomotor Behavior: Normal   Attitude:   Cooperative  Interested Playful Friendly Pleasant Attentive Jose  Orientation:   All  Speech   Rate / Production: Normal/ Responsive   Volume:  Normal   Mood:    Anxious  Depressed   Affect:    Worrisome   Thought Content:  Referential Thinking  Rumination   Thought Form:  Goal Directed   Insight:    Fair       Safety Issues and Plan for Safety and Risk Management:     [place] Suicide Severity Rating Scale (Short Version)  [place] Suicide Severity Rating (Short Version) 3/10/2020 9/11/2020 1/12/2021 1/19/2021 2/9/2021 5/18/2021 6/3/2021   Over the past 2 weeks have you felt down, depressed, or hopeless? no no no no no no yes   Over the past 2 weeks have you had thoughts of killing yourself? no no no no no no no   Have you ever attempted to kill yourself? no no no no no no no   Q1 Wished to be Dead (Past Month) - - - - - - -   Q2 Suicidal Thoughts (Past Month) - - - - - - -   Q3 Suicidal Thought Method - - - - - - -   Q4 Suicidal Intent without Specific Plan - - - - - - -   Q5 Suicide Intent with Specific Plan - - - - - - -   Q6 Suicide Behavior (Lifetime) - - - - - - -     Patient denies current fears or concerns for personal safety.  Patient denies current or recent suicidal ideation or " behaviors.  Patient denies current or recent homicidal ideation or behaviors.  Patient denies current or recent self injurious behavior or ideation.  Patient denies other safety concerns.  Recommended that patient call 911 or go to the local ED should there be a change in any of these risk factors.  Patient reports there are no firearms in the house.     Diagnostic Criteria:  A. Depressed mood for most of the day, for more days than not, as indicated either by subjective account or observation by others, for at least 2 years. Note: In children and adolescents, mood can be irritable and duration must be at least 1 year.        - poor appetite or overeating        - insomnia or hypersomnia       - low energy or fatigue        - low self-esteem        - poor concentration or difficulty making decisions       DSM5 Diagnoses: (Sustained by DSM5 Criteria Listed Above)  Diagnoses: 296.31 (F33.0) Major Depressive Disorder, Recurrent Episode, Mild _ and With anxious distress  Psychosocial & Contextual Factors: Postpartum anxiety  WHODAS 2.0 (12 item): No flowsheet data found.  Intervention:   Mindfulness- Patient was educated on relaxation and mindfulness techniques  Collateral Reports Completed:  Not Applicable      PLAN: (Homework, other):  1. Provider will continue Diagnostic Assessment.  Patient was given the following to do until next session:  Identify 6 skills that patient does better than most people.    2. Provider recommended the following referrals: none at this time.      3.  Safety plan created.  Provider recommended that patient  Call 9-1-1 or Er when needed.       CHON Rodriguez  June 21, 2021                                                 Progress Note    Client Name: Barbi Vale  Date: June 28, 2021         Service Type: Individual      Session Start Time: 0900  Session End Time: 1000      Session Length: 60 minutes     Session #: 3     Attendees: Client     DSM V Dx: Postpartum Depression &  Anxiety DA Date: 6/14/21    Treatment Plan Due: 9/14/21  PHQ-9 :   PHQ-9 SCORE 9/11/2020 2/9/2021 6/3/2021   PHQ-9 Total Score 3 10 11      SUHA-7 :    SUHA-7 SCORE 9/11/2020 2/9/2021 6/3/2021   Total Score 1 7 2           DATA      Progress Since Last Session (Related to Symptoms / Goals / Homework):   Symptoms: Improving gaining insight regarding reasons for various actions    Homework: Achieved / completed to satisfaction     Current / Ongoing Stressors and Concerns:   Low self-esteem     Treatment Objective(s) Addressed in This Session:   Sources of anger and responses to them     Intervention:   Walk through anger log (greatest source of anger is people that see self as being superior)     Response to Interventions:   Enthusiastic and will to work on self-esteem     ASSESSMENT: Current Emotional / Mental Status (status of significant symptoms):   Risk status (Self / Other harm or suicidal ideation)   Client denies current fears or concerns for personal safety.   Client denies current or recent suicidal ideation or behaviors.   Client denies current or recent homicidal ideation or behaviors.   Client denies current or recent self injurious behavior or ideation.   Client denies other safety concerns.   Recommended that patient call 911 or go to the local ED should there be a change in any of these risk factors.     Appearance:   Appropriate    Eye Contact:   Good    Psychomotor Behavior: Normal    Attitude:   Cooperative  Interested Playful Friendly Pleasant Attentive Jose   Orientation:   All   Speech    Rate / Production: Normal     Volume:  Normal    Mood:    Agitated   Affect:    Appropriate    Thought Content:  Clear    Thought Form:  Coherent  Logical    Insight:    Good         Medication Compliance:   Yes     Changes in Health Issues:   None reported     Chemical Use Review:   Substance Use: Chemical use reviewed, no active concerns identified      Tobacco Use: No current tobacco use.       Collateral  Reports Completed:   Not Applicable    PLAN: (Client Tasks / Therapist Tasks / Other)  Develop list of three times felt taken advantage of / work on skills to stand up for own rights as a person    CHON Rodriguez                                             Progress Note    Client Name: Barbi Vale  Date: June 21, 2021         Service Type: Individual      Session Start Time: 0900  Session End Time: 0955      Session Length: 55 minutes     Session #: 2     Attendees: Client     DSM V Dx: Depression with anxiety  DA Date: 6/14/21    Treatment Plan Due: 9/14/21  PHQ-9 :   PHQ-9 SCORE 9/11/2020 2/9/2021 6/3/2021   PHQ-9 Total Score 3 10 11      SUHA-7 :    SUHA-7 SCORE 9/11/2020 2/9/2021 6/3/2021   Total Score 1 7 2           DATA      Progress Since Last Session (Related to Symptoms / Goals / Homework):   Symptoms: No change Very anxious & depressed    Homework: Achieved / completed to satisfaction     Current / Ongoing Stressors and Concerns:   Judging self     Treatment Objective(s) Addressed in This Session:   How perfectionism is feeling like a failure and how it affects self-esteem     Intervention:   To accept the present with goals for th future     Response to Interventions:   Willing to try and work on it     ASSESSMENT: Current Emotional / Mental Status (status of significant symptoms):   Risk status (Self / Other harm or suicidal ideation)   Client denies current fears or concerns for personal safety.   Client denies current or recent suicidal ideation or behaviors.   Client denies current or recent homicidal ideation or behaviors.   Client denies current or recent self injurious behavior or ideation.   Client denies other safety concerns.   Recommended that patient call 911 or go to the local ED should there be a change in any of these risk factors.     Appearance:   Appropriate    Eye Contact:   Good    Psychomotor Behavior: Restless    Attitude:   Cooperative  Interested Playful Friendly  "Pleasant Attentive Jose   Orientation:   All   Speech    Rate / Production: Normal     Volume:  Normal    Mood:    Anxious  Depressed    Affect:    Subdued    Thought Content:  Clear    Thought Form:  Coherent  Logical    Insight:    Fair         Medication Compliance:   NA     Changes in Health Issues:   None reported     Chemical Use Review:   Substance Use: Chemical use reviewed, no active concerns identified      Tobacco Use: No current tobacco use.       Collateral Reports Completed:   Not Applicable    PLAN: (Client Tasks / Therapist Tasks / Other)  Identify 5 pet peeves / introduce \"Anger Log\"    CHON Rodriguez                 Progress Note - Initial Visit    Client Name:  Barbi Vale Date: 6/14/21         Service Type: Individual     Visit Start Time: 0900  Visit End Time: 1020    Visit #: 1    Attendees: Client    Service Modality:  In-person     Diagnosis:Depression & Anxiety     DATA:   Interactive Complexity: No   Crisis: No     Presenting Concerns/  Current Stressors:   Postpartum anxiety & 's anxiety level      ASSESSMENT:  Mental Status Assessment:  Appearance:   Appropriate   Eye Contact:   Good   Psychomotor Behavior: Normal   Attitude:   Cooperative  Interested Playful Friendly Pleasant Attentive Jose  Orientation:   All  Speech   Rate / Production: Normal/ Responsive   Volume:  Normal   Mood:    Anxious  Depressed   Affect:    Worrisome   Thought Content:  Referential Thinking  Rumination   Thought Form:  Goal Directed   Insight:    Fair       Safety Issues and Plan for Safety and Risk Management:     [place] Suicide Severity Rating Scale (Short Version)  [place] Suicide Severity Rating (Short Version) 3/10/2020 9/11/2020 1/12/2021 1/19/2021 2/9/2021 5/18/2021 6/3/2021   Over the past 2 weeks have you felt down, depressed, or hopeless? no no no no no no yes   Over the past 2 weeks have you had thoughts of killing yourself? no no no no no no no   Have you ever attempted to " kill yourself? no no no no no no no   Q1 Wished to be Dead (Past Month) - - - - - - -   Q2 Suicidal Thoughts (Past Month) - - - - - - -   Q3 Suicidal Thought Method - - - - - - -   Q4 Suicidal Intent without Specific Plan - - - - - - -   Q5 Suicide Intent with Specific Plan - - - - - - -   Q6 Suicide Behavior (Lifetime) - - - - - - -     Patient denies current fears or concerns for personal safety.  Patient denies current or recent suicidal ideation or behaviors.  Patient denies current or recent homicidal ideation or behaviors.  Patient denies current or recent self injurious behavior or ideation.  Patient denies other safety concerns.  Recommended that patient call 911 or go to the local ED should there be a change in any of these risk factors.  Patient reports there are no firearms in the house.     Diagnostic Criteria:  A. Depressed mood for most of the day, for more days than not, as indicated either by subjective account or observation by others, for at least 2 years. Note: In children and adolescents, mood can be irritable and duration must be at least 1 year.        - poor appetite or overeating        - insomnia or hypersomnia       - low energy or fatigue        - low self-esteem        - poor concentration or difficulty making decisions       DSM5 Diagnoses: (Sustained by DSM5 Criteria Listed Above)  Diagnoses: 296.31 (F33.0) Major Depressive Disorder, Recurrent Episode, Mild _ and With anxious distress  Psychosocial & Contextual Factors: Postpartum anxiety  WHODAS 2.0 (12 item): No flowsheet data found.  Intervention:   Mindfulness- Patient was educated on relaxation and mindfulness techniques  Collateral Reports Completed:  Not Applicable      PLAN: (Homework, other):  1. Provider will continue Diagnostic Assessment.  Patient was given the following to do until next session:  Identify 6 skills that patient does better than most people.    2. Provider recommended the following referrals: none at this  time.      3.  Safety plan created.  Provider recommended that patient  Call 9-1-1 or Er when needed.       CHON Rodriguez  June 21, 2021

## 2022-01-13 ENCOUNTER — ANCILLARY PROCEDURE (OUTPATIENT)
Dept: GENERAL RADIOLOGY | Facility: OTHER | Age: 37
End: 2022-01-13
Attending: PODIATRIST
Payer: COMMERCIAL

## 2022-01-13 ENCOUNTER — OFFICE VISIT (OUTPATIENT)
Dept: PODIATRY | Facility: OTHER | Age: 37
End: 2022-01-13
Attending: PODIATRIST
Payer: COMMERCIAL

## 2022-01-13 VITALS
HEART RATE: 89 BPM | OXYGEN SATURATION: 99 % | TEMPERATURE: 96.7 F | DIASTOLIC BLOOD PRESSURE: 62 MMHG | SYSTOLIC BLOOD PRESSURE: 93 MMHG

## 2022-01-13 DIAGNOSIS — M79.675 GREAT TOE PAIN, LEFT: ICD-10-CM

## 2022-01-13 DIAGNOSIS — L03.116 CELLULITIS OF LEFT FOOT: ICD-10-CM

## 2022-01-13 DIAGNOSIS — L97.522 ULCER OF LEFT FOOT WITH FAT LAYER EXPOSED (H): Primary | ICD-10-CM

## 2022-01-13 DIAGNOSIS — L02.612 ABSCESS OF LEFT FOOT: ICD-10-CM

## 2022-01-13 PROCEDURE — 73630 X-RAY EXAM OF FOOT: CPT | Mod: TC | Performed by: RADIOLOGY

## 2022-01-13 PROCEDURE — 10060 I&D ABSCESS SIMPLE/SINGLE: CPT | Performed by: PODIATRIST

## 2022-01-13 RX ORDER — CEPHALEXIN 500 MG/1
500 CAPSULE ORAL 4 TIMES DAILY
Qty: 40 CAPSULE | Refills: 0 | Status: SHIPPED | OUTPATIENT
Start: 2022-01-13 | End: 2022-01-23

## 2022-01-13 RX ORDER — DULOXETIN HYDROCHLORIDE 60 MG/1
60 CAPSULE, DELAYED RELEASE ORAL
COMMUNITY
Start: 2021-12-30 | End: 2022-07-14

## 2022-01-13 ASSESSMENT — PAIN SCALES - GENERAL: PAINLEVEL: MILD PAIN (2)

## 2022-01-13 NOTE — NURSING NOTE
"Chief Complaint   Patient presents with     Ingrown Toenail     infected       Initial BP 93/62 (BP Location: Left arm, Patient Position: Sitting, Cuff Size: Adult Regular)   Pulse 89   Temp (!) 96.7  F (35.9  C) (Tympanic)   SpO2 99%  Estimated body mass index is 34.69 kg/m  as calculated from the following:    Height as of 6/3/21: 1.53 m (5' 0.24\").    Weight as of 6/3/21: 81.2 kg (179 lb).  Medication Reconciliation: karyn Ochoa  "

## 2022-01-13 NOTE — PATIENT INSTRUCTIONS
Nail procedure care:  -Start epsom salt soaks tomorrow. Soak the foot 1-2 times a day for 20 minutes.  -Apply an antibiotic cream, gauze and a bandaid over the toe for the first week after the procedure.  -After one week, switch to a betadine dressing. Apply a small amount of betadine on gauze or dab the betadine over the toe with gauze and apply another dry gauze over the toe followed by a band aid or tape.  -Do not apply a band aid directly over the nail procedure site without gauze.     Keep the toe covered at all times until it is completely healed.  -You may develop a black scab over the nail bed--let this fall off on its own and don't pick at it.  -The toe may drain for 2-3 weeks. It is normal for it to have a clear drainage.    Watching for signs of infection:  If the toe has a thick, white pus coming from the procedure site or if the the toe becomes red, swollen, painful, or you begin to feel sick (fever/chills/nausea/vomitting), return to the podiatry clinic immediately or to the emergency room if after hours.    Thank you for allowing  and our Podiatry team to participate in your care. Please call our office at [phone] with scheduling questions or with any other questions or concerns.

## 2022-01-13 NOTE — PROGRESS NOTES
Chief complaint: Patient presents with:  Ingrown Toenail: infected      History of Present Illness: This 36 year old female is seen for pain in  her LEFT hallux. She had  LEFT hallux toenail matrixectomy in May of 2021. Randomly, she developed pain, redness and drainage a couple days ago. She has been soaking her foot and she called podiatry to have the toe evaluated. The toe is very painful. She popped a blister with a needle and lightly applied pressure to the toe and drainage came out which relieved some pressure and pain.     No further pedal complaints today.       BP 93/62 (BP Location: Left arm, Patient Position: Sitting, Cuff Size: Adult Regular)   Pulse 89   Temp (!) 96.7  F (35.9  C) (Tympanic)   SpO2 99%     Patient Active Problem List   Diagnosis     ACP (advance care planning)     Chronic pain disorder     Myofascial muscle pain     Paresthesia     Dysuria     Well woman exam with routine gynecological exam     Encounter for preconception consultation     Encounter for surveillance of contraceptives     Other chronic pain     Supervision of normal first pregnancy in first trimester     Chemical dermatitis     Encounter for triage in pregnant patient     Clogged duct, postpartum     Mastitis     Candidiasis of breast     Bilateral carpal tunnel syndrome     Depression       Past Surgical History:   Procedure Laterality Date      SECTION N/A 3/15/2019    Procedure:  SECTION;  Surgeon: Pedro Pablo Cantor MD;  Location: [facility]     FOOT SURGERY Right     [place], [place]       Current Outpatient Medications   Medication     clotrimazole (LOTRIMIN) 1 % external solution     DULoxetine (CYMBALTA) 60 MG capsule     multivitamin w/minerals (THERA-VIT-M) tablet     No current facility-administered medications for this visit.          Allergies   Allergen Reactions     Depo-Provera [Medroxyprogesterone] Swelling     Swelled up, headaches       Family History   Problem Relation Age of Onset      Mental Illness Mother      Mental Illness Father      Mental Illness Brother      Cerebrovascular Disease Maternal Grandmother      Cerebrovascular Disease Maternal Grandfather      Aneurysm Maternal Grandfather      Other - See Comments Paternal Grandmother 86        old age     Kidney Disease Paternal Grandfather         on dialysis       Social History     Socioeconomic History     Marital status:      Spouse name: Glenn     Number of children: 0     Years of education: 14     Highest education level: None   Occupational History     Occupation:      Employer:    Social Needs     Financial resource strain: None     Food insecurity     Worry: None     Inability: None     Transportation needs     Medical: None     Non-medical: None   Tobacco Use     Smoking status: Never Smoker     Smokeless tobacco: Never Used   Substance and Sexual Activity     Alcohol use: No     Drug use: No     Sexual activity: Yes     Partners: Male     Birth control/protection: None     Comment: nuva ring done in january 2018   Lifestyle     Physical activity     Days per week: None     Minutes per session: None     Stress: None   Relationships     Social connections     Talks on phone: None     Gets together: None     Attends Rastafarian service: None     Active member of club or organization: None     Attends meetings of clubs or organizations: None     Relationship status: None     Intimate partner violence     Fear of current or ex partner: None     Emotionally abused: None     Physically abused: None     Forced sexual activity: None   Other Topics Concern     Parent/sibling w/ CABG, MI or angioplasty before 65F 55M? No   Social History Narrative     None       ROS: 10 point ROS neg other than the symptoms noted above in the HPI.  EXAM  Constitutional: healthy, alert and no distress    Psychiatric: mentation appears normal and affect normal/bright    VASCULAR:  -Dorsalis pedis pulse +2/4 b/l  -Posterior  tibial pulse +2/4 b/l  -Capillary refill time < 3 seconds to b/l hallux    NEURO:  -Light touch sensation intact to b/l plantar forefoot  DERM:  -Skin temperature, texture and turgor WNL b/l  -Toenails mildly thickened, dystrophic and discolored x 4  -Toenail removed from LEFT hallux     -Mild erythema and open wound to the LEFT hallux medial toenail border on the LEFT hallux  ---Moderate serous drainage with mild purulent drainage  ---Mild-to-moderate blanchable erythema and edema to medial toenail border  ---No ascending erythema, mild calor, mild purulence, no malodor, mild infection  MSK:  -Tenderness on palpation to medial border of LEFT hallux toenail  -Muscle strength of ankles +5/5 for dorsiflexion, plantarflexion, ABDUction and ADDuction   ============================================================    ASSESSMENT:  (L97.522) Ulcer of left foot with fat layer exposed (H)  (primary encounter diagnosis)    (L02.612) Abscess of left foot    (L03.116) Cellulitis of left foot    (M79.675) Great toe pain, left        PLAN:  -Patient evaluated and examined. Treatment options discussed with no educational barriers noted.  -Discussed nail procedure options and etiologies and treatments options. There are no visible nail spicules, but there may be a nail spicule buried in the nail border. The toe has moderate erythema, edema, and mild purulent drainage. It is advised the toe be injected with local anesthesia and the wound cleaned and debrided with any potential nail spicules removed. Patient is in agreement with this plan.    -Local anesthesia with incision and drainage of left hallux Medial border: Written and verbal consent obtained after reviewing risks and benefits of the procedure. Patient understands that although phenol is used in attempt to prevent regrowth of the ingrown toenail, the nail can still grow back. There is also a risk of post procedure infection. A severe foot infection could lead to a proximal  "foot or leg amputation or loss of life, so the patient is advised to return to podiatry or the ED immediately if the patient notices any SOI. The patient is in agreement with this plan and wishes to proceed with the procedure. A time-out was performed to identify the correct patient, limb, digit and procedure.    An alcohol prep pad was applied to  to the base of the right hallux. The digit was injected at the base of the hallux in a ring block fashion with 6 mL of 1:1 of 2% Lidocaine plain and 0.5% marcaine plain. Adequate local anesthesia was obtained. A ring tournicot was applied to the digit and a betadine prep stick was applied to the hallux.    A curette was used to debride the medial nail border. Upon palpating the wound with a curette, a small pustule pocket was released.    There were no obvious nail spicules palpated or visualized. The nail border, however, probed deep 0.8cm medially around the bone although the wound did not probe directly to the bone.    The wound was debrided with excisional debridement using a curette to the level of the subcutaneous tissue. No further purulent drainage was noted.    Next, an angiocath was used to flush the wound with Dakin's solution.    The tournicot was removed from the toe and there was a prompt hyperemic response to the hallux.    The wound was then dressed with betadine soaked gauze, dry gauze, and 1\" coban.    The patient was educated on after procedure care including daily epsom salt soaks starting tomorrow followed by the above dressing after cleaning the wound with Dakin's solution.    Provided education on how to look for worsening signs of infection (redness, swelling, pain, purulence, fever, chills, nausea, vomiting) and the patient was instructed to return to the clinic or Emergency Department immediately if there are any signs of infection.    -Antibiotics: Keflex 500mg QID for ten days was prescribed    -Patient in agreement with the above treatment " plan and all of patient's questions were answered.      RTC 1 1/2 weeks to evaluate LEFT hallux medial toenail border wound          Cynthia Rivas DPM

## 2022-01-20 ENCOUNTER — NURSE TRIAGE (OUTPATIENT)
Dept: FAMILY MEDICINE | Facility: OTHER | Age: 37
End: 2022-01-20
Payer: COMMERCIAL

## 2022-01-20 DIAGNOSIS — Z20.822 EXPOSURE TO 2019 NOVEL CORONAVIRUS: Primary | ICD-10-CM

## 2022-01-20 NOTE — TELEPHONE ENCOUNTER
Pt requested testing offered multiple times pt declined all stated she has decided not to test at this time.     Reason for Disposition    [1] CLOSE CONTACT COVID-19 EXPOSURE within last 14 days AND [2] NO symptoms    Additional Information    Negative: COVID-19 lab test positive    Negative: [1] Lives with someone known to have influenza (flu test positive) AND [2] flu-like symptoms (e.g., cough, runny nose, sore throat, SOB; with or without fever)    Negative: [1] Symptoms of COVID-19 (e.g., cough, fever, SOB, or others) AND [2] HCP diagnosed COVID-19 based on symptoms    Negative: [1] Symptoms of COVID-19 (e.g., cough, fever, SOB, or others) AND [2] lives in an area with community spread    Negative: [1] Symptoms of COVID-19 (e.g., cough, fever, SOB, or others) AND [2] within 14 days of EXPOSURE (close contact) with diagnosed or suspected COVID-19 patient    Negative: [1] Symptoms of COVID-19 (e.g., cough, fever, SOB, or others) AND [2] within 14 days of travel from high-risk area for COVID-19 community spread (identified by CDC)    Negative: [1] Difficulty breathing (shortness of breath) occurs AND [2] onset > 14 days after COVID-19 EXPOSURE (Close Contact)    Negative: [1] Dry cough occurs AND [2] onset > 14 days after COVID-19 EXPOSURE    Negative: [1] Wet cough (i.e., white-yellow, yellow, green, or pallavi colored sputum) AND [2] onset > 14 days after COVID-19 EXPOSURE    Negative: [1] Common cold symptoms AND [2] onset > 14 days after COVID-19 EXPOSURE    Negative: COVID-19 vaccine reaction suspected (e.g., fever, headache, muscle aches) occurring during days 1-3 after getting vaccine    Negative: COVID-19 vaccine, questions about    Negative: [1] CLOSE CONTACT COVID-19 EXPOSURE within last 14 days AND [2] needs COVID-19 lab test to return to work AND [3] NO symptoms    Negative: [1] CLOSE CONTACT COVID-19 EXPOSURE within last 14 days AND [2] exposed person is a  (e.g., police or paramedic) AND  "[3] NO symptoms    Negative: [1] CLOSE CONTACT COVID-19 EXPOSURE within last 14 days AND [2] exposed person is a healthcare worker who was NOT using all recommended personal protective equipment (e.g., a respirator-N95 mask, eye protection, gloves, and gown) AND [3] NO symptoms    Negative: [1] Living or working in a correctional facility, long-term care facility, or shelter (i.e., congregate setting; densely populated) AND [2] where an outbreak has occurred AND [3] NO symptoms    Answer Assessment - Initial Assessment Questions  1. COVID-19 EXPOSURE: \"Please describe how you were exposed to someone with a COVID-19 infection.\"      Yes   2. PLACE of CONTACT: \"Where were you when you were exposed to COVID-19?\" (e.g., home, school, medical waiting room; which city?)      Several locations, home and at  reports three contacts  3. TYPE of CONTACT: \"How much contact was there?\" (e.g., sitting next to, live in same house, work in same office, same building)      Yes very close  4. DURATION of CONTACT: \"How long were you in contact with the COVID-19 patient?\" (e.g., a few seconds, passed by person, a few minutes, 15 minutes or longer, live with the patient)      hours  5. MASK: \"Were you wearing a mask?\" \"Was the other person wearing a mask?\" Note: wearing a mask reduces the risk of an otherwise close contact.      no  6. DATE of CONTACT: \"When did you have contact with a COVID-19 patient?\" (e.g., how many days ago)      1/15/22  7. COMMUNITY SPREAD: \"Are there lots of cases of COVID-19 (community spread) where you live?\" (See public health department website, if unsure)        yes  8. SYMPTOMS: \"Do you have any symptoms?\" (e.g., fever, cough, breathing difficulty, loss of taste or smell)      no  9. PREGNANCY OR POSTPARTUM: \"Is there any chance you are pregnant?\" \"When was your last menstrual period?\" \"Did you deliver in the last 2 weeks?\"      no  10. HIGH RISK: \"Do you have any heart or lung problems?\" \"Do you " "have a weak immune system?\" (e.g., heart failure, COPD, asthma, HIV positive, chemotherapy, renal failure, diabetes mellitus, sickle cell anemia, obesity)        no  11. TRAVEL: \"Have you traveled out of the country recently?\" If Yes, ask: \"When and where?\" Also ask about out-of-state travel, since the [facility] has identified some high-risk cities for community spread in the . Note: Travel becomes less relevant if there is widespread community transmission where the patient lives.        no    Protocols used: CORONAVIRUS (COVID-19) EXPOSURE-A- 8.25.2021      "

## 2022-01-31 ENCOUNTER — OFFICE VISIT (OUTPATIENT)
Dept: PSYCHOLOGY | Facility: OTHER | Age: 37
End: 2022-01-31
Attending: MARRIAGE & FAMILY THERAPIST
Payer: COMMERCIAL

## 2022-01-31 DIAGNOSIS — F41.9 ANXIETY: Primary | ICD-10-CM

## 2022-01-31 PROCEDURE — 90837 PSYTX W PT 60 MINUTES: CPT | Performed by: MARRIAGE & FAMILY THERAPIST

## 2022-01-31 NOTE — PROGRESS NOTES
Progress Note    Client Name: Barbi Vale  Date: January 31, 2022         Service Type: Individual      Session Start Time: 1600  Session End Time: 1700      Session Length: 60 minutes     Session #: 12     Attendees: Client     DSM V Dx: Anxiety    DA Date: 1/31/22    Treatment Plan Due: 4/31/22  PHQ-9 :   PHQ-9 SCORE 9/11/2020 2/9/2021 6/3/2021   PHQ-9 Total Score 3 10 11      SUHA-7 :    SUHA-7 SCORE 9/11/2020 2/9/2021 6/3/2021   Total Score 1 7 2           DATA      Progress Since Last Session (Related to Symptoms / Goals / Homework):   Symptoms: No change working on setting boundaries and being more assertive    Homework: Completed in session     Current / Ongoing Stressors and Concerns:   Becoming more assertive and less passive     Treatment Objective(s) Addressed in This Session:   Ways in which people create anxiety     Intervention:   Ways to set boundaries and be more assertive     Response to Interventions:   Passive     ASSESSMENT: Current Emotional / Mental Status (status of significant symptoms):   Risk status (Self / Other harm or suicidal ideation)   Client denies current fears or concerns for personal safety.   Client denies current or recent suicidal ideation or behaviors.   Client denies current or recent homicidal ideation or behaviors.   Client denies current or recent self injurious behavior or ideation.   Client denies other safety concerns.   Recommended that patient call 911 or go to the local ED should there be a change in any of these risk factors.     Appearance:   Appropriate    Eye Contact:   Good    Psychomotor Behavior: Normal    Attitude:   Cooperative  Interested Friendly   Orientation:   All   Speech    Rate / Production: Normal     Volume:  Normal    Mood:    Anxious    Affect:    Appropriate    Thought Content:  Perservative  Referential Thinking  Rumination    Thought Form:  Circumstantial   Insight:    Fair         Medication  Compliance:   NA     Changes in Health Issues:   None reported     Chemical Use Review:   Substance Use: Chemical use reviewed, no active concerns identified      Tobacco Use: No current tobacco use.       Collateral Reports Completed:   Not Applicable    PLAN: (Client Tasks / Therapist Tasks / Other)  Be prepared to describe for me who is Barbi Calderón CHON Falcon                                             Progress Note    Client Name: Barbi Vale  Date: 2021         Service Type: Individual      Session Start Time: 1600  Session End Time: 1700      Session Length: 60 minutes     Session #: 11     Attendees: Client     DSM V Dx: Anxiety    DA Date: 10/25/21    Treatment Plan Due: 22  PHQ-9 :   PHQ-9 SCORE 2020 2021 6/3/2021   PHQ-9 Total Score 3 10 11      SUHA-7 :    SUHA-7 SCORE 2020 2021 6/3/2021   Total Score 1 7 2           DATA      Progress Since Last Session (Related to Symptoms / Goals / Homework):   Symptoms: Improving setting better personal boundaries    Homework: Achieved / completed to satisfaction     Current / Ongoing Stressors and Concerns:   Being power of  for grandmother who      Treatment Objective(s) Addressed in This Session:   Ways of handling & reducing stress in making  arrangements for grandmother     Intervention:   Brainstorm ideas     Response to Interventions:   Energetic and willing to work on reducing stress     ASSESSMENT: Current Emotional / Mental Status (status of significant symptoms):   Risk status (Self / Other harm or suicidal ideation)   Client denies current fears or concerns for personal safety.   Client denies current or recent suicidal ideation or behaviors.   Client denies current or recent homicidal ideation or behaviors.   Client denies current or recent self injurious behavior or ideation.   Client denies other safety concerns.   Recommended that patient call 911 or go to the local ED should there  be a change in any of these risk factors.     Appearance:   Appropriate    Eye Contact:   Good    Psychomotor Behavior: Normal    Attitude:   Cooperative  Interested Friendly Pleasant Attentive Jose   Orientation:   All   Speech    Rate / Production: Talkative    Volume:  Normal    Mood:    Anxious  Elevated    Affect:    Appropriate    Thought Content:  Rumination  Obsessions Compulsions   Thought Form:  Goal Directed  Obsessive  Circumstantial   Insight:    Fair         Medication Compliance:   NA     Changes in Health Issues:   None reported     Chemical Use Review:   Substance Use: Chemical use reviewed, no active concerns identified      Tobacco Use: No current tobacco use.       Collateral Reports Completed:   Not Applicable    PLAN: (Client Tasks / Therapist Tasks / Other)  Making  arrangements for grandmother as her power of     CHON Rodriguez                                             Progress Note    Client Name: Barbi Vale  Date: 2021         Service Type: Individual      Session Start Time: 1600  Session End Time: 1700      Session Length: 60 minutes     Session #: 10     Attendees: Client     DSM V Dx: Anxiety    DA Date: 10/25/21    Treatment Plan Due: 22  PHQ-9 :   PHQ-9 SCORE 2020 2021 6/3/2021   PHQ-9 Total Score 3 10 11      SUHA-7 :    SUHA-7 SCORE 2020 2021 6/3/2021   Total Score 1 7 2           DATA      Progress Since Last Session (Related to Symptoms / Goals / Homework):   Symptoms: Improving Taking better care of self    Homework: Did not complete     Current / Ongoing Stressors and Concerns:   Care Taking personality & not taking enough time for self     Treatment Objective(s) Addressed in This Session:   Taking better care of self     Intervention:   Look at own personal emotional needs     Response to Interventions:   Very energetic     ASSESSMENT: Current Emotional / Mental Status (status of significant symptoms):   Risk  status (Self / Other harm or suicidal ideation)   Client denies current fears or concerns for personal safety.   Client denies current or recent suicidal ideation or behaviors.   Client denies current or recent homicidal ideation or behaviors.   Client denies current or recent self injurious behavior or ideation.   Client denies other safety concerns.   Recommended that patient call 911 or go to the local ED should there be a change in any of these risk factors.     Appearance:   Appropriate    Eye Contact:   Good    Psychomotor Behavior: Normal    Attitude:   Cooperative  Interested Playful Friendly Pleasant Attentive Jose   Orientation:   All   Speech    Rate / Production: Normal     Volume:  Normal    Mood:    Normal   Affect:    Appropriate    Thought Content:  Clear    Thought Form:  Coherent  Logical    Insight:    Good         Medication Compliance:   Yes     Changes in Health Issues:   None reported     Chemical Use Review:   Substance Use: Chemical use reviewed, no active concerns identified      Tobacco Use: No current tobacco use.       Collateral Reports Completed:   Not Applicable    PLAN: (Client Tasks / Therapist Tasks / Other)  Continue to be assertive and set own personal boundaries    CHON Rodriguez                                             Progress Note    Client Name: Barbi Vale  Date: November 8, 2021         Service Type: Individual      Session Start Time: 1600  Session End Time: 1700      Session Length: 60 minutes     Session #: 9     Attendees: Client     DSM V Dx: Anxiety    DA Date: 10/25/21    Treatment Plan Due: 1/25/21  PHQ-9 :   PHQ-9 SCORE 9/11/2020 2/9/2021 6/3/2021   PHQ-9 Total Score 3 10 11      SUHA-7 :    SUHA-7 SCORE 9/11/2020 2/9/2021 6/3/2021   Total Score 1 7 2           DATA      Progress Since Last Session (Related to Symptoms / Goals / Homework):   Symptoms: No change Giving spouse to much power to control feelings and behavior    Homework: Completed in  session     Current / Ongoing Stressors and Concerns:   Marital conflict     Treatment Objective(s) Addressed in This Session:   To realize own strengths and abilities     Intervention:   Identifying who she is     Response to Interventions:   Thought Provoking     ASSESSMENT: Current Emotional / Mental Status (status of significant symptoms):   Risk status (Self / Other harm or suicidal ideation)   Client denies current fears or concerns for personal safety.   Client denies current or recent suicidal ideation or behaviors.   Client denies current or recent homicidal ideation or behaviors.   Client denies current or recent self injurious behavior or ideation.   Client denies other safety concerns.   Recommended that patient call 911 or go to the local ED should there be a change in any of these risk factors.     Appearance:   Appropriate    Eye Contact:   Good    Psychomotor Behavior: Restless    Attitude:   Cooperative  Interested Friendly Pleasant Attentive   Orientation:   All   Speech    Rate / Production: Normal     Volume:  Normal    Mood:    Angry  Anxious  Fearful   Affect:    Restricted  Subdued    Thought Content:  Clear    Thought Form:  Coherent  Circumstantial   Insight:    Fair         Medication Compliance:   NA     Changes in Health Issues:   None reported     Chemical Use Review:   Substance Use: Chemical use reviewed, no active concerns identified      Tobacco Use: No current tobacco use.       Collateral Reports Completed:   Not Applicable    PLAN: (Client Tasks / Therapist Tasks / Other)  To look at and define who she is by way of talents and abilities    CHON Rodriguez                                             Progress Note    Client Name: Barbi Vale  Date: November 1, 2021         Service Type: Individual      Session Start Time: 1600  Session End Time: 1655      Session Length: 55 minutes     Session #: 8     Attendees: Client     DSM V Dx: Anxiety    DA Date:  10/25/21    Treatment Plan Due: 1/25/22  PHQ-9 :   PHQ-9 SCORE 9/11/2020 2/9/2021 6/3/2021   PHQ-9 Total Score 3 10 11      SUHA-7 :    SUHA-7 SCORE 9/11/2020 2/9/2021 6/3/2021   Total Score 1 7 2           DATA      Progress Since Last Session (Related to Symptoms / Goals / Homework):   Symptoms: Worsening spouse doesn't trust and doesn't want marriage counseling    Homework: Completed in session     Current / Ongoing Stressors and Concerns:   Marital conflict     Treatment Objective(s) Addressed in This Session:   Feelings associated with marriage     Intervention:   Sources of anger and ways to express anger     Response to Interventions:   Will pursure individual counseling without spouse participation     ASSESSMENT: Current Emotional / Mental Status (status of significant symptoms):   Risk status (Self / Other harm or suicidal ideation)   Client denies current fears or concerns for personal safety.   Client denies current or recent suicidal ideation or behaviors.   Client denies current or recent homicidal ideation or behaviors.   Client denies current or recent self injurious behavior or ideation.   Client denies other safety concerns.   Recommended that patient call 911 or go to the local ED should there be a change in any of these risk factors.     Appearance:   Appropriate    Eye Contact:   Good    Psychomotor Behavior: Agitated  Restless    Attitude:   Cooperative  Interested Friendly Pleasant Attentive Jose   Orientation:   All   Speech    Rate / Production: Normal     Volume:  Normal    Mood:    Angry  Anxious  Depressed  Irritable  Sad    Affect:    Constricted  Flat    Thought Content:  Referential Thinking  Rumination    Thought Form:  Obsessive  Circumstantial   Insight:    Fair         Medication Compliance:   NA     Changes in Health Issues:   None reported     Chemical Use Review:   Substance Use: Chemical use reviewed, no active concerns identified      Tobacco Use: No current tobacco use.        Collateral Reports Completed:   Not Applicable    PLAN: (Client Tasks / Therapist Tasks / Other)  Make at least three additional individual counseling sessions    CHON Rodriguez                                             Progress Note    Client Name: Barbi Vale  Date: August 23, 2021         Service Type: Couple      Session Start Time: 1615  Session End Time: 1645      Session Length: 30 minutes     Session #: 7     Attendees: Client and Spouse / Significant Other     DSM V Dx: Anxiety & Depression    DA Date: 6/28/21    Treatment Plan Due: 9/28/21  PHQ-9 :   PHQ-9 SCORE 9/11/2020 2/9/2021 6/3/2021   PHQ-9 Total Score 3 10 11      SUHA-7 :    SUHA-7 SCORE 9/11/2020 2/9/2021 6/3/2021   Total Score 1 7 2           DATA      Progress Since Last Session (Related to Symptoms / Goals / Homework):   Symptoms: Improving coouple communication is improving    Homework: Completed in session     Current / Ongoing Stressors and Concerns:   Not spending quality time together with spouse     Treatment Objective(s) Addressed in This Session:   Importance of taking time for self and for relationship     Intervention:   Identifying possible time to take time     Response to Interventions:   Will work on assignment and also schedule a date with each other     ASSESSMENT: Current Emotional / Mental Status (status of significant symptoms):   Risk status (Self / Other harm or suicidal ideation)   Client denies current fears or concerns for personal safety.   Client denies current or recent suicidal ideation or behaviors.   Client denies current or recent homicidal ideation or behaviors.   Client denies current or recent self injurious behavior or ideation.   Client denies other safety concerns.   Recommended that patient call 911 or go to the local ED should there be a change in any of these risk factors.     Appearance:   Appropriate    Eye Contact:   Good    Psychomotor Behavior: Normal    Attitude:   Cooperative   Interested Friendly Pleasant   Orientation:   All   Speech    Rate / Production: Normal     Volume:  Normal    Mood:    Anxious  Sad    Affect:    Restricted    Thought Content:  Clear    Thought Form:  Coherent  Logical    Insight:    Good         Medication Compliance:   NA     Changes in Health Issues:   None reported     Chemical Use Review:   Substance Use: Chemical use reviewed, no active concerns identified      Tobacco Use: No current tobacco use.       Collateral Reports Completed:   Not Applicable    PLAN: (Client Tasks / Therapist Tasks / Other)  Plan a date with each other and also take time for self / work on awareness wheel    CHON Rodriguez                                             Progress Note    Client Name: Barbi Vale  Date: August 2, 2021         Service Type: Individual      Session Start Time: 1300  Session End Time: 1400      Session Length: 60 minutes     Session #: 5     Attendees: Client     DSM V Dx: Anxiety & Postpartum depression  DA Date: 6/28/21    Treatment Plan Due: 9/28/21  PHQ-9 :   PHQ-9 SCORE 9/11/2020 2/9/2021 6/3/2021   PHQ-9 Total Score 3 10 11      SUHA-7 :    SUHA-7 SCORE 9/11/2020 2/9/2021 6/3/2021   Total Score 1 7 2           DATA      Progress Since Last Session (Related to Symptoms / Goals / Homework):   Symptoms: Improving was able to take some quality time for self    Homework: Achieved / completed to satisfaction     Current / Ongoing Stressors and Concerns:   Relationship conflict along with agreement on parenting skills     Treatment Objective(s) Addressed in This Session:   To shift focus from how spouse is doing to how she is doing     Intervention:   Look at own needs     Response to Interventions:   Workable and positive     ASSESSMENT: Current Emotional / Mental Status (status of significant symptoms):   Risk status (Self / Other harm or suicidal ideation)   Client denies current fears or concerns for personal safety.   Client denies current or  recent suicidal ideation or behaviors.   Client denies current or recent homicidal ideation or behaviors.   Client denies current or recent self injurious behavior or ideation.   Client denies other safety concerns.   Recommended that patient call 911 or go to the local ED should there be a change in any of these risk factors.     Appearance:   Appropriate    Eye Contact:   Good    Psychomotor Behavior: Restless    Attitude:   Cooperative  Playful Friendly Pleasant Guarded  Attentive   Orientation:   All   Speech    Rate / Production: Normal     Volume:  Normal    Mood:    Anxious  Depressed    Affect:    Restricted    Thought Content:  Obsessions Compulsions   Thought Form:  Obsessive  Circumstantial   Insight:    Fair         Medication Compliance:   NA     Changes in Health Issues:   None reported     Chemical Use Review:   Substance Use: Chemical use reviewed, no active concerns identified      Tobacco Use: No current tobacco use.       Collateral Reports Completed:   Not Applicable    PLAN: (Client Tasks / Therapist Tasks / Other)  Work on taking more time for self also possible marriage counseling with spouse in attendance    CHON Rodriguez                                             Progress Note    Client Name: Barbi Vale  Date: July 19, 2021         Service Type: Individual      Session Start Time: 4:05  Session End Time: 5:00      Session Length: 55 minutes     Session #: 4     Attendees: Client     DSM V Dx: Anxiety    DA Date: 6/14/21    Treatment Plan Due: 9/14/21  PHQ-9 :   PHQ-9 SCORE 9/11/2020 2/9/2021 6/3/2021   PHQ-9 Total Score 3 10 11      SUHA-7 :    SUHA-7 SCORE 9/11/2020 2/9/2021 6/3/2021   Total Score 1 7 2           DATA      Progress Since Last Session (Related to Symptoms / Goals / Homework):   Symptoms: No change restlessness    Homework: Achieved / completed to satisfaction     Current / Ongoing Stressors and Concerns:   Not standing up for own rights / anger  management     Treatment Objective(s) Addressed in This Session:   Sources of anger     Intervention:   How to express anger in a healthy way     Response to Interventions:   Cooperative     ASSESSMENT: Current Emotional / Mental Status (status of significant symptoms):   Risk status (Self / Other harm or suicidal ideation)   Client denies current fears or concerns for personal safety.   Client denies current or recent suicidal ideation or behaviors.   Client denies current or recent homicidal ideation or behaviors.   Client denies current or recent self injurious behavior or ideation.   Client denies other safety concerns.   Recommended that patient call 911 or go to the local ED should there be a change in any of these risk factors.     Appearance:   Appropriate    Eye Contact:   Good    Psychomotor Behavior: Agitated    Attitude:   Cooperative  Interested Friendly Pleasant   Orientation:   All   Speech    Rate / Production: Normal     Volume:  Normal    Mood:    Anxious  Agitated   Affect:    Appropriate    Thought Content:  Clear    Thought Form:  Flight of Ideas    Insight:    Fair         Medication Compliance:   NA     Changes in Health Issues:   None reported     Chemical Use Review:   Substance Use: Chemical use reviewed, no active concerns identified      Tobacco Use: No current tobacco use.       Collateral Reports Completed:   Not Applicable    PLAN: (Client Tasks / Therapist Tasks / Other)  Pt. To take a minimum of 2 hours for self in a week / possible marriage counseling    CHON Rodriguez                                             Progress Note    Client Name: Barbi Vale  Date: June 28, 2021         Service Type: Individual      Session Start Time: 0900  Session End Time: 1000      Session Length: 60 minutes     Session #: 3     Attendees: Client     DSM V Dx: Postpartum Depression & Anxiety DA Date: 6/14/21    Treatment Plan Due: 9/14/21  PHQ-9 :   PHQ-9 SCORE 9/11/2020 2/9/2021  6/3/2021   PHQ-9 Total Score 3 10 11      SUHA-7 :    SUHA-7 SCORE 9/11/2020 2/9/2021 6/3/2021   Total Score 1 7 2           DATA      Progress Since Last Session (Related to Symptoms / Goals / Homework):   Symptoms: Improving gaining insight regarding reasons for various actions    Homework: Achieved / completed to satisfaction     Current / Ongoing Stressors and Concerns:   Low self-esteem     Treatment Objective(s) Addressed in This Session:   Sources of anger and responses to them     Intervention:   Walk through anger log (greatest source of anger is people that see self as being superior)     Response to Interventions:   Enthusiastic and will to work on self-esteem     ASSESSMENT: Current Emotional / Mental Status (status of significant symptoms):   Risk status (Self / Other harm or suicidal ideation)   Client denies current fears or concerns for personal safety.   Client denies current or recent suicidal ideation or behaviors.   Client denies current or recent homicidal ideation or behaviors.   Client denies current or recent self injurious behavior or ideation.   Client denies other safety concerns.   Recommended that patient call 911 or go to the local ED should there be a change in any of these risk factors.     Appearance:   Appropriate    Eye Contact:   Good    Psychomotor Behavior: Normal    Attitude:   Cooperative  Interested Playful Friendly Pleasant Attentive Jose   Orientation:   All   Speech    Rate / Production: Normal     Volume:  Normal    Mood:    Agitated   Affect:    Appropriate    Thought Content:  Clear    Thought Form:  Coherent  Logical    Insight:    Good         Medication Compliance:   Yes     Changes in Health Issues:   None reported     Chemical Use Review:   Substance Use: Chemical use reviewed, no active concerns identified      Tobacco Use: No current tobacco use.       Collateral Reports Completed:   Not Applicable    PLAN: (Client Tasks / Therapist Tasks / Other)  Develop list of  three times felt taken advantage of / work on skills to stand up for own rights as a person    CHON Rodriguez                                             Progress Note    Client Name: Barbi Vale  Date: June 21, 2021         Service Type: Individual      Session Start Time: 0900  Session End Time: 0955      Session Length: 55 minutes     Session #: 2     Attendees: Client     DSM V Dx: Depression with anxiety  DA Date: 6/14/21    Treatment Plan Due: 9/14/21  PHQ-9 :   PHQ-9 SCORE 9/11/2020 2/9/2021 6/3/2021   PHQ-9 Total Score 3 10 11      SUHA-7 :    SUHA-7 SCORE 9/11/2020 2/9/2021 6/3/2021   Total Score 1 7 2           DATA      Progress Since Last Session (Related to Symptoms / Goals / Homework):   Symptoms: No change Very anxious & depressed    Homework: Achieved / completed to satisfaction     Current / Ongoing Stressors and Concerns:   Judging self     Treatment Objective(s) Addressed in This Session:   How perfectionism is feeling like a failure and how it affects self-esteem     Intervention:   To accept the present with goals for th future     Response to Interventions:   Willing to try and work on it     ASSESSMENT: Current Emotional / Mental Status (status of significant symptoms):   Risk status (Self / Other harm or suicidal ideation)   Client denies current fears or concerns for personal safety.   Client denies current or recent suicidal ideation or behaviors.   Client denies current or recent homicidal ideation or behaviors.   Client denies current or recent self injurious behavior or ideation.   Client denies other safety concerns.   Recommended that patient call 911 or go to the local ED should there be a change in any of these risk factors.     Appearance:   Appropriate    Eye Contact:   Good    Psychomotor Behavior: Restless    Attitude:   Cooperative  Interested Playful Friendly Pleasant Attentive Jose   Orientation:   All   Speech    Rate / Production: Normal     Volume:  Normal  "   Mood:    Anxious  Depressed    Affect:    Subdued    Thought Content:  Clear    Thought Form:  Coherent  Logical    Insight:    Fair         Medication Compliance:   NA     Changes in Health Issues:   None reported     Chemical Use Review:   Substance Use: Chemical use reviewed, no active concerns identified      Tobacco Use: No current tobacco use.       Collateral Reports Completed:   Not Applicable    PLAN: (Client Tasks / Therapist Tasks / Other)  Identify 5 pet peeves / introduce \"Anger Log\"    CHON Rodriguez                 Progress Note - Initial Visit    Client Name:  Barbi Vale Date: 6/14/21         Service Type: Individual     Visit Start Time: 0900  Visit End Time: 1020    Visit #: 1    Attendees: Client    Service Modality:  In-person     Diagnosis:Depression & Anxiety     DATA:   Interactive Complexity: No   Crisis: No     Presenting Concerns/  Current Stressors:   Postpartum anxiety & 's anxiety level      ASSESSMENT:  Mental Status Assessment:  Appearance:   Appropriate   Eye Contact:   Good   Psychomotor Behavior: Normal   Attitude:   Cooperative  Interested Playful Friendly Pleasant Attentive Jose  Orientation:   All  Speech   Rate / Production: Normal/ Responsive   Volume:  Normal   Mood:    Anxious  Depressed   Affect:    Worrisome   Thought Content:  Referential Thinking  Rumination   Thought Form:  Goal Directed   Insight:    Fair       Safety Issues and Plan for Safety and Risk Management:     [place] Suicide Severity Rating Scale (Short Version)  [place] Suicide Severity Rating (Short Version) 9/11/2020 1/12/2021 1/19/2021 2/9/2021 5/18/2021 6/3/2021 1/13/2022   Over the past 2 weeks have you felt down, depressed, or hopeless? no no no no no yes no   Over the past 2 weeks have you had thoughts of killing yourself? no no no no no no no   Have you ever attempted to kill yourself? no no no no no no no   Q1 Wished to be Dead (Past Month) - - - - - - -   Q2 Suicidal " Thoughts (Past Month) - - - - - - -   Q3 Suicidal Thought Method - - - - - - -   Q4 Suicidal Intent without Specific Plan - - - - - - -   Q5 Suicide Intent with Specific Plan - - - - - - -   Q6 Suicide Behavior (Lifetime) - - - - - - -     Patient denies current fears or concerns for personal safety.  Patient denies current or recent suicidal ideation or behaviors.  Patient denies current or recent homicidal ideation or behaviors.  Patient denies current or recent self injurious behavior or ideation.  Patient denies other safety concerns.  Recommended that patient call 911 or go to the local ED should there be a change in any of these risk factors.  Patient reports there are no firearms in the house.     Diagnostic Criteria:  A. Depressed mood for most of the day, for more days than not, as indicated either by subjective account or observation by others, for at least 2 years. Note: In children and adolescents, mood can be irritable and duration must be at least 1 year.        - poor appetite or overeating        - insomnia or hypersomnia       - low energy or fatigue        - low self-esteem        - poor concentration or difficulty making decisions       DSM5 Diagnoses: (Sustained by DSM5 Criteria Listed Above)  Diagnoses: 296.31 (F33.0) Major Depressive Disorder, Recurrent Episode, Mild _ and With anxious distress  Psychosocial & Contextual Factors: Postpartum anxiety  WHODAS 2.0 (12 item): No flowsheet data found.  Intervention:   Mindfulness- Patient was educated on relaxation and mindfulness techniques  Collateral Reports Completed:  Not Applicable      PLAN: (Homework, other):  1. Provider will continue Diagnostic Assessment.  Patient was given the following to do until next session:  Identify 6 skills that patient does better than most people.    2. Provider recommended the following referrals: none at this time.      3.  Safety plan created.  Provider recommended that patient  Call 9-1-1 or Er when needed.        CHON Rodriguez  June 21, 2021                                                 Progress Note    Client Name: Barbi Vale  Date: June 28, 2021         Service Type: Individual      Session Start Time: 0900  Session End Time: 1000      Session Length: 60 minutes     Session #: 3     Attendees: Client     DSM V Dx: Postpartum Depression & Anxiety DA Date: 6/14/21    Treatment Plan Due: 9/14/21  PHQ-9 :   PHQ-9 SCORE 9/11/2020 2/9/2021 6/3/2021   PHQ-9 Total Score 3 10 11      SUHA-7 :    SUHA-7 SCORE 9/11/2020 2/9/2021 6/3/2021   Total Score 1 7 2           DATA      Progress Since Last Session (Related to Symptoms / Goals / Homework):   Symptoms: Improving gaining insight regarding reasons for various actions    Homework: Achieved / completed to satisfaction     Current / Ongoing Stressors and Concerns:   Low self-esteem     Treatment Objective(s) Addressed in This Session:   Sources of anger and responses to them     Intervention:   Walk through anger log (greatest source of anger is people that see self as being superior)     Response to Interventions:   Enthusiastic and will to work on self-esteem     ASSESSMENT: Current Emotional / Mental Status (status of significant symptoms):   Risk status (Self / Other harm or suicidal ideation)   Client denies current fears or concerns for personal safety.   Client denies current or recent suicidal ideation or behaviors.   Client denies current or recent homicidal ideation or behaviors.   Client denies current or recent self injurious behavior or ideation.   Client denies other safety concerns.   Recommended that patient call 911 or go to the local ED should there be a change in any of these risk factors.     Appearance:   Appropriate    Eye Contact:   Good    Psychomotor Behavior: Normal    Attitude:   Cooperative  Interested Playful Friendly Pleasant Attentive Jose   Orientation:   All   Speech    Rate / Production: Normal     Volume:  Normal     Mood:    Agitated   Affect:    Appropriate    Thought Content:  Clear    Thought Form:  Coherent  Logical    Insight:    Good         Medication Compliance:   Yes     Changes in Health Issues:   None reported     Chemical Use Review:   Substance Use: Chemical use reviewed, no active concerns identified      Tobacco Use: No current tobacco use.       Collateral Reports Completed:   Not Applicable    PLAN: (Client Tasks / Therapist Tasks / Other)  Develop list of three times felt taken advantage of / work on skills to stand up for own rights as a person    CHON Rodriguez                                             Progress Note    Client Name: Barbi Vale  Date: June 21, 2021         Service Type: Individual      Session Start Time: 0900  Session End Time: 0955      Session Length: 55 minutes     Session #: 2     Attendees: Client     DSM V Dx: Depression with anxiety  DA Date: 6/14/21    Treatment Plan Due: 9/14/21  PHQ-9 :   PHQ-9 SCORE 9/11/2020 2/9/2021 6/3/2021   PHQ-9 Total Score 3 10 11      SUHA-7 :    SUHA-7 SCORE 9/11/2020 2/9/2021 6/3/2021   Total Score 1 7 2           DATA      Progress Since Last Session (Related to Symptoms / Goals / Homework):   Symptoms: No change Very anxious & depressed    Homework: Achieved / completed to satisfaction     Current / Ongoing Stressors and Concerns:   Judging self     Treatment Objective(s) Addressed in This Session:   How perfectionism is feeling like a failure and how it affects self-esteem     Intervention:   To accept the present with goals for th future     Response to Interventions:   Willing to try and work on it     ASSESSMENT: Current Emotional / Mental Status (status of significant symptoms):   Risk status (Self / Other harm or suicidal ideation)   Client denies current fears or concerns for personal safety.   Client denies current or recent suicidal ideation or behaviors.   Client denies current or recent homicidal ideation or  "behaviors.   Client denies current or recent self injurious behavior or ideation.   Client denies other safety concerns.   Recommended that patient call 911 or go to the local ED should there be a change in any of these risk factors.     Appearance:   Appropriate    Eye Contact:   Good    Psychomotor Behavior: Restless    Attitude:   Cooperative  Interested Playful Friendly Pleasant Attentive Jose   Orientation:   All   Speech    Rate / Production: Normal     Volume:  Normal    Mood:    Anxious  Depressed    Affect:    Subdued    Thought Content:  Clear    Thought Form:  Coherent  Logical    Insight:    Fair         Medication Compliance:   NA     Changes in Health Issues:   None reported     Chemical Use Review:   Substance Use: Chemical use reviewed, no active concerns identified      Tobacco Use: No current tobacco use.       Collateral Reports Completed:   Not Applicable    PLAN: (Client Tasks / Therapist Tasks / Other)  Identify 5 pet peeves / introduce \"Anger Log\"    CHON Rodriguez                 Progress Note - Initial Visit    Client Name:  Barbi Vale Date: 6/14/21         Service Type: Individual     Visit Start Time: 0900  Visit End Time: 1020    Visit #: 1    Attendees: Client    Service Modality:  In-person     Diagnosis:Depression & Anxiety     DATA:   Interactive Complexity: No   Crisis: No     Presenting Concerns/  Current Stressors:   Postpartum anxiety & 's anxiety level      ASSESSMENT:  Mental Status Assessment:  Appearance:   Appropriate   Eye Contact:   Good   Psychomotor Behavior: Normal   Attitude:   Cooperative  Interested Playful Friendly Pleasant Attentive Jose  Orientation:   All  Speech   Rate / Production: Normal/ Responsive   Volume:  Normal   Mood:    Anxious  Depressed   Affect:    Worrisome   Thought Content:  Referential Thinking  Rumination   Thought Form:  Goal Directed   Insight:    Fair       Safety Issues and Plan for Safety and Risk " Management:     [place] Suicide Severity Rating Scale (Short Version)  [place] Suicide Severity Rating (Short Version) 9/11/2020 1/12/2021 1/19/2021 2/9/2021 5/18/2021 6/3/2021 1/13/2022   Over the past 2 weeks have you felt down, depressed, or hopeless? no no no no no yes no   Over the past 2 weeks have you had thoughts of killing yourself? no no no no no no no   Have you ever attempted to kill yourself? no no no no no no no   Q1 Wished to be Dead (Past Month) - - - - - - -   Q2 Suicidal Thoughts (Past Month) - - - - - - -   Q3 Suicidal Thought Method - - - - - - -   Q4 Suicidal Intent without Specific Plan - - - - - - -   Q5 Suicide Intent with Specific Plan - - - - - - -   Q6 Suicide Behavior (Lifetime) - - - - - - -     Patient denies current fears or concerns for personal safety.  Patient denies current or recent suicidal ideation or behaviors.  Patient denies current or recent homicidal ideation or behaviors.  Patient denies current or recent self injurious behavior or ideation.  Patient denies other safety concerns.  Recommended that patient call 911 or go to the local ED should there be a change in any of these risk factors.  Patient reports there are no firearms in the house.     Diagnostic Criteria:  A. Depressed mood for most of the day, for more days than not, as indicated either by subjective account or observation by others, for at least 2 years. Note: In children and adolescents, mood can be irritable and duration must be at least 1 year.        - poor appetite or overeating        - insomnia or hypersomnia       - low energy or fatigue        - low self-esteem        - poor concentration or difficulty making decisions       DSM5 Diagnoses: (Sustained by DSM5 Criteria Listed Above)  Diagnoses: 296.31 (F33.0) Major Depressive Disorder, Recurrent Episode, Mild _ and With anxious distress  Psychosocial & Contextual Factors: Postpartum anxiety  WHODAS 2.0 (12 item): No flowsheet data  found.  Intervention:   Mindfulness- Patient was educated on relaxation and mindfulness techniques  Collateral Reports Completed:  Not Applicable      PLAN: (Homework, other):  1. Provider will continue Diagnostic Assessment.  Patient was given the following to do until next session:  Identify 6 skills that patient does better than most people.    2. Provider recommended the following referrals: none at this time.      3.  Safety plan created.  Provider recommended that patient  Call 9-1-1 or Er when needed.       CHON Rodriguez  June 21, 2021

## 2022-02-19 ENCOUNTER — HOSPITAL ENCOUNTER (EMERGENCY)
Facility: HOSPITAL | Age: 37
Discharge: HOME | End: 2022-02-19
Attending: PHYSICIAN ASSISTANT | Admitting: PHYSICIAN ASSISTANT
Payer: COMMERCIAL

## 2022-02-19 VITALS
RESPIRATION RATE: 16 BRPM | SYSTOLIC BLOOD PRESSURE: 115 MMHG | TEMPERATURE: 97.1 F | HEART RATE: 69 BPM | DIASTOLIC BLOOD PRESSURE: 72 MMHG | OXYGEN SATURATION: 100 %

## 2022-02-19 DIAGNOSIS — S90.422A INFECTED BLISTER OF GREAT TOE OF LEFT FOOT, INITIAL ENCOUNTER: ICD-10-CM

## 2022-02-19 DIAGNOSIS — L08.9 INFECTED BLISTER OF GREAT TOE OF LEFT FOOT, INITIAL ENCOUNTER: ICD-10-CM

## 2022-02-19 PROCEDURE — 99213 OFFICE O/P EST LOW 20 MIN: CPT | Performed by: PHYSICIAN ASSISTANT

## 2022-02-19 PROCEDURE — G0463 HOSPITAL OUTPT CLINIC VISIT: HCPCS

## 2022-02-19 RX ORDER — CEFADROXIL 500 MG/1
500 CAPSULE ORAL 2 TIMES DAILY
Qty: 14 CAPSULE | Refills: 0 | Status: SHIPPED | OUTPATIENT
Start: 2022-02-19 | End: 2022-02-26

## 2022-02-19 ASSESSMENT — ENCOUNTER SYMPTOMS: WOUND: 1
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
## 2022-02-19 NOTE — ED TRIAGE NOTES
Left foot pain. Big toe. Pop blister this am, currently red. Initially she saw Dr. Rivas, and gave her ABX, which she finished. Denies pain

## 2022-02-23 ENCOUNTER — OFFICE VISIT (OUTPATIENT)
Dept: PODIATRY | Facility: OTHER | Age: 37
End: 2022-02-23
Attending: PODIATRIST
Payer: COMMERCIAL

## 2022-02-23 VITALS
WEIGHT: 185 LBS | DIASTOLIC BLOOD PRESSURE: 69 MMHG | TEMPERATURE: 98.1 F | HEIGHT: 63 IN | SYSTOLIC BLOOD PRESSURE: 106 MMHG | OXYGEN SATURATION: 98 % | HEART RATE: 87 BPM | BODY MASS INDEX: 32.78 KG/M2

## 2022-02-23 DIAGNOSIS — L97.522 ULCER OF LEFT FOOT WITH FAT LAYER EXPOSED (H): Primary | ICD-10-CM

## 2022-02-23 PROCEDURE — 11042 DBRDMT SUBQ TIS 1ST 20SQCM/<: CPT | Performed by: PODIATRIST

## 2022-02-23 ASSESSMENT — PAIN SCALES - GENERAL: PAINLEVEL: NO PAIN (0)

## 2022-02-23 NOTE — NURSING NOTE
"Chief Complaint   Patient presents with     RECHECK     INFECTED TOE       Initial /69 (BP Location: Left arm)   Pulse 87   Temp 98.1  F (36.7  C) (Tympanic)   Ht 1.6 m (5' 3\")   Wt 83.9 kg (185 lb)   SpO2 98%   BMI 32.77 kg/m   Estimated body mass index is 32.77 kg/m  as calculated from the following:    Height as of this encounter: 1.6 m (5' 3\").    Weight as of this encounter: 83.9 kg (185 lb).  Medication Reconciliation: complete  Lynda Washington LPN    "

## 2022-02-23 NOTE — PROGRESS NOTES
"Chief complaint: Patient presents with:  RECHECK: INFECTED TOE      History of Present Illness: This 36 year old female is seen for pain in  her LEFT hallux. She developed an infection with a purulent pocket on the LEFT hallux medial nail border. She went to  and she received an antibiotic and the infection has cleared. She now has an open wound on the toe but no signs of infection. She has done a daily epsom salt soak and applied an antibiotic ointment dressing. She has minimal pain from the toe.    No further pedal complaints today.       /69 (BP Location: Left arm)   Pulse 87   Temp 98.1  F (36.7  C) (Tympanic)   Ht 1.6 m (5' 3\")   Wt 83.9 kg (185 lb)   SpO2 98%   BMI 32.77 kg/m      Patient Active Problem List   Diagnosis     ACP (advance care planning)     Chronic pain disorder     Myofascial muscle pain     Paresthesia     Dysuria     Well woman exam with routine gynecological exam     Encounter for preconception consultation     Encounter for surveillance of contraceptives     Other chronic pain     Supervision of normal first pregnancy in first trimester     Chemical dermatitis     Encounter for triage in pregnant patient     Clogged duct, postpartum     Mastitis     Candidiasis of breast     Bilateral carpal tunnel syndrome     Depression       Past Surgical History:   Procedure Laterality Date      SECTION N/A 3/15/2019    Procedure:  SECTION;  Surgeon: Pedro Pablo Cantor MD;  Location: [facility]     FOOT SURGERY Right     [place], [place]       Current Outpatient Medications   Medication     cefadroxil (DURICEF) 500 MG capsule     clotrimazole (LOTRIMIN) 1 % external solution     DULoxetine (CYMBALTA) 60 MG capsule     multivitamin w/minerals (THERA-VIT-M) tablet     No current facility-administered medications for this visit.          Allergies   Allergen Reactions     Depo-Provera [Medroxyprogesterone] Swelling     Swelled up, headaches       Family History   Problem Relation " Age of Onset     Mental Illness Mother      Mental Illness Father      Mental Illness Brother      Cerebrovascular Disease Maternal Grandmother      Cerebrovascular Disease Maternal Grandfather      Aneurysm Maternal Grandfather      Other - See Comments Paternal Grandmother 86        old age     Kidney Disease Paternal Grandfather         on dialysis       Social History     Socioeconomic History     Marital status:      Spouse name: Glenn     Number of children: 0     Years of education: 14     Highest education level: None   Occupational History     Occupation:      Employer:    Social Needs     Financial resource strain: None     Food insecurity     Worry: None     Inability: None     Transportation needs     Medical: None     Non-medical: None   Tobacco Use     Smoking status: Never Smoker     Smokeless tobacco: Never Used   Substance and Sexual Activity     Alcohol use: No     Drug use: No     Sexual activity: Yes     Partners: Male     Birth control/protection: None     Comment: nuva ring done in january 2018   Lifestyle     Physical activity     Days per week: None     Minutes per session: None     Stress: None   Relationships     Social connections     Talks on phone: None     Gets together: None     Attends Zoroastrian service: None     Active member of club or organization: None     Attends meetings of clubs or organizations: None     Relationship status: None     Intimate partner violence     Fear of current or ex partner: None     Emotionally abused: None     Physically abused: None     Forced sexual activity: None   Other Topics Concern     Parent/sibling w/ CABG, MI or angioplasty before 65F 55M? No   Social History Narrative     None       ROS: 10 point ROS neg other than the symptoms noted above in the HPI.  EXAM  Constitutional: healthy, alert and no distress    Psychiatric: mentation appears normal and affect normal/bright    VASCULAR:  -Dorsalis pedis pulse +2/4  b/l  -Posterior tibial pulse +2/4 b/l  -Capillary refill time < 3 seconds to b/l hallux    NEURO:  -Light touch sensation intact to b/l plantar forefoot  DERM:  -Skin temperature, texture and turgor WNL b/l  -Toenails mildly thickened, dystrophic and discolored x 4  -Toenail removed from LEFT hallux     -No erythema to the open wound to the LEFT hallux medial toenail border measuring 0.5cm x 0.2m x 0.6cm around the medial aspect of the toenail  ---Mild-to-moderate serous drainage with no purulent drainage  ---Mild edema with no erythema to nail border  ---Mildly rolled skin borders along the inner lining of the wound  ---No severe erythema, no ascending erythema, no calor, no purulence, no malodor, no other signs of infection.   MSK:  -Minimal tenderness on palpation to medial border of LEFT hallux toenail  -Muscle strength of ankles +5/5 for dorsiflexion, plantarflexion, ABDUction and ADDuction   ============================================================    ASSESSMENT:  (L97.522) Ulcer of left foot with fat layer exposed (H)  (primary encounter diagnosis)    PLAN:  -Patient evaluated and examined. Treatment options discussed with no educational barriers noted.  -Discussed nail procedure options and etiologies and treatments options. There are no visible nail spicules. There were mildly rolled borderers on the inside of the wound. The wound was fairly deep but did not probe-to-bone.    -Excisional debridement of wound on LEFT hallux with a sharp ring cutette to the level of and including the subcutaneous tissue layer (debrided a total of less than 20 centimeters squared) with all non-viable soft tissue debrided from the wound bed. No obvious nail spicules or foreign debris was found within the wound.  -Debrided ulceration in attempt to decrease the biofilm layer, promote healing and in attempt to prevent infection  ---Dressed wound with betadine gauze, dry gauze and Medipore tape  ---Patient to continue with daily  epsom salt soaks and dressings to the toe until healed with no further drainage.  ---If wound re-opens and there is any evidence of a nail spicule, will consider a nail border matrixectomy. If there are no obvious areas of a nail spicule, may consider a small incision for a deeper I&D plus a possible physical removal of any remaining matrix.    -There are no signs of infection at this time. Patient is advised to continue with the dressings until the wound is healed and to continue the antibiotics prescribed in Urgent Care until the antibiotic regimen is completed.    -Patient in agreement with the above treatment plan and all of patient's questions were answered.      RTC as needed if wound does not heal or continues to re-open          Cynthia Rivas DPM

## 2022-03-20 ENCOUNTER — HEALTH MAINTENANCE LETTER (OUTPATIENT)
Age: 37
End: 2022-03-20

## 2022-07-14 ENCOUNTER — OFFICE VISIT (OUTPATIENT)
Dept: FAMILY MEDICINE | Facility: OTHER | Age: 37
End: 2022-07-14
Attending: FAMILY MEDICINE
Payer: COMMERCIAL

## 2022-07-14 VITALS
BODY MASS INDEX: 31.89 KG/M2 | TEMPERATURE: 96.9 F | DIASTOLIC BLOOD PRESSURE: 78 MMHG | WEIGHT: 180 LBS | HEART RATE: 90 BPM | SYSTOLIC BLOOD PRESSURE: 110 MMHG | OXYGEN SATURATION: 98 %

## 2022-07-14 DIAGNOSIS — R53.83 FATIGUE, UNSPECIFIED TYPE: ICD-10-CM

## 2022-07-14 DIAGNOSIS — Z71.89 ACP (ADVANCE CARE PLANNING): ICD-10-CM

## 2022-07-14 DIAGNOSIS — E55.9 HYPOVITAMINOSIS D: ICD-10-CM

## 2022-07-14 DIAGNOSIS — F41.1 GAD (GENERALIZED ANXIETY DISORDER): Primary | ICD-10-CM

## 2022-07-14 DIAGNOSIS — Z11.59 ENCOUNTER FOR SCREENING FOR OTHER VIRAL DISEASES: ICD-10-CM

## 2022-07-14 DIAGNOSIS — E34.9 HORMONAL DISORDER: ICD-10-CM

## 2022-07-14 DIAGNOSIS — F06.30 MENSTRUAL-RELATED MOOD DISORDER: ICD-10-CM

## 2022-07-14 PROBLEM — F43.10 PTSD (POST-TRAUMATIC STRESS DISORDER): Status: ACTIVE | Noted: 2022-04-27

## 2022-07-14 LAB
ALBUMIN SERPL-MCNC: 4.1 G/DL (ref 3.4–5)
ALP SERPL-CCNC: 86 U/L (ref 40–150)
ALT SERPL W P-5'-P-CCNC: 28 U/L (ref 0–50)
ANION GAP SERPL CALCULATED.3IONS-SCNC: 3 MMOL/L (ref 3–14)
AST SERPL W P-5'-P-CCNC: 28 U/L (ref 0–45)
BILIRUB SERPL-MCNC: 0.7 MG/DL (ref 0.2–1.3)
BUN SERPL-MCNC: 10 MG/DL (ref 7–30)
CALCIUM SERPL-MCNC: 9.3 MG/DL (ref 8.5–10.1)
CHLORIDE BLD-SCNC: 103 MMOL/L (ref 94–109)
CO2 SERPL-SCNC: 30 MMOL/L (ref 20–32)
CREAT SERPL-MCNC: 0.68 MG/DL (ref 0.52–1.04)
ERYTHROCYTE [DISTWIDTH] IN BLOOD BY AUTOMATED COUNT: 13.1 % (ref 10–15)
FERRITIN SERPL-MCNC: 13 NG/ML (ref 12–150)
GFR SERPL CREATININE-BSD FRML MDRD: >90 ML/MIN/1.73M2
GLUCOSE BLD-MCNC: 100 MG/DL (ref 70–99)
HCT VFR BLD AUTO: 43 % (ref 35–47)
HGB BLD-MCNC: 14.1 G/DL (ref 11.7–15.7)
IRON SATN MFR SERPL: 32 % (ref 15–46)
IRON SERPL-MCNC: 134 UG/DL (ref 35–180)
MCH RBC QN AUTO: 28.8 PG (ref 26.5–33)
MCHC RBC AUTO-ENTMCNC: 32.8 G/DL (ref 31.5–36.5)
MCV RBC AUTO: 88 FL (ref 78–100)
PLATELET # BLD AUTO: 242 10E3/UL (ref 150–450)
POTASSIUM BLD-SCNC: 3.7 MMOL/L (ref 3.4–5.3)
PROT SERPL-MCNC: 8 G/DL (ref 6.8–8.8)
RBC # BLD AUTO: 4.9 10E6/UL (ref 3.8–5.2)
SODIUM SERPL-SCNC: 136 MMOL/L (ref 133–144)
TIBC SERPL-MCNC: 413 UG/DL (ref 240–430)
TSH SERPL DL<=0.005 MIU/L-ACNC: 2.15 MU/L (ref 0.4–4)
WBC # BLD AUTO: 5.7 10E3/UL (ref 4–11)

## 2022-07-14 PROCEDURE — 83550 IRON BINDING TEST: CPT | Performed by: FAMILY MEDICINE

## 2022-07-14 PROCEDURE — 99215 OFFICE O/P EST HI 40 MIN: CPT | Performed by: FAMILY MEDICINE

## 2022-07-14 PROCEDURE — 36415 COLL VENOUS BLD VENIPUNCTURE: CPT | Performed by: FAMILY MEDICINE

## 2022-07-14 PROCEDURE — 80053 COMPREHEN METABOLIC PANEL: CPT | Performed by: FAMILY MEDICINE

## 2022-07-14 PROCEDURE — 82306 VITAMIN D 25 HYDROXY: CPT | Performed by: FAMILY MEDICINE

## 2022-07-14 PROCEDURE — 86803 HEPATITIS C AB TEST: CPT | Performed by: FAMILY MEDICINE

## 2022-07-14 PROCEDURE — 82607 VITAMIN B-12: CPT | Performed by: FAMILY MEDICINE

## 2022-07-14 PROCEDURE — 82728 ASSAY OF FERRITIN: CPT | Performed by: FAMILY MEDICINE

## 2022-07-14 PROCEDURE — 84443 ASSAY THYROID STIM HORMONE: CPT | Performed by: FAMILY MEDICINE

## 2022-07-14 PROCEDURE — 85027 COMPLETE CBC AUTOMATED: CPT | Performed by: FAMILY MEDICINE

## 2022-07-14 RX ORDER — SERTRALINE HYDROCHLORIDE 100 MG/1
150 TABLET, FILM COATED ORAL
COMMUNITY
Start: 2022-06-21 | End: 2022-12-16

## 2022-07-14 RX ORDER — HYDROXYZINE HYDROCHLORIDE 25 MG/1
25-50 TABLET, FILM COATED ORAL 3 TIMES DAILY PRN
COMMUNITY
Start: 2022-05-12 | End: 2023-09-14

## 2022-07-14 SDOH — HEALTH STABILITY: PHYSICAL HEALTH: ON AVERAGE, HOW MANY MINUTES DO YOU ENGAGE IN EXERCISE AT THIS LEVEL?: 0 MIN

## 2022-07-14 SDOH — HEALTH STABILITY: PHYSICAL HEALTH: ON AVERAGE, HOW MANY DAYS PER WEEK DO YOU ENGAGE IN MODERATE TO STRENUOUS EXERCISE (LIKE A BRISK WALK)?: 0 DAYS

## 2022-07-14 ASSESSMENT — ANXIETY QUESTIONNAIRES
3. WORRYING TOO MUCH ABOUT DIFFERENT THINGS: SEVERAL DAYS
4. TROUBLE RELAXING: NOT AT ALL
GAD7 TOTAL SCORE: 6
6. BECOMING EASILY ANNOYED OR IRRITABLE: SEVERAL DAYS
GAD7 TOTAL SCORE: 6
2. NOT BEING ABLE TO STOP OR CONTROL WORRYING: SEVERAL DAYS
7. FEELING AFRAID AS IF SOMETHING AWFUL MIGHT HAPPEN: NOT AT ALL
5. BEING SO RESTLESS THAT IT IS HARD TO SIT STILL: NOT AT ALL
IF YOU CHECKED OFF ANY PROBLEMS ON THIS QUESTIONNAIRE, HOW DIFFICULT HAVE THESE PROBLEMS MADE IT FOR YOU TO DO YOUR WORK, TAKE CARE OF THINGS AT HOME, OR GET ALONG WITH OTHER PEOPLE: SOMEWHAT DIFFICULT
1. FEELING NERVOUS, ANXIOUS, OR ON EDGE: NEARLY EVERY DAY

## 2022-07-14 ASSESSMENT — PAIN SCALES - GENERAL: PAINLEVEL: NO PAIN (0)

## 2022-07-14 ASSESSMENT — PATIENT HEALTH QUESTIONNAIRE - PHQ9: SUM OF ALL RESPONSES TO PHQ QUESTIONS 1-9: 12

## 2022-07-14 NOTE — COMMUNITY RESOURCES LIST (ENGLISH)
07/14/2022   [facility] - Outpatient Clinics  N/A  For questions about this resource list or additional care needs, please contact your primary care clinic or care manager.  Phone: [phone]   Email: N/A   Address: [address] [place] [address]   Hours: N/A        Mental Health       Individual counseling  1  [facility] [place] Distance: 0.64 miles      COVID-19 Status: Regular Operations   [address] [place] [address]  Language: English  Hours: Mon - Thu 9:00 AM - 8:00 PM , Fri - Sat 9:00 AM - 5:00 PM Appt. Only  Fees: Insurance, Self Pay   Phone: [phone] Ext.1 Website: [email or web address]     2  [facility] [place] Distance: 0.76 miles      COVID-19 Status: Regular Operations   [address] [place] [address]  Language: English  Hours: Mon - Fri 8:00 AM - 4:30 PM  Fees: Insurance, Self Pay   Phone: [phone] Email: [email or web address] Website: [email or web address]          Important Numbers & Websites       Emergency Services   911  [facility] Services   311  Poison Control   (365) 211-9579  Suicide Prevention Lifeline   (835) 432-8582 (TALK)  Child Abuse Hotline   (876) 627-7730 (4-A-Child)  Sexual Assault Hotline   (824) 615-3343 (HOPE)  National Runaway Safeline   (206) 357-4130 (RUNAWAY)  All-Options Talkline   (281) 831-8545  Substance Abuse Referral   (747) 912-9979 (HELP)

## 2022-07-14 NOTE — PATIENT INSTRUCTIONS
Start optivite PMT 3 tabs 2x/day    2.  Get fasting labs on day 3 of your period before 8am    3.  Follow-up appt 2-3 wks after your lab work

## 2022-07-14 NOTE — NURSING NOTE
"Chief Complaint   Patient presents with     Anxiety     32       Initial /78   Pulse 90   Temp 96.9  F (36.1  C) (Tympanic)   Wt 81.6 kg (180 lb)   SpO2 98%   BMI 31.89 kg/m   Estimated body mass index is 31.89 kg/m  as calculated from the following:    Height as of 2/23/22: 1.6 m (5' 3\").    Weight as of this encounter: 81.6 kg (180 lb).  Medication Reconciliation: complete  Shwetha Bee LPN  "

## 2022-07-15 LAB
HCV AB SERPL QL IA: NONREACTIVE
VIT B12 SERPL-MCNC: 517 PG/ML (ref 232–1245)

## 2022-07-15 NOTE — COMMUNITY RESOURCES LIST (ENGLISH)
07/14/2022   [facility] - Outpatient Clinics  N/A  For questions about this resource list or additional care needs, please contact your primary care clinic or care manager.  Phone: [phone]   Email: N/A   Address: [address] [place] [address]   Hours: N/A        Exercise and Recreation       Gym or workout facility  1  [facility] [place] Distance: 1.99 miles      COVID-19 Status: Regular Operations   [address] [place] [address]  Language: English  Hours: Mon - Sun Open 24 Hours  Fees: Self Pay   Phone: [phone] Email: [email or web address] Website: [email or web address]     2  [facility] Distance: 17.64 miles      COVID-19 Status: Regular Operations   [address] [place] [address]  Language: English  Hours: Mon - Fri 5:00 AM - 7:00 PM , Sat 9:00 AM - 5:00 PM  Fees: Free, Self Pay   Phone: [phone]) [phone] Email: [email or web address] Website: [email or web address]          Mental Health       Individual counseling  3  [facility] [place] Distance: 0.64 miles      COVID-19 Status: Regular Operations   [address] [place] [address]  Language: English  Hours: Mon - Thu 9:00 AM - 8:00 PM , Fri - Sat 9:00 AM - 5:00 PM Appt. Only  Fees: Insurance, Self Pay   Phone: (734) [phone] Ext.1 Website: [email or web address]     4  [facility] [place] Distance: 0.76 miles      COVID-19 Status: Regular Operations   [address] [place] [address]  Language: English  Hours: Mon - Fri 8:00 AM - 4:30 PM  Fees: Insurance, Self Pay   Phone: [phone]) [phone] Email: [email or web address] Website: [email or web address]          Important Numbers & Websites       Emergency Services   911  [facility]   311  Poison Control   (310) 373-2665  Suicide Prevention Lifeline   (259) 792-3480 (TALK)  Child Abuse Hotline   (509) 801-6486 (4-A-Child)  Sexual Assault Hotline   (223)  439-7118 (HOPE)  National Runaway Safeline   (173) 540-2442 (RUNAWAY)  All-Options Talkline   (168) 858-5989  Substance Abuse Referral   (359) 544-1791 (HELP)

## 2022-07-18 LAB — DEPRECATED CALCIDIOL+CALCIFEROL SERPL-MC: 28 UG/L (ref 20–75)

## 2022-07-30 ENCOUNTER — HOSPITAL ENCOUNTER (EMERGENCY)
Facility: HOSPITAL | Age: 37
Discharge: HOME | End: 2022-07-30
Attending: PHYSICIAN ASSISTANT | Admitting: PHYSICIAN ASSISTANT
Payer: COMMERCIAL

## 2022-07-30 VITALS
SYSTOLIC BLOOD PRESSURE: 100 MMHG | TEMPERATURE: 98.9 F | OXYGEN SATURATION: 97 % | HEART RATE: 73 BPM | DIASTOLIC BLOOD PRESSURE: 68 MMHG | RESPIRATION RATE: 16 BRPM

## 2022-07-30 DIAGNOSIS — J32.0 RIGHT MAXILLARY SINUSITIS: ICD-10-CM

## 2022-07-30 PROCEDURE — 99213 OFFICE O/P EST LOW 20 MIN: CPT | Performed by: PHYSICIAN ASSISTANT

## 2022-07-30 PROCEDURE — G0463 HOSPITAL OUTPT CLINIC VISIT: HCPCS

## 2022-07-30 RX ORDER — FLUTICASONE PROPIONATE 50 MCG
1 SPRAY, SUSPENSION (ML) NASAL 2 TIMES DAILY
Qty: 16 G | Refills: 0 | Status: SHIPPED | OUTPATIENT
Start: 2022-07-30 | End: 2022-08-04

## 2022-07-30 RX ORDER — DOXYCYCLINE 100 MG/1
100 CAPSULE ORAL 2 TIMES DAILY
Qty: 14 CAPSULE | Refills: 0 | Status: SHIPPED | OUTPATIENT
Start: 2022-07-30 | End: 2022-08-06

## 2022-07-30 ASSESSMENT — ENCOUNTER SYMPTOMS
SINUS PRESSURE: 1
EYES NEGATIVE: 1
FEVER: 0
COUGH: 1
CHILLS: 0
PSYCHIATRIC NEGATIVE: 1
SORE THROAT: 0
SINUS PAIN: 1
FATIGUE: 1
CARDIOVASCULAR NEGATIVE: 1
SHORTNESS OF BREATH: 0
NEUROLOGICAL NEGATIVE: 1
HEADACHES: 0
DIZZINESS: 0
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
## 2022-07-30 NOTE — ED TRIAGE NOTES
"Patient presents with c/o of sinus pain/pressure day 15. Patient was attempting to let \"it clear on its own.\" Patient reports that right side is bothering her, not so much the left. Patient reports constant drainage for the past 2 weeks.       "

## 2022-07-30 NOTE — DISCHARGE INSTRUCTIONS
Flonase 1 spray in AM and PM for 3-5 days.   Doxycycline to cover sinus infection (bacteria). Cover up in the sun while on the antibiotics. If you are 100% AFTER day 5, you can stop the doxy.   Saltwater/saline nasal spray + saltwater gargle OR neti pot.  Tylenol for pain as needed.   Worst headache of life, concern for shortness of breath/breathing needs to come back in!

## 2022-08-01 ENCOUNTER — TELEPHONE (OUTPATIENT)
Dept: FAMILY MEDICINE | Facility: OTHER | Age: 37
End: 2022-08-01

## 2022-08-01 DIAGNOSIS — F41.9 ANXIETY AND DEPRESSION: ICD-10-CM

## 2022-08-01 DIAGNOSIS — F41.1 GAD (GENERALIZED ANXIETY DISORDER): Primary | ICD-10-CM

## 2022-08-01 DIAGNOSIS — F43.10 PTSD (POST-TRAUMATIC STRESS DISORDER): ICD-10-CM

## 2022-08-01 DIAGNOSIS — F32.A ANXIETY AND DEPRESSION: ICD-10-CM

## 2022-08-01 NOTE — TELEPHONE ENCOUNTER
"8/1/2022 10:04 AM  Pt called stated she spoke with [facility] today. Pt stated, \"They stated I need a referral for assessment/ psych evaluation for diagnosis.\" PCP returns tomorrow can advise if pt needs to be seen again or if a referral will be placed. LOV on 7/14/22.     Pt also stated \"Also Dr. Pimentel said on the third day of my menstrual cycle I need labs by 8 AM on the third day. Writer unable to schedule at clinic lab due to no openings. Discussed plan with the clinic  employees they advise pt complete lab as a  walk in appt in the Hospital Lab, Hospital Registration.     Call back number: [phone] (Home Phone)  "

## 2022-08-03 ENCOUNTER — LAB (OUTPATIENT)
Dept: LAB | Facility: HOSPITAL | Age: 37
End: 2022-08-03
Payer: COMMERCIAL

## 2022-08-03 DIAGNOSIS — E34.9 HORMONAL DISORDER: ICD-10-CM

## 2022-08-03 DIAGNOSIS — F06.30 MENSTRUAL-RELATED MOOD DISORDER: ICD-10-CM

## 2022-08-03 DIAGNOSIS — F41.1 GAD (GENERALIZED ANXIETY DISORDER): ICD-10-CM

## 2022-08-03 LAB
FASTING STATUS PATIENT QL REPORTED: YES
FSH SERPL IRP2-ACNC: 7 MIU/ML
GLUCOSE BLD-MCNC: 92 MG/DL (ref 70–99)
INSULIN SERPL-ACNC: 8.3 UU/ML (ref 2.6–24.9)
LH SERPL-ACNC: 6.5 MIU/ML
PROLACTIN SERPL 3RD IS-MCNC: 9 NG/ML (ref 5–23)
TSH SERPL DL<=0.005 MIU/L-ACNC: 1.94 MU/L (ref 0.4–4)

## 2022-08-03 PROCEDURE — 83002 ASSAY OF GONADOTROPIN (LH): CPT

## 2022-08-03 PROCEDURE — 82947 ASSAY GLUCOSE BLOOD QUANT: CPT

## 2022-08-03 PROCEDURE — 83498 ASY HYDROXYPROGESTERONE 17-D: CPT

## 2022-08-03 PROCEDURE — 82627 DEHYDROEPIANDROSTERONE: CPT

## 2022-08-03 PROCEDURE — 36415 COLL VENOUS BLD VENIPUNCTURE: CPT

## 2022-08-03 PROCEDURE — 83520 IMMUNOASSAY QUANT NOS NONAB: CPT

## 2022-08-03 PROCEDURE — 84999 UNLISTED CHEMISTRY PROCEDURE: CPT

## 2022-08-03 PROCEDURE — 84403 ASSAY OF TOTAL TESTOSTERONE: CPT

## 2022-08-03 PROCEDURE — 83525 ASSAY OF INSULIN: CPT

## 2022-08-03 PROCEDURE — 84443 ASSAY THYROID STIM HORMONE: CPT

## 2022-08-03 PROCEDURE — 84146 ASSAY OF PROLACTIN: CPT

## 2022-08-03 PROCEDURE — 82157 ASSAY OF ANDROSTENEDIONE: CPT

## 2022-08-03 PROCEDURE — 84270 ASSAY OF SEX HORMONE GLOBUL: CPT

## 2022-08-03 PROCEDURE — 83001 ASSAY OF GONADOTROPIN (FSH): CPT

## 2022-08-04 LAB
DHEA-S SERPL-MCNC: 94 UG/DL (ref 35–430)
Lab: NORMAL
PERFORMING LABORATORY: NORMAL
SHBG SERPL-SCNC: 48 NMOL/L (ref 30–135)
SPECIMEN STATUS: NORMAL
TEST NAME: NORMAL

## 2022-08-05 LAB
TESTOST FREE SERPL-MCNC: 0.2 NG/DL
TESTOST SERPL-MCNC: 14 NG/DL (ref 8–60)

## 2022-08-07 LAB — MISCELLANEOUS TEST 1 (ARUP): NORMAL

## 2022-08-08 LAB — 17OHP SERPL-MCNC: 25 NG/DL

## 2022-08-14 LAB — ANDROST SERPL-MCNC: 0.81 NG/ML

## 2022-08-18 ENCOUNTER — OFFICE VISIT (OUTPATIENT)
Dept: FAMILY MEDICINE | Facility: OTHER | Age: 37
End: 2022-08-18
Attending: FAMILY MEDICINE
Payer: COMMERCIAL

## 2022-08-18 VITALS
DIASTOLIC BLOOD PRESSURE: 60 MMHG | SYSTOLIC BLOOD PRESSURE: 100 MMHG | OXYGEN SATURATION: 97 % | WEIGHT: 179.2 LBS | TEMPERATURE: 97.3 F | BODY MASS INDEX: 31.75 KG/M2 | HEIGHT: 63 IN | HEART RATE: 76 BPM

## 2022-08-18 DIAGNOSIS — F41.1 GAD (GENERALIZED ANXIETY DISORDER): ICD-10-CM

## 2022-08-18 DIAGNOSIS — F90.2 ATTENTION DEFICIT HYPERACTIVITY DISORDER (ADHD), COMBINED TYPE: Primary | ICD-10-CM

## 2022-08-18 PROCEDURE — 99214 OFFICE O/P EST MOD 30 MIN: CPT | Performed by: FAMILY MEDICINE

## 2022-08-18 RX ORDER — DEXTROAMPHETAMINE SACCHARATE, AMPHETAMINE ASPARTATE MONOHYDRATE, DEXTROAMPHETAMINE SULFATE AND AMPHETAMINE SULFATE 2.5; 2.5; 2.5; 2.5 MG/1; MG/1; MG/1; MG/1
10 CAPSULE, EXTENDED RELEASE ORAL DAILY
COMMUNITY
End: 2022-12-16

## 2022-08-18 ASSESSMENT — ANXIETY QUESTIONNAIRES
3. WORRYING TOO MUCH ABOUT DIFFERENT THINGS: NOT AT ALL
4. TROUBLE RELAXING: NOT AT ALL
6. BECOMING EASILY ANNOYED OR IRRITABLE: NOT AT ALL
GAD7 TOTAL SCORE: 0
7. FEELING AFRAID AS IF SOMETHING AWFUL MIGHT HAPPEN: NOT AT ALL
IF YOU CHECKED OFF ANY PROBLEMS ON THIS QUESTIONNAIRE, HOW DIFFICULT HAVE THESE PROBLEMS MADE IT FOR YOU TO DO YOUR WORK, TAKE CARE OF THINGS AT HOME, OR GET ALONG WITH OTHER PEOPLE: NOT DIFFICULT AT ALL
1. FEELING NERVOUS, ANXIOUS, OR ON EDGE: NOT AT ALL
GAD7 TOTAL SCORE: 0
5. BEING SO RESTLESS THAT IT IS HARD TO SIT STILL: NOT AT ALL
2. NOT BEING ABLE TO STOP OR CONTROL WORRYING: NOT AT ALL

## 2022-08-18 ASSESSMENT — PATIENT HEALTH QUESTIONNAIRE - PHQ9: SUM OF ALL RESPONSES TO PHQ QUESTIONS 1-9: 9

## 2022-08-18 ASSESSMENT — PAIN SCALES - GENERAL: PAINLEVEL: NO PAIN (0)

## 2022-08-18 NOTE — NURSING NOTE
"Chief Complaint   Patient presents with     Results       Initial /60 (BP Location: Right arm, Patient Position: Sitting, Cuff Size: Adult Regular)   Pulse 76   Temp 97.3  F (36.3  C) (Tympanic)   Ht 1.6 m (5' 3\")   Wt 81.3 kg (179 lb 3.2 oz)   LMP 08/01/2022   SpO2 97%   BMI 31.74 kg/m   Estimated body mass index is 31.74 kg/m  as calculated from the following:    Height as of this encounter: 1.6 m (5' 3\").    Weight as of this encounter: 81.3 kg (179 lb 3.2 oz).  Medication Reconciliation: complete  Steff Bey LPN  "

## 2022-08-18 NOTE — PROGRESS NOTES
Assessment & Plan     Attention deficit hyperactivity disorder (ADHD), combined type  Functioning  Much better with adderall    SUHA (generalized anxiety disorder)  Trial l-threonine      Provider  Link to [facility] Help Grid :[id number]}    Patient was agreeable to this plan and had no further questions.  Patient Instructions   L-threonine 200mg 2x/day for anxiety    34 minutes spent on the date of the encounter doing chart review, history and exam, documentation and further activities per the note      No follow-ups on file.    Caprice Pimentel MD  [facility] - AARTI Landon is a 36 year old accompanied by her daughter, presenting for the following health issues:  Results      HPI     Depression and Anxiety Follow-Up    How are you doing with your depression since your last visit? Improved since recent diagnosis of ADD    How are you doing with your anxiety since your last visit?  Improved     Are you having other symptoms that might be associated with depression or anxiety? No- Airmed for therapy and ADD.     Have you had a significant life event? No     Do you have any concerns with your use of alcohol or other drugs? No    Social History     Tobacco Use     Smoking status: Never Smoker     Smokeless tobacco: Never Used   Vaping Use     Vaping Use: Never used   Substance Use Topics     Alcohol use: Yes     Drug use: No     PHQ 2/9/2021 6/3/2021 7/14/2022   PHQ-9 Total Score 10 11 12   Q9: Thoughts of better off dead/self-harm past 2 weeks Not at all Not at all Not at all     SUHA-7 SCORE 2/9/2021 6/3/2021 7/14/2022   Total Score 7 2 6     Last PHQ-9 7/14/2022   1.  Little interest or pleasure in doing things 1   2.  Feeling down, depressed, or hopeless 1   3.  Trouble falling or staying asleep, or sleeping too much 1   4.  Feeling tired or having little energy 3   5.  Poor appetite or overeating 2   6.  Feeling bad about yourself 1   7.  Trouble concentrating 3   8.  Moving slowly or  "restless 0   Q9: Thoughts of better off dead/self-harm past 2 weeks 0   PHQ-9 Total Score 12   Difficulty at work, home, or with people Somewhat difficult     SUHA-7  7/14/2022   1. Feeling nervous, anxious, or on edge 3   2. Not being able to stop or control worrying 1   3. Worrying too much about different things 1   4. Trouble relaxing 0   5. Being so restless that it is hard to sit still 0   6. Becoming easily annoyed or irritable 1   7. Feeling afraid, as if something awful might happen 0   SUHA-7 Total Score 6   If you checked any problems, how difficult have they made it for you to do your work, take care of things at home, or get along with other people? Somewhat difficult       Suicide Assessment Five-step Evaluation and Treatment (SAFE-T)      How many servings of fruits and vegetables do you eat daily?  2-3    On average, how many sweetened beverages do you drink each day (Examples: soda, juice, sweet tea, etc.  Do NOT count diet or artificially sweetened beverages)?   1    How many days per week do you exercise enough to make your heart beat faster? 3 or less    How many minutes a day do you exercise enough to make your heart beat faster? 30 - 60    How many days per week do you miss taking your medication? 0    Review of Systems   Constitutional, HEENT, cardiovascular, pulmonary, gi and gu systems are negative, except as otherwise noted.      Objective    /60 (BP Location: Right arm, Patient Position: Sitting, Cuff Size: Adult Regular)   Pulse 76   Temp 97.3  F (36.3  C) (Tympanic)   Ht 1.6 m (5' 3\")   Wt 81.3 kg (179 lb 3.2 oz)   LMP 08/01/2022   SpO2 97%   BMI 31.74 kg/m    Body mass index is 31.74 kg/m .  Physical Exam   GENERAL: healthy, alert and no distress  PSYCH: mentation appears normal, affect normal/bright    No results found for any visits on 08/18/22.              .  ..  "

## 2022-08-24 RX ORDER — DEXTROAMPHETAMINE SACCHARATE, AMPHETAMINE ASPARTATE MONOHYDRATE, DEXTROAMPHETAMINE SULFATE AND AMPHETAMINE SULFATE 2.5; 2.5; 2.5; 2.5 MG/1; MG/1; MG/1; MG/1
10 CAPSULE, EXTENDED RELEASE ORAL DAILY
OUTPATIENT
Start: 2022-08-24

## 2022-08-24 NOTE — TELEPHONE ENCOUNTER
Adderall 10 mg      Last Written Prescription Date:  0  Last Fill Quantity: 0,   # refills: 0  Last Office Visit: 8/18/22  Future Office visit:       Routing refill request to provider for review/approval because:  Medication is reported/historical

## 2022-09-11 ENCOUNTER — HEALTH MAINTENANCE LETTER (OUTPATIENT)
Age: 37
End: 2022-09-11

## 2022-09-14 ENCOUNTER — TELEPHONE (OUTPATIENT)
Dept: FAMILY MEDICINE | Facility: OTHER | Age: 37
End: 2022-09-14

## 2022-09-14 DIAGNOSIS — R41.3 MEMORY LOSS: ICD-10-CM

## 2022-09-14 DIAGNOSIS — F41.1 GAD (GENERALIZED ANXIETY DISORDER): Primary | ICD-10-CM

## 2022-09-14 DIAGNOSIS — F43.10 PTSD (POST-TRAUMATIC STRESS DISORDER): ICD-10-CM

## 2022-09-14 DIAGNOSIS — F90.2 ATTENTION DEFICIT HYPERACTIVITY DISORDER (ADHD), COMBINED TYPE: ICD-10-CM

## 2022-09-14 NOTE — TELEPHONE ENCOUNTER
10:38 AM    Reason for Call: Phone Call    Description: Patient is requesting a referral for a neuropsychologist. Please call her regarding this.    Was an appointment offered for this call? No  If yes : Appointment type              Date    Preferred method for responding to this message: Telephone Call  What is your phone number ? [phone]    If we cannot reach you directly, may we leave a detailed response at the number you provided? Yes    Can this message wait until your PCP/provider returns, if available today? No, provider is out until October.    Angie Slaughter

## 2022-09-16 ENCOUNTER — TELEPHONE (OUTPATIENT)
Dept: NEUROPSYCHOLOGY | Facility: CLINIC | Age: 37
End: 2022-09-16

## 2022-09-16 NOTE — TELEPHONE ENCOUNTER
[facility] referral cancelled. Referral sent to incorrect location. Pt would like an appointment at [facility]. Referring provider notified.    Tonya Crowe  Psychometrist

## 2022-10-14 ENCOUNTER — TRANSFERRED RECORDS (OUTPATIENT)
Dept: HEALTH INFORMATION MANAGEMENT | Facility: CLINIC | Age: 37
End: 2022-10-14

## 2022-11-21 ENCOUNTER — LAB (OUTPATIENT)
Dept: LAB | Facility: OTHER | Age: 37
End: 2022-11-21
Attending: FAMILY MEDICINE
Payer: COMMERCIAL

## 2022-11-21 ENCOUNTER — ALLIED HEALTH/NURSE VISIT (OUTPATIENT)
Dept: FAMILY MEDICINE | Facility: OTHER | Age: 37
End: 2022-11-21
Attending: FAMILY MEDICINE
Payer: COMMERCIAL

## 2022-11-21 VITALS — DIASTOLIC BLOOD PRESSURE: 68 MMHG | SYSTOLIC BLOOD PRESSURE: 113 MMHG | HEART RATE: 95 BPM

## 2022-11-21 DIAGNOSIS — Z01.30 BLOOD PRESSURE CHECK: Primary | ICD-10-CM

## 2022-11-21 DIAGNOSIS — Z79.899 ENCOUNTER FOR LONG-TERM (CURRENT) USE OF MEDICATIONS: ICD-10-CM

## 2022-11-21 DIAGNOSIS — Z79.899 ENCOUNTER FOR LONG-TERM (CURRENT) USE OF MEDICATIONS: Primary | ICD-10-CM

## 2022-11-21 LAB — TSH SERPL DL<=0.005 MIU/L-ACNC: 1.75 UIU/ML (ref 0.3–4.2)

## 2022-11-21 PROCEDURE — 99207 PR NO CHARGE NURSE ONLY: CPT

## 2022-11-21 PROCEDURE — 36415 COLL VENOUS BLD VENIPUNCTURE: CPT

## 2022-11-21 PROCEDURE — 84443 ASSAY THYROID STIM HORMONE: CPT

## 2022-12-16 ENCOUNTER — OFFICE VISIT (OUTPATIENT)
Dept: FAMILY MEDICINE | Facility: OTHER | Age: 37
End: 2022-12-16
Attending: FAMILY MEDICINE
Payer: COMMERCIAL

## 2022-12-16 ENCOUNTER — TRANSFERRED RECORDS (OUTPATIENT)
Dept: HEALTH INFORMATION MANAGEMENT | Facility: CLINIC | Age: 37
End: 2022-12-16

## 2022-12-16 VITALS
BODY MASS INDEX: 32.53 KG/M2 | DIASTOLIC BLOOD PRESSURE: 62 MMHG | HEART RATE: 84 BPM | OXYGEN SATURATION: 99 % | TEMPERATURE: 97 F | WEIGHT: 183.6 LBS | SYSTOLIC BLOOD PRESSURE: 102 MMHG | HEIGHT: 63 IN

## 2022-12-16 DIAGNOSIS — F43.10 PTSD (POST-TRAUMATIC STRESS DISORDER): ICD-10-CM

## 2022-12-16 DIAGNOSIS — F41.1 GAD (GENERALIZED ANXIETY DISORDER): ICD-10-CM

## 2022-12-16 DIAGNOSIS — F90.2 ATTENTION DEFICIT HYPERACTIVITY DISORDER (ADHD), COMBINED TYPE: Primary | ICD-10-CM

## 2022-12-16 PROCEDURE — 99214 OFFICE O/P EST MOD 30 MIN: CPT | Performed by: FAMILY MEDICINE

## 2022-12-16 RX ORDER — VENLAFAXINE HYDROCHLORIDE 75 MG/1
75 CAPSULE, EXTENDED RELEASE ORAL DAILY
Status: ON HOLD | COMMUNITY
Start: 2022-12-06 | End: 2023-01-24

## 2022-12-16 RX ORDER — GUANFACINE 1 MG/1
1 TABLET ORAL AT BEDTIME
Status: ON HOLD | COMMUNITY
Start: 2022-11-18 | End: 2023-01-24

## 2022-12-16 RX ORDER — LISDEXAMFETAMINE DIMESYLATE 70 MG/1
70 CAPSULE ORAL 2 TIMES DAILY
Status: ON HOLD | COMMUNITY
Start: 2022-12-05 | End: 2023-01-24

## 2022-12-16 ASSESSMENT — PAIN SCALES - GENERAL: PAINLEVEL: NO PAIN (0)

## 2022-12-16 ASSESSMENT — PATIENT HEALTH QUESTIONNAIRE - PHQ9: SUM OF ALL RESPONSES TO PHQ QUESTIONS 1-9: 13

## 2022-12-16 NOTE — PROGRESS NOTES
"  Assessment & Plan     Attention deficit hyperactivity disorder (ADHD), combined type  She has started meds and reports that she can concentrate better in the quiet of the day, still overwhelmed at times but now able to communicate with her .  She can drive better  Working with [facility] who has been very helpful    SUHA (generalized anxiety disorder)  improved    PTSD (post-traumatic stress disorder)  Better, not doing therapy yet but she is able to process her early childhood memories      Provider  Link to [facility] Help Grid :[id number]}    Patient was agreeable to this plan and had no further questions.  There are no Patient Instructions on file for this visit.    No follow-ups on file.    Caprice Pimentel MD  [facility] - AARTI Landon is a 37 year old, presenting for the following health issues:  A.D.H.D      HPI     Concern - ADHD  Onset: Dx recently, a few months   Description: Had recent dx- would like to update on recent dx and med changes  Has had trouble with finding the right medication due to rapid metabolism   Intensity: moderate  Progression of Symptoms:  improving  Accompanying Signs & Symptoms: Feeling overwhelmed, memory loss  Previous history of similar problem: Previous symptoms but was not diagnosed until recently.   Alleviating factors:        Improved by: Self awareness, medication has helped  Therapies tried and outcome: Vyvanse, Ritalin, Adderall, concerta, Effexor, hydroxyzine 25mg       Review of Systems   Constitutional, HEENT, cardiovascular, pulmonary, gi and gu systems are negative, except as otherwise noted.      Objective    /62   Pulse 84   Temp 97  F (36.1  C)   Ht 1.6 m (5' 3\")   Wt 83.3 kg (183 lb 9.6 oz)   LMP 12/15/2022   SpO2 99%   BMI 32.52 kg/m    Body mass index is 32.52 kg/m .  Physical Exam   GENERAL: healthy, alert and no distress  MS: no gross musculoskeletal defects noted, no edema  PSYCH: mentation appears normal, " tangential, affect normal/bright and speech pressured    No results found for any visits on 12/16/22.

## 2023-01-02 ENCOUNTER — TRANSFERRED RECORDS (OUTPATIENT)
Dept: HEALTH INFORMATION MANAGEMENT | Facility: CLINIC | Age: 38
End: 2023-01-02

## 2023-01-11 ENCOUNTER — HOSPITAL ENCOUNTER (INPATIENT)
Facility: HOSPITAL | Age: 38
LOS: 13 days | Discharge: HOME | DRG: 885 | End: 2023-01-24
Attending: STUDENT IN AN ORGANIZED HEALTH CARE EDUCATION/TRAINING PROGRAM | Admitting: STUDENT IN AN ORGANIZED HEALTH CARE EDUCATION/TRAINING PROGRAM
Payer: COMMERCIAL

## 2023-01-11 DIAGNOSIS — F31.10 BIPOLAR AFFECTIVE DISORDER, CURRENT EPISODE MANIC, CURRENT EPISODE SEVERITY UNSPECIFIED (H): ICD-10-CM

## 2023-01-11 DIAGNOSIS — F41.1 GAD (GENERALIZED ANXIETY DISORDER): Primary | ICD-10-CM

## 2023-01-11 DIAGNOSIS — R41.82 ALTERED MENTAL STATUS, UNSPECIFIED ALTERED MENTAL STATUS TYPE: ICD-10-CM

## 2023-01-11 DIAGNOSIS — F30.10 MANIC BEHAVIOR (H): ICD-10-CM

## 2023-01-11 LAB
ALBUMIN UR-MCNC: NEGATIVE MG/DL
AMPHETAMINES UR QL: DETECTED
ANION GAP SERPL CALCULATED.3IONS-SCNC: 12 MMOL/L (ref 7–15)
APPEARANCE UR: CLEAR
BARBITURATES UR QL SCN: NOT DETECTED
BENZODIAZ UR QL SCN: NOT DETECTED
BILIRUB UR QL STRIP: NEGATIVE
BUN SERPL-MCNC: 4.8 MG/DL (ref 6–20)
BUPRENORPHINE UR QL: NOT DETECTED
CALCIUM SERPL-MCNC: 9.5 MG/DL (ref 8.6–10)
CANNABINOIDS UR QL: NOT DETECTED
CHLORIDE SERPL-SCNC: 101 MMOL/L (ref 98–107)
COCAINE UR QL SCN: NOT DETECTED
COLOR UR AUTO: NORMAL
CREAT SERPL-MCNC: 0.63 MG/DL (ref 0.51–0.95)
D-METHAMPHET UR QL: NOT DETECTED
DEPRECATED HCO3 PLAS-SCNC: 26 MMOL/L (ref 22–29)
ERYTHROCYTE [DISTWIDTH] IN BLOOD BY AUTOMATED COUNT: 12 % (ref 10–15)
FLUAV RNA SPEC QL NAA+PROBE: NEGATIVE
FLUBV RNA RESP QL NAA+PROBE: NEGATIVE
GFR SERPL CREATININE-BSD FRML MDRD: >90 ML/MIN/1.73M2
GLUCOSE SERPL-MCNC: 101 MG/DL (ref 70–99)
GLUCOSE UR STRIP-MCNC: NEGATIVE MG/DL
HCT VFR BLD AUTO: 41.2 % (ref 35–47)
HGB BLD-MCNC: 13.9 G/DL (ref 11.7–15.7)
HGB UR QL STRIP: NEGATIVE
HOLD SPECIMEN: NORMAL
KETONES UR STRIP-MCNC: NEGATIVE MG/DL
LEUKOCYTE ESTERASE UR QL STRIP: NEGATIVE
MCH RBC QN AUTO: 30.7 PG (ref 26.5–33)
MCHC RBC AUTO-ENTMCNC: 33.7 G/DL (ref 31.5–36.5)
MCV RBC AUTO: 91 FL (ref 78–100)
METHADONE UR QL SCN: NOT DETECTED
NITRATE UR QL: NEGATIVE
OPIATES UR QL SCN: NOT DETECTED
OXYCODONE UR QL SCN: NOT DETECTED
PCP UR QL SCN: NOT DETECTED
PH UR STRIP: 7.5 [PH] (ref 4.7–8)
PLATELET # BLD AUTO: 284 10E3/UL (ref 150–450)
POTASSIUM SERPL-SCNC: 4 MMOL/L (ref 3.4–5.3)
PROPOXYPH UR QL: NOT DETECTED
RBC # BLD AUTO: 4.53 10E6/UL (ref 3.8–5.2)
RBC URINE: <1 /HPF
RSV RNA SPEC NAA+PROBE: NEGATIVE
SARS-COV-2 RNA RESP QL NAA+PROBE: NEGATIVE
SODIUM SERPL-SCNC: 139 MMOL/L (ref 136–145)
SP GR UR STRIP: 1.01 (ref 1–1.03)
SQUAMOUS EPITHELIAL: 0 /HPF
TRICYCLICS UR QL SCN: NOT DETECTED
UROBILINOGEN UR STRIP-MCNC: NORMAL MG/DL
WBC # BLD AUTO: 5.2 10E3/UL (ref 4–11)
WBC URINE: 0 /HPF

## 2023-01-11 PROCEDURE — 99285 EMERGENCY DEPT VISIT HI MDM: CPT | Performed by: STUDENT IN AN ORGANIZED HEALTH CARE EDUCATION/TRAINING PROGRAM

## 2023-01-11 PROCEDURE — 84520 ASSAY OF UREA NITROGEN: CPT | Performed by: STUDENT IN AN ORGANIZED HEALTH CARE EDUCATION/TRAINING PROGRAM

## 2023-01-11 PROCEDURE — 85041 AUTOMATED RBC COUNT: CPT | Performed by: STUDENT IN AN ORGANIZED HEALTH CARE EDUCATION/TRAINING PROGRAM

## 2023-01-11 PROCEDURE — 93010 ELECTROCARDIOGRAM REPORT: CPT | Performed by: INTERNAL MEDICINE

## 2023-01-11 PROCEDURE — 124N000001 HC R&B MH

## 2023-01-11 PROCEDURE — 93005 ELECTROCARDIOGRAM TRACING: CPT

## 2023-01-11 PROCEDURE — 81001 URINALYSIS AUTO W/SCOPE: CPT | Performed by: STUDENT IN AN ORGANIZED HEALTH CARE EDUCATION/TRAINING PROGRAM

## 2023-01-11 PROCEDURE — 99285 EMERGENCY DEPT VISIT HI MDM: CPT

## 2023-01-11 PROCEDURE — 250N000013 HC RX MED GY IP 250 OP 250 PS 637: Performed by: STUDENT IN AN ORGANIZED HEALTH CARE EDUCATION/TRAINING PROGRAM

## 2023-01-11 PROCEDURE — 87637 SARSCOV2&INF A&B&RSV AMP PRB: CPT | Performed by: STUDENT IN AN ORGANIZED HEALTH CARE EDUCATION/TRAINING PROGRAM

## 2023-01-11 PROCEDURE — 36415 COLL VENOUS BLD VENIPUNCTURE: CPT | Performed by: STUDENT IN AN ORGANIZED HEALTH CARE EDUCATION/TRAINING PROGRAM

## 2023-01-11 PROCEDURE — 99223 1ST HOSP IP/OBS HIGH 75: CPT | Mod: AI | Performed by: NURSE PRACTITIONER

## 2023-01-11 PROCEDURE — 80306 DRUG TEST PRSMV INSTRMNT: CPT | Performed by: STUDENT IN AN ORGANIZED HEALTH CARE EDUCATION/TRAINING PROGRAM

## 2023-01-11 PROCEDURE — C9803 HOPD COVID-19 SPEC COLLECT: HCPCS

## 2023-01-11 RX ORDER — AMOXICILLIN 250 MG
1 CAPSULE ORAL 2 TIMES DAILY PRN
Status: DISCONTINUED | OUTPATIENT
Start: 2023-01-11 | End: 2023-01-24 | Stop reason: HOSPADM

## 2023-01-11 RX ORDER — HYDROXYZINE HYDROCHLORIDE 25 MG/1
25 TABLET, FILM COATED ORAL EVERY 4 HOURS PRN
Status: DISCONTINUED | OUTPATIENT
Start: 2023-01-11 | End: 2023-01-16

## 2023-01-11 RX ORDER — METHYLPHENIDATE HYDROCHLORIDE 30 MG/1
60 CAPSULE, EXTENDED RELEASE ORAL 2 TIMES DAILY
Status: ON HOLD | COMMUNITY
Start: 2022-12-19 | End: 2023-01-24

## 2023-01-11 RX ORDER — OLANZAPINE 10 MG/1
10 TABLET ORAL ONCE
Status: COMPLETED | OUTPATIENT
Start: 2023-01-11 | End: 2023-01-11

## 2023-01-11 RX ORDER — ACETAMINOPHEN 325 MG/1
650 TABLET ORAL EVERY 4 HOURS PRN
Status: DISCONTINUED | OUTPATIENT
Start: 2023-01-11 | End: 2023-01-24 | Stop reason: HOSPADM

## 2023-01-11 RX ORDER — OLANZAPINE 5 MG/1
5-10 TABLET ORAL 3 TIMES DAILY PRN
Status: DISCONTINUED | OUTPATIENT
Start: 2023-01-11 | End: 2023-01-24 | Stop reason: HOSPADM

## 2023-01-11 RX ORDER — OLANZAPINE 10 MG/2ML
10 INJECTION, POWDER, FOR SOLUTION INTRAMUSCULAR 3 TIMES DAILY PRN
Status: DISCONTINUED | OUTPATIENT
Start: 2023-01-11 | End: 2023-01-11

## 2023-01-11 RX ORDER — LANOLIN ALCOHOL/MO/W.PET/CERES
3 CREAM (GRAM) TOPICAL
Status: DISCONTINUED | OUTPATIENT
Start: 2023-01-11 | End: 2023-01-16

## 2023-01-11 RX ORDER — LOXAPINE SUCCINATE 25 MG/1
25 TABLET ORAL 2 TIMES DAILY
Status: DISCONTINUED | OUTPATIENT
Start: 2023-01-11 | End: 2023-01-12

## 2023-01-11 RX ORDER — DEXTROAMPHETAMINE SULFATE 15 MG/1
15 CAPSULE, EXTENDED RELEASE ORAL 2 TIMES DAILY
Status: ON HOLD | COMMUNITY
Start: 2022-12-19 | End: 2023-01-24

## 2023-01-11 RX ORDER — MAGNESIUM HYDROXIDE/ALUMINUM HYDROXICE/SIMETHICONE 120; 1200; 1200 MG/30ML; MG/30ML; MG/30ML
30 SUSPENSION ORAL EVERY 4 HOURS PRN
Status: DISCONTINUED | OUTPATIENT
Start: 2023-01-11 | End: 2023-01-24 | Stop reason: HOSPADM

## 2023-01-11 RX ORDER — OLANZAPINE 10 MG/1
10 TABLET ORAL 3 TIMES DAILY PRN
Status: DISCONTINUED | OUTPATIENT
Start: 2023-01-11 | End: 2023-01-11

## 2023-01-11 RX ORDER — OLANZAPINE 10 MG/2ML
10 INJECTION, POWDER, FOR SOLUTION INTRAMUSCULAR 3 TIMES DAILY PRN
Status: DISCONTINUED | OUTPATIENT
Start: 2023-01-11 | End: 2023-01-24 | Stop reason: HOSPADM

## 2023-01-11 RX ADMIN — OLANZAPINE 10 MG: 10 TABLET, FILM COATED ORAL at 11:35

## 2023-01-11 ASSESSMENT — ACTIVITIES OF DAILY LIVING (ADL)
ADLS_ACUITY_SCORE: 30
CHANGE_IN_FUNCTIONAL_STATUS_SINCE_ONSET_OF_CURRENT_ILLNESS/INJURY: NO
DOING_ERRANDS_INDEPENDENTLY_DIFFICULTY: NO
DIFFICULTY_EATING/SWALLOWING: NO
VISION_MANAGEMENT: GLASSES
LAUNDRY: UNABLE TO COMPLETE
ADLS_ACUITY_SCORE: 30
CONCENTRATING,_REMEMBERING_OR_MAKING_DECISIONS_DIFFICULTY: NO
DRESSING/BATHING_DIFFICULTY: NO
ADLS_ACUITY_SCORE: 35
ADLS_ACUITY_SCORE: 35
ADLS_ACUITY_SCORE: 30
FALL_HISTORY_WITHIN_LAST_SIX_MONTHS: NO
ADLS_ACUITY_SCORE: 30
HYGIENE/GROOMING: INDEPENDENT
WALKING_OR_CLIMBING_STAIRS_DIFFICULTY: NO
ORAL_HYGIENE: INDEPENDENT
WEAR_GLASSES_OR_BLIND: YES
ADLS_ACUITY_SCORE: 30
DRESS: SCRUBS (BEHAVIORAL HEALTH)
TOILETING_ISSUES: NO
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
## 2023-01-11 NOTE — PROGRESS NOTES
01/11/23 1419   Patient Belongings   Did you bring any home meds/supplements to the hospital?  Yes   Disposition of meds  Sent to security/pharmacy per site process   Patient Belongings locker;sent to security per site process   Patient Belongings Put in Hospital Secure Location (Security or Locker, etc.) glasses;cell phone/electronics;clothing   Belongings Search Yes   Clothing Search Yes   Second Staff Courtney ()   Comment small brown lanyard, doggy bag rolls, green carrhart jacket, pink tie, 2x red socks, gold bra, red shirt, blue jeans, glasses, red/white bandana.   List items sent to safe:   Black Samsung w/ black case, no scratches, Bee lanyard with bee badge clip, CPR Life Key, 5 Lovejoy, 2 car fobs.    Addendum: Lanyard and Keys and Car Fobs released to .    All other belongings put in assigned cubby in belongings room.       I have reviewed my belongings list on admission and verify that it is correct.     Patient signature_______________________________    Second staff witness (if patient unable to sign) ______________________________       I have received all my belongings at discharge.    Patient signature________________________________    Chinmay  1/11/2023  2:25 PM
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
## 2023-01-11 NOTE — DISCHARGE INSTRUCTIONS
Behavioral Discharge Planning and Instructions    Summary: Pt presented to the ED with increased mental health status.     Main Diagnosis: 1.  Unspecified psychosis  2.  Rule out bipolar disorder type I most recent episode manic severe psychotic features, exacerbated by amphetamine prescription  3.  Rule out schizophrenia  4.  Attention deficit disorder by history though high likelihood of bipolar versus attention deficit    Health Care Follow-up:     Eirmed- Left message for follow ups  Med management:Jeovany Vargas- Tuesday January 31st @ 11:00 AM   Therapy: Belle- Monday February 6th @ 10:00 AM  Sima Diaz. [address] [place] [id number]  Phone: [phone]   Fax: [phone]  [email or web address]    [facility]  CMer - Rigoberto Gao  [address] [address]  Phone:  [phone]   Fax - [phone]        NUMBERS TO CALL IN CRISIS    National Suicide Prevention Lifeline ([facility])  1-790.109.9493    Crisis Text Line ([place])  Text  HOME  to 095301    Mental Health Crisis Line ([place], [place])  231.802.6526    [facility] Mobile Crisis ([place], [place])   [phone]      If you are feeling very unsafe, call 911 or bring yourself to the Emergency Room      Attend all scheduled appointments with your outpatient providers. Call at least 24 hours in advance if you need to reschedule an appointment to ensure continued access to your outpatient providers.     Major Treatments, Procedures and Findings:  You were provided with: a psychiatric assessment, assessed for medical stability, medication evaluation and/or management, group therapy, family therapy, individual therapy, CD evaluation/assessment, milieu management, and medical interventions    Symptoms to Report: feeling more aggressive, increased confusion, losing more sleep, mood getting worse, or thoughts of suicide    Early warning signs can include: increased depression or anxiety sleep  "disturbances increased thoughts or behaviors of suicide or self-harm  increased unusual thinking, such as paranoia or hearing voices    Safety and Wellness:  Take all medicines as directed.  Make no changes unless your doctor suggests them.      Follow treatment recommendations.  Refrain from alcohol and non-prescribed drugs.  Ask your support system to help you reduce your access to items that could harm yourself or others. Items could include:  Firearms  Medicines (both prescribed and over-the-counter)  Knives and other sharp objects  Ropes and like materials  Car keys  If there is a concern for safety, call 911. If there is a concern for safety, call 911.    Resources:   Crisis Intervention: 421.633.1771 or 516-692-9683 (TTY: 653.970.4193).  Call anytime for help.  National [place] on Mental Illness (www.[place].juan.org): 341.776.4785 or 273-015-1076.  Alcoholics Anonymous (www.alcoholics-anonymous.org): Check your phone book for your local chapter.  Suicide Awareness Voices of Education (SAVE) (www.save.org): 703-190-KBQV (1626)  National Suicide Prevention Line (www.mentalhealthmn.org): 703-611-NKXQ (8703)  Mental Health Consumer/Survivor Network of [place] (www.mhcsn.net): 805.317.6049 or 125-296-0905  Mental Health Association of [place] (www.mentalhealth.org): 552.594.4259 or 789-511-4237  Self- Management and Recovery Training., [employer]-- Toll free: 234.511.3021  www.[employer]  Text 4 Life: txt \"LIFE\" to 28174 for immediate support and crisis intervention  Crisis text line: Text \"[place]\" to 134224. Free, confidential, 24/7.  Crisis Intervention: 818.518.9719 or 134-801-2498. Call anytime for help.     General Medication Instructions:   See your medication sheet(s) for instructions.   Take all medicines as directed.  Make no changes unless your doctor suggests them.   Go to all your doctor visits.  Be sure to have all your required lab tests. This way, your medicines can be refilled on time.  Do not use any drugs not " prescribed by your doctor.  Avoid alcohol.    Advance Directives:   Scanned document on file with [employer]? No scanned doc  Is document scanned? Pt states no documents  Honoring Choices Your Rights Handout: Informed and given  Was more information offered? Pt declined    The Treatment team has appreciated the opportunity to work with you. If you have any questions or concerns about your recent admission, you can contact the unit which can receive your call 24 hours a day, 7 days a week. They will be able to get in touch with a Provider if needed. The unit number is [phone] .
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
## 2023-01-11 NOTE — CARE PLAN
Prior to Admission Medication Reconciliation:     Medications added:   [x] None  [] As listed below:    Medications deleted:   [x] None  [] As listed below:    Medications marked for review/removal by attending:  [x] None  [] As listed below:    Changes made to existing medications:   [] None  [x] Updated time of day, strengths and frequencies to most current.     Pertinent notes/medications patient takes different than prescribed:     Lotrimin- pt does not remember why it was prescribed and hasn't been taking      Dexedrine- pt has not been taking for at least 1 week. Reports that she doesn't know how it effects her and isn't sure she should be taking.       Reports she is compliant with tenex- based on fill dates, should be out.       Stimulant dosing schedule: patient takes ritalin and vyvanse first thing in the morning, the second dose of vyvanse 4 hours after the morning dose and the second ritalin dose 8 hours after the morning dose.     Last times/dates taken verified with patient:  [x] Yes- completed myself   [] Nurse completed, no changes made (double checked entries)  [] Unable to review with patient at this time:    Allergy review:    [x]Did not review: reviewed by nursing  []Allergies reviewed and updated if needed.     Medication reconciliation sources:   [x]Patient  []Patient family member/emergency contact: **  [x][facility] Report Review  [x]Epic Chart Review  []Care Everywhere review  [x]Pharmacy med list/phone call: Per 's- per pharmacist they call Jeovany Vargas often and confirm she is on all 3 stimulants and at such high doses.   []Fill dates reflect compliancy. No concerns.  []Fill dates do not reflect compliancy on the following medications:   [x]Outside meds dispense report:   [x]Fill dates reflect compliancy. No concerns.  []Fill dates do not reflect compliancy on the following medications:   []HomeCare medlist, Nursing home or Assisted Living MAR:  [x]Behavioral Health Provider: RAJAT  Med-Jeovany Vargas  Last seen 1/2/22    Dextroamphetamine ER 15 mg BID    Guanfacine 1 mg daily at bedtime     Hydroxyzine 25 mg 1-2 tab TID PRN     vyvanse 70 mg BID     Methylphenidate 30 mg ER 2 caps BID    Venlafaxine ER 75 mg daily wc  []Other: **    Pharmacy desired at discharge: [facility] [place] [facility]    Is patient on coumadin?   [x]No   []Yes      Fill dates and reported compliancy:  [x] Compliant: fill dates reflect reported compliancy.   [] Not applicable. Patient is not taking any prescribed maintenance medications at this time.   [] Fill dates do not coincide with compliancy, but reports compliancy.    [] Fill dates reflect compliancy, but reports non-compliancy.   [] Cannot assess at this time.     Historian accuracy:  [] Excellent- alert and oriented, understands why meds were prescribed and how to take, able to answer specifics  [x] Good- alert and oriented, understands why meds were prescribed and how to take, some confusion   [] Fair- alert and oriented, doesn't know medications without list, cannot answer specifics about medications, but has a decent process for which to take at home  [] Poor- does not know medications, may not have a process to take at home, may be cognitively unable to review at this time  []Medication management done by family member or facility, no concerns about historian accuracy.   [] Cannot assess at this time.     Medication Management:  [x] Manages meds independently  [] Family member/ other party manages meds/assists:  [] Meds managed by staff at facility  [] Meds set up by home care, family/other party helps administer  [] Meds set up by home care, self administers  [] Cannot assess at this time.     Other medications aside from PTA:  [x] Denies taking any other medications aside from those listed in PTA meds, this includes over-the-counter vitamins, supplements and analgesics.   [] Unable to confirm at this time.     Silvia Nazario on 1/11/2023 at 12:30 PM     Notifying appropriate party of  changes/additions/discrepancies:  []No pertinent changes made, notification not necessary.   [] Notified attending provider via text page/phone call/sticky note or other:  [] Notified other:  [x] Medications have not been reconciled by a provider yet, notification not necessary  [] Pt is not admitted to floor yet or patient is boarding, PTA meds completed before admission.     Prior to Admission medications    Medication Sig Last Dose Taking? Auth Provider Long Term End Date   dextroamphetamine (DEXEDRINE SPANSULE) 15 MG 24 hr capsule Take 15 mg by mouth 2 times daily Past Month Yes Reported, Patient     guanFACINE (TENEX) 1 MG tablet Take 1 mg by mouth At Bedtime Past Week at HS Yes Reported, Patient Yes    hydrOXYzine (ATARAX) 25 MG tablet Take 25-50 mg by mouth 3 times daily as needed 1/10/2023 Yes Reported, Patient     methylphenidate (RITALIN [place]) 30 MG 24 hr capsule Take 60 mg by mouth 2 times daily 1/10/2023 Yes Reported, Patient     multivitamin w/minerals (THERA-VIT-M) tablet Take 1 tablet by mouth daily Past Month Yes Reported, Patient     venlafaxine (EFFEXOR XR) 75 MG 24 hr capsule Take 75 mg by mouth daily 1/10/2023 Yes Reported, Patient Yes    VYVANSE 70 MG capsule Take 70 mg by mouth 2 times daily 1/10/2023 Yes Reported, Patient     clotrimazole (LOTRIMIN) 1 % external solution Apply topically 2 times daily  Patient not taking: Reported on 1/11/2023 Not Taking  Cynthia Rivas DPM
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
## 2023-01-11 NOTE — ED NOTES
"Pt presents to the ED with HPD. HPD states that the patient has mad two different calls to 911 this morning at 0300 and 0730 stating that her  is going to kill her. Pt is at home with her mother-in-law and . PD also reports that the patient was making comments that they are keeping her locked in the house. Pt states \"I basically just fled for my life, my  is trying to kill me.\" Pt reportedly has been locking herself in the bathroom and talking to herself. PD states that the patient bolted out of her house with no shoes on. HPD reports that the patient also made a call to the local school this morning stating that a man is going to bring a gun to the school and start killing people, everyone without ADHD needs to die. Pt is in room. Pt is alert, pt is paranoid and delusional, pt seems anxious.  HPD outside the room placing pt on a  hold. ER provider in with patient. 1:1 continuous observation requested for patient.   "

## 2023-01-12 PROCEDURE — 124N000001 HC R&B MH

## 2023-01-12 PROCEDURE — 99233 SBSQ HOSP IP/OBS HIGH 50: CPT | Performed by: NURSE PRACTITIONER

## 2023-01-12 RX ORDER — CLONAZEPAM 0.5 MG/1
0.5 TABLET ORAL 2 TIMES DAILY PRN
Status: DISCONTINUED | OUTPATIENT
Start: 2023-01-12 | End: 2023-01-15

## 2023-01-12 ASSESSMENT — ACTIVITIES OF DAILY LIVING (ADL)
ADLS_ACUITY_SCORE: 30
LAUNDRY: UNABLE TO COMPLETE
DRESS: SCRUBS (BEHAVIORAL HEALTH)
DRESS: SCRUBS (BEHAVIORAL HEALTH)
LAUNDRY: UNABLE TO COMPLETE
ADLS_ACUITY_SCORE: 30
HYGIENE/GROOMING: INDEPENDENT
ORAL_HYGIENE: INDEPENDENT
ADLS_ACUITY_SCORE: 30
ADLS_ACUITY_SCORE: 30
ORAL_HYGIENE: INDEPENDENT
ADLS_ACUITY_SCORE: 30
HYGIENE/GROOMING: INDEPENDENT

## 2023-01-12 NOTE — PLAN OF CARE
Problem: Suicidal Behavior  Goal: Suicidal Behavior is Absent or Managed  Outcome: Progressing     Problem: Intimate Partner Violence  Goal: Safe Environment for Discharge  Outcome: Progressing     Problem: Thought Process Alteration  Goal: Optimal Thought Clarity  Outcome: Progressing     Problem: Adult Behavioral Health Plan of Care  Goal: Patient-Specific Goal (Individualization)  Description: Pt will sleep >5 hours each night throughout hospitalization.   Pt will eat >50% of each meal throughout hospitalization.  Pt will attend >25% of available groups throughout hospitalization.  Pt will accept appropriate treatment recommendations made by the treatment team.   Outcome: Progressing     Face to Face report received from STEVE Baron.  Rounding completed. Patient observed in bed with eyes closed appearing to sleep with resting respirations noted.  15 minute and PRN checks all night. No complaints offered. Will continue to monitor. Patient able to make needs known.    Face to face end of shift report communicated to [facility] day shift RN.     Eugenia Young RN  1/12/2023  1:47 AM           Eugenia Young RN  1/12/2023  1:47 AM

## 2023-01-12 NOTE — PROGRESS NOTES
"CLINICAL NUTRITION SERVICES  -  ASSESSMENT NOTE    Barbi Vale : Admission Nutrition Risk Screen - unsure of weight loss    37 yof admitted for manic behavior. Medical hx includes ADHD, anxiety. AMS. Weight is down 11lbs in the last month. Intake/appetite poor this morning.    Diet Order: Regular  Intake: 25% intake x 1 meal. RN note from yesterday- ate 100% of meals     Height: 5' 3\"  Weight: 172 lbs 11.2 oz  Body mass index is 30.59 kg/m .  Weight Status:  Obesity Grade I BMI 30-34.9  Weight History: 6% weight loss in 1 month  Wt Readings from Last 10 Encounters:   01/11/23 78.3 kg (172 lb 11.2 oz)   12/16/22 83.3 kg (183 lb 9.6 oz)   08/18/22 81.3 kg (179 lb 3.2 oz)   07/14/22 81.6 kg (180 lb)   02/23/22 83.9 kg (185 lb)   06/03/21 81.2 kg (179 lb)   05/18/21 82.1 kg (181 lb)   02/09/21 82.1 kg (181 lb)   01/19/21 82.1 kg (181 lb)   01/12/21 82.6 kg (182 lb 3.2 oz)      Malnutrition Diagnosis: Suspect poor nutrition status with weight loss. Poor intake possible with AMS.    NUTRITION RECOMMENDATIONS  - Encourage intake at meal times  - Offer Ensure with poor appetite/intake  - May benefit from a daily multivitamin/mineral    MONITORING AND EVALUATION:  RD will monitor intake, weight, labs  "

## 2023-01-12 NOTE — PLAN OF CARE
Problem: Adult Behavioral Health Plan of Care  Goal: Patient-Specific Goal (Individualization)  Description: Pt will sleep >5 hours each night throughout hospitalization.   Pt will eat >50% of each meal throughout hospitalization.  Pt will attend >25% of available groups throughout hospitalization.  Pt will accept appropriate treatment recommendations made by the treatment team.   Outcome: Progressing    Face to face end of shift report received from night shift RN. Rounding completed. Pt observed in their own bed, with eyes closed, in right side-lying position, and with rested breathing. Pt up to the lobby for breakfast. Pt denies pain and other discomforts. Pt denies SI, HI, and hallucinations. Pt refuses this shift's scheduled Loxitane; Pt medication education handout and discussion provided; Pt reports having further questions for the provider. Pt attends group. Will continue to support the needs of the patient. Face to face end of shift report to be given to evening shift RN.       Problem: Thought Process Alteration  Goal: Optimal Thought Clarity  Outcome: Progressing     Problem: Intimate Partner Violence  Goal: Safe Environment for Discharge  Outcome: Progressing     Problem: Suicidal Behavior  Goal: Suicidal Behavior is Absent or Managed  Outcome: Progressing -- Pt remained free of self injury and harm throughout the duration of this shift.

## 2023-01-12 NOTE — PLAN OF CARE
Problem: Thought Process Alteration  Goal: Optimal Thought Clarity  Outcome: Not Progressing     Problem: Adult Behavioral Health Plan of Care  Goal: Patient-Specific Goal (Individualization)  Description: Pt will sleep >5 hours each night throughout hospitalization.   Pt will eat >50% of each meal throughout hospitalization.  Pt will attend >25% of available groups throughout hospitalization.  Pt will accept appropriate treatment recommendations made by the treatment team.   Outcome: Progressing   Patient has been isolative and withdrawn to room all shift.  She does follow directions from staff and is polite.  Continues to make delusional statements and is nonsensical in conversation.  Refused HS medication stating that she doesn't need medication because she feels better then she ever has.  Full range affect.  Ate 100% of meals.  No complaints of pain.  VS WNL.  Face to face end of shift report communicated to night shift RN.     Loraine Valenzuela RN  1/11/2023  10:12 PM        Alert and awak, no focal deficits, no motor or sensory deficits.

## 2023-01-13 ENCOUNTER — TELEPHONE (OUTPATIENT)
Dept: BEHAVIORAL HEALTH | Facility: CLINIC | Age: 38
End: 2023-01-13

## 2023-01-13 PROCEDURE — 250N000013 HC RX MED GY IP 250 OP 250 PS 637: Performed by: NURSE PRACTITIONER

## 2023-01-13 PROCEDURE — 99233 SBSQ HOSP IP/OBS HIGH 50: CPT | Performed by: NURSE PRACTITIONER

## 2023-01-13 PROCEDURE — 124N000001 HC R&B MH

## 2023-01-13 RX ORDER — LAMOTRIGINE 25 MG/1
25 TABLET ORAL AT BEDTIME
Status: DISCONTINUED | OUTPATIENT
Start: 2023-01-13 | End: 2023-01-24 | Stop reason: HOSPADM

## 2023-01-13 RX ORDER — LORAZEPAM 1 MG/1
1 TABLET ORAL 2 TIMES DAILY
Status: DISCONTINUED | OUTPATIENT
Start: 2023-01-13 | End: 2023-01-15

## 2023-01-13 RX ADMIN — LORAZEPAM 1.5 MG: 1 TABLET ORAL at 15:12

## 2023-01-13 RX ADMIN — LORAZEPAM 1 MG: 1 TABLET ORAL at 20:23

## 2023-01-13 ASSESSMENT — ACTIVITIES OF DAILY LIVING (ADL)
ADLS_ACUITY_SCORE: 30
ORAL_HYGIENE: INDEPENDENT
LAUNDRY: UNABLE TO COMPLETE
ADLS_ACUITY_SCORE: 30
DRESS: SCRUBS (BEHAVIORAL HEALTH)
ADLS_ACUITY_SCORE: 30
HYGIENE/GROOMING: INDEPENDENT
ADLS_ACUITY_SCORE: 30
ADLS_ACUITY_SCORE: 30

## 2023-01-13 NOTE — PLAN OF CARE
Problem: Thought Process Alteration  Goal: Optimal Thought Clarity  Outcome: Progressing     Problem: Adult Behavioral Health Plan of Care  Goal: Patient-Specific Goal (Individualization)  Description: Pt will sleep >5 hours each night throughout hospitalization.   Pt will eat >50% of each meal throughout hospitalization.  Pt will attend >25% of available groups throughout hospitalization.  Pt will accept appropriate treatment recommendations made by the treatment team.   Outcome: Progressing  Patient has been isolative and withdrawn to her room much of the day.  Refuses to talk with staff or participate in nursing assessment.  States she is doing fine and doesn't need assistance from  anyone.  No scheduled or PRN medications given.  Ate 100% of meals.  No complaints of pain.  VS WNL.  Will continue to monitor.  Face to face end of shift report communicated to night shift RN.     Loraine Valenzuela RN  1/12/2023  10:33 PM

## 2023-01-13 NOTE — PLAN OF CARE
Problem: Adult Behavioral Health Plan of Care  Goal: Patient-Specific Goal (Individualization)  Description: Pt will sleep >5 hours each night throughout hospitalization.   Pt will eat >50% of each meal throughout hospitalization.  Pt will attend >25% of available groups throughout hospitalization.  Pt will accept appropriate treatment recommendations made by the treatment team.   Outcome: Progressing    Face to face end of shift report received from night shift RN. Rounding completed. Pt observed writing in a journal while sitting in the lobby. Pt denies pain, SI, HI, and hallucinations. Pt withdrawn. Pt accepts and tolerates meeting with April NP. Pt accepts and receives this shift's scheduled medication. Will continue to support the needs of the patient. Face to face end of shift report to be given to evening shift RN.       Problem: Thought Process Alteration  Goal: Optimal Thought Clarity  Outcome: Progressing     Problem: Suicidal Behavior  Goal: Suicidal Behavior is Absent or Managed  Outcome: Progressing -- Pt remained free of self injury and harm throughout the duration of this shift.

## 2023-01-13 NOTE — TELEPHONE ENCOUNTER
Writer received a call from [facility] ([phone]) stating today is pt's LCD in IP; requested concurrent review be completed today as they are closed on Monday. Writer notified IP RN via [place].

## 2023-01-13 NOTE — PROGRESS NOTES
"[facility] PSYCHIATRY  PROGRESS NOTE     SUBJECTIVE     Ellie is extremely pressured in her speech today and her insight was very poor.  She tells me she is not going to take the loxapine that her outpatient psychiatric nurse practitioner recommends she start because \"I do not need anything like that I can only need possibly some anxiety medications.  I just have attention deficit disorder\" she is quite grandiose and pressured today.  She is argumentative and irritable.  She told me that she called her  place of employment and told her 's boss that he was mentally unstable and that she figured her boss should know so \"the school can be safe\" her  apparently is a teacher at a local school and Ellie told me she believes her \" is still mentally unstable if he does not footboard at home he might do it at the school and I do not want anybody hurt\".  She is now telling me today that she believes her  is trying to harm her and is worried for her 3-year-old daughter's safety.  Prior to this manic episode she has never made comments to her outpatient provider or anyone else that her  is abusive in any way and this is likely still grossly delusional.  I asked her if she would feel safe to discharge home to her  and she told me \"no but I am going to have to\".  She has no thoughts of harming herself or others.  She has never attempted suicide and states she has never actually had any episodes of depression.  She did tell me she would be willing to restart Effexor for anxiety.  I did tell her that that would be contraindicated if she truly did have bipolar disorder and from her presentation yesterday and today I still believe she meets criteria for bipolar disorder versus adhd or SUHA.        MEDICATIONS   Scheduled Meds:  PRN Meds:.acetaminophen, alum & mag hydroxide-simethicone, clonazePAM, hydrOXYzine, melatonin, OLANZapine **OR** OLANZapine, senna-docusate     ALLERGIES " "  Allergies   Allergen Reactions     Depo-Provera [Medroxyprogesterone] Swelling     Swelled up, headaches        MENTAL STATUS EXAM   Vitals: /66   Pulse 85   Temp 99.1  F (37.3  C) (Temporal)   Resp 18   Ht 1.6 m (5' 3\")   Wt 78.3 kg (172 lb 11.2 oz)   LMP 12/15/2022   SpO2 96%   BMI 30.59 kg/m      Appearance: Alert, oriented, dressed in hospital scrubs  Attitude: Cooperative   Eye Contact: Fair  Mood: \"Depressed\"  Affect: Restricted range of affect, mood congruent  Speech: Slight reduction in rate. Normal rhythm   Psychomotor Behavior: No tremor, rigidity, akathisia, or psychomotor retardation    Thought Process: Logical, goal directed   Associations: No loose associations   Thought Content: Passive SI. No SIB. Denies AVH. No evidence of delusional thought  Insight: Poor  Judgment: Limited  Oriented to: Person, place, and time  Attention Span and Concentration: Intact  Recent and Remote Memory: Intact  Language: English with appropriate syntax and vocabulary  Fund of Knowledge: Average  Muscle Strength and Tone: Grossly normal  Gait and Station: Grossly normal       LABS   No results found for this or any previous visit (from the past 24 hour(s)).      IMPRESSION        This is a 37 year old female with a PMH of attention deficit disorder who has been treated with multiple stimulant medication.  She has never had an episode of psychosis in the past though has had some depressive episodes with high anxiety and racing thoughts.  Her outpatient provider had been receiving text messages from her this morning and had been very concerned she was psychotic and encouraged her to come to the emergency room.  While in the emergency room she did get a dose of Zyprexa 10 mg and did have some improvement in her irrational thought process and is now thinking that her  may not be actually trying to kill her though still questions some of her thoughts.  It sounds as though her symptoms started after the " birth of her 3-year-old.  She states that she has had significant racing thoughts and very high anxiety and nothing has been helping significantly.  There is not a family history of diagnosed bipolar disorder though does state that she has family members who likely do have this.  She to has questioned if she has bipolar disorder and it sounds as though that her outpatient provider has also been concerned about this though not quite certain.  She does not have any substance abuse issues and it has not been a concern that she abuses stimulants though likely did send her into a manic episode.     DIAGNOSES     1.  Unspecified psychosis  2.  Rule out bipolar disorder type I most recent episode manic severe psychotic features, exacerbated by amphetamine prescription  3.  Rule out schizophrenia  4.  Attention deficit disorder by history though high likelihood of bipolar versus attention deficit       PLAN     Location: Unit 5  Legal Status: Orders Placed This [facility]      Health Officer Authority to Detain (TONJA)      Emergency Hospitalization Hold (72 Hr Hold)    Safety Assessment:    Behavioral Orders   Procedures     Code 1 - Restrict to Unit     Routine Programming     As clinically indicated     Status 15     Every 15 minutes.      PTA medications continued/changed:     -Discontinue all stimulant medication and Effexor    New medications tried and stopped:     -None    New medications initiated:     -Loxapine: Did not take  -Klonopin 0.5 mg twice daily as needed    Today's Changes:    -Refusing to take loxapine will discontinue  -Did speak with her about starting Lamictal and lithium though refuses  -Will give as needed 0.5 Klonopin twice daily for anxiety/agitation the will not discharge her on this    Programming: Patient will be treated in a therapeutic milieu with appropriate individual and group therapies. Education will be provided on diagnoses, medications, and treatments.     Medical diagnoses:  Per  medicine    Consult: None    Labs none needed today    Anticipated LOS: Likely discharge AGAINST MEDICAL ADVICE when 72-hour hold is up.  Is unwilling to take any medications and remains pressured and somewhat grandiose as well as paranoid  Disposition: Likely discharge home.  If she does not feel safe with  she may have other family she can stay with       TREATMENT TEAM CARE PLAN     Progress: Continued symptoms.    Continued Stay Criteria/Rationale: Continued symptoms without sufficient improvement/resolution.    Medical/Physical: See above.    Precautions: See above.     Plan: Continue inpatient care with unit support and medication management.    Rationale for change in precautions or plan: NA due to no change.    Participants: Debbi Barger NP, Nursing, SW, OT.    The patient's care was discussed with the treatment team and chart notes were reviewed.       ATTESTATION    Debbi Barger NP

## 2023-01-13 NOTE — PLAN OF CARE
Problem: Suicidal Behavior  Goal: Suicidal Behavior is Absent or Managed  Outcome: Progressing     Problem: Intimate Partner Violence  Goal: Safe Environment for Discharge  Outcome: Progressing     Problem: Thought Process Alteration  Goal: Optimal Thought Clarity  Outcome: Progressing     Problem: Adult Behavioral Health Plan of Care  Goal: Patient-Specific Goal (Individualization)  Description: Pt will sleep >5 hours each night throughout hospitalization.   Pt will eat >50% of each meal throughout hospitalization.  Pt will attend >25% of available groups throughout hospitalization.  Pt will accept appropriate treatment recommendations made by the treatment team.   Outcome: Progressing     Face to Face report received from STEVE Baron.  Rounding completed. Patient observed in her room with eyes closed appearing to sleep with resting respirations noted.  15 minute and PRN checks all night. No complaints offered. Will continue to monitor.    Face to face end of shift report communicated to [facility] day shift RN.     Eugenia Young RN  1/13/2023  1:23 AM

## 2023-01-13 NOTE — PROGRESS NOTES
"[facility] PSYCHIATRY  PROGRESS NOTE     SUBJECTIVE     She was quite irritable with nursing and  this morning.  She was initially irritable with me though became a bit more cooperative.  She still has no insight at all into the delusions and states \"I am willing to take medications now.\"  I asked her more about this and asked her if she understood the diagnosis of bipolar disorder and recalled our conversation yesterday and she states \"I do not think I am bipolar I have anxiety and attention deficit disorder but I will take medications to get home and get out of here\" I did tell her that I wanted her to grasp and read some information before she decided to take any medications.  She agreed to take a moderate dose of Ativan to help her \"anxiety\" so she can read information I gave her.  It is very hard to follow her in conversation and she is very pressured and racing thoughts though all of her thoughts fixate around how she believes her  is going to harm her or possibly their daughter or even her stepmother.  I did speak with the  today with her permission and Ellie's stepmother is with her  and also believes Ellie is behaving very strangely and family has been questioning bipolar disorder for quite some time.  Family is not feeling safe currently taking her home as she does have a 3-year-old daughter and has significant delusions though no thoughts or intent to harm anybody or herself.  I did talk to the family about her telling me that she would start medication \"just to get out of here\".  After a few doses of medications she may have enough insight to realize the benefits and how irrational her thoughts have been recently.  Family is aware that there is no danger factor enough for me to fill out petition for commitment or Quinonez.     MEDICATIONS   Scheduled Meds:  PRN Meds:.acetaminophen, alum & mag hydroxide-simethicone, clonazePAM, hydrOXYzine, melatonin, OLANZapine " "**OR** OLANZapine, senna-docusate     ALLERGIES   Allergies   Allergen Reactions     Depo-Provera [Medroxyprogesterone] Swelling     Swelled up, headaches        MENTAL STATUS EXAM   Vitals: /83   Pulse 78   Temp 98  F (36.7  C) (Temporal)   Resp 18   Ht 1.6 m (5' 3\")   Wt 78.3 kg (172 lb 11.2 oz)   LMP 12/15/2022   SpO2 98%   BMI 30.59 kg/m      Appearance: Alert, oriented, dressed in hospital scrubs  Attitude: Cooperative   Eye Contact: Fair  Mood: \"Depressed\"  Affect: Restricted range of affect, mood congruent  Speech: Slight reduction in rate. Normal rhythm   Psychomotor Behavior: No tremor, rigidity, akathisia, or psychomotor retardation    Thought Process: Logical, goal directed   Associations: No loose associations   Thought Content: Passive SI. No SIB. Denies AVH. No evidence of delusional thought  Insight: Poor  Judgment: Limited  Oriented to: Person, place, and time  Attention Span and Concentration: Intact  Recent and Remote Memory: Intact  Language: English with appropriate syntax and vocabulary  Fund of Knowledge: Average  Muscle Strength and Tone: Grossly normal  Gait and Station: Grossly normal       LABS   No results found for this or any previous visit (from the past 24 hour(s)).      IMPRESSION        This is a 37 year old female with a PMH of attention deficit disorder who has been treated with multiple stimulant medication.  She has never had an episode of psychosis in the past though has had some depressive episodes with high anxiety and racing thoughts.  Her outpatient provider had been receiving text messages from her this morning and had been very concerned she was psychotic and encouraged her to come to the emergency room.  While in the emergency room she did get a dose of Zyprexa 10 mg and did have some improvement in her irrational thought process and is now thinking that her  may not be actually trying to kill her though still questions some of her thoughts.  It " sounds as though her symptoms started after the birth of her 3-year-old.  She states that she has had significant racing thoughts and very high anxiety and nothing has been helping significantly.  There is not a family history of diagnosed bipolar disorder though does state that she has family members who likely do have this.  She to has questioned if she has bipolar disorder and it sounds as though that her outpatient provider has also been concerned about this though not quite certain.  She does not have any substance abuse issues and it has not been a concern that she abuses stimulants though likely did send her into a manic episode.     DIAGNOSES     1.  Unspecified psychosis  2.  Rule out bipolar disorder type I most recent episode manic severe psychotic features, exacerbated by amphetamine prescription  3.  Rule out schizophrenia  4.  Attention deficit disorder by history though high likelihood of bipolar versus attention deficit       PLAN     Location: Unit 5  Legal Status: Orders Placed This [facility]      Health Officer Authority to Detain (TONJA)      Emergency Hospitalization Hold (72 Hr Hold)    Safety Assessment:    Behavioral Orders   Procedures     Code 1 - Restrict to Unit     Routine Programming     As clinically indicated     Status 15     Every 15 minutes.      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 Education will be provided on diagnoses, medications, and  treatments.     Medical diagnoses:  Per medicine    Consult: None    Labs none needed today    Anticipated LOS: Likely discharge AGAINST MEDICAL ADVICE when 72-hour hold is up.  Is unwilling to take any medications and remains pressured and somewhat grandiose as well as paranoid  Disposition: Likely discharge home.  If she does not feel safe with  she may have other family she can stay with       TREATMENT TEAM CARE PLAN     Progress: Continued symptoms.    Continued Stay Criteria/Rationale: Continued symptoms without sufficient improvement/resolution.    Medical/Physical: See above.    Precautions: See above.     Plan: Continue inpatient care with unit support and medication management.    Rationale for change in precautions or plan: NA due to no change.    Participants: Debbi Barger NP, Nursing, SW, OT.    The patient's care was discussed with the treatment team and chart notes were reviewed.       ATTESTATION    Debbi Barger NP

## 2023-01-13 NOTE — TELEPHONE ENCOUNTER
Roro from [facility] called ([phone] ) stating today is pt's LCD in IP; requested concurrent review be completed today as they are closed on Monday. WTr notified IP RN via outlook.

## 2023-01-13 NOTE — PROGRESS NOTES
"Spoke with pt this morning. Pt was found resting in bed. She states she is doing ok. She presents irritable and short with answers. Went over outpatient services with pt and she states \"I'll be here for a while probably\". Invited pt for conversation of why she believes that. Pt is unable to form an answer. Encouraged pt to leave her bedroom and attend some groups today. Pt nods her head in ageence and has no further questions or concerns.     Discharge Plan: Home with services   Services: Psychiatry and therapy   Placement: NA  Referrals made: NA  Follow Ups: Will make upon discharge   Court status: EVANGELISTA  Quinonez/SDM: NA  "

## 2023-01-13 NOTE — PLAN OF CARE
"Face to face shift report received from Nurse. Rounding completed, pt observed.            Problem: Adult Behavioral Health Plan of Care  Goal: Patient-Specific Goal (Individualization)  Description: Pt will sleep >5 hours each night throughout hospitalization.   Pt will eat >50% of each meal throughout hospitalization.  Pt will attend >25% of available groups throughout hospitalization.  Pt will accept appropriate treatment recommendations made by the treatment team.   Outcome: Progressing     Problem: Thought Process Alteration  Goal: Optimal Thought Clarity  Outcome: Progressing     Problem: Intimate Partner Violence  Goal: Safe Environment for Discharge  Outcome: Progressing     Problem: Suicidal Behavior  Goal: Suicidal Behavior is Absent or Managed  Outcome: Progressing         Pt. Observed st the start of this shift attending group.     Pt. talking more today. Pt. Wished to talk to her .        Pt refused Vraylar and lamotrigine/ order with this evenings medication pass. Pt. states  In own words that the problems she is having is because of ADHD and what she described as an \"Adrenaln rush\" and not bipolar disorder. She also explained that she forgot to tell her provider this when she talked to her last and refused her scheduled medications execpt for ativan.     Pt. Attending groups and consuming meals this shift.     Face to face report will be communicated to oncoming STEVE.    Kiley Sun RN  1/13/2023  10:54 PM  "

## 2023-01-14 PROCEDURE — 99233 SBSQ HOSP IP/OBS HIGH 50: CPT | Performed by: NURSE PRACTITIONER

## 2023-01-14 PROCEDURE — 124N000001 HC R&B MH

## 2023-01-14 PROCEDURE — 250N000013 HC RX MED GY IP 250 OP 250 PS 637: Performed by: NURSE PRACTITIONER

## 2023-01-14 RX ADMIN — LORAZEPAM 1 MG: 1 TABLET ORAL at 09:14

## 2023-01-14 RX ADMIN — LORAZEPAM 1 MG: 1 TABLET ORAL at 22:56

## 2023-01-14 RX ADMIN — CLONAZEPAM 0.5 MG: 0.5 TABLET ORAL at 13:29

## 2023-01-14 ASSESSMENT — ACTIVITIES OF DAILY LIVING (ADL)
ADLS_ACUITY_SCORE: 30
DRESS: SCRUBS (BEHAVIORAL HEALTH)
ADLS_ACUITY_SCORE: 30
LAUNDRY: UNABLE TO COMPLETE
ADLS_ACUITY_SCORE: 30
ORAL_HYGIENE: INDEPENDENT
ADLS_ACUITY_SCORE: 30
HYGIENE/GROOMING: INDEPENDENT
ADLS_ACUITY_SCORE: 30
LAUNDRY: UNABLE TO COMPLETE
ADLS_ACUITY_SCORE: 30
HYGIENE/GROOMING: INDEPENDENT
DRESS: SCRUBS (BEHAVIORAL HEALTH)
ORAL_HYGIENE: INDEPENDENT
ADLS_ACUITY_SCORE: 30
ADLS_ACUITY_SCORE: 30

## 2023-01-14 NOTE — PROGRESS NOTES
"[facility] PSYCHIATRY  PROGRESS NOTE     SUBJECTIVE     Ellie is still very paranoid about her .  She did decide to let her  and stepmother take the car keys yesterday as they needed the vehicle that had the car seat and needs for her child.  She continues to tell me she is willing to take the medications \"to save my \".  She believes that \"if my  loses control, he could hurt himself me and her child and if I take medications he believes he is in control so I am okay with that because it keeps us all safe\" she has still absolutely no insight into her delusions.  She is writing in excessive amounts and at times it is disorganized with words crossed out and in different areas on the page.  She is still very pressured in her speech and quite irritable makes very little eye contact.  Affect is very restricted.  I did give her an article to read yesterday about the over lab between attention deficit disorder and bipolar disorder and she had stated \"I am 100% sure I have attention deficit disorder and I am writing down why I feel this way and when I am done I am going to give it to you but it is very hard for me to explain\" she talks almost incessantly but is extremely circumstantial and verbose.  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  She has no side effects to her medications.  She did take lorazepam last night and states she slept very well.  Probably first night of good sleep she has had other than the evening she took Zyprexa.       MEDICATIONS   Scheduled Meds:    cariprazine  3 mg Oral At Bedtime     lamoTRIgine  25 mg Oral At Bedtime     LORazepam  1 mg Oral BID     PRN Meds:.acetaminophen, alum & mag hydroxide-simethicone, clonazePAM, hydrOXYzine, melatonin, OLANZapine **OR** OLANZapine, senna-docusate     ALLERGIES   Allergies   Allergen " "Reactions     Depo-Provera [Medroxyprogesterone] Swelling     Swelled up, headaches        MENTAL STATUS EXAM   Vitals: /78   Pulse 84   Temp 97.6  F (36.4  C) (Tympanic)   Resp 16   Ht 1.6 m (5' 3\")   Wt 78.3 kg (172 lb 11.2 oz)   LMP 12/15/2022   SpO2 96%   BMI 30.59 kg/m      Appearance: Alert, oriented, dressed in hospital scrubs  Attitude: Cooperative   Eye Contact: Fair  Mood: \"Depressed\"  Affect: Restricted range of affect, mood congruent  Speech: Slight reduction in rate. Normal rhythm   Psychomotor Behavior: No tremor, rigidity, akathisia, or psychomotor retardation    Thought Process: Logical, goal directed   Associations: No loose associations   Thought Content: Passive SI. No SIB. Denies AVH. No evidence of delusional thought  Insight: Poor  Judgment: Limited  Oriented to: Person, place, and time  Attention Span and Concentration: Intact  Recent and Remote Memory: Intact  Language: English with appropriate syntax and vocabulary  Fund of Knowledge: Average  Muscle Strength and Tone: Grossly normal  Gait and Station: Grossly normal       LABS   No results found for this or any previous visit (from the past 24 hour(s)).      IMPRESSION        This is a 37 year old female with a PMH of attention deficit disorder who has been treated with multiple stimulant medication.  She has never had an episode of psychosis in the past though has had some depressive episodes with high anxiety and racing thoughts.  Her outpatient provider had been receiving text messages from her this morning and had been very concerned she was psychotic and encouraged her to come to the emergency room.  While in the emergency room she did get a dose of Zyprexa 10 mg and did have some improvement in her irrational thought process and is now thinking that her  may not be actually trying to kill her though still questions some of her thoughts.  It sounds as though her symptoms started after the birth of her 3-year-old.  " She states that she has had significant racing thoughts and very high anxiety and nothing has been helping significantly.  There is not a family history of diagnosed bipolar disorder though does state that she has family members who likely do have this.  She to has questioned if she has bipolar disorder and it sounds as though that her outpatient provider has also been concerned about this though not quite certain.  She does not have any substance abuse issues and it has not been a concern that she abuses stimulants though likely did send her into a manic episode.     DIAGNOSES     1.  Unspecified psychosis  2.  Rule out bipolar disorder type I most recent episode manic severe psychotic features, exacerbated by amphetamine prescription  3.  Rule out schizophrenia  4.  Attention deficit disorder by history though high likelihood of bipolar versus attention deficit       PLAN     Location: Unit 5  Legal Status: Orders Placed This [facility]      Health Officer Authority to Detain (TONJA)      Emergency Hospitalization Hold (72 Hr Hold)    Safety Assessment:    Behavioral Orders   Procedures     Code 1 - Restrict to Unit     Routine Programming     As clinically indicated     Status 15     Every 15 minutes.      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    programming: Patient will be treated in a therapeutic milieu with appropriate individual and group therapies. Education will be provided on diagnoses, medications, and treatments.      Medical diagnoses:  Per medicine    Consult: None    Labs none needed today    Anticipated LOS: Likely discharge AGAINST MEDICAL ADVICE when 72-hour hold is up.  Is unwilling to take any medications and remains pressured and somewhat grandiose as well as paranoid  Disposition: Likely discharge home.  If she does not feel safe with  she may have other family she can stay with       TREATMENT TEAM CARE PLAN     Progress: Continued symptoms.  Minimal improvement    Continued Stay Criteria/Rationale: Continued symptoms without sufficient improvement/resolution.    Medical/Physical: See above.    Precautions: See above.     Plan: Continue inpatient care with unit support and medication management.    Rationale for change in precautions or plan: NA due to no change.    Participants: Debbi Barger NP, Nursing, SW, OT.    The patient's care was discussed with the treatment team and chart notes were reviewed.       ATTESTATION    Debbi Barger NP

## 2023-01-14 NOTE — PLAN OF CARE
Face to face shift report received from Kiley CAMPBELL RN. Rounding completed, pt observed.    Problem: Adult Behavioral Health Plan of Care  Goal: Patient-Specific Goal (Individualization)  Description: Pt will sleep >5 hours each night throughout hospitalization.   Pt will eat >50% of each meal throughout hospitalization.  Pt will attend >25% of available groups throughout hospitalization.  Pt will accept appropriate treatment recommendations made by the treatment team.   Outcome: Not Progressing  Note: Shift Summary:  Patient in bed with eyes closed.  Respirations are visible and regular.  Position changes noted.     Problem: Thought Process Alteration  Goal: Optimal Thought Clarity  Description: Patient will be able to hold a reality based conversation by discharge.  Outcome: Not Progressing  Face to face report will be communicated to oncoming RN.    Jeanette Wills RN  1/13/2023

## 2023-01-14 NOTE — PLAN OF CARE
Problem: Adult Behavioral Health Plan of Care  Goal: Patient-Specific Goal (Individualization)  Description: Pt will sleep >5 hours each night throughout hospitalization.   Pt will eat >50% of each meal throughout hospitalization.  Pt will attend >25% of available groups throughout hospitalization.  Pt will accept appropriate treatment recommendations made by the treatment team.   Outcome: Not Progressing    Face to face end of shift report received from night shift RN. Rounding completed. Pt observed in their own bed, with eyes closed, in left side-lying position, and with rested breathing. Pt observed writing in a journal while sitting in her own bed. Pt denies pain, SI, HI, and hallucinations. Pt accepts and receives this shift's scheduled medication. Pt's speech is rapid. Pt pleasant and open to conversation. 1329 Pt requests and receives Klonopin prn as ordered for anxiety; Pt receives educational handout on Klonopin. Pt receives an educational handout on Lamictal. Will continue to support the needs of the patient. Face to face end of shift report to be given to evening shift RN.       Problem: Thought Process Alteration  Goal: Optimal Thought Clarity  Description: Patient will be able to hold a reality based conversation by discharge.  Outcome: Not Progressing     Problem: Intimate Partner Violence  Goal: Safe Environment for Discharge  Outcome: Progressing     Problem: Suicidal Behavior  Goal: Suicidal Behavior is Absent or Managed  Outcome: Progressing -- Pt remained free of self injury and harm throughout the duration of this shift.

## 2023-01-15 PROCEDURE — 124N000001 HC R&B MH

## 2023-01-15 PROCEDURE — 250N000013 HC RX MED GY IP 250 OP 250 PS 637: Performed by: NURSE PRACTITIONER

## 2023-01-15 RX ORDER — LORAZEPAM 1 MG/1
1 TABLET ORAL 3 TIMES DAILY
Status: DISCONTINUED | OUTPATIENT
Start: 2023-01-15 | End: 2023-01-16

## 2023-01-15 RX ORDER — LORAZEPAM 0.5 MG/1
0.5 TABLET ORAL DAILY PRN
Status: DISCONTINUED | OUTPATIENT
Start: 2023-01-15 | End: 2023-01-16

## 2023-01-15 RX ADMIN — HYDROXYZINE HYDROCHLORIDE 25 MG: 25 TABLET ORAL at 11:58

## 2023-01-15 RX ADMIN — LORAZEPAM 1 MG: 1 TABLET ORAL at 09:00

## 2023-01-15 RX ADMIN — CLONAZEPAM 0.5 MG: 0.5 TABLET ORAL at 14:00

## 2023-01-15 ASSESSMENT — ACTIVITIES OF DAILY LIVING (ADL)
ADLS_ACUITY_SCORE: 30
ORAL_HYGIENE: INDEPENDENT
LAUNDRY: UNABLE TO COMPLETE
DRESS: SCRUBS (BEHAVIORAL HEALTH)
DRESS: SCRUBS (BEHAVIORAL HEALTH)
ADLS_ACUITY_SCORE: 30
HYGIENE/GROOMING: INDEPENDENT
HYGIENE/GROOMING: INDEPENDENT
ADLS_ACUITY_SCORE: 30
ORAL_HYGIENE: INDEPENDENT
LAUNDRY: UNABLE TO COMPLETE
ADLS_ACUITY_SCORE: 30

## 2023-01-15 NOTE — PLAN OF CARE
"  Problem: Adult Behavioral Health Plan of Care  Goal: Patient-Specific Goal (Individualization)  Description: Pt will sleep >5 hours each night throughout hospitalization.   Pt will eat >50% of each meal throughout hospitalization.  Pt will attend >25% of available groups throughout hospitalization.  Pt will accept appropriate treatment recommendations made by the treatment team.   Outcome: Progressing    Face to face end of shift report received from night shift RN. Rounding completed. Pt observed in their own bed, with eyes closed, in supine position, and with rested breathing. Pt up to the lobby for breakfast. Pt observed socializing with a peer in the lobby while coloring. Pt denies pain, SI, HI, and hallucinations. Pt's speech is rapid. Pt attends group. 1158 Pt requests and receives Hydroxyzine prn as ordered. In afternoon, Pt reports continuum of anxiety; Pt expresses concerns regarding her ; Pt reports hat she needs to be medicated to \"keep him happy\" and \"in control\". Pt states, \"He's the one that needs help.\" Pt's speech is pressured. 1400 Klonopin administered prn as ordered. Will continue to support the needs of the patient. Face to face end of shift report to be given to evening shift RN.       Problem: Intimate Partner Violence  Goal: Safe Environment for Discharge  Outcome: Progressing     Problem: Suicidal Behavior  Goal: Suicidal Behavior is Absent or Managed  Outcome: Progressing -- Pt remained free of self injury and harm throughout the duration of this shift.       Problem: Thought Process Alteration  Goal: Optimal Thought Clarity  Description: Patient will be able to hold a reality based conversation by discharge.  Outcome: Not Progressing                   "

## 2023-01-15 NOTE — PLAN OF CARE
"SHIFT NOTE: 1500 to 0730    Problem: Thought Process Alteration  Goal: Optimal Thought Clarity  Description: Patient will be able to hold a reality based conversation by discharge.  Outcome: Progressing     Problem: Adult Behavioral Health Plan of Care  Goal: Patient-Specific Goal (Individualization)  Description: Pt will sleep >5 hours each night throughout hospitalization.   Pt will eat >50% of each meal throughout hospitalization.  Pt will attend >25% of available groups throughout hospitalization.  Pt will accept appropriate treatment recommendations made by the treatment team.   Outcome: Progressing   Patient has been calm and cooperative  this shift.  She attended groups and is social with peers.  [facility].  Ate 100% of meals.  Continues to state that she is here because she missed a dose of effexor and that combined with a \"adrenaline rush\" made her thinking unclear. Refused all HS medications.  States she doesn't feel she needs them because she is \"as good as good can get.\"  Patient came out to nurses station at 2255 and requested anxiety medication.  States she is upset that she has took rehome her dog.  Took scheduled Ativan at 2256. Patient laid down and slept through the night.   No complaints of pain.  Vs WNL.  Face to face end of shift report communicated to day shift RN.     Loraine Valenzuela RN  1/14/2023  10:14 PM       "

## 2023-01-16 ENCOUNTER — TELEPHONE (OUTPATIENT)
Dept: BEHAVIORAL HEALTH | Facility: CLINIC | Age: 38
End: 2023-01-16

## 2023-01-16 PROCEDURE — 124N000001 HC R&B MH

## 2023-01-16 PROCEDURE — 99233 SBSQ HOSP IP/OBS HIGH 50: CPT | Performed by: NURSE PRACTITIONER

## 2023-01-16 PROCEDURE — 250N000013 HC RX MED GY IP 250 OP 250 PS 637: Performed by: NURSE PRACTITIONER

## 2023-01-16 RX ORDER — LITHIUM CARBONATE 300 MG/1
600 TABLET, FILM COATED, EXTENDED RELEASE ORAL AT BEDTIME
Status: DISCONTINUED | OUTPATIENT
Start: 2023-01-16 | End: 2023-01-16

## 2023-01-16 RX ORDER — LORAZEPAM 0.5 MG/1
0.5 TABLET ORAL EVERY 4 HOURS PRN
Status: DISCONTINUED | OUTPATIENT
Start: 2023-01-16 | End: 2023-01-24 | Stop reason: HOSPADM

## 2023-01-16 RX ORDER — LITHIUM CARBONATE 300 MG/1
300 TABLET, FILM COATED, EXTENDED RELEASE ORAL AT BEDTIME
Status: COMPLETED | OUTPATIENT
Start: 2023-01-16 | End: 2023-01-16

## 2023-01-16 RX ORDER — LITHIUM CARBONATE 300 MG/1
600 TABLET, FILM COATED, EXTENDED RELEASE ORAL AT BEDTIME
Status: DISCONTINUED | OUTPATIENT
Start: 2023-01-17 | End: 2023-01-23

## 2023-01-16 RX ADMIN — HYDROXYZINE HYDROCHLORIDE 25 MG: 25 TABLET ORAL at 09:36

## 2023-01-16 ASSESSMENT — ACTIVITIES OF DAILY LIVING (ADL)
ADLS_ACUITY_SCORE: 30
DRESS: SCRUBS (BEHAVIORAL HEALTH);INDEPENDENT
ADLS_ACUITY_SCORE: 30
HYGIENE/GROOMING: INDEPENDENT
ADLS_ACUITY_SCORE: 30
ORAL_HYGIENE: INDEPENDENT
ADLS_ACUITY_SCORE: 30

## 2023-01-16 NOTE — PLAN OF CARE
"Face to face end of shift report communicated to oncoming shift.     Luz Flynn RN  1/16/2023  3:32 PM    Problem: Adult Behavioral Health Plan of Care  Goal: Patient-Specific Goal (Individualization)  Description: Pt will sleep >5 hours each night throughout hospitalization.   Pt will eat >50% of each meal throughout hospitalization.  Pt will attend >25% of available groups throughout hospitalization.  Pt will accept appropriate treatment recommendations made by the treatment team.   Outcome: Progressing  Note: Patient up on unit attends all groups, patient is appropriate and cooperative with staff and nursing assessment.  Patient lets needs be known.  Patient refuses AM scheduled dose of Ativan, \"I don't feel I need that now, and she told me not to take it if I don't think I need it\" \"I metabolize things so fast and so much faster than other people, we need to run my labs and get this all figured out while I'm here\"  Patient's speech is rapid yet remains clear in conversation.     Patient eats 100% of meals.     Patient eager to talk to this writer at the start of the day, dismissive second half of day.      Patient denies SI, HI, AH and VH.         Goal Outcome Evaluation:                        "

## 2023-01-16 NOTE — PLAN OF CARE
Problem: Adult Behavioral Health Plan of Care  Goal: Patient-Specific Goal (Individualization)  Description: Pt will sleep >5 hours each night throughout hospitalization.   Pt will eat >50% of each meal throughout hospitalization.  Pt will attend >25% of available groups throughout hospitalization.  Pt will accept appropriate treatment recommendations made by the treatment team.   Outcome: Progressing   Goal Outcome Evaluation:     0015: Rounding complete. Patient observed. Patient in bed at beginning of shift and appears to be asleep. Regular, non-labored respirations noted. Laying in supine position.     0600: Patient slept about 5 hours and continues to be in bed at this time. 15 minute safety and PRN checks done throughout the shift. Position changes noted.     Face to face report given with opportunity to observe patient.    Report given to STEVE Montanez.     Destiny Pereira RN   1/16/2023  7:07 AM

## 2023-01-16 NOTE — PROGRESS NOTES
"[facility] PSYCHIATRY  PROGRESS NOTE     SUBJECTIVE     She has not taking any scheduled medications other than lorazepam on 1 or 2 occasions.  She refused the Vraylar and lamotrigine.  She showed me a handout that she had printed off for her and a handout was an article from [place] on becoming overwhelmed.  She began to tell me how \"I know why I look manic it is all because I skipped my hydroxyzine dose\" I told her that this article appeared to be relevant to some extent though being overwhelmed does not lead to such an acute situation as she is in now.  She continues to tell me how she just has anxiety though is more open to discussing bipolar disorder and how her symptoms likely feel like anxiety though she also has grandiosity, extremely pressured speech and is very circumstantial.  She did not become upset when I told her this.  She did tell me \"you might be right.  I really feel like he listens to me so I trust you and I will take what ever medication you feel would help me\".  She has sent this to me before and then she changes her mind about medications.  Today she is telling me she is willing to stay past her 72-hour hold start lithium and stabilize here and return home once stable.  I did try to call her  today and he would be quite pleased with this old her stepmother.       MEDICATIONS   Scheduled Meds:    cariprazine  3 mg Oral At Bedtime     lamoTRIgine  25 mg Oral At Bedtime     LORazepam  1 mg Oral TID     PRN Meds:.acetaminophen, alum & mag hydroxide-simethicone, hydrOXYzine, LORazepam, melatonin, OLANZapine **OR** OLANZapine, senna-docusate     ALLERGIES   Allergies   Allergen Reactions     Depo-Provera [Medroxyprogesterone] Swelling     Swelled up, headaches        MENTAL STATUS EXAM   Vitals: /69 (BP Location: Right arm)   Pulse 73   Temp 98  F (36.7  C) (Temporal)   Resp 16   Ht 1.6 m (5' 3\")   Wt 77.4 kg (170 lb 11.2 oz)   LMP 12/15/2022   SpO2 98%   BMI 30.24 kg/m  " "    Appearance: Alert, oriented, dressed in hospital scrubs  Attitude: Cooperative   Eye Contact: Poor  Mood: Tense  Affect: Restricted range of affect, mood congruent  Speech: Very pressured.  Psychomotor Behavior: No tremor, rigidity, akathisia, or psychomotor retardation    Thought Process: Circumstantial  Associations: No loose associations   Thought Content: Passive SI. No SIB. Denies AVH.  Continues to have some paranoid delusions and is very fixated on \"'s mental health\"  Insight: Some possible improvement  Judgment: Limited  Oriented to: Person, place, and time  Attention Span and Concentration: Intact  Recent and Remote Memory: Intact  Language: English with appropriate syntax and vocabulary  Fund of Knowledge: Average  Muscle Strength and Tone: Grossly normal  Gait and Station: Grossly normal       LABS   No results found for this or any previous visit (from the past 24 hour(s)).      IMPRESSION        This is a 37 year old female with a PMH of attention deficit disorder who has been treated with multiple stimulant medication.  She has never had an episode of psychosis in the past though has had some depressive episodes with high anxiety and racing thoughts.  Her outpatient provider had been receiving text messages from her this morning and had been very concerned she was psychotic and encouraged her to come to the emergency room.  While in the emergency room she did get a dose of Zyprexa 10 mg and did have some improvement in her irrational thought process and is now thinking that her  may not be actually trying to kill her though still questions some of her thoughts.  It sounds as though her symptoms started after the birth of her 3-year-old.  She states that she has had significant racing thoughts and very high anxiety and nothing has been helping significantly.  There is not a family history of diagnosed bipolar disorder though does state that she has family members who likely do have " this.  She to has questioned if she has bipolar disorder and it sounds as though that her outpatient provider has also been concerned about this though not quite certain.  She does not have any substance abuse issues and it has not been a concern that she abuses stimulants though likely did send her into a manic episode.     DIAGNOSES     1.  Unspecified psychosis  2.  Rule out bipolar disorder type I most recent episode manic severe psychotic features, exacerbated by amphetamine prescription  3.  Rule out schizophrenia  4.  Attention deficit disorder by history though high likelihood of bipolar versus attention deficit       PLAN     Location: Unit 5  Legal Status: Orders Placed This Encounter      Health Officer Authority to Detain (TONJA)      Emergency Hospitalization Hold (72 Hr Hold)    Safety Assessment:    Behavioral Orders   Procedures     Code 1 - Restrict to Unit     Routine Programming     As clinically indicated     Status 15     Every 15 minutes.      PTA medications continued/changed:     -Discontinue all stimulant medication and Effexor    New medications tried and stopped:     -None    New medications initiated:     -Loxapine: Did not take  -Klonopin 0.5 mg twice daily as needed    Today's Changes:    Discontinue Vraylar she has not taken any  Start lithium 300 mg nightly and increase to 600 the following day  Continue Lamictal 25 mg    programming: Patient will be treated in a therapeutic milieu with appropriate individual and group therapies. Education will be provided on diagnoses, medications, and treatments.     Medical diagnoses:  Per medicine    Consult: None    Labs none needed today    Anticipated LOS: States she is willing to stay here until she is stabilized on medications even beyond her 72-hour hold  Disposition: Discharge home with  when stable.  Would likely benefit from partial hospital program after discharge once stable.       TREATMENT TEAM CARE PLAN     Progress: Continued  symptoms.  Minimal improvement though has now agreed to start medications    Continued Stay Criteria/Rationale: Continued symptoms without sufficient improvement/resolution.    Medical/Physical: See above.    Precautions: See above.     Plan: Continue inpatient care with unit support and medication management.    Rationale for change in precautions or plan: NA due to no change.    Participants: Debbi Barger NP, Nursing, SW, OT.    The patient's care was discussed with the treatment team and chart notes were reviewed.       ATTESTATION    Debbi Barger NP

## 2023-01-16 NOTE — PLAN OF CARE
"  Problem: Thought Process Alteration  Goal: Optimal Thought Clarity  Description: Patient will be able to hold a reality based conversation by discharge.  Outcome: Not Progressing     Problem: Adult Behavioral Health Plan of Care  Goal: Patient-Specific Goal (Individualization)  Description: Pt will sleep >5 hours each night throughout hospitalization.   Pt will eat >50% of each meal throughout hospitalization.  Pt will attend >25% of available groups throughout hospitalization.  Pt will accept appropriate treatment recommendations made by the treatment team.   Outcome: Progressing  Patient out in the lounge at the beginning of this shift.  Irritable and dismissive with staff.  Up at nurses station asking if the staff are mandated reporters.  When asked why she was concerned about this she stated, \"I'm just hoping that the wrong thing wasn't done, but I guess I'm the one that will have to live with that.\"  Patient would not explain what she meant by this.  She is rambling and tangential in conversation.  Refused all HS medications.  Did attend groups and is social with peers.  No complaints of pain.  VS WNL.  Face to face end of shift report communicated to night shift RN.     Loraine Valenzuela RN  1/15/2023  11:01 PM       "

## 2023-01-17 PROCEDURE — 124N000001 HC R&B MH

## 2023-01-17 PROCEDURE — 250N000013 HC RX MED GY IP 250 OP 250 PS 637: Performed by: NURSE PRACTITIONER

## 2023-01-17 PROCEDURE — 99233 SBSQ HOSP IP/OBS HIGH 50: CPT | Performed by: NURSE PRACTITIONER

## 2023-01-17 RX ORDER — LITHIUM CARBONATE 300 MG/1
300 TABLET, FILM COATED, EXTENDED RELEASE ORAL ONCE
Status: COMPLETED | OUTPATIENT
Start: 2023-01-17 | End: 2023-01-17

## 2023-01-17 RX ADMIN — LAMOTRIGINE 25 MG: 25 TABLET ORAL at 21:49

## 2023-01-17 RX ADMIN — LITHIUM CARBONATE 300 MG: 300 TABLET, EXTENDED RELEASE ORAL at 21:49

## 2023-01-17 RX ADMIN — LORAZEPAM 0.5 MG: 0.5 TABLET ORAL at 18:16

## 2023-01-17 ASSESSMENT — ACTIVITIES OF DAILY LIVING (ADL)
HYGIENE/GROOMING: INDEPENDENT
DRESS: INDEPENDENT
ADLS_ACUITY_SCORE: 30
DRESS: SCRUBS (BEHAVIORAL HEALTH);INDEPENDENT
ADLS_ACUITY_SCORE: 30
ORAL_HYGIENE: INDEPENDENT
ORAL_HYGIENE: INDEPENDENT
HYGIENE/GROOMING: INDEPENDENT
ADLS_ACUITY_SCORE: 30

## 2023-01-17 NOTE — PLAN OF CARE
"Problem: Adult Behavioral Health Plan of Care  Goal: Patient-Specific Goal (Individualization)  Description: Pt will sleep >5 hours each night throughout hospitalization.   Pt will eat >50% of each meal throughout hospitalization.  Pt will attend >25% of available groups throughout hospitalization.  Pt will accept appropriate treatment recommendations made by the treatment team.   Outcome: Not Progressing  Note: Pt presented as manic. She continues to have grandiosity and hyper-verbal, circumstantial, pressured speech. Pt stated \"I'm here because I forgot to take my anxiety medication. I was extremely exhausted, had a panic attack and looked manic. Dr. Werner thinks I'm bipolar. She is wonderful and kind and I respect her, but I need to pause. I need to explain to her a little bit more. I need to figure out my metabolism and absorption issues.  I literally know everything under the sun. I'm trained in crisis intervention. I ended up in the hospital twice in the past year because I neglected myself because all I do is give.\" Pt then became fixated on her 's anxiety and how her daughter has anxiety. Pt informed writer that she has been \"wound up\" today from \"lights and sounds\". She stated \"I redirected myself multiple times. I have endless capabilities.\" She declined need for PRN medication.    Pt spent the majority of the shift up on the unit socializing with peers. She participated in available groups. She showered tonight.     When pt was brought her scheduled Lamictal and Lithium, she stated \"I respectfully decline.\"     Face to face end of shift report communicated to oncoming RN.      Problem: Suicidal Behavior  Goal: Suicidal Behavior is Absent or Managed  Outcome: Progressing  Note: Pt denied suicidal ideation. She remained free from self harm.      Goal Outcome Evaluation:    Plan of Care Reviewed With: patient      "

## 2023-01-17 NOTE — PROGRESS NOTES
Filed petition for commitment to [facility]. Ade from the [facility] will be up on the unit to screen the pt tomorrow morning 1/18.     Discharge Plan: Home with services   Services: Psychiatry and therapy   Placement: NA  Referrals made: NA  Follow Ups: Will make upon discharge   Court status: Filed on [facility] on 1/17  [place]/SDM: Will be filing

## 2023-01-17 NOTE — PROGRESS NOTES
"CLINICAL NUTRITION SERVICES  -  REASSESSMENT NOTE    Barbi AIDEN Vale    37 yof admitted for manic behavior. Medical hx includes ADHD, anxiety. AMS. Weight is down 11lbs in the last month. Weight down 2lbs this admission. Intake/appetite variable. Mostly adequate intake.    Diet Order: Regular  Intake: 14 meals with 50-75% intake    Height: 5' 3\"  Weight: 170 lbs 11.2 oz  Body mass index is 30.24 kg/m .  Weight Status:  Obesity Grade I BMI 30-34.9  Weight History: 6% weight loss in 1 month  Wt Readings from Last 10 Encounters:   01/11/23 78.3 kg (172 lb 11.2 oz)   12/16/22 83.3 kg (183 lb 9.6 oz)   08/18/22 81.3 kg (179 lb 3.2 oz)   07/14/22 81.6 kg (180 lb)   02/23/22 83.9 kg (185 lb)   06/03/21 81.2 kg (179 lb)   05/18/21 82.1 kg (181 lb)   02/09/21 82.1 kg (181 lb)   01/19/21 82.1 kg (181 lb)   01/12/21 82.6 kg (182 lb 3.2 oz)      Malnutrition Diagnosis: Suspect poor nutrition status with weight loss. Poor intake possible with AMS.     NUTRITION RECOMMENDATIONS  - Encourage intake at meal times  - Offer Ensure with poor appetite/intake  - May benefit from a daily multivitamin/mineral     MONITORING AND EVALUATION:  RD will monitor intake, weight, labs              "

## 2023-01-17 NOTE — PROGRESS NOTES
"[facility] PSYCHIATRY  PROGRESS NOTE     SUBJECTIVE     Today I found out that prior to her admission Ellie was very paranoid about her , still paranoid that she locked herself and her 3-year-old daughter in the bathroom.  During that time her  called the police on her as he was concerned for their safety.  She was making reports Eric to her outpatient psychiatric nurse practitioner that she was afraid of \"murder suicide\" it sounds as though.  She has not had any intent to harm others or hersel though her though she still paranoid and believes that her  will kill her, her daughter and then himself.  I was not aware of this information until this morning.  This was very concerning especially as she has now not taken any medications and has not stabilized at all.  Her  is very fearful that she will take the daughter and \"go into hiding\" or that potentially something worse could happen.  She has already called the [employer] and told them that she was concerned that her  would harm staff or students at the school which almost caused a lockdown at the school.       MEDICATIONS   Scheduled Meds:    lamoTRIgine  25 mg Oral At Bedtime     lithium ER  600 mg Oral At Bedtime     PRN Meds:.acetaminophen, alum & mag hydroxide-simethicone, LORazepam, OLANZapine **OR** OLANZapine, senna-docusate     ALLERGIES   Allergies   Allergen Reactions     Depo-Provera [Medroxyprogesterone] Swelling     Swelled up, headaches        MENTAL STATUS EXAM   Vitals: /59   Pulse 80   Temp 97.8  F (36.6  C) (Tympanic)   Resp 14   Ht 1.6 m (5' 3\")   Wt 77.4 kg (170 lb 11.2 oz)   LMP 12/15/2022   SpO2 97%   BMI 30.24 kg/m      Appearance: Alert, oriented, dressed in hospital scrubs  Attitude: Cooperative   Eye Contact: Poor  Mood: Tense  Affect: Restricted range of affect, mood congruent  Speech: Very pressured.  Psychomotor Behavior: No tremor, rigidity, akathisia, or psychomotor " "retardation    Thought Process: Circumstantial  Associations: No loose associations   Thought Content: Passive SI. No SIB. Denies AVH.  Continues to have some paranoid delusions and is very fixated on \"'s mental health\"  Insight: Some possible improvement  Judgment: Limited  Oriented to: Person, place, and time  Attention Span and Concentration: Intact  Recent and Remote Memory: Intact  Language: English with appropriate syntax and vocabulary  Fund of Knowledge: Average  Muscle Strength and Tone: Grossly normal  Gait and Station: Grossly normal       LABS   No results found for this or any previous visit (from the past 24 hour(s)).      IMPRESSION        This is a 37 year old female with a PMH of attention deficit disorder who has been treated with multiple stimulant medication.  She has never had an episode of psychosis in the past though has had some depressive episodes with high anxiety and racing thoughts.  Her outpatient provider had been receiving text messages from her this morning and had been very concerned she was psychotic and encouraged her to come to the emergency room.  While in the emergency room she did get a dose of Zyprexa 10 mg and did have some improvement in her irrational thought process and is now thinking that her  may not be actually trying to kill her though still questions some of her thoughts.  It sounds as though her symptoms started after the birth of her 3-year-old.  She states that she has had significant racing thoughts and very high anxiety and nothing has been helping significantly.  There is not a family history of diagnosed bipolar disorder though does state that she has family members who likely do have this.  She to has questioned if she has bipolar disorder and it sounds as though that her outpatient provider has also been concerned about this though not quite certain.  She does not have any substance abuse issues and it has not been a concern that she abuses " stimulants though likely did send her into a manic episode.     DIAGNOSES     1.  Unspecified psychosis  2.  Rule out bipolar disorder type I most recent episode manic severe psychotic features, exacerbated by amphetamine prescription  3.  Rule out schizophrenia  4.  Attention deficit disorder by history though high likelihood of bipolar versus attention deficit       PLAN     Location: Unit 5  Legal Status: Orders Placed This Encounter      Health Officer Authority to Detain (TONJA)      Emergency Hospitalization Hold (72 Hr Hold)    Safety Assessment:    Behavioral Orders   Procedures     Code 1 - Restrict to Unit     Routine Programming     As clinically indicated     Status 15     Every 15 minutes.      PTA medications continued/changed:     -Discontinue all stimulant medication and Effexor    New medications tried and stopped:     -None    New medications initiated:     -Loxapine: Did not take  -Klonopin 0.5 mg twice daily as needed    Today's Changes:    Continue lithium  Continue lamictal  Petition filled out.   programming: Patient will be treated in a therapeutic milieu with appropriate individual and group therapies. Education will be provided on diagnoses, medications, and treatments.     Medical diagnoses:  Per medicine    Consult: None    Labs none needed today    Anticipated LOS: States she is willing to stay here until she is stabilized on medications even beyond her 72-hour hold  Disposition: Discharge home with  when stable.  Would likely benefit from partial hospital program after discharge once stable.       TREATMENT TEAM CARE PLAN     Progress: Continued symptoms.  Minimal improvement though has now agreed to start medications    Continued Stay Criteria/Rationale: Continued symptoms without sufficient improvement/resolution.    Medical/Physical: See above.    Precautions: See above.     Plan: Continue inpatient care with unit support and medication management.    Rationale for change in  precautions or plan: NA due to no change.    Participants: Debbi Barger NP, Nursing, SW, OT.    The patient's care was discussed with the treatment team and chart notes were reviewed.       ATTESTATION    Debbi Barger NP

## 2023-01-17 NOTE — PLAN OF CARE
Face to face shift report received from nurse. Rounding completed, pt observed.    Problem: Adult Behavioral Health Plan of Care  Goal: Patient-Specific Goal (Individualization)  Description: Pt will sleep >5 hours each night throughout hospitalization.   Pt will eat >50% of each meal throughout hospitalization.  Pt will attend >25% of available groups throughout hospitalization.  Pt will accept appropriate treatment recommendations made by the treatment team.   Outcome: Progressing  Patient up at the window of station right after shift change wanting to tell new staff that she only did one thing wrong. Patient then came back and asked writer about if writer has ever heard about someone not liking the noise of the sound machine. Patient slept 5 hours throughout the shift. No issues noted after patient went to bed.    Problem: Thought Process Alteration  Goal: Optimal Thought Clarity  Description: Patient will be able to hold a reality based conversation by discharge.  Outcome: Progressing     Problem: Suicidal Behavior  Goal: Suicidal Behavior is Absent or Managed  Outcome: Progressing     Face to face report will be communicated to oncoming RN.    Benito Monson RN  1/17/2023

## 2023-01-17 NOTE — PLAN OF CARE
"Face to face shift report received from Benito HANLEY RN. Rounding completed, pt observed.    Problem: Adult Behavioral Health Plan of Care  Goal: Patient-Specific Goal (Individualization)  Description: Pt will sleep >5 hours each night throughout hospitalization.   Pt will eat >50% of each meal throughout hospitalization.  Pt will attend >25% of available groups throughout hospitalization.  Pt will accept appropriate treatment recommendations made by the treatment team.   Outcome: Not Progressing  Note: Shift Summary:  Patient awake in her room at the start of this shift.  Patient up and in lounge after breakfast.  Attending groups.  Eye contact is poor but patient is aware of this and trying to use better eye contact.  She can make her needs known but states \"I don't want to bother anyone but I need to show I can take care of myself\". Nurse encouraged patient to continue to push herself.  Remains guarded but cooperative.  Gait is steady.  ADLs wnl.     Problem: Thought Process Alteration  Goal: Optimal Thought Clarity  Description: Patient will be able to hold a reality based conversation by discharge.  Outcome: Not Progressing  Face to face report will be communicated to oncoming RN.    Jeanette Wills RN  1/17/2023                            "

## 2023-01-18 PROCEDURE — 124N000004

## 2023-01-18 PROCEDURE — 250N000013 HC RX MED GY IP 250 OP 250 PS 637: Performed by: NURSE PRACTITIONER

## 2023-01-18 PROCEDURE — 99233 SBSQ HOSP IP/OBS HIGH 50: CPT | Performed by: NURSE PRACTITIONER

## 2023-01-18 RX ADMIN — LORAZEPAM 0.5 MG: 0.5 TABLET ORAL at 17:08

## 2023-01-18 RX ADMIN — LORAZEPAM 0.5 MG: 0.5 TABLET ORAL at 08:24

## 2023-01-18 RX ADMIN — LITHIUM CARBONATE 600 MG: 300 TABLET, EXTENDED RELEASE ORAL at 20:19

## 2023-01-18 RX ADMIN — LORAZEPAM 0.5 MG: 0.5 TABLET ORAL at 12:48

## 2023-01-18 RX ADMIN — LAMOTRIGINE 25 MG: 25 TABLET ORAL at 20:19

## 2023-01-18 ASSESSMENT — ACTIVITIES OF DAILY LIVING (ADL)
HYGIENE/GROOMING: INDEPENDENT
ADLS_ACUITY_SCORE: 30
ADLS_ACUITY_SCORE: 30
DRESS: INDEPENDENT
LAUNDRY: UNABLE TO COMPLETE
ADLS_ACUITY_SCORE: 30
HYGIENE/GROOMING: INDEPENDENT
ADLS_ACUITY_SCORE: 30
ADLS_ACUITY_SCORE: 30
DRESS: SCRUBS (BEHAVIORAL HEALTH)
ORAL_HYGIENE: INDEPENDENT
ADLS_ACUITY_SCORE: 30
ADLS_ACUITY_SCORE: 30
ORAL_HYGIENE: INDEPENDENT
ADLS_ACUITY_SCORE: 30
ADLS_ACUITY_SCORE: 30

## 2023-01-18 NOTE — PLAN OF CARE
"Problem: Adult Behavioral Health Plan of Care  Goal: Patient-Specific Goal (Individualization)  Description: Pt will sleep >5 hours each night throughout hospitalization.   Pt will eat >50% of each meal throughout hospitalization.  Pt will attend >25% of available groups throughout hospitalization.  Pt will accept appropriate treatment recommendations made by the treatment team.   Note: Pt requested and received medication handouts on Lithium and Lamictal. She stated \"I haven't been trying to avoid or be in denial. I've just been trying to stay educated. I've been pursuing this for 6 years.\" Pt then went on to talk about her \"absorption and metabolism issues\"  and how she has \"maxed out\" every medication she has ever tried. She stated \"everything starts out working and then stops. I think I need to see a specialist to check my metabolism, endocrine, and hormones and to check internally.\" Pt's speech continues to be hyper-verbal, pressured, and circumstantial. Eye contact was minimal. She did not make any paranoid or delusional statements in regards to her  tonight.    Pt ate 100% of supper.    1816 - Pt received 0.5 mg of PRN Ativan for anxiety. Medication somewhat effective.    Pt participated in milieu activities and available groups. She was compliant with her scheduled Lithium and Lamictal tonight.     Face to face end of shift report communicated to oncoming RN.     Problem: Suicidal Behavior  Goal: Suicidal Behavior is Absent or Managed  Outcome: Progressing  Note: Pt denied suicidal ideation. She remained free from self harm.      Goal Outcome Evaluation:    Plan of Care Reviewed With: patient                   "

## 2023-01-18 NOTE — PLAN OF CARE
Pt was screened this morning by Ade from the [facility]. Ade states the pt is willing to do Vol in Lieu. Discussed with provider, she is in agreeance of this as well. Went over and pt signed Vol in Lie agreement.    Discharge Plan: Home with services   Services: Psychiatry and therapy   Placement: NA  Referrals made: NA  Follow Ups: Will make upon discharge   Court status: Granted Vol in Lieu on 1/18  [place]/SDM: Will be filing

## 2023-01-18 NOTE — PLAN OF CARE
Face to face shift report received from nurse. Rounding completed, pt observed.    Problem: Adult Behavioral Health Plan of Care  Goal: Patient-Specific Goal (Individualization)  Description: Pt will sleep >5 hours each night throughout hospitalization.   Pt will eat >50% of each meal throughout hospitalization.  Pt will attend >25% of available groups throughout hospitalization.  Pt will accept appropriate treatment recommendations made by the treatment team.   Outcome: Progressing   Pt has been in bed with eyes closed and regular respirations observed all night. Will continue to monitor. Patient slept 7 hour throughout the shift.     Face to face report will be communicated to oncoming RN.    Benito Monson RN  1/18/2023

## 2023-01-18 NOTE — PLAN OF CARE
"Face to face shift report received from Benito HANLEY RN. Rounding completed, pt observed.    Problem: Adult Behavioral Health Plan of Care  Goal: Patient-Specific Goal (Individualization)  Description: Pt will sleep >5 hours each night throughout hospitalization.   Pt will eat >50% of each meal throughout hospitalization.  Pt will attend >25% of available groups throughout hospitalization.  Pt will accept appropriate treatment recommendations made by the treatment team.   Outcome: Progressing  Note: Shift Summary:  Patient has been up on the unit this shift. Patient asking for medications at breakfast but none scheduled.  Nurse reviewed current medications with patient.  She then did endorse increased anxiety.  Requested and given Ativan 0.5 mg po at 0824.  She states that \"I talk really a lot when I get anxious and I think that usually happens midday\".  Informed of how frequent she can request PRN.       Problem: Thought Process Alteration  Goal: Optimal Thought Clarity  Description: Patient will be able to hold a reality based conversation by discharge.  Outcome: Progressing  Face to face report will be communicated to oncoming RN.    Jeanette Wills RN  1/18/2023                     "

## 2023-01-19 PROCEDURE — 124N000004

## 2023-01-19 PROCEDURE — 99233 SBSQ HOSP IP/OBS HIGH 50: CPT | Performed by: NURSE PRACTITIONER

## 2023-01-19 PROCEDURE — 250N000013 HC RX MED GY IP 250 OP 250 PS 637: Performed by: NURSE PRACTITIONER

## 2023-01-19 RX ADMIN — LORAZEPAM 0.5 MG: 0.5 TABLET ORAL at 15:52

## 2023-01-19 RX ADMIN — LORAZEPAM 0.5 MG: 0.5 TABLET ORAL at 08:19

## 2023-01-19 RX ADMIN — LAMOTRIGINE 25 MG: 25 TABLET ORAL at 21:34

## 2023-01-19 RX ADMIN — LITHIUM CARBONATE 600 MG: 300 TABLET, EXTENDED RELEASE ORAL at 21:34

## 2023-01-19 ASSESSMENT — ACTIVITIES OF DAILY LIVING (ADL)
ADLS_ACUITY_SCORE: 30
DRESS: SCRUBS (BEHAVIORAL HEALTH)
ORAL_HYGIENE: INDEPENDENT
ADLS_ACUITY_SCORE: 30
HYGIENE/GROOMING: INDEPENDENT
HYGIENE/GROOMING: INDEPENDENT
ADLS_ACUITY_SCORE: 30
DRESS: INDEPENDENT
ADLS_ACUITY_SCORE: 30
LAUNDRY: UNABLE TO COMPLETE
ADLS_ACUITY_SCORE: 30
ORAL_HYGIENE: INDEPENDENT

## 2023-01-19 NOTE — PLAN OF CARE
"Treatment Plan    Pt will continue to clear from current mental health symptoms and return home with services once stable. Currently pt is still presenting with hypomania and rapid and pressured speech. Pt is currently going through medication changes. Spoke with pt possibly attending PHP. Provided pt with brochure and informed on what PHP entails. Pt said she was interested but would need to think about it because she \"has all the skills already\".       Client Strengths:  caring, creative, educated, empathetic, goal-focused, good listener, has a previous history of therapy, insightful, intelligent, motivated, open to learning, open to suggestions / feedback, responsible parent, support of family, friends and providers, supportive, wants to learn, willing to ask questions and willing to relate to others    Areas of Vulnerability:  Manic symptoms   Poor impulse control   Psychotic symptoms/behavior   Anxiety    Long-Term Goals:  Knowledge about illness and management of symptoms   Maintenance of personal safety   Effective management of impulsivity     Abuse Prevention Plan:  Safe, therapeutic environment   Safety coping plan as needed   Education regarding illness and skill development   Coordination with care providers   Impluse control education and intervention   Medication adjustment/management (MI/CD)     Discharge Criteria:  Satisfactory progress toward treatment goals   Improvement re: identified problems and symptoms   Ability to continue recovery at next level of service   Has a discharge plan in place      Updates:   -Increase Lithium CR to 600 mg at bedtime (per H&P)    "

## 2023-01-19 NOTE — PLAN OF CARE
SHIFT NOTE: 1500 to 0730    Problem: Thought Process Alteration  Goal: Optimal Thought Clarity  Description: Patient will be able to hold a reality based conversation by discharge.  Outcome: Progressing     Problem: Adult Behavioral Health Plan of Care  Goal: Patient-Specific Goal (Individualization)  Description: Pt will sleep >5 hours each night throughout hospitalization.   Pt will eat >50% of each meal throughout hospitalization.  Pt will attend >25% of available groups throughout hospitalization.  Pt will accept appropriate treatment recommendations made by the treatment team.   Outcome: Progressing     Patient has been calm, cooperative, and medication complaint this shift.  She is social with peers and attended groups.  No delusional statements noted but patient is rambling in conversation.  Full range affect.  Feels her medications are working well.  No complaints of pain.  Vs WNL.  Patient in bed at 2300 and slept through the night with regular respirations and position changes noted.  Face to face end of shift report communicated to day shift RN.     Loraine Valenzuela RN  1/18/2023  10:35 PM

## 2023-01-19 NOTE — PROGRESS NOTES
"[facility] PSYCHIATRY  PROGRESS NOTE     SUBJECTIVE     Barbi has been participating in groups today. She was given information on PHP by the , however did tell him that she would need to think about it because she \"has all the skills already\". Will continue to encourage her to consider as a way to continue to stabilize her mental health. Speech does continue to be rambling, pressured. Nursing notes indicate she was singing in the lounge area during lunch. She denies any side effects from the medication, took first dose of Lithium 600 mg last night. She continues to feel that these might be the \"right medications finally\" as she reports having tried numerous medications with little benefit. Most recently had been treated with high doses of stimulants, which likely was exacerbating her symptoms prior to admission.        MEDICATIONS   Scheduled Meds:    lamoTRIgine  25 mg Oral At Bedtime     lithium ER  600 mg Oral At Bedtime     PRN Meds:.acetaminophen, alum & mag hydroxide-simethicone, LORazepam, OLANZapine **OR** OLANZapine, senna-docusate     ALLERGIES   Allergies   Allergen Reactions     Depo-Provera [Medroxyprogesterone] Swelling     Swelled up, headaches        MENTAL STATUS EXAM   Vitals: /74 (BP Location: Left arm)   Pulse 80   Temp 98.2  F (36.8  C) (Tympanic)   Resp 18   Ht 1.6 m (5' 3\")   Wt 77.4 kg (170 lb 11.2 oz)   LMP 12/15/2022   SpO2 95%   BMI 30.24 kg/m      Appearance: Alert, oriented, dressed in hospital scrubs, casually groomed  Attitude: Cooperative, pleasant   Eye Contact: good  Mood: \"better\"  Affect: brighter  Speech: mildly pressured, rambling  Psychomotor Behavior: No tremor, rigidity, akathisia, or psychomotor retardation    Thought Process: more linear and goal oriented  Associations: No loose associations   Thought Content: Denies SI. No SIB. Denies AVH.  Paranoia improving  Insight: improving  Judgment: Limited  Oriented to: Person, place, and time  Attention Span " and Concentration: Intact  Recent and Remote Memory: Intact  Language: English with appropriate syntax and vocabulary  Fund of Knowledge: Average  Muscle Strength and Tone: Grossly normal  Gait and Station: Grossly normal       LABS   No results found for this or any previous visit (from the past 24 hour(s)).      IMPRESSION        This is a 37 year old female with a PMH of attention deficit disorder who has been treated with multiple stimulant medication.  She has never had an episode of psychosis in the past though has had some depressive episodes with high anxiety and racing thoughts.  Her outpatient provider had been receiving text messages from her this morning and had been very concerned she was psychotic and encouraged her to come to the emergency room.  While in the emergency room she did get a dose of Zyprexa 10 mg and did have some improvement in her irrational thought process and is now thinking that her  may not be actually trying to kill her though still questions some of her thoughts.  It sounds as though her symptoms started after the birth of her 3-year-old.  She states that she has had significant racing thoughts and very high anxiety and nothing has been helping significantly.  There is not a family history of diagnosed bipolar disorder though does state that she has family members who likely do have this.  She to has questioned if she has bipolar disorder and it sounds as though that her outpatient provider has also been concerned about this though not quite certain.  She does not have any substance abuse issues and it has not been a concern that she abuses stimulants though likely did send her into a manic episode.     DIAGNOSES     -Bipolar 1 disorder, most recent episode manic with psychotic features (likely exacerbated by amphetamine prescription)  -Attention deficit disorder, by history though high likelihood of bipolar versus attention deficit       PLAN     Location: Unit 5  Legal  Status: Voluntary in lieu of commitment    Safety Assessment:    Behavioral Orders   Procedures     Code 1 - Restrict to Unit     Routine Programming     As clinically indicated     Status 15     Every 15 minutes.      PTA medications continued/changed:     -Discontinue all stimulant medication and Effexor    New medications tried and stopped:     -None    New medications initiated:     -Ativan 0.5 mg twice daily as needed  -Lithium  mg at bedtime  -Lamictal 25 mg daily (start date 1/17)    Today's Changes:    -Continue current medication  -Lithium level, BMP on 1/23  -Was given information on PHP program, will encourage her to consider this     Programming: Patient will be treated in a therapeutic milieu with appropriate individual and group therapies. Education will be provided on diagnoses, medications, and treatments.     Medical diagnoses:  Per medicine    Consult: None    Labs: none needed today    Anticipated LOS: 5-7 days    Disposition: Discharge home with  when stable. Would likely benefit from PHP         ATTESTATION    Anna Weaver NP

## 2023-01-19 NOTE — PLAN OF CARE
Face to face shift report received from Loraine CAMPBELL RN. Rounding completed, pt observed.    Problem: Adult Behavioral Health Plan of Care  Goal: Patient-Specific Goal (Individualization)  Description: Pt will sleep >5 hours each night throughout hospitalization.   Pt will eat >50% of each meal throughout hospitalization.  Pt will attend >25% of available groups throughout hospitalization.  Pt will accept appropriate treatment recommendations made by the treatment team.   Outcome: Progressing  Note: Shift Summary:  Patient up in lounge at the start of this shift.  Patient states she is feeling anxious.  Requested Ativan 0.5 mg po at breakfast.  Speech remains rambling.  Observed singing in lounge during lunch.  Denies pain or unwanted side effects.  Gait is balanced and steady.  Patient attending groups.     Problem: Thought Process Alteration  Goal: Optimal Thought Clarity  Description: Patient will be able to hold a reality based conversation by discharge.  Outcome: Progressing  Face to face report will be communicated to oncoming RN.    Jeanette Wills RN  1/19/2023

## 2023-01-19 NOTE — PROGRESS NOTES
"[facility] PSYCHIATRY  PROGRESS NOTE     SUBJECTIVE     Barbi is up in her room when I see her today. She reports that she had initially been very hesitant to consider new medication, however she did take the Lamictal and [place] yesterday evening. She states \"I think the meds are going to work wonderfully\". She denies any notable side effects. She reports that for the past 4 years she has \"been in crisis mode\" with her mental health. She states that she has been trying to get her medication regulated for years however either the medication does not work or she will \"max out\" the dose and have to try something different. She had never been on either Lithium or Lamictal from what I understand. She has been attending groups. Her speech is a bit pressured and rambling, though she does not exhibit any notable paranoia. It does appear she was able to sleep well last night. She is agreeable to take the medication and to remain in the hospital voluntarily, so was offered voluntary in lieu of commitment.      MEDICATIONS   Scheduled Meds:    lamoTRIgine  25 mg Oral At Bedtime     lithium ER  600 mg Oral At Bedtime     PRN Meds:.acetaminophen, alum & mag hydroxide-simethicone, LORazepam, OLANZapine **OR** OLANZapine, senna-docusate     ALLERGIES   Allergies   Allergen Reactions     Depo-Provera [Medroxyprogesterone] Swelling     Swelled up, headaches        MENTAL STATUS EXAM   Vitals: /70   Pulse 72   Temp 98.4  F (36.9  C) (Temporal)   Resp 14   Ht 1.6 m (5' 3\")   Wt 77.4 kg (170 lb 11.2 oz)   LMP 12/15/2022   SpO2 96%   BMI 30.24 kg/m      Appearance: Alert, oriented, dressed in hospital scrubs, casually groomed  Attitude: Cooperative, pleasant   Eye Contact: good  Mood: \"better\"  Affect: brighter  Speech: mildly pressured, rambling  Psychomotor Behavior: No tremor, rigidity, akathisia, or psychomotor retardation    Thought Process: more linear and goal oriented  Associations: No loose associations "   Thought Content: Denies SI. No SIB. Denies AVH.  Paranoia improving  Insight: improving  Judgment: Limited  Oriented to: Person, place, and time  Attention Span and Concentration: Intact  Recent and Remote Memory: Intact  Language: English with appropriate syntax and vocabulary  Fund of Knowledge: Average  Muscle Strength and Tone: Grossly normal  Gait and Station: Grossly normal       LABS   No results found for this or any previous visit (from the past 24 hour(s)).      IMPRESSION        This is a 37 year old female with a PMH of attention deficit disorder who has been treated with multiple stimulant medication.  She has never had an episode of psychosis in the past though has had some depressive episodes with high anxiety and racing thoughts.  Her outpatient provider had been receiving text messages from her this morning and had been very concerned she was psychotic and encouraged her to come to the emergency room.  While in the emergency room she did get a dose of Zyprexa 10 mg and did have some improvement in her irrational thought process and is now thinking that her  may not be actually trying to kill her though still questions some of her thoughts.  It sounds as though her symptoms started after the birth of her 3-year-old.  She states that she has had significant racing thoughts and very high anxiety and nothing has been helping significantly.  There is not a family history of diagnosed bipolar disorder though does state that she has family members who likely do have this.  She to has questioned if she has bipolar disorder and it sounds as though that her outpatient provider has also been concerned about this though not quite certain.  She does not have any substance abuse issues and it has not been a concern that she abuses stimulants though likely did send her into a manic episode.     DIAGNOSES     1.  Unspecified psychosis  2.  Rule out bipolar disorder type I most recent episode manic severe  psychotic features, exacerbated by amphetamine prescription  3.  Rule out schizophrenia  4.  Attention deficit disorder by history though high likelihood of bipolar versus attention deficit       PLAN     Location: Unit 5  Legal Status: Voluntary in lieu of commitment    Safety Assessment:    Behavioral Orders   Procedures     Code 1 - Restrict to Unit     Routine Programming     As clinically indicated     Status 15     Every 15 minutes.      PTA medications continued/changed:     -Discontinue all stimulant medication and Effexor    New medications tried and stopped:     -None    New medications initiated:     -Klonopin 0.5 mg twice daily as needed  -Lithium  mg at bedtime  -Lamictal 25 mg daily (start date 1/17)    Today's Changes:    -Increase Lithium CR to 600 mg at bedtime  -Consider PHP referral     Programming: Patient will be treated in a therapeutic milieu with appropriate individual and group therapies. Education will be provided on diagnoses, medications, and treatments.     Medical diagnoses:  Per medicine    Consult: None    Labs: none needed today    Anticipated LOS: 5-7 days    Disposition: Discharge home with  when stable. Would likely benefit from PHP         ATTESTATION    Anna Weaver NP

## 2023-01-19 NOTE — PLAN OF CARE
Problem: Intimate Partner Violence  Goal: Safe Environment for Discharge  Outcome: Adequate for Care Transition     Problem: Suicidal Behavior  Goal: Suicidal Behavior is Absent or Managed  Outcome: Adequate for Care Transition   Goal Outcome Evaluation:    Plan of Care Reviewed With: patient

## 2023-01-20 PROCEDURE — 250N000013 HC RX MED GY IP 250 OP 250 PS 637: Performed by: NURSE PRACTITIONER

## 2023-01-20 PROCEDURE — 124N000004

## 2023-01-20 PROCEDURE — 99232 SBSQ HOSP IP/OBS MODERATE 35: CPT | Performed by: NURSE PRACTITIONER

## 2023-01-20 RX ADMIN — LAMOTRIGINE 25 MG: 25 TABLET ORAL at 20:25

## 2023-01-20 RX ADMIN — LORAZEPAM 0.5 MG: 0.5 TABLET ORAL at 08:19

## 2023-01-20 RX ADMIN — LITHIUM CARBONATE 600 MG: 300 TABLET, EXTENDED RELEASE ORAL at 20:26

## 2023-01-20 RX ADMIN — LORAZEPAM 0.5 MG: 0.5 TABLET ORAL at 17:45

## 2023-01-20 ASSESSMENT — ACTIVITIES OF DAILY LIVING (ADL)
HYGIENE/GROOMING: INDEPENDENT
ADLS_ACUITY_SCORE: 30
ORAL_HYGIENE: INDEPENDENT
ADLS_ACUITY_SCORE: 30
DRESS: INDEPENDENT
ADLS_ACUITY_SCORE: 30

## 2023-01-20 NOTE — PLAN OF CARE
Face to face shift report received from Loraine CAMPBELL RN. Rounding completed, pt observed.    Problem: Adult Behavioral Health Plan of Care  Goal: Patient-Specific Goal (Individualization)  Description: Pt will sleep >5 hours each night throughout hospitalization.   Pt will eat >50% of each meal throughout hospitalization.  Pt will attend >25% of available groups throughout hospitalization.  Pt will accept appropriate treatment recommendations made by the treatment team.   Outcome: Progressing  Note: Shift Summary: Patient in bed with eyes closed at the start of this shift.  Respirations are visible and regular.  Position changes independently.     Problem: Thought Process Alteration  Goal: Optimal Thought Clarity  Description: Patient will be able to hold a reality based conversation by discharge.  Outcome: Progressing

## 2023-01-20 NOTE — PLAN OF CARE
Care continued for next shift.  Rounding completed and patient observed.    Problem: Adult Behavioral Health Plan of Care  Goal: Patient-Specific Goal (Individualization)  Description: Pt will sleep >5 hours each night throughout hospitalization.   Pt will eat >50% of each meal throughout hospitalization.  Pt will attend >25% of available groups throughout hospitalization.  Pt will accept appropriate treatment recommendations made by the treatment team.   1/20/2023 0720 by Jeanette Wills RN  Outcome: Progressing  Note: Shift Summary:  Patient up on unit this shift.  Anxious always first thing in the morning.  Requested and given Ativan 0.5 mg po with breakfast.  Denies pain or unwanted side effects.  Insight improving and open to a bipolar diagnosis possibly being her issue.  Attending and participating in groups.  Social with peers. Conversation are reality based.       Problem: Thought Process Alteration  Goal: Optimal Thought Clarity  Description: Patient will be able to hold a reality based conversation by discharge.  1/20/2023 0720 by Jeanette Wills RN  Outcome: Progressing    Face to face report will be communicated to oncoming RN.    Jeanette Wills RN  1/20/2023

## 2023-01-20 NOTE — PLAN OF CARE
Problem: Thought Process Alteration  Goal: Optimal Thought Clarity  Description: Patient will be able to hold a reality based conversation by discharge.  Outcome: Progressing     Problem: Adult Behavioral Health Plan of Care  Goal: Patient-Specific Goal (Individualization)  Description: Pt will sleep >5 hours each night throughout hospitalization.   Pt will eat >50% of each meal throughout hospitalization.  Pt will attend >25% of available groups throughout hospitalization.  Pt will accept appropriate treatment recommendations made by the treatment team.   Outcome: Progressing     Patient has been calm, cooperative, and medication complaint this shift.  She attended all groups and is social with peers.  Continues to ramble in conversation but is showing more insight into her mental illness.  Today she talked about how important it is for her to take her medications because when she doesn't then her thinking isn't right and she struggles to maintain a conversation.  Full range affect.  Took ativan x1 this shift for anxiety.  No complaints of pain.  Vs WNL.  Face to face end of shift report communicated to night shift RN.     Loraine Valenzuela RN  1/19/2023  9:56 PM

## 2023-01-21 PROCEDURE — 124N000004

## 2023-01-21 PROCEDURE — 250N000013 HC RX MED GY IP 250 OP 250 PS 637: Performed by: NURSE PRACTITIONER

## 2023-01-21 RX ADMIN — LAMOTRIGINE 25 MG: 25 TABLET ORAL at 20:32

## 2023-01-21 RX ADMIN — LORAZEPAM 0.5 MG: 0.5 TABLET ORAL at 22:43

## 2023-01-21 RX ADMIN — LITHIUM CARBONATE 600 MG: 300 TABLET, EXTENDED RELEASE ORAL at 20:32

## 2023-01-21 ASSESSMENT — ACTIVITIES OF DAILY LIVING (ADL)
ADLS_ACUITY_SCORE: 30
DRESS: INDEPENDENT;SCRUBS (BEHAVIORAL HEALTH)
ADLS_ACUITY_SCORE: 30
LAUNDRY: UNABLE TO COMPLETE
ADLS_ACUITY_SCORE: 30
ORAL_HYGIENE: INDEPENDENT
HYGIENE/GROOMING: INDEPENDENT

## 2023-01-21 NOTE — PLAN OF CARE
Problem: Adult Behavioral Health Plan of Care  Goal: Patient-Specific Goal (Individualization)  Description: Pt will sleep >5 hours each night throughout hospitalization.   Pt will eat >50% of each meal throughout hospitalization.  Pt will attend >25% of available groups throughout hospitalization.  Pt will accept appropriate treatment recommendations made by the treatment team.   Note: Report received from STEVE Huang.  Rounding complete.  Pt observed sleeping with regular and unlabored respirations.      Pt has been in bed with eyes closed and regular respirations.  15 minute and PRN checks all night.  No complaints offered.  Will continue to monitor.     Face to face end of shift communicated to oncoming RN.     Louise MCNEIL  January 21, 2023  5:33 AM       Problem: Thought Process Alteration  Goal: Optimal Thought Clarity  Description: Patient will be able to hold a reality based conversation by discharge.  Note: Unable to assess due to pt sleeping.     Goal Outcome Evaluation:

## 2023-01-21 NOTE — PLAN OF CARE
"Face to face end of shift report received from Jeanette CAMPBELL RN. Rounding completed and patient observed in the lounge. No requests at this time.     Goal Outcome Evaluation: Patient reported she is feeling relief from the symptoms that brought her into the hospital. She reported her anxiety and depression are minimal and she denied HI/SI and hallucinations. She talked about feeling \"overstimulated\" and requested to have 0.5mg Ativan for this at   . She denied pain. She attended groups. She speaks rapidly and rarely lets peers talk. After the Ativan this got better. She talked about using her coping skills but said she knows what to do to decrease her anxiety when she's at home but there is a whole different set of stressors. She is pleasant and cooperative. Her affect is bright.     Face to face end of shift report communicated to oncoming RN.        Problem: Adult Behavioral Health Plan of Care  Goal: Patient-Specific Goal (Individualization)  Description: Pt will sleep >5 hours each night throughout hospitalization.   Pt will eat >50% of each meal throughout hospitalization.  Pt will attend >25% of available groups throughout hospitalization.  Pt will accept appropriate treatment recommendations made by the treatment team.   Outcome: Progressing     Problem: Thought Process Alteration  Goal: Optimal Thought Clarity  Description: Patient will be able to hold a reality based conversation by discharge.  Outcome: Progressing                      "

## 2023-01-21 NOTE — PROGRESS NOTES
"[facility] PSYCHIATRY  PROGRESS NOTE     SUBJECTIVE     Barbi reports that she is feeling \"so much better\" since starting on the medication. She has been compliant with Lithium and Lamictal. Speech is still a bit pressured however it is easier to redirect the conversation when needed. \"I used to feel boisterous all the time but now I feel like things are calmer\". We reviewed that the high doses of stimulants she was prescribed prior to admission was likely the cause of her paranoia about her . She no longer believes that her  is going to harm her or their child. She states that he is coming to visit tonight, she notes that when she spoke with him on the phone earlier he told her \"I feel like I'm talking to my wife again\". She notes that she has been struggling with her mental health for several years and is now hopeful that she is on the right track in terms of stabilizing her mood. She has been utilizing the PRN ativan less frequently, stating that she feels that working out has been helpful and she would like to get into the habit of using her elliptical at home to decrease the amount of medication she needs to take. She has been attending group programming throughout the day. Denies any side effects from medication.         MEDICATIONS   Scheduled Meds:    lamoTRIgine  25 mg Oral At Bedtime     lithium ER  600 mg Oral At Bedtime     PRN Meds:.acetaminophen, alum & mag hydroxide-simethicone, LORazepam, OLANZapine **OR** OLANZapine, senna-docusate     ALLERGIES   Allergies   Allergen Reactions     Depo-Provera [Medroxyprogesterone] Swelling     Swelled up, headaches        MENTAL STATUS EXAM   Vitals: /63   Pulse 72   Temp 97.6  F (36.4  C) (Temporal)   Resp 16   Ht 1.6 m (5' 3\")   Wt 77.4 kg (170 lb 11.2 oz)   LMP 12/15/2022   SpO2 98%   BMI 30.24 kg/m      Appearance: Alert, oriented, dressed in hospital scrubs, casually groomed  Attitude: Cooperative, pleasant   Eye Contact: " "good  Mood: \"better\"  Affect: brighter  Speech: still slightly pressured  Psychomotor Behavior: No tremor, rigidity, akathisia, or psychomotor retardation    Thought Process: more linear and goal oriented  Associations: No loose associations   Thought Content: Denies SI. No SIB. Denies AVH.  No paranoia noted today  Insight: improving  Judgment: fair, has improved  Oriented to: Person, place, and time  Attention Span and Concentration: Intact  Recent and Remote Memory: Intact  Language: English with appropriate syntax and vocabulary  Fund of Knowledge: Average  Muscle Strength and Tone: Grossly normal  Gait and Station: Grossly normal       LABS   No results found for this or any previous visit (from the past 24 hour(s)).      IMPRESSION        This is a 37 year old female with a PMH of attention deficit disorder who has been treated with multiple stimulant medication.  She has never had an episode of psychosis in the past though has had some depressive episodes with high anxiety and racing thoughts.  Her outpatient provider had been receiving text messages from her this morning and had been very concerned she was psychotic and encouraged her to come to the emergency room.  While in the emergency room she did get a dose of Zyprexa 10 mg and did have some improvement in her irrational thought process and is now thinking that her  may not be actually trying to kill her though still questions some of her thoughts.  It sounds as though her symptoms started after the birth of her 3-year-old.  She states that she has had significant racing thoughts and very high anxiety and nothing has been helping significantly.  There is not a family history of diagnosed bipolar disorder though does state that she has family members who likely do have this.  She to has questioned if she has bipolar disorder and it sounds as though that her outpatient provider has also been concerned about this though not quite certain.  She does " not have any substance abuse issues and it has not been a concern that she abuses stimulants though likely did send her into a manic episode.     DIAGNOSES     -Bipolar 1 disorder, most recent episode manic with psychotic features (likely exacerbated by amphetamine prescription)  -Attention deficit disorder, by history though high likelihood of bipolar versus attention deficit       PLAN     Location: Unit 5  Legal Status: Voluntary in lieu of commitment    Safety Assessment:    Behavioral Orders   Procedures     Code 1 - Restrict to Unit     Routine Programming     As clinically indicated     Status 15     Every 15 minutes.      PTA medications continued/changed:     -Discontinue all stimulant medication and Effexor    New medications tried and stopped:     -None    New medications initiated:     -Ativan 0.5 mg twice daily as needed  -Lithium  mg at bedtime  -Lamictal 25 mg daily (start date 1/17)    Today's Changes:    -Continue current medication  -Lithium level, BMP on 1/23  -Was given information on PHP program, will encourage her to consider this     Programming: Patient will be treated in a therapeutic milieu with appropriate individual and group therapies. Education will be provided on diagnoses, medications, and treatments.     Medical diagnoses:  Per medicine    Consult: None    Labs: lithium level, BMP on 1/23 ordered    Anticipated LOS: 5-7 days    Disposition: Discharge home with  when stable. Would likely benefit from PHP         ATTESTATION    Anna Weaver NP

## 2023-01-21 NOTE — PLAN OF CARE
"  Problem: Thought Process Alteration  Goal: Optimal Thought Clarity  Description: Patient will be able to hold a reality based conversation by discharge.  Outcome: Progressing     Problem: Adult Behavioral Health Plan of Care  Goal: Patient-Specific Goal (Individualization)  Description: Pt will sleep >5 hours each night throughout hospitalization.   Pt will eat >50% of each meal throughout hospitalization.  Pt will attend >25% of available groups throughout hospitalization.  Pt will accept appropriate treatment recommendations made by the treatment team.   Outcome: Progressing     Face to Face report received from STEVE Gil.  Rounding completed. Patient observed in the open lounge where she was social with peers.  Patient has pressured speech and is singing loudly in the lounge while listening to music.  Patient states she would like to walk to deal with her ADHD but the hallway \"stinks so bad\" that she is avoiding passing through that area. Mask was offered to cover her nose; she refused a mask.  She is medication compliant.  VSS.  Will continue to monitor the needs of the patient.    Face to face end of shift report communicated to oncoming evening shift STEVE.     Eugenia Young RN  1/21/2023  10:11 AM          "

## 2023-01-22 PROCEDURE — 124N000004

## 2023-01-22 PROCEDURE — 99232 SBSQ HOSP IP/OBS MODERATE 35: CPT | Performed by: NURSE PRACTITIONER

## 2023-01-22 PROCEDURE — 250N000013 HC RX MED GY IP 250 OP 250 PS 637: Performed by: NURSE PRACTITIONER

## 2023-01-22 RX ADMIN — LORAZEPAM 0.5 MG: 0.5 TABLET ORAL at 10:00

## 2023-01-22 RX ADMIN — LORAZEPAM 0.5 MG: 0.5 TABLET ORAL at 18:04

## 2023-01-22 RX ADMIN — LITHIUM CARBONATE 600 MG: 300 TABLET, EXTENDED RELEASE ORAL at 20:48

## 2023-01-22 RX ADMIN — LAMOTRIGINE 25 MG: 25 TABLET ORAL at 20:48

## 2023-01-22 ASSESSMENT — ACTIVITIES OF DAILY LIVING (ADL)
ADLS_ACUITY_SCORE: 30
HYGIENE/GROOMING: INDEPENDENT
ADLS_ACUITY_SCORE: 30
LAUNDRY: UNABLE TO COMPLETE
ADLS_ACUITY_SCORE: 30
ORAL_HYGIENE: INDEPENDENT
DRESS: SCRUBS (BEHAVIORAL HEALTH);INDEPENDENT

## 2023-01-22 NOTE — PROGRESS NOTES
"[facility] PSYCHIATRY  PROGRESS NOTE     MARCELA Landon has been up in the lounge and attending groups for much of the day. Her affect is bright. Has not been making any paranoid statements regarding her  or family. She notes that her  told her yesterday that he feels \"optimistic\" about her improvement. \"Usually I'm the optimist and he's the pessimist. I'm happy he's happy\". She denies any side effects from medication. Will get lithium level in the AM. She does report being thankful for the help she has received here in the hospital and again talks about how she has struggled with medications and her mental health for several years. She is not as pressured today, will answer my question and then pause to continue coversation instead of just continuing to talk. She denies any suicidal thoughts or SIB, denies hallucinations. Reviewed that we will likely look at discharging her home this week as she has shown improvement since admission.     MEDICATIONS   Scheduled Meds:    lamoTRIgine  25 mg Oral At Bedtime     lithium ER  600 mg Oral At Bedtime     PRN Meds:.acetaminophen, alum & mag hydroxide-simethicone, LORazepam, OLANZapine **OR** OLANZapine, senna-docusate     ALLERGIES   Allergies   Allergen Reactions     Depo-Provera [Medroxyprogesterone] Swelling     Swelled up, headaches        MENTAL STATUS EXAM   Vitals: /53   Pulse 68   Temp 98.2  F (36.8  C) (Temporal)   Resp 16   Ht 1.6 m (5' 3\")   Wt 76.8 kg (169 lb 4.8 oz)   LMP 12/15/2022   SpO2 98%   BMI 29.99 kg/m      Appearance: Alert, oriented, dressed in hospital scrubs, casually groomed  Attitude: Cooperative, pleasant   Eye Contact: good  Mood: \"better\"  Affect: brighter  Speech: less pressured, able to engage more in back and forth conversation  Psychomotor Behavior: No tremor, rigidity, akathisia, or psychomotor retardation    Thought Process: more linear and goal oriented  Associations: No loose associations "   Thought Content: Denies SI. No SIB. Denies AVH.  No paranoia noted today  Insight: improving  Judgment: fair, has improved  Oriented to: Person, place, and time  Attention Span and Concentration: Intact  Recent and Remote Memory: Intact  Language: English with appropriate syntax and vocabulary  Fund of Knowledge: Average  Muscle Strength and Tone: Grossly normal  Gait and Station: Grossly normal       LABS   No results found for this or any previous visit (from the past 24 hour(s)).      IMPRESSION     This is a 37 year old female with a PMH of attention deficit disorder who has been treated with multiple stimulant medication.  She has never had an episode of psychosis in the past though has had some depressive episodes with high anxiety and racing thoughts.  Her outpatient provider had been receiving text messages from her this morning and had been very concerned she was psychotic and encouraged her to come to the emergency room.  While in the emergency room she did get a dose of Zyprexa 10 mg and did have some improvement in her irrational thought process and is now thinking that her  may not be actually trying to kill her though still questions some of her thoughts.  It sounds as though her symptoms started after the birth of her 3-year-old.  She states that she has had significant racing thoughts and very high anxiety and nothing has been helping significantly.  There is not a family history of diagnosed bipolar disorder though does state that she has family members who likely do have this.  She to has questioned if she has bipolar disorder and it sounds as though that her outpatient provider has also been concerned about this though not quite certain.  She does not have any substance abuse issues and it has not been a concern that she abuses stimulants though likely did send her into a manic episode.     DIAGNOSES     -Bipolar 1 disorder, most recent episode manic with psychotic features (likely  exacerbated by amphetamine prescription)  -Attention deficit disorder, by history though high likelihood of bipolar versus attention deficit       PLAN     Location: Unit 5  Legal Status: Voluntary in lieu of commitment    Safety Assessment:    Behavioral Orders   Procedures     Code 1 - Restrict to Unit     Routine Programming     As clinically indicated     Status 15     Every 15 minutes.      PTA medications continued/changed:     -Discontinue all stimulant medication and Effexor    New medications tried and stopped:     -None    New medications initiated:     -Ativan 0.5 mg twice daily as needed  -Lithium  mg at bedtime  -Lamictal 25 mg daily (start date 1/17)    Today's Changes:    -Continue current medication  -Lithium level, BMP tomorrow  -Was given information on PHP program, will encourage her to consider this  -Likely discharge home with outpatient services this week     Programming: Patient will be treated in a therapeutic milieu with appropriate individual and group therapies. Education will be provided on diagnoses, medications, and treatments.     Medical diagnoses:  Per medicine    Consult: None    Labs: lithium level, BMP on 1/23 ordered    Anticipated LOS: 5-7 days    Disposition: Discharge home with  when stable. Would likely benefit from PHP         ATTESTATION    Anna Weaver NP

## 2023-01-22 NOTE — PLAN OF CARE
Problem: Adult Behavioral Health Plan of Care  Goal: Patient-Specific Goal (Individualization)  Description: Pt will sleep >5 hours each night throughout hospitalization.   Pt will eat >50% of each meal throughout hospitalization.  Pt will attend >25% of available groups throughout hospitalization.  Pt will accept appropriate treatment recommendations made by the treatment team.   Outcome: Progressing     Problem: Thought Process Alteration  Goal: Optimal Thought Clarity  Description: Patient will be able to hold a reality based conversation by discharge.  Outcome: Progressing     Face to face shift report received from Juliane RN. Rounding completed, pt observed.     Pt appeared to sleep most of this shift.     Face to face report will be communicated to oncoming RN.    Janice Eller RN  1/22/2023  5:56 AM

## 2023-01-22 NOTE — PLAN OF CARE
Problem: Thought Process Alteration  Goal: Optimal Thought Clarity  Description: Patient will be able to hold a reality based conversation by discharge.  Outcome: Progressing     Problem: Adult Behavioral Health Plan of Care  Goal: Patient-Specific Goal (Individualization)  Description: Pt will sleep >5 hours each night throughout hospitalization.   Pt will eat >50% of each meal throughout hospitalization.  Pt will attend >25% of available groups throughout hospitalization.  Pt will accept appropriate treatment recommendations made by the treatment team.   Outcome: Progressing     Face to Face report received from STEVE Camacho.  Rounding completed. Patient observed in the open lounge.  Patient  is calm, cooperative & medication compliant.  Patient states she hopes to remember to take her PRNs earlier in the day instead of waiting until evening so that the anxiety stays under control.    VSS.  Patient denies SI, HI or AVH.  Patient is able to make needs known.  Will continue to monitor the needs of the patient.    PRNs:  1000  Ativan 0.5 mg for 3/10 anxiety    Face to face end of shift report communicated to oncoming evening shift RN.     Eugenia Young RN  1/22/2023  9:44 AM

## 2023-01-22 NOTE — PLAN OF CARE
Problem: Adult Behavioral Health Plan of Care  Goal: Patient-Specific Goal (Individualization)  Description: Pt will sleep >5 hours each night throughout hospitalization.   Pt will eat >50% of each meal throughout hospitalization.  Pt will attend >25% of available groups throughout hospitalization.  Pt will accept appropriate treatment recommendations made by the treatment team.   Outcome: Progressing     Pt in lounge at the start of the shift. Pt reported anxiety this shift as she was getting visitors. Pt given ativan 0.5mg at 1804. Pt attended groups this shift and socialized with peers. Pt speech less pressured than yesterday and reported less manic feeling. Pt denied pain, SI, HI and hallucinations. Pt states she is about ready to go home and feels that she is able to handle her stress.       Problem: Thought Process Alteration  Goal: Optimal Thought Clarity  Description: Patient will be able to hold a reality based conversation by discharge.  Outcome: Progressing   Goal Outcome Evaluation:     Plan of Care Reviewed With: patient

## 2023-01-22 NOTE — PLAN OF CARE
"  Problem: Adult Behavioral Health Plan of Care  Goal: Patient-Specific Goal (Individualization)  Description: Pt will sleep >5 hours each night throughout hospitalization.   Pt will eat >50% of each meal throughout hospitalization.  Pt will attend >25% of available groups throughout hospitalization.  Pt will accept appropriate treatment recommendations made by the treatment team.   Outcome: Progressing     Pt in lounge most of the shift. Pt does report anxiety but states she has skills to deal with her anxiety. Pt does report that the lithium has been very helpful and feels that she has been overwhelmed recently but feels clarity now that she is on lithium. Pt appears manic with pressured, rambling speech. Pt complained of pain in her right upper abdomin, states \"it feels like fatty deposits and the pain occasionally takes my breath away\". Pt wants to have an ultrasound done to check, pt also states she needs to see a dermatologist to check some moles on her back. Pt encouraged to talk to her NP in the morning. Pt denies depression, SI, HI and hallucinations.     Problem: Thought Process Alteration  Goal: Optimal Thought Clarity  Description: Patient will be able to hold a reality based conversation by discharge.  Outcome: Progressing   Goal Outcome Evaluation:     Plan of Care Reviewed With: patient         Face to face end of shift report communicated to night shift RN.     Mariel Pike RN  1/21/2023  10:14 PM              "

## 2023-01-23 LAB
ANION GAP SERPL CALCULATED.3IONS-SCNC: 11 MMOL/L (ref 7–15)
BUN SERPL-MCNC: 10.4 MG/DL (ref 6–20)
CALCIUM SERPL-MCNC: 9.6 MG/DL (ref 8.6–10)
CHLORIDE SERPL-SCNC: 102 MMOL/L (ref 98–107)
CREAT SERPL-MCNC: 0.77 MG/DL (ref 0.51–0.95)
DEPRECATED HCO3 PLAS-SCNC: 29 MMOL/L (ref 22–29)
GFR SERPL CREATININE-BSD FRML MDRD: >90 ML/MIN/1.73M2
GLUCOSE SERPL-MCNC: 83 MG/DL (ref 70–99)
HOLD SPECIMEN: NORMAL
LITHIUM SERPL-SCNC: 0.4 MMOL/L (ref 0.6–1.2)
POTASSIUM SERPL-SCNC: 4.1 MMOL/L (ref 3.4–5.3)
SODIUM SERPL-SCNC: 142 MMOL/L (ref 136–145)

## 2023-01-23 PROCEDURE — 80178 ASSAY OF LITHIUM: CPT | Performed by: NURSE PRACTITIONER

## 2023-01-23 PROCEDURE — 124N000004

## 2023-01-23 PROCEDURE — 250N000013 HC RX MED GY IP 250 OP 250 PS 637: Performed by: NURSE PRACTITIONER

## 2023-01-23 PROCEDURE — 99232 SBSQ HOSP IP/OBS MODERATE 35: CPT | Performed by: NURSE PRACTITIONER

## 2023-01-23 PROCEDURE — 36415 COLL VENOUS BLD VENIPUNCTURE: CPT | Performed by: NURSE PRACTITIONER

## 2023-01-23 PROCEDURE — 80048 BASIC METABOLIC PNL TOTAL CA: CPT | Performed by: NURSE PRACTITIONER

## 2023-01-23 RX ORDER — LITHIUM CARBONATE 450 MG
450 TABLET, EXTENDED RELEASE ORAL EVERY 12 HOURS SCHEDULED
Status: DISCONTINUED | OUTPATIENT
Start: 2023-01-23 | End: 2023-01-24 | Stop reason: HOSPADM

## 2023-01-23 RX ADMIN — LAMOTRIGINE 25 MG: 25 TABLET ORAL at 20:30

## 2023-01-23 RX ADMIN — LORAZEPAM 0.5 MG: 0.5 TABLET ORAL at 18:18

## 2023-01-23 RX ADMIN — LITHIUM CARBONATE 450 MG: 450 TABLET, EXTENDED RELEASE ORAL at 20:30

## 2023-01-23 ASSESSMENT — ACTIVITIES OF DAILY LIVING (ADL)
ADLS_ACUITY_SCORE: 30
HYGIENE/GROOMING: INDEPENDENT
HYGIENE/GROOMING: INDEPENDENT
ORAL_HYGIENE: INDEPENDENT
DRESS: INDEPENDENT
ADLS_ACUITY_SCORE: 30
ORAL_HYGIENE: INDEPENDENT
ADLS_ACUITY_SCORE: 30
DRESS: SCRUBS (BEHAVIORAL HEALTH);INDEPENDENT

## 2023-01-23 NOTE — PLAN OF CARE
Telephone contact with Rigoberto from the [facility] - he is going to be out tomorrow so wanted to stop and meet with the patient today - attempted to call and schedule appointment with Maryam again but got the voicemail - voicemail was left on Friday.

## 2023-01-23 NOTE — PLAN OF CARE
Problem: Adult Behavioral Health Plan of Care  Goal: Patient-Specific Goal (Individualization)  Description: Pt will sleep >5 hours each night throughout hospitalization.   Pt will eat >50% of each meal throughout hospitalization.  Pt will attend >25% of available groups throughout hospitalization.  Pt will accept appropriate treatment recommendations made by the treatment team.   Outcome: Progressing     Problem: Thought Process Alteration  Goal: Optimal Thought Clarity  Description: Patient will be able to hold a reality based conversation by discharge.  Outcome: Progressing     Face to face shift report received from Juliane RN. Rounding completed, pt observed.     Pt appeared to be sleeping most of this shift.     Face to face report will be communicated to oncoming RN.    Janice Eller RN  1/23/2023  6:10 AM

## 2023-01-23 NOTE — PROGRESS NOTES
"CLINICAL NUTRITION SERVICES  -  REASSESSMENT NOTE    Barbi AIDEN Vale     37 yof admitted for manic behavior. Medical hx includes ADHD, anxiety. Weight trending down slightly this admission. Intake mostly adequate.    Diet Order: Regular   Intake: 17 meals with % intake (15 with 100% intake)    Height: 5' 3\"  Weight: 169 lbs 4.8 oz  Body mass index is 29.99 kg/m .  Weight Status:  Overweight BMI 25-29.9  Weight History:  Wt Readings from Last 10 Encounters:   01/22/23 76.8 kg (169 lb 4.8 oz)   12/16/22 83.3 kg (183 lb 9.6 oz)   08/18/22 81.3 kg (179 lb 3.2 oz)   07/14/22 81.6 kg (180 lb)   02/23/22 83.9 kg (185 lb)   06/03/21 81.2 kg (179 lb)   05/18/21 82.1 kg (181 lb)   02/09/21 82.1 kg (181 lb)   01/19/21 82.1 kg (181 lb)   01/12/21 82.6 kg (182 lb 3.2 oz)      Malnutrition Diagnosis: Suspect poor nutrition status with weight loss.    NUTRITION RECOMMENDATIONS  - Encourage intake at meal times  - Offer Ensure with poor appetite/intake    MONITORING AND EVALUATION:  RD will monitor PRN                "

## 2023-01-23 NOTE — PLAN OF CARE
"Face to face shift report received from Chloe JEFFERS RN. Rounding completed, pt observed.    Problem: Adult Behavioral Health Plan of Care  Goal: Patient-Specific Goal (Individualization)  Description: Pt will sleep >5 hours each night throughout hospitalization.   Pt will eat >50% of each meal throughout hospitalization.  Pt will attend >25% of available groups throughout hospitalization.  Pt will accept appropriate treatment recommendations made by the treatment team.   Outcome: Progressing  Note: Shift Summary:  Patient up in lounge at the start of this shift.  Patient denies needs at the beginning of this shift.  Patientis attending groups and social with peers.  Denies pain or unwanted side effects.  Requested and given Ativan 0.5 mg po at 1818 after a telephone call with her .  Stated she had said something she shouldn't have and now needed to \"get it together\" so she could call him back and apologize. Compliant with scheduled medications.     Problem: Thought Process Alteration  Goal: Optimal Thought Clarity  Description: Patient will be able to hold a reality based conversation by discharge.  Outcome: Progressing  Face to face report will be communicated to oncoming RN.    Jeanette Wills RN  1/23/2023                            "

## 2023-01-23 NOTE — PLAN OF CARE
Problem: Adult Behavioral Health Plan of Care  Goal: Patient-Specific Goal (Individualization)  Description: Pt will sleep >5 hours each night throughout hospitalization.   Pt will eat >50% of each meal throughout hospitalization.  Pt will attend >25% of available groups throughout hospitalization.  Pt will accept appropriate treatment recommendations made by the treatment team.   Outcome: Progressing  Note:     0900 Patient is alert and up on the unit. Denies any current problem or need states she is hoping to discharge today. Patient attending groups and is steady on her feet. Behavior appropriate and social with peers.    1430 Patient meeting with assigned [facility] at this time.     Face to face end of shift report communicated to 3-11 shift RN.     Chloe Kirby RN  1/23/2023  2:54 PM          Problem: Thought Process Alteration  Goal: Optimal Thought Clarity  Description: Patient will be able to hold a reality based conversation by discharge.  Outcome: Progressing      Goal Outcome Evaluation:    Plan of Care Reviewed With: patient

## 2023-01-24 VITALS
OXYGEN SATURATION: 99 % | HEART RATE: 79 BPM | BODY MASS INDEX: 30 KG/M2 | RESPIRATION RATE: 16 BRPM | DIASTOLIC BLOOD PRESSURE: 58 MMHG | TEMPERATURE: 98 F | SYSTOLIC BLOOD PRESSURE: 101 MMHG | WEIGHT: 169.3 LBS | HEIGHT: 63 IN

## 2023-01-24 PROCEDURE — 250N000013 HC RX MED GY IP 250 OP 250 PS 637: Performed by: NURSE PRACTITIONER

## 2023-01-24 PROCEDURE — 99239 HOSP IP/OBS DSCHRG MGMT >30: CPT | Performed by: NURSE PRACTITIONER

## 2023-01-24 RX ORDER — LITHIUM CARBONATE 450 MG
450 TABLET, EXTENDED RELEASE ORAL 2 TIMES DAILY
Qty: 60 TABLET | Refills: 0 | Status: SHIPPED | OUTPATIENT
Start: 2023-01-24 | End: 2023-02-20

## 2023-01-24 RX ORDER — LORAZEPAM 0.5 MG/1
0.5 TABLET ORAL 2 TIMES DAILY PRN
Qty: 30 TABLET | Refills: 0 | Status: SHIPPED | OUTPATIENT
Start: 2023-01-24 | End: 2024-06-05

## 2023-01-24 RX ORDER — LAMOTRIGINE 25 MG/1
25 TABLET ORAL AT BEDTIME
Qty: 60 TABLET | Refills: 0 | Status: SHIPPED | OUTPATIENT
Start: 2023-01-24 | End: 2023-02-20

## 2023-01-24 RX ADMIN — LORAZEPAM 0.5 MG: 0.5 TABLET ORAL at 14:05

## 2023-01-24 RX ADMIN — LITHIUM CARBONATE 450 MG: 450 TABLET, EXTENDED RELEASE ORAL at 08:31

## 2023-01-24 ASSESSMENT — ACTIVITIES OF DAILY LIVING (ADL)
ADLS_ACUITY_SCORE: 30
ORAL_HYGIENE: INDEPENDENT
ADLS_ACUITY_SCORE: 30
HYGIENE/GROOMING: INDEPENDENT
DRESS: SCRUBS (BEHAVIORAL HEALTH);INDEPENDENT

## 2023-01-24 NOTE — PLAN OF CARE
Problem: Adult Behavioral Health Plan of Care  Goal: Patient-Specific Goal (Individualization)  Description: Pt will sleep >5 hours each night throughout hospitalization.   Pt will eat >50% of each meal throughout hospitalization.  Pt will attend >25% of available groups throughout hospitalization.  Pt will accept appropriate treatment recommendations made by the treatment team.   1/24/2023 1153 by Chloe Kirby RN  Outcome: Progressing  Note:     0930 Patient is alert and up on the unit. Patient states she is excited to discharge home. Patient is alert and pleasant. Attending groups. Patient discussing discharge plans. Patient currently has no physical complaints including pain.    1405 Patient requested and given Ativan 0.5 mg po for anxiety related to discharge.    1430 Writer reviewed AVS with patient at this time. Patient denies depression and thoughts of suicide as well as auditory and visual hallucinations and pain. Patient states that she is just here because she wants to be a better Mom.     Face to face end of shift report communicated to 3-11 shift RN.     Chloe Kirby RN  1/24/2023  2:40 PM           Problem: Thought Process Alteration  Goal: Optimal Thought Clarity  Description: Patient will be able to hold a reality based conversation by discharge.  Outcome: Progressing   Goal Outcome Evaluation:    Plan of Care Reviewed With: patient

## 2023-01-24 NOTE — PLAN OF CARE
Problem: Adult Behavioral Health Plan of Care  Goal: Patient-Specific Goal (Individualization)  Description: Pt will sleep >5 hours each night throughout hospitalization.   Pt will eat >50% of each meal throughout hospitalization.  Pt will attend >25% of available groups throughout hospitalization.  Pt will accept appropriate treatment recommendations made by the treatment team.   Outcome: Progressing     Problem: Thought Process Alteration  Goal: Optimal Thought Clarity  Description: Patient will be able to hold a reality based conversation by discharge.  Outcome: Progressing     Face to face shift report received from Yaya White completed, pt observed laying in bed, appeared to be sleeping.    Patient appeared to be sleeping for approximately 7.5 hours since 2245 last shift, up once to use the bathroom.    Patient had no reported  or observed hallucinations or paranoia.    Patient's morning vitals as follows; Blood Pressure 101/58  Temp 98 Pulse 79 Respirations 16 SpO2 99% RA    Face to face report will be communicated to oncoming RN.    Britney De La O RN  1/24/2023  6:40 AM

## 2023-01-24 NOTE — PROGRESS NOTES
Scheduled follow ups with [facility]  Med management:Jeovany Vargas- Tuesday January 31st @ 11:00 AM   &  Therapy: Belle- Monday February 6th @ 10:00 AM    Pt is discharging at the recommendation of the treatment team. Pt is discharging to home transported by . Pt denies having any thoughts of hurting themself or anyone else. Pt denies anxiety or depression. Pt has follow up with Psychiatry and therapy. Discharge instructions, including; demographic sheet, psychiatric evaluation, discharge summary, and AVS were faxed to these next level of care providers.

## 2023-01-24 NOTE — PROGRESS NOTES
"[facility] PSYCHIATRY  PROGRESS NOTE     SUBJECTIVE     Barbi has been up in the lounge and attending groups for much of the day. We reviewed her labs. Patient is in agreement to increase Lithium dosing, will split the dose per her request. She denies any side effects from the medication. She reports feeling hopeful about discharge. Her  did come to see her this afternoon. No evidence of paranoia or thoughts of harming self or others. Discussed likely discharge home tomorrow once outpatient appointments have been scheduled. She is in agreement with this plan.     MEDICATIONS   Scheduled Meds:    lamoTRIgine  25 mg Oral At Bedtime     lithium ER  450 mg Oral Q12H [facility] (08/20)     PRN Meds:.acetaminophen, alum & mag hydroxide-simethicone, LORazepam, OLANZapine **OR** OLANZapine, senna-docusate     ALLERGIES   Allergies   Allergen Reactions     Depo-Provera [Medroxyprogesterone] Swelling     Swelled up, headaches        MENTAL STATUS EXAM   Vitals: /58   Pulse 79   Temp 98  F (36.7  C) (Tympanic)   Resp 16   Ht 1.6 m (5' 3\")   Wt 76.8 kg (169 lb 4.8 oz)   LMP 12/15/2022   SpO2 99%   BMI 29.99 kg/m      Appearance: Alert, oriented, dressed in hospital scrubs, casually groomed  Attitude: Cooperative, pleasant   Eye Contact: good  Mood: \"better\"  Affect: brighter  Speech: less pressured, able to engage more in back and forth conversation  Psychomotor Behavior: No tremor, rigidity, akathisia, or psychomotor retardation    Thought Process: more linear and goal oriented  Associations: No loose associations   Thought Content: Denies SI. No SIB. Denies AVH.  No paranoia noted today  Insight: improving  Judgment: fair, has improved  Oriented to: Person, place, and time  Attention Span and Concentration: Intact  Recent and Remote Memory: Intact  Language: English with appropriate syntax and vocabulary  Fund of Knowledge: Average  Muscle Strength and Tone: Grossly normal  Gait and Station: Grossly " normal       LABS   No results found for this or any previous visit (from the past 24 hour(s)).      IMPRESSION     This is a 37 year old female with a PMH of attention deficit disorder who has been treated with multiple stimulant medication.  She has never had an episode of psychosis in the past though has had some depressive episodes with high anxiety and racing thoughts.  Her outpatient provider had been receiving text messages from her this morning and had been very concerned she was psychotic and encouraged her to come to the emergency room.  While in the emergency room she did get a dose of Zyprexa 10 mg and did have some improvement in her irrational thought process and is now thinking that her  may not be actually trying to kill her though still questions some of her thoughts.  It sounds as though her symptoms started after the birth of her 3-year-old.  She states that she has had significant racing thoughts and very high anxiety and nothing has been helping significantly.  There is not a family history of diagnosed bipolar disorder though does state that she has family members who likely do have this.  She to has questioned if she has bipolar disorder and it sounds as though that her outpatient provider has also been concerned about this though not quite certain.  She does not have any substance abuse issues and it has not been a concern that she abuses stimulants though likely did send her into a manic episode.     DIAGNOSES     -Bipolar 1 disorder, most recent episode manic with psychotic features (likely exacerbated by amphetamine prescription)  -Attention deficit disorder, by history though high likelihood of bipolar versus attention deficit       PLAN     Location: Unit 5  Legal Status: Voluntary in lieu of commitment    Safety Assessment:    Behavioral Orders   Procedures     Code 1 - Restrict to Unit     Routine Programming     As clinically indicated     Status 15     Every 15 minutes.       PTA medications continued/changed:     -Discontinue all stimulant medication and Effexor    New medications tried and stopped:     -None    New medications initiated:     -Ativan 0.5 mg twice daily as needed  -Lithium  mg at bedtime  -Lamictal 25 mg daily (start date 1/17)    Today's Changes:    -Increase Lithium CR, will split dose to 450 mg BID  -Reviewed labs  -Likely discharge home with outpatient services tomorrow     Programming: Patient will be treated in a therapeutic milieu with appropriate individual and group therapies. Education will be provided on diagnoses, medications, and treatments.     Medical diagnoses:  Per medicine    Consult: None    Labs: lithium level 0.4    Anticipated LOS: 5-7 days    Disposition: Discharge home with  when stable, likely tomorrow         ATTESTATION    Anna Weaver NP

## 2023-01-24 NOTE — DISCHARGE SUMMARY
[facility] PSYCHIATRY  DISCHARGE SUMMARY     DISCHARGE DATA     Barbi Vale MRN# [id number]   Age: 37 year old YOB: 1985     Date of Admission: 1/11/2023  Date of Discharge: January 24, 2023  Discharge Provider: Anna Weaver NP       REASON FOR ADMISSION     She is brought to the emergency room police after she called the police stating that her  was trying to kill her.when the police arrived at her house they did realize that her  seemed rational and stable and that she was acting very irrational and agitated and was taken to the emergency room.  In the emergency room she did receive 10 of Zyprexa though it did take a few hours to take any effect and she did have some improvement in irrational thoughts and was quite sedated after the Zyprexa.  She was not able to answer a large amount of questions for me as she did fell asleep in between questions though I was able to obtain some information some history from her.  She states she is never been hospitalized in the past.  She states that her symptoms of very high anxiety, panic and racing thoughts and feeling very tense began right after she had her 3-year-old.  This is her only child.  She was treated for attention deficit disorder though states she has 2, question bipolar disorder.  She does not have any substance abuse history at all.  There has not been any concern that she is abusing stimulants.  I have not yet spoke with her  though she did state that I could call him and give me his phone number.  She did state that I can call her outpatient provider who I just spoke with who has had some question about bipolar disorder though does state she frequently remains in a pressured and elevated state and did initially believe that it was related to attention deficit though is clearly now believing it is not.  He has never seen her psychotic in the past until this morning when she was texting him delusional  statements and he then encouraged her to come to the emergency room.  It does not sound that her outpatient provider is too familiar with her .  It does not sound like he goes to appointments with her to help with any collateral information though hoping to gather some collateral information on her symptoms over the past few years that he sees.    See H&P completed by Debbi Barger cnp for full admission information.       DISCHARGE DIAGNOSES     -Bipolar 1 disorder, most recent episode manic with psychotic features (likely exacerbated by amphetamine prescription)  -Attention deficit disorder, by history though high likelihood of bipolar versus attention deficit       CONSULTS     none       HOSPITAL COURSE     Legal status: Orders Placed This Encounter      Legal status Voluntary    Patient was admitted to unit 5 due to the aforementioned presentation. The patient was placed under 15 minute checks to ensure patient safety. Patient was taking extremely high doses of stimulants prior to admission, which was felt to be the cause of her paranoid thinking. All stimulants were stopped, Effexor XR stopped. Patient was started on Lithium and Lamictal scheduled and was utilizing PRN Ativan for breakthrough anxiety. Patient is aware that this will likely only continue for a few months until mood stabilizers are at therapeutic dosing. Patient tolerated these medication well. Lithium level on 600 mg daily was 0.4. Dose increased to 450 mg BID, she will be due for another lithium level at her next outpatient appointments. Upon admission, patient had been paranoid that her  was trying to kill her. Her speech was pressured and mood was elevated. She was initially very hesitant to start any new medications, as she only felt she needed medication for her ADHD. Due to patient's level of paranoia and psychosis and her refusal to consider medications, a petition for commitment was filed with the [facility]. She was  agreeable to stay voluntary in lieu of commitment. She did then start to take Lithium and Lamictal, within a few days patient reported feeling the best she had ever felt, felt she was thinking more clearly. After all stimulants were stopped patient no longer verbalized any paranoia or psychotic ideas or statements. It is likely that those symptoms were stimulant-induced as they quickly resolved within a few days of stopping her prescribed stimulants. Over the course of her stay patient was less pressured and was able to have an appropriate conversation. She was attending all available group programming and was social with peers and staff. No reports of SI or hallucinations throughout her stay. She was not exhibiting any symptoms of paranoia on day of discharge. Patient did consider attending PHP program though decided to decline at this time. She is aware that this would be an option in the future should she need a bit more structure or stabilization. She has been assigned a  with the [facility] and has follow-up for therapy and medication management. She will discharge home with her  this afternoon to follow-up with her outpatient providers.    It is highly likely that patient's psychosis and paranoia were induced by her prescribed high dose stimulants. Would recommend against restarting any type of stimulant at this time.     Ms. Vale did not require seclusion/restraint during hospitalization.     We reviewed with Ms. Vale current and past medication trials including duration, dose, response and side effects. During this hospitalization, the following changes to the patient's psychotropic medications were made:    PTA psychotropic medications stopped:     -Dexedrine 15 BID, Ritalin LA 60 BID  -Vyvanse 70 mg BID  -Effexor XR 75 mg daily  -Guanfacine 1 mg at bedtime    PTA psychotropic medications continued/changed:     -none    New psychotropic medications tried and stopped:     -none    New  psychotropic medications initiated:     -Lithium  mg BID  -Lamictal 25 mg daily (started 1/17)  -Ativan 0.5 mg     With these changes and supports the patient noticed improvement in their symptoms and felt sufficiently ready for discharge. As a result, Barbi Vale was discharged to home. At the time of discharge, Barbi Vale was determined to not be a danger to self or others. The patient was also medically stable for discharge. At the current time of discharge, the patient does not meet criteria for involuntary hospitalization. On the day of discharge, the patient reports that they do not have suicidal or homicidal ideation. Steps taken to minimize risk include: assessing patient s behavior and thought process daily during hospital stay, discharging patient with adequate plan for follow up for mental and physical health and discussing safety plan of returning to the hospital should the patient ever have thoughts of harming themselves or others. Therefore, based on all available evidence including the factors cited above, the patient does not appear to be at imminent risk for self-harm, and is appropriate for outpatient level of care. However, if patient uses substances or is medication non-adherent, their risk of decompensation and SI will be elevated. This was discussed with the patient.       DISCHARGE MEDICATIONS     Current Discharge Medication List      START taking these medications    Details   lamoTRIgine (LAMICTAL) 25 MG tablet Take 1 tablet (25 mg) by mouth At Bedtime for 1 more week, then increase to 2 tabs (50mg) at bedtime  Qty: 60 tablet, Refills: 0    Associated Diagnoses: Bipolar affective disorder, current episode manic, current episode severity unspecified (H)      lithium (ESKALITH CR/LITHOBID) 450 MG CR tablet Take 1 tablet (450 mg) by mouth 2 times daily  Qty: 60 tablet, Refills: 0    Associated Diagnoses: Bipolar affective disorder, current episode manic, current episode  "severity unspecified (H)      LORazepam (ATIVAN) 0.5 MG tablet Take 1 tablet (0.5 mg) by mouth 2 times daily as needed for anxiety  Qty: 30 tablet, Refills: 0    Associated Diagnoses: SUHA (generalized anxiety disorder)         CONTINUE these medications which have NOT CHANGED    Details   hydrOXYzine (ATARAX) 25 MG tablet Take 25-50 mg by mouth 3 times daily as needed      multivitamin w/minerals (THERA-VIT-M) tablet Take 1 tablet by mouth daily         STOP taking these medications       clotrimazole (LOTRIMIN) 1 % external solution Comments:   Reason for Stopping:         dextroamphetamine (DEXEDRINE SPANSULE) 15 MG 24 hr capsule Comments:   Reason for Stopping:        guanFACINE (TENEX) 1 MG tablet Comments:   Reason for Stopping:         methylphenidate (RITALIN LA) 30 MG 24 hr capsule Comments:   Reason for Stopping:         venlafaxine (EFFEXOR XR) 75 MG 24 hr capsule Comments:   Reason for Stopping:         VYVANSE 70 MG capsule Comments:   Reason for Stopping:             Reason for two or more neuroleptics: not applicable       MENTAL STATUS EXAM   Vitals: /58   Pulse 79   Temp 98  F (36.7  C) (Tympanic)   Resp 16   Ht 1.6 m (5' 3\")   Wt 76.8 kg (169 lb 4.8 oz)   LMP 12/15/2022   SpO2 99%   BMI 29.99 kg/m      Appearance: Alert, oriented, dressed in hospital scrubs, appears stated age   Attitude: Cooperative, pleasant  Eye Contact: Good  Mood: \"Better\"  Affect: Full range of affect, brighter  Speech: less pressured, able to engage more in back and forth conversation  Psychomotor Behavior: No tremor, rigidity, or psychomotor abnormality   Thought Process: more linear and logical, goal oriented  Associations: No loose associations   Thought Content: Denies SI or plan. No SIB. Denies A/V hallucinations. No evidence of delusional thought or paranoia  Insight: fair, has improved  Judgment: fair, has improved  Oriented to: Person, place, and time  Attention Span and Concentration: Intact  Recent and " Remote Memory: Intact  Language: English with appropriate syntax and vocabulary  Fund of Knowledge: Average  Muscle Strength and Tone: Grossly normal  Gait and Station: Grossly normal       DISCHARGE PLAN     1.  Education given regarding diagnostic and treatment options with risks, benefits and alternatives with adequate verbalization of understanding.  2.  Discharge to home. Upon detailed review of risk factors, patient amenable for release.   3.  Continue aforementioned medications and associated medication changes with follow-up by outpatient provider.  4.  Crisis management planning in place.    5.  Nursing and  to review further discharge recommendations.   6.  Patient is being discharged with the following appointments as detailed below.      Health Care Follow-up:      [facility]  Med management:Jeovany Vargas- Tuesday January 31st @ 11:00 AM   Therapy: Belle- Monday February 6th @ 10:00 AM  Sima Sandoval [address] [place] [id number]  Phone: [phone]   Fax: [phone]  [email or web address]     [facility]   - Rigoberto Gao  [address] [place] [id number]  Phone:  [phone]   Fax - [phone]       DISCHARGE SERVICES PROVIDED     40 minutes spent on discharge services, including:  Final examination of patient.  Review and discussion of hospital stay.  Instructions for continued outpatient care/goals.  Preparation of discharge records.  Preparation of medications refills and new prescriptions.  Preparation of applicable referral forms.        ATTESTATION     Anna Weaver NP       LABS THIS ADMISSION     Results for orders placed or performed during the hospital encounter of 01/11/23   CBC with platelets     Status: Normal   Result Value Ref Range    WBC Count 5.2 4.0 - 11.0 10e3/uL    RBC Count 4.53 3.80 - 5.20 10e6/uL    Hemoglobin 13.9 11.7 - 15.7 g/dL    Hematocrit 41.2 35.0 - 47.0 %    MCV 91 78 - 100 fL    MCH 30.7 26.5 - 33.0 pg     MCHC 33.7 31.5 - 36.5 g/dL    RDW 12.0 10.0 - 15.0 %    Platelet Count 284 150 - 450 10e3/uL   Basic metabolic panel     Status: Abnormal   Result Value Ref Range    Sodium 139 136 - 145 mmol/L    Potassium 4.0 3.4 - 5.3 mmol/L    Chloride 101 98 - 107 mmol/L    Carbon Dioxide (CO2) 26 22 - 29 mmol/L    Anion Gap 12 7 - 15 mmol/L    Urea Nitrogen 4.8 (L) 6.0 - 20.0 mg/dL    Creatinine 0.63 0.51 - 0.95 mg/dL    Calcium 9.5 8.6 - 10.0 mg/dL    Glucose 101 (H) 70 - 99 mg/dL    GFR Estimate >90 >60 mL/min/1.73m2   UA with Microscopic reflex to Culture     Status: Normal    Specimen: Urine, Midstream   Result Value Ref Range    Color Urine Straw Colorless, Straw, Light Yellow, Yellow    Appearance Urine Clear Clear    Glucose Urine Negative Negative mg/dL    Bilirubin Urine Negative Negative    Ketones Urine Negative Negative mg/dL    Specific Gravity Urine 1.006 1.003 - 1.035    Blood Urine Negative Negative    pH Urine 7.5 4.7 - 8.0    Protein Albumin Urine Negative Negative mg/dL    Urobilinogen Urine Normal Normal, 2.0 mg/dL    Nitrite Urine Negative Negative    Leukocyte Esterase Urine Negative Negative    RBC Urine <1 <=2 /HPF    WBC Urine 0 <=5 /HPF    Squamous Epithelials Urine 0 <=1 /HPF    Narrative    Urine Culture not indicated   Asymptomatic Influenza A/B & SARS-CoV2 (COVID-19) Virus PCR Multiplex Nasopharyngeal     Status: Normal    Specimen: Nasopharyngeal; Swab   Result Value Ref Range    Influenza A PCR Negative Negative    Influenza B PCR Negative Negative    RSV PCR Negative Negative    SARS CoV2 PCR Negative Negative    Narrative    Testing was performed using the Xpert Xpress CoV2/Flu/RSV Assay on the Cepheid GeneXpert Instrument. This test should be ordered for the detection of SARS-CoV-2 and influenza viruses in individuals who meet clinical and/or epidemiological criteria. Test performance is unknown in asymptomatic patients. This test is for in vitro diagnostic use under the FDA EUA for  laboratories certified under CLIA to perform high or moderate complexity testing. This test has not been FDA cleared or approved. A negative result does not rule out the presence of PCR inhibitors in the specimen or target RNA in concentration below the limit of detection for the assay. If only one viral target is positive but coinfection with multiple targets is suspected, the sample should be re-tested with another FDA cleared, approved, or authorized test, if coinfection would change clinical management. This test was validated by the [facility]. These laboratories are certified under the Clinical Laboratory Improvement Amendments of 1988 (CLIA-88) as qualified to perform high complexity laboratory testing.   Extra Tube     Status: None    Narrative    The following orders were created for panel order Extra Tube.  Procedure                               Abnormality         Status                     ---------                               -----------         ------                     Extra Blue Top Tube[[id number]]                              Final result               Extra Red Top Tube[[id number]]                               Final result               Extra Heparinized Syringe[[id number]]                        Final result                 Please view results for these tests on the individual orders.   Extra Blue Top Tube     Status: None   Result Value Ref Range    Hold Specimen JIC    Extra Red Top Tube     Status: None   Result Value Ref Range    Hold Specimen JIC    Extra Heparinized Syringe     Status: None   Result Value Ref Range    Hold Specimen JIC    Urine Drugs of Abuse Screen Panel 13     Status: Abnormal   Result Value Ref Range    Cannabinoids (33-ihj-8-carboxy-9-THC) Not Detected Not Detected, Indeterminate    Phencyclidine Not Detected Not Detected, Indeterminate    Cocaine (Benzoylecgonine) Not Detected Not Detected, Indeterminate    Methamphetamine (d-Methamphetamine) Not  Detected Not Detected, Indeterminate    Opiates (Morphine) Not Detected Not Detected, Indeterminate    Amphetamine (d-Amphetamine) Detected (A) Not Detected, Indeterminate    Benzodiazepines (Nordiazepam) Not Detected Not Detected, Indeterminate    Tricyclic Antidepressants (Desipramine) Not Detected Not Detected, Indeterminate    Methadone Not Detected Not Detected, Indeterminate    Barbiturates (Butalbital) Not Detected Not Detected, Indeterminate    Oxycodone Not Detected Not Detected, Indeterminate    Propoxyphene (Norpropoxyphene) Not Detected Not Detected, Indeterminate    Buprenorphine Not Detected Not Detected, Indeterminate   Lithium level     Status: Abnormal   Result Value Ref Range    Lithium 0.4 (L) 0.6 - 1.2 mmol/L   Basic metabolic panel     Status: Normal   Result Value Ref Range    Sodium 142 136 - 145 mmol/L    Potassium 4.1 3.4 - 5.3 mmol/L    Chloride 102 98 - 107 mmol/L    Carbon Dioxide (CO2) 29 22 - 29 mmol/L    Anion Gap 11 7 - 15 mmol/L    Urea Nitrogen 10.4 6.0 - 20.0 mg/dL    Creatinine 0.77 0.51 - 0.95 mg/dL    Calcium 9.6 8.6 - 10.0 mg/dL    Glucose 83 70 - 99 mg/dL    GFR Estimate >90 >60 mL/min/1.73m2   Extra Tube     Status: None    Narrative    The following orders were created for panel order Extra Tube.  Procedure                               Abnormality         Status                     ---------                               -----------         ------                     Extra Purple Top Tube[[id number]]                            Final result                 Please view results for these tests on the individual orders.   Extra Purple Top Tube     Status: None   Result Value Ref Range    Hold Specimen [facility]    Urine Drugs of Abuse Screen     Status: Abnormal    Narrative    The following orders were created for panel order Urine Drugs of Abuse Screen.  Procedure                               Abnormality         Status                     ---------                                -----------         ------                     Urine Drugs of Abuse Scr...[[id number]]  Abnormal            Final result                 Please view results for these tests on the individual orders.

## 2023-01-24 NOTE — PLAN OF CARE
Face to face shift report received from Chloe JEFFERS RN. Rounding completed, pt observed.    Problem: Adult Behavioral Health Plan of Care  Goal: Plan of Care Review  Outcome: Adequate for Care Transition  Note: Shift Summary:   Discharge Note    Patient Discharged to home on 1/24/2023 3:51 PM via Private Car accompanied by .     Patient informed of discharge instructions in AVS by delonte RN. patient verbalizes understanding and denies having any questions pertaining to AVS. Patient stable at time of discharge. Patient denies SI, HI, and thoughts of self harm at time of discharge. All personal belongings returned to patient. Discharge prescriptions sent to [facility]. Psych evaluation, history and physical, AVS, and discharge summary faxed to next level of care- .     Jeanette Wills RN  1/24/2023  3:51 PM    Goal: Patient-Specific Goal (Individualization)  Description: Pt will sleep >5 hours each night throughout hospitalization.   Pt will eat >50% of each meal throughout hospitalization.  Pt will attend >25% of available groups throughout hospitalization.  Pt will accept appropriate treatment recommendations made by the treatment team.   Outcome: Adequate for Care Transition  Goal: Individualized Daily Interaction Plan (IDIP)  Outcome: Adequate for Care Transition  Goal: Adheres to Safety Considerations for Self and Others  Outcome: Adequate for Care Transition  Goal: Absence of New-Onset Illness or Injury  Outcome: Adequate for Care Transition  Goal: Optimized Coping Skills in Response to Life Stressors  Outcome: Adequate for Care Transition  Goal: Develops/Participates in Therapeutic [place] to Support Successful Transition  Outcome: Adequate for Care Transition     Problem: Thought Process Alteration  Goal: Optimal Thought Clarity  Description: Patient will be able to hold a reality based conversation by discharge.  Outcome: Adequate for Care Transition

## 2023-01-30 ENCOUNTER — LAB (OUTPATIENT)
Dept: LAB | Facility: OTHER | Age: 38
End: 2023-01-30
Payer: COMMERCIAL

## 2023-01-30 ENCOUNTER — TELEPHONE (OUTPATIENT)
Dept: FAMILY MEDICINE | Facility: OTHER | Age: 38
End: 2023-01-30

## 2023-01-30 DIAGNOSIS — F30.10 MANIC BEHAVIOR (H): ICD-10-CM

## 2023-01-30 LAB — LITHIUM SERPL-SCNC: 0.8 MMOL/L (ref 0.6–1.2)

## 2023-01-30 PROCEDURE — 36415 COLL VENOUS BLD VENIPUNCTURE: CPT

## 2023-01-30 PROCEDURE — 80178 ASSAY OF LITHIUM: CPT

## 2023-01-30 NOTE — TELEPHONE ENCOUNTER
Recommendation to follow up with Dr. Pimentel as scheduled (2/15/2023) to discuss symptoms and ensure correct testing orders are placed.    Robyn Celaya MD

## 2023-01-31 DIAGNOSIS — Z79.899 LITHIUM USE: Primary | ICD-10-CM

## 2023-02-01 ENCOUNTER — LAB (OUTPATIENT)
Dept: LAB | Facility: OTHER | Age: 38
End: 2023-02-01
Payer: COMMERCIAL

## 2023-02-01 DIAGNOSIS — Z79.899 LITHIUM USE: ICD-10-CM

## 2023-02-01 LAB — LITHIUM SERPL-SCNC: 0.5 MMOL/L (ref 0.6–1.2)

## 2023-02-01 PROCEDURE — 80178 ASSAY OF LITHIUM: CPT

## 2023-02-01 PROCEDURE — 36415 COLL VENOUS BLD VENIPUNCTURE: CPT

## 2023-02-07 ENCOUNTER — APPOINTMENT (OUTPATIENT)
Dept: ULTRASOUND IMAGING | Facility: HOSPITAL | Age: 38
End: 2023-02-07
Attending: PHYSICIAN ASSISTANT
Payer: COMMERCIAL

## 2023-02-07 ENCOUNTER — HOSPITAL ENCOUNTER (EMERGENCY)
Facility: HOSPITAL | Age: 38
Discharge: HOME | End: 2023-02-07
Attending: NURSE PRACTITIONER | Admitting: PHYSICIAN ASSISTANT
Payer: COMMERCIAL

## 2023-02-07 ENCOUNTER — MYC MEDICAL ADVICE (OUTPATIENT)
Dept: FAMILY MEDICINE | Facility: OTHER | Age: 38
End: 2023-02-07

## 2023-02-07 VITALS
HEART RATE: 75 BPM | TEMPERATURE: 99.4 F | SYSTOLIC BLOOD PRESSURE: 123 MMHG | DIASTOLIC BLOOD PRESSURE: 76 MMHG | RESPIRATION RATE: 20 BRPM | OXYGEN SATURATION: 97 %

## 2023-02-07 DIAGNOSIS — R21 RASH AND NONSPECIFIC SKIN ERUPTION: ICD-10-CM

## 2023-02-07 LAB
ALBUMIN SERPL BCG-MCNC: 4 G/DL (ref 3.5–5.2)
ALP SERPL-CCNC: 77 U/L (ref 35–104)
ALT SERPL W P-5'-P-CCNC: 16 U/L (ref 10–35)
ANION GAP SERPL CALCULATED.3IONS-SCNC: 8 MMOL/L (ref 7–15)
AST SERPL W P-5'-P-CCNC: 24 U/L (ref 10–35)
BASOPHILS # BLD AUTO: 0.1 10E3/UL (ref 0–0.2)
BASOPHILS NFR BLD AUTO: 1 %
BILIRUB SERPL-MCNC: 0.4 MG/DL
BUN SERPL-MCNC: 7.2 MG/DL (ref 6–20)
CALCIUM SERPL-MCNC: 9.3 MG/DL (ref 8.6–10)
CHLORIDE SERPL-SCNC: 102 MMOL/L (ref 98–107)
CREAT SERPL-MCNC: 0.68 MG/DL (ref 0.51–0.95)
DEPRECATED HCO3 PLAS-SCNC: 27 MMOL/L (ref 22–29)
EOSINOPHIL # BLD AUTO: 0.1 10E3/UL (ref 0–0.7)
EOSINOPHIL NFR BLD AUTO: 2 %
ERYTHROCYTE [DISTWIDTH] IN BLOOD BY AUTOMATED COUNT: 12.2 % (ref 10–15)
FERRITIN SERPL-MCNC: 38 NG/ML (ref 6–175)
FLUAV RNA SPEC QL NAA+PROBE: NEGATIVE
FLUBV RNA RESP QL NAA+PROBE: NEGATIVE
GFR SERPL CREATININE-BSD FRML MDRD: >90 ML/MIN/1.73M2
GLUCOSE SERPL-MCNC: 104 MG/DL (ref 70–99)
HCT VFR BLD AUTO: 40.2 % (ref 35–47)
HGB BLD-MCNC: 13.3 G/DL (ref 11.7–15.7)
HOLD SPECIMEN: NORMAL
IMM GRANULOCYTES # BLD: 0 10E3/UL
IMM GRANULOCYTES NFR BLD: 0 %
LIPASE SERPL-CCNC: 18 U/L (ref 13–60)
LYMPHOCYTES # BLD AUTO: 1.3 10E3/UL (ref 0.8–5.3)
LYMPHOCYTES NFR BLD AUTO: 23 %
MCH RBC QN AUTO: 30.7 PG (ref 26.5–33)
MCHC RBC AUTO-ENTMCNC: 33.1 G/DL (ref 31.5–36.5)
MCV RBC AUTO: 93 FL (ref 78–100)
MONOCYTES # BLD AUTO: 0.4 10E3/UL (ref 0–1.3)
MONOCYTES NFR BLD AUTO: 6 %
NEUTROPHILS # BLD AUTO: 3.9 10E3/UL (ref 1.6–8.3)
NEUTROPHILS NFR BLD AUTO: 68 %
NRBC # BLD AUTO: 0 10E3/UL
NRBC BLD AUTO-RTO: 0 /100
PLATELET # BLD AUTO: 291 10E3/UL (ref 150–450)
POTASSIUM SERPL-SCNC: 4 MMOL/L (ref 3.4–5.3)
PROT SERPL-MCNC: 6.9 G/DL (ref 6.4–8.3)
RBC # BLD AUTO: 4.33 10E6/UL (ref 3.8–5.2)
RSV RNA SPEC NAA+PROBE: NEGATIVE
SARS-COV-2 RNA RESP QL NAA+PROBE: NEGATIVE
SODIUM SERPL-SCNC: 137 MMOL/L (ref 136–145)
WBC # BLD AUTO: 5.8 10E3/UL (ref 4–11)

## 2023-02-07 PROCEDURE — 76705 ECHO EXAM OF ABDOMEN: CPT

## 2023-02-07 PROCEDURE — 82728 ASSAY OF FERRITIN: CPT | Performed by: PHYSICIAN ASSISTANT

## 2023-02-07 PROCEDURE — 250N000013 HC RX MED GY IP 250 OP 250 PS 637: Performed by: PHYSICIAN ASSISTANT

## 2023-02-07 PROCEDURE — C9803 HOPD COVID-19 SPEC COLLECT: HCPCS

## 2023-02-07 PROCEDURE — 87637 SARSCOV2&INF A&B&RSV AMP PRB: CPT | Performed by: PHYSICIAN ASSISTANT

## 2023-02-07 PROCEDURE — 99284 EMERGENCY DEPT VISIT MOD MDM: CPT | Mod: 25,CS

## 2023-02-07 PROCEDURE — 99284 EMERGENCY DEPT VISIT MOD MDM: CPT | Mod: CS | Performed by: PHYSICIAN ASSISTANT

## 2023-02-07 PROCEDURE — 83690 ASSAY OF LIPASE: CPT | Performed by: PHYSICIAN ASSISTANT

## 2023-02-07 PROCEDURE — 85004 AUTOMATED DIFF WBC COUNT: CPT | Performed by: PHYSICIAN ASSISTANT

## 2023-02-07 PROCEDURE — 36415 COLL VENOUS BLD VENIPUNCTURE: CPT | Performed by: PHYSICIAN ASSISTANT

## 2023-02-07 PROCEDURE — 80053 COMPREHEN METABOLIC PANEL: CPT | Performed by: PHYSICIAN ASSISTANT

## 2023-02-07 RX ORDER — DIPHENHYDRAMINE HCL 25 MG
25 CAPSULE ORAL ONCE
Status: COMPLETED | OUTPATIENT
Start: 2023-02-07 | End: 2023-02-07

## 2023-02-07 RX ADMIN — DIPHENHYDRAMINE HYDROCHLORIDE 25 MG: 25 CAPSULE ORAL at 13:05

## 2023-02-07 ASSESSMENT — ENCOUNTER SYMPTOMS
FEVER: 1
CHILLS: 1
DIZZINESS: 0
BRUISES/BLEEDS EASILY: 0
WEAKNESS: 0
COUGH: 0
SHORTNESS OF BREATH: 0
BACK PAIN: 0
ABDOMINAL PAIN: 1

## 2023-02-07 ASSESSMENT — ACTIVITIES OF DAILY LIVING (ADL): ADLS_ACUITY_SCORE: 35
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
## 2023-02-07 NOTE — ED TRIAGE NOTES
Pt presents with c/o possible medication reaction to Lamictal, has been taking since 1/13-14   states that she has a Rash on back, neck lymph noes are hurting, fever, pain in her upper right quadrant, nausea, head aches   No sore throat, cough, chest pain, sob.   Is reading life threatening reaction on FDA web site.  back is red, spreading down to her legs.

## 2023-02-07 NOTE — ED TRIAGE NOTES
"Pt presents with a possible reaction to Lamictal. Pt states she started this medication while recent admission. Pt was trying to show staff her phone and the \"life threatening reactions\" that can happen. Pt states she had a rash over whole body for 4-5 days, pt states her whole body also itched. Pt stopped taking Lamictal yesterday. Pt denies SOB, no sore throat, no cough, no chest pain. Pt states she has some RUQ pain since the beginning of January. Unable to get a straight answer from the patient. Pt is focused on \"life threatening reaction.\" Pt does not make eye contact with staff, pt focused on phone.      "

## 2023-02-07 NOTE — DISCHARGE INSTRUCTIONS
As we discussed, your laboratory evaluation today in the emergency department was unremarkable for any concerning findings.  Your ultrasound was also negative for concerning findings.  Your COVID and flu were also negative.  Please follow-up in the clinic this week for recheck.  Return to this emergency department for any new or worsening symptoms.  Discuss the Lamictal with your primary care provider as well as medication manager.    You may use Benadryl for the rash.  This can be followed clinically.  If the rash seems to worsen or change please return here.

## 2023-02-08 ENCOUNTER — HOSPITAL ENCOUNTER (EMERGENCY)
Facility: HOSPITAL | Age: 38
Discharge: HOME | End: 2023-02-08
Attending: PHYSICIAN ASSISTANT | Admitting: PHYSICIAN ASSISTANT
Payer: COMMERCIAL

## 2023-02-08 VITALS
SYSTOLIC BLOOD PRESSURE: 134 MMHG | TEMPERATURE: 98.5 F | HEART RATE: 99 BPM | OXYGEN SATURATION: 98 % | RESPIRATION RATE: 18 BRPM | DIASTOLIC BLOOD PRESSURE: 81 MMHG

## 2023-02-08 DIAGNOSIS — F41.1 ANXIETY REACTION: ICD-10-CM

## 2023-02-08 PROCEDURE — G0463 HOSPITAL OUTPT CLINIC VISIT: HCPCS

## 2023-02-08 PROCEDURE — 99212 OFFICE O/P EST SF 10 MIN: CPT | Performed by: PHYSICIAN ASSISTANT

## 2023-02-09 NOTE — ED TRIAGE NOTES
Pt presents with c/o allergic reation.  States that she has evidence now in regards to the life threatening reaction.   Has been 3 days since taking the Lamictal   No labored breathing, talking fast. Doesn't feel like she is being heard and wants to make sure she isn't going to have the life threatening reactions.
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
## 2023-02-09 NOTE — TELEPHONE ENCOUNTER
Patient calling regarding mychart messages and visits to the ER.   Please review and advise.   Patient reports frustration with treatment in ER.

## 2023-02-09 NOTE — ED TRIAGE NOTES
Patient presents with concerns that she is having a reaction to her Lamictal. She states she was seen yesterday but felt she did not articulate herself well.

## 2023-02-10 ENCOUNTER — TELEPHONE (OUTPATIENT)
Dept: FAMILY MEDICINE | Facility: OTHER | Age: 38
End: 2023-02-10

## 2023-02-10 NOTE — TELEPHONE ENCOUNTER
Patient is requesting an overbook today for an ER F/u for anxiety, rash.  She has been trying to communicate with provider through My chart but not getting a return message.  She has been to the ER 2 times.  Patient is on the verge of tears on the phone and only want's to see her provider.  Please call her back to advise.      Assessments & Plan (with Medical Decision Making)   Findings as above.  This is a 37-year-old female who presented to the emergency department for concerns of a possible significant reaction to her Lamictal.  She was quite concerned over possible HLH.  She looks well.  She has no significant pressured speech or evidence of acute psychosis.  The rash is quite nondescript and shows no evidence of petechiae, purpura, vesicles, or Nikolsky sign.  She has no fever.  She is not tachycardic.  Her laboratory evaluation is entirely unremarkable including her ferritin.  She also has a negative ultrasound of the right upper quadrant.  At this time I can find no reasonable or compelling medical indication for further evaluation on an emergent basis.  The remainder of the work-up and concerns can be followed by her primary physician as an outpatient.  See discharge instructions.  Return here as needed.  The patient voiced understanding and was agreeable.     2/8/22  Assessments & Plan (with Medical Decision Making)   This is a 37-year-old female who presented to the urgent care for recheck of a possible reaction to her Lamictal.  She stopped taking the medication approximately 4 days ago.  The patient is concerned that she may have a particularly rare disease that is caused from using Lamictal that she found on her discharge instructions after being discharged from the behavioral health unit.  She stopped taking Lamictal on Sunday.  I evaluated this patient yesterday and she underwent a multitude of laboratory testing including ultrasound of the right upper quadrant that were all unremarkable.  This was to  alleviate her concerns regarding the HLH.  We discussed the patient's laboratory work yesterday as well as further work-up.  The patient had very specific requests including biopsies, genetic testing, etc. that she found on to the FDA website.  I advised that this facility does not perform this testing but I was willing to facilitate further testing to alleviate her concerns.  The patient became quite angry, got up from her chair and left the urgent care.  I do not believe there is any reasonable indication for acute psychiatric hold in this patient.  However, she will be considered to leave before work-up was finished.

## 2023-02-10 NOTE — TELEPHONE ENCOUNTER
Her Dr Thayer is going to order some labs for her.  She does have an appointment with Margarito next week. I let her know providers message from last night, states she didn't see it.     Caprice Pimentel MD     [facility]     8:35 PM  Note  I'd really need to see her to discuss this further but most cases of this disease occurs in kids under  1year old.

## 2023-02-10 NOTE — TELEPHONE ENCOUNTER
I'd really need to see her to discuss this further but most cases of this disease occurs in kids under  1year old.

## 2023-02-13 ENCOUNTER — OFFICE VISIT (OUTPATIENT)
Dept: FAMILY MEDICINE | Facility: OTHER | Age: 38
End: 2023-02-13
Attending: FAMILY MEDICINE
Payer: COMMERCIAL

## 2023-02-13 VITALS
WEIGHT: 175 LBS | BODY MASS INDEX: 31 KG/M2 | HEART RATE: 77 BPM | DIASTOLIC BLOOD PRESSURE: 72 MMHG | SYSTOLIC BLOOD PRESSURE: 110 MMHG | TEMPERATURE: 98.6 F | OXYGEN SATURATION: 97 %

## 2023-02-13 DIAGNOSIS — D76.1 HLH (HEMOPHAGOCYTIC LYMPHOHISTIOCYTOSIS) (H): ICD-10-CM

## 2023-02-13 DIAGNOSIS — R19.7 DIARRHEA, UNSPECIFIED TYPE: ICD-10-CM

## 2023-02-13 DIAGNOSIS — F31.10 BIPOLAR I DISORDER, MOST RECENT EPISODE (OR CURRENT) MANIC (H): ICD-10-CM

## 2023-02-13 DIAGNOSIS — Z79.899 ENCOUNTER FOR LONG-TERM (CURRENT) USE OF MEDICATIONS: Primary | ICD-10-CM

## 2023-02-13 DIAGNOSIS — L30.9 DERMATITIS: Primary | ICD-10-CM

## 2023-02-13 LAB
ALBUMIN SERPL BCG-MCNC: 4.2 G/DL (ref 3.5–5.2)
ALP SERPL-CCNC: 77 U/L (ref 35–104)
ALT SERPL W P-5'-P-CCNC: 16 U/L (ref 10–35)
ANION GAP SERPL CALCULATED.3IONS-SCNC: 9 MMOL/L (ref 7–15)
AST SERPL W P-5'-P-CCNC: 25 U/L (ref 10–35)
BASOPHILS # BLD AUTO: 0.1 10E3/UL (ref 0–0.2)
BASOPHILS NFR BLD AUTO: 1 %
BILIRUB SERPL-MCNC: 0.7 MG/DL
BUN SERPL-MCNC: 6 MG/DL (ref 6–20)
CALCIUM SERPL-MCNC: 9.4 MG/DL (ref 8.6–10)
CHLORIDE SERPL-SCNC: 101 MMOL/L (ref 98–107)
CHOLEST SERPL-MCNC: 221 MG/DL
CREAT SERPL-MCNC: 0.79 MG/DL (ref 0.51–0.95)
DEPRECATED HCO3 PLAS-SCNC: 28 MMOL/L (ref 22–29)
EOSINOPHIL # BLD AUTO: 0.1 10E3/UL (ref 0–0.7)
EOSINOPHIL NFR BLD AUTO: 1 %
ERYTHROCYTE [DISTWIDTH] IN BLOOD BY AUTOMATED COUNT: 12.3 % (ref 10–15)
FERRITIN SERPL-MCNC: 34 NG/ML (ref 6–175)
GFR SERPL CREATININE-BSD FRML MDRD: >90 ML/MIN/1.73M2
GLUCOSE SERPL-MCNC: 91 MG/DL (ref 70–99)
HCT VFR BLD AUTO: 41 % (ref 35–47)
HDLC SERPL-MCNC: 54 MG/DL
HGB BLD-MCNC: 13.5 G/DL (ref 11.7–15.7)
IMM GRANULOCYTES # BLD: 0 10E3/UL
IMM GRANULOCYTES NFR BLD: 0 %
LDH SERPL L TO P-CCNC: 183 U/L (ref 0–250)
LDLC SERPL CALC-MCNC: 138 MG/DL
LYMPHOCYTES # BLD AUTO: 1.5 10E3/UL (ref 0.8–5.3)
LYMPHOCYTES NFR BLD AUTO: 22 %
MCH RBC QN AUTO: 31 PG (ref 26.5–33)
MCHC RBC AUTO-ENTMCNC: 32.9 G/DL (ref 31.5–36.5)
MCV RBC AUTO: 94 FL (ref 78–100)
MONOCYTES # BLD AUTO: 0.5 10E3/UL (ref 0–1.3)
MONOCYTES NFR BLD AUTO: 7 %
NEUTROPHILS # BLD AUTO: 4.7 10E3/UL (ref 1.6–8.3)
NEUTROPHILS NFR BLD AUTO: 69 %
NONHDLC SERPL-MCNC: 167 MG/DL
NRBC # BLD AUTO: 0 10E3/UL
NRBC BLD AUTO-RTO: 0 /100
PLATELET # BLD AUTO: 382 10E3/UL (ref 150–450)
POTASSIUM SERPL-SCNC: 3.8 MMOL/L (ref 3.4–5.3)
PROT SERPL-MCNC: 7.1 G/DL (ref 6.4–8.3)
RBC # BLD AUTO: 4.36 10E6/UL (ref 3.8–5.2)
SODIUM SERPL-SCNC: 138 MMOL/L (ref 136–145)
TRIGL SERPL-MCNC: 143 MG/DL
WBC # BLD AUTO: 6.9 10E3/UL (ref 4–11)

## 2023-02-13 PROCEDURE — 36415 COLL VENOUS BLD VENIPUNCTURE: CPT | Performed by: FAMILY MEDICINE

## 2023-02-13 PROCEDURE — 80053 COMPREHEN METABOLIC PANEL: CPT | Performed by: FAMILY MEDICINE

## 2023-02-13 PROCEDURE — 87177 OVA AND PARASITES SMEARS: CPT | Performed by: FAMILY MEDICINE

## 2023-02-13 PROCEDURE — 87209 SMEAR COMPLEX STAIN: CPT | Performed by: FAMILY MEDICINE

## 2023-02-13 PROCEDURE — 82728 ASSAY OF FERRITIN: CPT | Performed by: FAMILY MEDICINE

## 2023-02-13 PROCEDURE — 83615 LACTATE (LD) (LDH) ENZYME: CPT | Performed by: FAMILY MEDICINE

## 2023-02-13 PROCEDURE — 87506 IADNA-DNA/RNA PROBE TQ 6-11: CPT | Performed by: FAMILY MEDICINE

## 2023-02-13 PROCEDURE — 83630 LACTOFERRIN FECAL (QUAL): CPT | Performed by: FAMILY MEDICINE

## 2023-02-13 PROCEDURE — 86665 EPSTEIN-BARR CAPSID VCA: CPT | Mod: 59 | Performed by: FAMILY MEDICINE

## 2023-02-13 PROCEDURE — 85025 COMPLETE CBC W/AUTO DIFF WBC: CPT | Performed by: FAMILY MEDICINE

## 2023-02-13 PROCEDURE — 99215 OFFICE O/P EST HI 40 MIN: CPT | Performed by: FAMILY MEDICINE

## 2023-02-13 PROCEDURE — 86665 EPSTEIN-BARR CAPSID VCA: CPT | Performed by: FAMILY MEDICINE

## 2023-02-13 PROCEDURE — 80061 LIPID PANEL: CPT | Performed by: FAMILY MEDICINE

## 2023-02-13 PROCEDURE — 82977 ASSAY OF GGT: CPT | Performed by: FAMILY MEDICINE

## 2023-02-13 PROCEDURE — 87493 C DIFF AMPLIFIED PROBE: CPT | Performed by: FAMILY MEDICINE

## 2023-02-13 RX ORDER — LITHIUM CARBONATE 300 MG/1
900 CAPSULE ORAL DAILY
COMMUNITY
End: 2023-09-27

## 2023-02-13 ASSESSMENT — PAIN SCALES - GENERAL: PAINLEVEL: MILD PAIN (3)

## 2023-02-13 ASSESSMENT — ANXIETY QUESTIONNAIRES
2. NOT BEING ABLE TO STOP OR CONTROL WORRYING: SEVERAL DAYS
6. BECOMING EASILY ANNOYED OR IRRITABLE: SEVERAL DAYS
GAD7 TOTAL SCORE: 7
5. BEING SO RESTLESS THAT IT IS HARD TO SIT STILL: NOT AT ALL
3. WORRYING TOO MUCH ABOUT DIFFERENT THINGS: SEVERAL DAYS
IF YOU CHECKED OFF ANY PROBLEMS ON THIS QUESTIONNAIRE, HOW DIFFICULT HAVE THESE PROBLEMS MADE IT FOR YOU TO DO YOUR WORK, TAKE CARE OF THINGS AT HOME, OR GET ALONG WITH OTHER PEOPLE: SOMEWHAT DIFFICULT
4. TROUBLE RELAXING: NEARLY EVERY DAY
1. FEELING NERVOUS, ANXIOUS, OR ON EDGE: SEVERAL DAYS
7. FEELING AFRAID AS IF SOMETHING AWFUL MIGHT HAPPEN: NOT AT ALL
GAD7 TOTAL SCORE: 7

## 2023-02-13 ASSESSMENT — PATIENT HEALTH QUESTIONNAIRE - PHQ9: SUM OF ALL RESPONSES TO PHQ QUESTIONS 1-9: 10

## 2023-02-13 NOTE — PROGRESS NOTES
Assessment & Plan     Dermatitis  Improving but still present, follow-up next week  - Comprehensive metabolic panel; Future  - CBC with platelets and differential; Future  - Ferritin; Future  - GGT; Future  - Lipid Profile (Chol, Trig, HDL, LDL calc); Future  - Lactate Dehydrogenase (LDH); Future  - EBV Capsid Antibody IgG; Future  - EBV Capsid Antibody IgM; Future  - C. difficile Toxin B PCR with reflex to C. difficile Antigen and Toxins A/B EIA; Future  - Fecal Lactoferrin; Future  - Enteric Bacteria and Virus Panel by [facility] Stool; Future  - Ova and Parasite Screen; Future  - EBV Capsid Antibody IgM  - EBV Capsid Antibody IgG  - Lactate Dehydrogenase (LDH)  - Lipid Profile (Chol, Trig, HDL, LDL calc)  - GGT  - Ferritin  - CBC with platelets and differential  - Comprehensive metabolic panel  - C. difficile Toxin B PCR with reflex to C. difficile Antigen and Toxins A/B EIA  - Fecal Lactoferrin  - Enteric Bacteria and Virus Panel by [facility] Stool  - Ova and Parasite Screen    HLH (hemophagocytic lymphohistiocytosis) (H)  Ruled out with labs today, reassurance given, will follow-up next week to look at further work up  - Comprehensive metabolic panel; Future  - CBC with platelets and differential; Future  - Ferritin; Future  - GGT; Future  - Lipid Profile (Chol, Trig, HDL, LDL calc); Future  - Lactate Dehydrogenase (LDH); Future  - EBV Capsid Antibody IgG; Future  - EBV Capsid Antibody IgM; Future  - EBV Capsid Antibody IgM  - EBV Capsid Antibody IgG  - Lactate Dehydrogenase (LDH)  - Lipid Profile (Chol, Trig, HDL, LDL calc)  - GGT  - Ferritin  - CBC with platelets and differential  - Comprehensive metabolic panel    Diarrhea, unspecified type  No recent antibiotics  - C. difficile Toxin B PCR with reflex to C. difficile Antigen and Toxins A/B EIA; Future  - Fecal Lactoferrin; Future  - Enteric Bacteria and Virus Panel by [facility] Stool; Future  - Ova and Parasite Screen; Future  - C. difficile Toxin B PCR with reflex to C.  "difficile Antigen and Toxins A/B EIA  - Fecal Lactoferrin  - Enteric Bacteria and Virus Panel by IMER Stool  - Ova and Parasite Screen    Bipolar I disorder, most recent episode (or current) manic (H)  Getting treated by [facility], stopped lamictal due to concerns with side effects    Patient was agreeable to this plan and had no further questions.  40 minutes spent on the date of the encounter doing chart review, history and exam, documentation and further activities per the note       BMI:   Estimated body mass index is 31 kg/m  as calculated from the following:    Height as of 1/11/23: 1.6 m (5' 3\").    Weight as of this encounter: 79.4 kg (175 lb).   Weight management plan: Discussed healthy diet and exercise guidelines    Depression Screening Follow Up    PHQ 2/13/2023   PHQ-9 Total Score 10   Q9: Thoughts of better off dead/self-harm past 2 weeks Not at all     Last PHQ-9 2/13/2023   1.  Little interest or pleasure in doing things 0   2.  Feeling down, depressed, or hopeless 1   3.  Trouble falling or staying asleep, or sleeping too much 1   4.  Feeling tired or having little energy 3   5.  Poor appetite or overeating 1   6.  Feeling bad about yourself 1   7.  Trouble concentrating 3   8.  Moving slowly or restless 0   Q9: Thoughts of better off dead/self-harm past 2 weeks 0   PHQ-9 Total Score 10   Difficulty at work, home, or with people Very difficult       Follow Up Actions Taken  Patient has experienced a recent significant life event.  Patient counseled, no additional follow up at this time.     There are no Patient Instructions on file for this visit.    No follow-ups on file.    Caprice Pimentel MD  [facility] - AARTI Landon is a 37 year old accompanied by her spouse and daughter, presenting for the following health issues:  Gastrointestinal Problem      HPI     Concern - Symptoms  Onset: 1 month  Description: Started on a new medication, symptoms have progressed since " then, has had multiple symptoms this past month. Have worsened the past few days. Last day of Lamictal was February 5th  (started January 12th)  Intensity: severe  Progression of Symptoms:  Worsening, progressing slowly   Accompanying Signs & Symptoms:   Abdominal pain, loose stools for 5 days- foul smelling,   decreased urine output- foul smelling urine   swelling in abdomen, painful lifting, bloating  fatigue- taking frequent naps. Hard time concentrating  Rash- on body blotchy, itchy   chills- tempeture intolerance   Decreased appetite   Light sensitivity  Previous history of similar problem: None  Precipitating factors:        Worsened by: none  Alleviating factors:        Improved by: none  Therapies tried and outcome: Stopped medication, labs    Review of Systems   Constitutional, HEENT, cardiovascular, pulmonary, GI, , musculoskeletal, neuro, skin, endocrine and psych systems are negative, except as otherwise noted.      Objective    /72   Pulse 77   Temp 98.6  F (37  C)   Wt 79.4 kg (175 lb)   LMP 02/08/2023 (Approximate)   SpO2 97%   Breastfeeding No   BMI 31.00 kg/m    Body mass index is 31 kg/m .  Physical Exam   GENERAL: healthy, alert and no distress  NECK: no adenopathy, no asymmetry, masses, or scars and thyroid normal to palpation  RESP: lungs clear to auscultation - no rales, rhonchi or wheezes  CV: regular rate and rhythm, normal S1 S2, no S3 or S4, no murmur, click or rub, no peripheral edema and peripheral pulses strong  ABDOMEN: soft, nontender, no hepatosplenomegaly, no masses and bowel sounds normal  MS: no gross musculoskeletal defects noted, no edema  SKIN: macular erythematous-purplish rash, blanches on palpation  PSYCH: mentation appears normal, affect normal/bright    Results for orders placed or performed in visit on 02/13/23   Lactate Dehydrogenase (LDH)     Status: Normal   Result Value Ref Range    Lactate Dehydrogenase 183 0 - 250 U/L   Lipid Profile (Chol, Trig, HDL,  LDL calc)     Status: Abnormal   Result Value Ref Range    Cholesterol 221 (H) <200 mg/dL    Triglycerides 143 <150 mg/dL    Direct Measure HDL 54 >=50 mg/dL    LDL Cholesterol Calculated 138 (H) <=100 mg/dL    Non HDL Cholesterol 167 (H) <130 mg/dL    Narrative    Cholesterol  Desirable:  <200 mg/dL    Triglycerides  Normal:  Less than 150 mg/dL  Borderline High:  150-199 mg/dL  High:  200-499 mg/dL  Very High:  Greater than or equal to 500 mg/dL    Direct Measure HDL  Female:  Greater than or equal to 50 mg/dL   Male:  Greater than or equal to 40 mg/dL    LDL Cholesterol  Desirable:  <100mg/dL  Above Desirable:  100-129 mg/dL   Borderline High:  130-159 mg/dL   High:  160-189 mg/dL   Very High:  >= 190 mg/dL    Non HDL Cholesterol  Desirable:  130 mg/dL  Above Desirable:  130-159 mg/dL  Borderline High:  160-189 mg/dL  High:  190-219 mg/dL  Very High:  Greater than or equal to 220 mg/dL   Ferritin     Status: Normal   Result Value Ref Range    Ferritin 34 6 - 175 ng/mL   Comprehensive metabolic panel     Status: Normal   Result Value Ref Range    Sodium 138 136 - 145 mmol/L    Potassium 3.8 3.4 - 5.3 mmol/L    Chloride 101 98 - 107 mmol/L    Carbon Dioxide (CO2) 28 22 - 29 mmol/L    Anion Gap 9 7 - 15 mmol/L    Urea Nitrogen 6.0 6.0 - 20.0 mg/dL    Creatinine 0.79 0.51 - 0.95 mg/dL    Calcium 9.4 8.6 - 10.0 mg/dL    Glucose 91 70 - 99 mg/dL    Alkaline Phosphatase 77 35 - 104 U/L    AST 25 10 - 35 U/L    ALT 16 10 - 35 U/L    Protein Total 7.1 6.4 - 8.3 g/dL    Albumin 4.2 3.5 - 5.2 g/dL    Bilirubin Total 0.7 <=1.2 mg/dL    GFR Estimate >90 >60 mL/min/1.73m2   CBC with platelets and differential     Status: None   Result Value Ref Range    WBC Count 6.9 4.0 - 11.0 10e3/uL    RBC Count 4.36 3.80 - 5.20 10e6/uL    Hemoglobin 13.5 11.7 - 15.7 g/dL    Hematocrit 41.0 35.0 - 47.0 %    MCV 94 78 - 100 fL    MCH 31.0 26.5 - 33.0 pg    MCHC 32.9 31.5 - 36.5 g/dL    RDW 12.3 10.0 - 15.0 %    Platelet Count 382 150 - 450  10e3/uL    % Neutrophils 69 %    % Lymphocytes 22 %    % Monocytes 7 %    % Eosinophils 1 %    % Basophils 1 %    % Immature Granulocytes 0 %    NRBCs per 100 WBC 0 <1 /100    Absolute Neutrophils 4.7 1.6 - 8.3 10e3/uL    Absolute Lymphocytes 1.5 0.8 - 5.3 10e3/uL    Absolute Monocytes 0.5 0.0 - 1.3 10e3/uL    Absolute Eosinophils 0.1 0.0 - 0.7 10e3/uL    Absolute Basophils 0.1 0.0 - 0.2 10e3/uL    Absolute Immature Granulocytes 0.0 <=0.4 10e3/uL    Absolute NRBCs 0.0 10e3/uL   CBC with platelets and differential     Status: None    Narrative    The following orders were created for panel order CBC with platelets and differential.  Procedure                               Abnormality         Status                     ---------                               -----------         ------                     CBC with platelets and d...[[id number]]                      Final result                 Please view results for these tests on the individual orders.

## 2023-02-14 ENCOUNTER — APPOINTMENT (OUTPATIENT)
Dept: LAB | Facility: OTHER | Age: 38
End: 2023-02-14
Payer: COMMERCIAL

## 2023-02-14 DIAGNOSIS — Z79.899 HIGH RISK MEDICATION USE: Primary | ICD-10-CM

## 2023-02-14 LAB
C DIFF TOX B STL QL: NEGATIVE
GGT SERPL-CCNC: 19 U/L (ref 5–36)
LACTOFERRIN STL QL IA: NEGATIVE

## 2023-02-14 NOTE — RESULT ENCOUNTER NOTE
Please call patient with the following results:  Labs look great!  No HLH for sure and still waiting on a few other labs, follow-up next week and will see where to go from here

## 2023-02-15 ENCOUNTER — TELEPHONE (OUTPATIENT)
Dept: FAMILY MEDICINE | Facility: OTHER | Age: 38
End: 2023-02-15

## 2023-02-15 LAB
C COLI+JEJUNI+LARI FUSA STL QL NAA+PROBE: NOT DETECTED
EBV VCA IGG SER IA-ACNC: 298 U/ML
EBV VCA IGG SER IA-ACNC: POSITIVE
EBV VCA IGM SER IA-ACNC: 27 U/ML
EBV VCA IGM SER IA-ACNC: NORMAL
EC STX1 GENE STL QL NAA+PROBE: NOT DETECTED
EC STX2 GENE STL QL NAA+PROBE: NOT DETECTED
NOROV GI+II ORF1-ORF2 JNC STL QL NAA+PR: NOT DETECTED
O+P STL MICRO: NEGATIVE
RVA NSP5 STL QL NAA+PROBE: NOT DETECTED
SALMONELLA SP RPOD STL QL NAA+PROBE: NOT DETECTED
SHIGELLA SP+EIEC IPAH STL QL NAA+PROBE: NOT DETECTED
V CHOL+PARA RFBL+TRKH+TNAA STL QL NAA+PR: NOT DETECTED
Y ENTERO RECN STL QL NAA+PROBE: NOT DETECTED

## 2023-02-16 ENCOUNTER — TELEPHONE (OUTPATIENT)
Dept: FAMILY MEDICINE | Facility: OTHER | Age: 38
End: 2023-02-16

## 2023-02-16 NOTE — PROGRESS NOTES
Assessment & Plan     Attention deficit hyperactivity disorder (ADHD), combined type  Not on any stimulants at this point  Went down to [facility] last week and was diagnosed with lithium overdose (level was 1.3, upper limit of normal was 1.2)    Bipolar I disorder, most recent episode (or current) manic (H)  Lithium dosing cut back, spent quite a bit of time discussing her medication metabolism, her med provider believes her symptoms are somatic  Patient has been doing therapy but hasn't done EMDR yet that she knows of     Drug-induced metabolic disease (H)  Diarrhea resolved now      41 minutes spent on the date of the encounter doing chart review, history and exam, documentation and further activities per the note     MED REC REQUIRED  Post Medication Reconciliation Status: discharge medications reconciled, continue medications without change   Patient was agreeable to this plan and had no further questions.  Patient Instructions     Psychologists/ Counselors                        Chantel Rolon          ...            ..[phone]  [facility]                    ..  [phone]  [facility]                 .[phone]  [facility] (kids)       .         [phone]  [facility](teens)                ..[phone]  [facility]                    [phone]    EMDR  [facility]                   [phone]      [facility]           ...      .. [phone]  [facility]                ...[phone]  [facility]     ...          ...[phone]  [facility]               [phone]   [facility]..            [phone]  [facility]                       [phone]  [facility]               .    [phone]  [facility]                ..[phone]  [facility]     .      . ..[phone]                                                        Danae Moralez  "Vamsi                ..  .  [phone]  [facility]         .     .  [phone]  [facility] [address]              . [phone]    EMDR  [facility]  ..        [phone]  *[facility]              .    [phone]    [facility]               ...  .  [phone]      [facility]             . [phone]  [facility]            [phone]  [facility]     .      . ..[phone]         *Facilities in bold italics indicate medication management  Services are offered.     Crisis support    If you or someone you know is struggling or in crisis, help is available. Call or text 336 or chat [email or web address]    The volunteer Crisis Counselor will help you move from a 'hot moment to a cool moment'        No follow-ups on file.    Caprice Pimentel MD  [facility] - AARTI Landon is a 37 year old, presenting for the following health issues:  Hospital F/U      [facility]     ED/UC Followup:    Facility:  [facility] ER  Date of visit: 2/15/23  Reason for visit:   Medication Reaction     Swelling     Headache    Diagnoses    Toxicity Lithium Subsequent    Current Status: All symptoms have improved.        Review of Systems   Constitutional, HEENT, cardiovascular, pulmonary, gi and gu systems are negative, except as otherwise noted.      Objective    /60   Pulse 81   Temp 98  F (36.7  C)   Ht 1.6 m (5' 3\")   Wt 81 kg (178 lb 8 oz)   LMP 02/08/2023 (Approximate)   SpO2 98%   BMI 31.62 kg/m    Body mass index is 31.62 kg/m .  Physical Exam   GENERAL: healthy, alert and no distress  NECK: no adenopathy, no asymmetry, masses, or scars and thyroid normal to palpation  RESP: lungs clear to auscultation - no rales, rhonchi or wheezes  CV: regular rate and rhythm, normal S1 S2, no S3 or S4, no murmur, click or rub, no peripheral edema and peripheral pulses strong  ABDOMEN: soft, nontender, " no hepatosplenomegaly, no masses and bowel sounds normal  MS: no gross musculoskeletal defects noted, no edema  PSYCH: mentation appears normal, affect normal/bright    Results for orders placed or performed in visit on 02/20/23   Comprehensive metabolic panel     Status: Abnormal   Result Value Ref Range    Sodium 139 136 - 145 mmol/L    Potassium 3.9 3.4 - 5.3 mmol/L    Chloride 101 98 - 107 mmol/L    Carbon Dioxide (CO2) 27 22 - 29 mmol/L    Anion Gap 11 7 - 15 mmol/L    Urea Nitrogen 7.7 6.0 - 20.0 mg/dL    Creatinine 0.69 0.51 - 0.95 mg/dL    Calcium 10.0 8.6 - 10.0 mg/dL    Glucose 117 (H) 70 - 99 mg/dL    Alkaline Phosphatase 87 35 - 104 U/L    AST 22 10 - 35 U/L    ALT 15 10 - 35 U/L    Protein Total 7.1 6.4 - 8.3 g/dL    Albumin 4.4 3.5 - 5.2 g/dL    Bilirubin Total 0.8 <=1.2 mg/dL    GFR Estimate >90 >60 mL/min/1.73m2   Ferritin     Status: Normal   Result Value Ref Range    Ferritin 41 6 - 175 ng/mL   CBC with platelets and differential     Status: None   Result Value Ref Range    WBC Count 6.5 4.0 - 11.0 10e3/uL    RBC Count 4.34 3.80 - 5.20 10e6/uL    Hemoglobin 13.4 11.7 - 15.7 g/dL    Hematocrit 40.5 35.0 - 47.0 %    MCV 93 78 - 100 fL    MCH 30.9 26.5 - 33.0 pg    MCHC 33.1 31.5 - 36.5 g/dL    RDW 11.9 10.0 - 15.0 %    Platelet Count 280 150 - 450 10e3/uL    % Neutrophils 67 %    % Lymphocytes 25 %    % Monocytes 6 %    % Eosinophils 1 %    % Basophils 1 %    % Immature Granulocytes 0 %    NRBCs per 100 WBC 0 <1 /100    Absolute Neutrophils 4.4 1.6 - 8.3 10e3/uL    Absolute Lymphocytes 1.6 0.8 - 5.3 10e3/uL    Absolute Monocytes 0.4 0.0 - 1.3 10e3/uL    Absolute Eosinophils 0.1 0.0 - 0.7 10e3/uL    Absolute Basophils 0.1 0.0 - 0.2 10e3/uL    Absolute Immature Granulocytes 0.0 <=0.4 10e3/uL    Absolute NRBCs 0.0 10e3/uL   CBC with Platelets & Differential     Status: None    Narrative    The following orders were created for panel order CBC with Platelets & Differential.  Procedure                                Abnormality         Status                     ---------                               -----------         ------                     CBC with platelets and d...[[id number]]                      Final result                 Please view results for these tests on the individual orders.

## 2023-02-16 NOTE — TELEPHONE ENCOUNTER
"[facility] called to schedule a followup appt for the patient. Pt is scheduled w/Dr Pimentel on 3/30/23 for her followup of being seen @ the [facility] for \"hypothyroidism\".  The [facility] is requesting that the patient get a telephone call back as the [facility] is requesting the patient to have lab work / blood work in 4 weeks from today.  The [facility] is requesting blood work of TSH to ck T3 &T4    Please call pt @ [phone] to let her know when her lab is scheduled for.  "

## 2023-02-20 ENCOUNTER — LAB (OUTPATIENT)
Dept: LAB | Facility: OTHER | Age: 38
End: 2023-02-20
Payer: COMMERCIAL

## 2023-02-20 ENCOUNTER — OFFICE VISIT (OUTPATIENT)
Dept: FAMILY MEDICINE | Facility: OTHER | Age: 38
End: 2023-02-20
Attending: FAMILY MEDICINE
Payer: COMMERCIAL

## 2023-02-20 VITALS
TEMPERATURE: 98 F | HEIGHT: 63 IN | HEART RATE: 81 BPM | WEIGHT: 178.5 LBS | DIASTOLIC BLOOD PRESSURE: 60 MMHG | SYSTOLIC BLOOD PRESSURE: 100 MMHG | OXYGEN SATURATION: 98 % | BODY MASS INDEX: 31.63 KG/M2

## 2023-02-20 DIAGNOSIS — E80.20 DRUG-INDUCED METABOLIC DISEASE (H): ICD-10-CM

## 2023-02-20 DIAGNOSIS — T50.905A DRUG-INDUCED METABOLIC DISEASE (H): ICD-10-CM

## 2023-02-20 DIAGNOSIS — F31.10 BIPOLAR I DISORDER, MOST RECENT EPISODE (OR CURRENT) MANIC (H): ICD-10-CM

## 2023-02-20 DIAGNOSIS — F90.2 ATTENTION DEFICIT HYPERACTIVITY DISORDER (ADHD), COMBINED TYPE: Primary | ICD-10-CM

## 2023-02-20 DIAGNOSIS — Z79.899 HIGH RISK MEDICATION USE: ICD-10-CM

## 2023-02-20 PROBLEM — E07.9 DYSFUNCTION OF THYROID: Status: ACTIVE | Noted: 2023-02-16

## 2023-02-20 PROBLEM — F31.74 MANIC BIPOLAR I DISORDER IN FULL REMISSION (H): Status: ACTIVE | Noted: 2023-02-16

## 2023-02-20 LAB
ALBUMIN SERPL BCG-MCNC: 4.4 G/DL (ref 3.5–5.2)
ALP SERPL-CCNC: 87 U/L (ref 35–104)
ALT SERPL W P-5'-P-CCNC: 15 U/L (ref 10–35)
ANION GAP SERPL CALCULATED.3IONS-SCNC: 11 MMOL/L (ref 7–15)
AST SERPL W P-5'-P-CCNC: 22 U/L (ref 10–35)
BASOPHILS # BLD AUTO: 0.1 10E3/UL (ref 0–0.2)
BASOPHILS NFR BLD AUTO: 1 %
BILIRUB SERPL-MCNC: 0.8 MG/DL
BUN SERPL-MCNC: 7.7 MG/DL (ref 6–20)
CALCIUM SERPL-MCNC: 10 MG/DL (ref 8.6–10)
CHLORIDE SERPL-SCNC: 101 MMOL/L (ref 98–107)
CREAT SERPL-MCNC: 0.69 MG/DL (ref 0.51–0.95)
DEPRECATED HCO3 PLAS-SCNC: 27 MMOL/L (ref 22–29)
EOSINOPHIL # BLD AUTO: 0.1 10E3/UL (ref 0–0.7)
EOSINOPHIL NFR BLD AUTO: 1 %
ERYTHROCYTE [DISTWIDTH] IN BLOOD BY AUTOMATED COUNT: 11.9 % (ref 10–15)
FERRITIN SERPL-MCNC: 41 NG/ML (ref 6–175)
GFR SERPL CREATININE-BSD FRML MDRD: >90 ML/MIN/1.73M2
GLUCOSE SERPL-MCNC: 117 MG/DL (ref 70–99)
HCT VFR BLD AUTO: 40.5 % (ref 35–47)
HGB BLD-MCNC: 13.4 G/DL (ref 11.7–15.7)
IMM GRANULOCYTES # BLD: 0 10E3/UL
IMM GRANULOCYTES NFR BLD: 0 %
LYMPHOCYTES # BLD AUTO: 1.6 10E3/UL (ref 0.8–5.3)
LYMPHOCYTES NFR BLD AUTO: 25 %
MCH RBC QN AUTO: 30.9 PG (ref 26.5–33)
MCHC RBC AUTO-ENTMCNC: 33.1 G/DL (ref 31.5–36.5)
MCV RBC AUTO: 93 FL (ref 78–100)
MONOCYTES # BLD AUTO: 0.4 10E3/UL (ref 0–1.3)
MONOCYTES NFR BLD AUTO: 6 %
NEUTROPHILS # BLD AUTO: 4.4 10E3/UL (ref 1.6–8.3)
NEUTROPHILS NFR BLD AUTO: 67 %
NRBC # BLD AUTO: 0 10E3/UL
NRBC BLD AUTO-RTO: 0 /100
PLATELET # BLD AUTO: 280 10E3/UL (ref 150–450)
POTASSIUM SERPL-SCNC: 3.9 MMOL/L (ref 3.4–5.3)
PROT SERPL-MCNC: 7.1 G/DL (ref 6.4–8.3)
RBC # BLD AUTO: 4.34 10E6/UL (ref 3.8–5.2)
SODIUM SERPL-SCNC: 139 MMOL/L (ref 136–145)
WBC # BLD AUTO: 6.5 10E3/UL (ref 4–11)

## 2023-02-20 PROCEDURE — 99215 OFFICE O/P EST HI 40 MIN: CPT | Performed by: FAMILY MEDICINE

## 2023-02-20 PROCEDURE — 36415 COLL VENOUS BLD VENIPUNCTURE: CPT

## 2023-02-20 PROCEDURE — 80053 COMPREHEN METABOLIC PANEL: CPT

## 2023-02-20 PROCEDURE — 85025 COMPLETE CBC W/AUTO DIFF WBC: CPT

## 2023-02-20 PROCEDURE — 82728 ASSAY OF FERRITIN: CPT

## 2023-02-20 ASSESSMENT — PAIN SCALES - GENERAL: PAINLEVEL: NO PAIN (0)

## 2023-02-20 NOTE — PATIENT INSTRUCTIONS
Psychologists/ Counselors                        Chantel Rolon          ...            ..[phone]  [facility]                    ..  [phone]  [facility]                 .[phone]  [facility] (kids)       .         [phone]  [facility]teens)                ..[phone]  [facility]                    [phone]    EMDR  [facility]                   [phone]      [facility]           ...      .. [phone]  [facility]                ...[phone]  [facility]     ...          ...[phone]  [facility]               [phone]   [facility]..            [phone]  [facility]                       .[phone]  [facility]               .    [phone]  [facility]                ..[phone]  [facility]     .      . ..[phone]                                                        [facility]                ..  .  [phone]  [facility]         .     .  [phone]  [facility]              . [phone]    [facility]  ..        [phone]  *[facility]              .    [phone]    [facility]               ...  .  [phone]      [facility]             . [phone]  [facility]            [phone]  [facility]     .      . ..[phone]         *Facilities in bold italics indicate medication management  Services are offered.     Crisis support    If you or someone you know is struggling or in crisis, help is available. Call or text 809 or chat [email or web address]    The volunteer Crisis Counselor will help you move from a 'hot moment to a cool moment'

## 2023-02-21 ENCOUNTER — LAB (OUTPATIENT)
Dept: LAB | Facility: OTHER | Age: 38
End: 2023-02-21
Payer: COMMERCIAL

## 2023-02-21 DIAGNOSIS — Z79.899 HIGH RISK MEDICATION USE: ICD-10-CM

## 2023-02-21 LAB — LITHIUM SERPL-SCNC: 0.4 MMOL/L (ref 0.6–1.2)

## 2023-02-21 PROCEDURE — 36415 COLL VENOUS BLD VENIPUNCTURE: CPT

## 2023-02-21 PROCEDURE — 80178 ASSAY OF LITHIUM: CPT

## 2023-02-27 ENCOUNTER — MYC MEDICAL ADVICE (OUTPATIENT)
Dept: FAMILY MEDICINE | Facility: OTHER | Age: 38
End: 2023-02-27

## 2023-02-27 DIAGNOSIS — F41.1 GAD (GENERALIZED ANXIETY DISORDER): ICD-10-CM

## 2023-02-27 DIAGNOSIS — F90.2 ATTENTION DEFICIT HYPERACTIVITY DISORDER (ADHD), COMBINED TYPE: ICD-10-CM

## 2023-02-27 DIAGNOSIS — F31.10 BIPOLAR I DISORDER, MOST RECENT EPISODE (OR CURRENT) MANIC (H): ICD-10-CM

## 2023-02-27 DIAGNOSIS — F43.10 PTSD (POST-TRAUMATIC STRESS DISORDER): Primary | ICD-10-CM

## 2023-02-27 DIAGNOSIS — F06.30 MENSTRUAL-RELATED MOOD DISORDER: ICD-10-CM

## 2023-02-28 DIAGNOSIS — Z79.899 LITHIUM USE: Primary | ICD-10-CM

## 2023-03-07 ENCOUNTER — LAB (OUTPATIENT)
Dept: LAB | Facility: OTHER | Age: 38
End: 2023-03-07
Payer: COMMERCIAL

## 2023-03-07 DIAGNOSIS — Z79.899 LITHIUM USE: ICD-10-CM

## 2023-03-07 LAB — LITHIUM SERPL-SCNC: 1 MMOL/L (ref 0.6–1.2)

## 2023-03-07 PROCEDURE — 36415 COLL VENOUS BLD VENIPUNCTURE: CPT

## 2023-03-07 PROCEDURE — 80178 ASSAY OF LITHIUM: CPT

## 2023-05-06 ENCOUNTER — HEALTH MAINTENANCE LETTER (OUTPATIENT)
Age: 38
End: 2023-05-06

## 2023-08-28 ENCOUNTER — OFFICE VISIT (OUTPATIENT)
Dept: FAMILY MEDICINE | Facility: OTHER | Age: 38
End: 2023-08-28
Attending: NURSE PRACTITIONER
Payer: COMMERCIAL

## 2023-08-28 ENCOUNTER — HOSPITAL ENCOUNTER (EMERGENCY)
Facility: HOSPITAL | Age: 38
Discharge: LEFT BEFORE DISCHARGE | End: 2023-08-28
Payer: COMMERCIAL

## 2023-08-28 VITALS
DIASTOLIC BLOOD PRESSURE: 76 MMHG | BODY MASS INDEX: 27.63 KG/M2 | SYSTOLIC BLOOD PRESSURE: 120 MMHG | HEART RATE: 80 BPM | TEMPERATURE: 99.5 F | OXYGEN SATURATION: 99 % | WEIGHT: 156 LBS

## 2023-08-28 VITALS
BODY MASS INDEX: 28.03 KG/M2 | OXYGEN SATURATION: 96 % | RESPIRATION RATE: 20 BRPM | DIASTOLIC BLOOD PRESSURE: 85 MMHG | SYSTOLIC BLOOD PRESSURE: 129 MMHG | WEIGHT: 158.18 LBS | HEART RATE: 108 BPM | TEMPERATURE: 97.9 F | HEIGHT: 63 IN

## 2023-08-28 DIAGNOSIS — F31.10 BIPOLAR I DISORDER, MOST RECENT EPISODE (OR CURRENT) MANIC (H): Primary | ICD-10-CM

## 2023-08-28 LAB
ALBUMIN SERPL BCG-MCNC: 4.9 G/DL (ref 3.5–5.2)
ALP SERPL-CCNC: 73 U/L (ref 35–104)
ALT SERPL W P-5'-P-CCNC: 13 U/L (ref 0–50)
ANION GAP SERPL CALCULATED.3IONS-SCNC: 11 MMOL/L (ref 7–15)
AST SERPL W P-5'-P-CCNC: 24 U/L (ref 0–45)
BASOPHILS # BLD AUTO: 0.1 10E3/UL (ref 0–0.2)
BASOPHILS NFR BLD AUTO: 1 %
BILIRUB SERPL-MCNC: 0.8 MG/DL
BUN SERPL-MCNC: 6.3 MG/DL (ref 6–20)
CALCIUM SERPL-MCNC: 9.5 MG/DL (ref 8.6–10)
CHLORIDE SERPL-SCNC: 101 MMOL/L (ref 98–107)
CREAT SERPL-MCNC: 0.75 MG/DL (ref 0.51–0.95)
DEPRECATED HCO3 PLAS-SCNC: 23 MMOL/L (ref 22–29)
EOSINOPHIL # BLD AUTO: 0.1 10E3/UL (ref 0–0.7)
EOSINOPHIL NFR BLD AUTO: 1 %
ERYTHROCYTE [DISTWIDTH] IN BLOOD BY AUTOMATED COUNT: 12.1 % (ref 10–15)
EST. AVERAGE GLUCOSE BLD GHB EST-MCNC: 105 MG/DL
GFR SERPL CREATININE-BSD FRML MDRD: >90 ML/MIN/1.73M2
GLUCOSE SERPL-MCNC: 93 MG/DL (ref 70–99)
HBA1C MFR BLD: 5.3 %
HCT VFR BLD AUTO: 41.7 % (ref 35–47)
HGB BLD-MCNC: 13.8 G/DL (ref 11.7–15.7)
IMM GRANULOCYTES # BLD: 0 10E3/UL
IMM GRANULOCYTES NFR BLD: 0 %
LITHIUM SERPL-SCNC: 0.9 MMOL/L (ref 0.6–1.2)
LYMPHOCYTES # BLD AUTO: 2.2 10E3/UL (ref 0.8–5.3)
LYMPHOCYTES NFR BLD AUTO: 29 %
MCH RBC QN AUTO: 30.5 PG (ref 26.5–33)
MCHC RBC AUTO-ENTMCNC: 33.1 G/DL (ref 31.5–36.5)
MCV RBC AUTO: 92 FL (ref 78–100)
MONOCYTES # BLD AUTO: 0.5 10E3/UL (ref 0–1.3)
MONOCYTES NFR BLD AUTO: 7 %
NEUTROPHILS # BLD AUTO: 4.6 10E3/UL (ref 1.6–8.3)
NEUTROPHILS NFR BLD AUTO: 62 %
NRBC # BLD AUTO: 0 10E3/UL
NRBC BLD AUTO-RTO: 0 /100
PLATELET # BLD AUTO: 281 10E3/UL (ref 150–450)
POTASSIUM SERPL-SCNC: 3.5 MMOL/L (ref 3.4–5.3)
PROT SERPL-MCNC: 7.1 G/DL (ref 6.4–8.3)
RBC # BLD AUTO: 4.53 10E6/UL (ref 3.8–5.2)
SODIUM SERPL-SCNC: 135 MMOL/L (ref 136–145)
T4 FREE SERPL-MCNC: 1.25 NG/DL (ref 0.9–1.7)
TSH SERPL DL<=0.005 MIU/L-ACNC: 5.27 UIU/ML (ref 0.3–4.2)
WBC # BLD AUTO: 7.5 10E3/UL (ref 4–11)

## 2023-08-28 PROCEDURE — 36415 COLL VENOUS BLD VENIPUNCTURE: CPT | Performed by: NURSE PRACTITIONER

## 2023-08-28 PROCEDURE — 84439 ASSAY OF FREE THYROXINE: CPT | Performed by: NURSE PRACTITIONER

## 2023-08-28 PROCEDURE — 99213 OFFICE O/P EST LOW 20 MIN: CPT | Performed by: NURSE PRACTITIONER

## 2023-08-28 PROCEDURE — 83036 HEMOGLOBIN GLYCOSYLATED A1C: CPT | Performed by: NURSE PRACTITIONER

## 2023-08-28 PROCEDURE — 80178 ASSAY OF LITHIUM: CPT | Performed by: NURSE PRACTITIONER

## 2023-08-28 PROCEDURE — 80050 GENERAL HEALTH PANEL: CPT | Performed by: NURSE PRACTITIONER

## 2023-08-28 RX ORDER — DULOXETIN HYDROCHLORIDE 30 MG/1
60 CAPSULE, DELAYED RELEASE ORAL
COMMUNITY
Start: 2021-09-23 | End: 2023-09-14

## 2023-08-28 RX ORDER — LISDEXAMFETAMINE DIMESYLATE 20 MG/1
CAPSULE ORAL
COMMUNITY
Start: 2023-08-01 | End: 2024-04-24 | Stop reason: DRUGHIGH

## 2023-08-28 RX ORDER — GUANFACINE 1 MG/1
1 TABLET, EXTENDED RELEASE ORAL 2 TIMES DAILY
COMMUNITY
Start: 2023-07-27 | End: 2023-09-14

## 2023-08-28 RX ORDER — LURASIDONE HYDROCHLORIDE 40 MG/1
40 TABLET, FILM COATED ORAL
COMMUNITY
End: 2023-09-14

## 2023-08-28 RX ORDER — LISDEXAMFETAMINE DIMESYLATE 70 MG/1
CAPSULE ORAL
COMMUNITY
Start: 2023-06-14 | End: 2023-09-27

## 2023-08-28 RX ORDER — LISDEXAMFETAMINE DIMESYLATE 50 MG
CAPSULE ORAL
COMMUNITY
Start: 2023-08-01 | End: 2024-04-24 | Stop reason: DRUGHIGH

## 2023-08-28 ASSESSMENT — ACTIVITIES OF DAILY LIVING (ADL)
ADLS_ACUITY_SCORE: 33
ADLS_ACUITY_SCORE: 33

## 2023-08-28 NOTE — PATIENT INSTRUCTIONS
We will notify you of your lab once available      LIFESTYLE CHANGES    Eat a Healthy diet  Eat more vegetables/plants in your diet  Eat health fats  Himrod oil  avocados  Eat healthy proteins  Poultry without the skin  Fish  Limit red meat  Nuts - limit to 1/4 cup per day   Increase physical activity  Slowly work up to 30 minutes of physical activity most days of the week  Aerobic activity 150 minute a week  2 days of resistance exercising         Try daily yoga and meditation

## 2023-08-28 NOTE — ED TRIAGE NOTES
Patient arrived by private auto with multiple complaints.  Patient appears to be high anxiety stating she needs to be tested for diabetes, tingling extremities and face, Patient breathing rapidly.  Patient stated high stress and overwhelmed feelings.  Patient has multiple vague complaints.

## 2023-08-28 NOTE — PROGRESS NOTES
"  Assessment & Plan     Bipolar I disorder, most recent episode (or current) manic (H)  Prevents for labs today.  She states her psychiatrist was concerned for possible diabetes due to excessive thirst and urination.    TSH slightly elevated with normal T4 - continue to monitor with Dr Pimentel   Normal lithium level   Normal hemoglobin and wbc  Sodium slightly low, consider drinking an electrolyte drink occasionally   Normal kidney and liver test   A1c 5.3 no sign of diabetes or prediabetes and normal glucose today   - TSH with free T4 reflex; Future  - Hemoglobin A1c; Future  - Comprehensive metabolic panel (BMP + Alb, Alk Phos, ALT, AST, Total. Bili, TP); Future  - CBC with platelets and differential; Future  - Lithium level; Future  - TSH with free T4 reflex  - Hemoglobin A1c  - Comprehensive metabolic panel (BMP + Alb, Alk Phos, ALT, AST, Total. Bili, TP)  - CBC with platelets and differential  - Lithium level  - T4 free    Ordering of each unique test         BMI:   Estimated body mass index is 27.63 kg/m  as calculated from the following:    Height as of an earlier encounter on 8/28/23: 1.6 m (5' 3\").    Weight as of this encounter: 70.8 kg (156 lb).   Weight management plan: Discussed healthy diet and exercise guidelines    See Patient Instructions    No follow-ups on file.    GUERRERO [facility] - [place] is a 37 year old, presenting for the following health issues:  Diabetes      HPI     Patient would like to be check ed for diabetes.  States never can eat enough but then has no appetite.  Does get thirsty but her meds do that to her    She did not feel well today and went to the ER, but left without being seen.  She states she started to feel better once she ate something.      She does follow up with psychiatry and they would like her to be screened for possible diabetes or prediabetes.  She states she feels dehydrated lately.    Her current " medications Lorazepam, lithium, vyvanse, and guanfacine     Review of Systems   CONSTITUTIONAL: NEGATIVE for fever, chills, change in weight  INTEGUMENTARY/SKIN: NEGATIVE for worrisome rashes, moles or lesions  EYES: NEGATIVE for vision changes or irritation  ENT/MOUTH: NEGATIVE for ear, mouth and throat problems  RESP: NEGATIVE for significant cough or SOB  CV: NEGATIVE for chest pain, palpitations or peripheral edema  GI: constipation and nausea  : denies dysuria, normal menstrual cycle   MUSCULOSKELETAL: NEGATIVE for significant arthralgias or myalgia  NEURO: intermittent dizziness   ENDOCRINE: NEGATIVE for temperature intolerance, skin/hair changes  PSYCHIATRIC: HX anxiety and HX depression      Objective    /76   Pulse 80   Temp 99.5  F (37.5  C) (Tympanic)   Wt 70.8 kg (156 lb)   SpO2 99%   BMI 27.63 kg/m    Body mass index is 27.63 kg/m .  Physical Exam   GENERAL: alert and no distress  HENT: ear canals and TM's normal, nose and mouth without ulcers or lesions  NECK: no adenopathy, no asymmetry, masses, or scars and thyroid normal to palpation  RESP: lungs clear to auscultation - no rales, rhonchi or wheezes  CV: regular rate and rhythm, normal S1 S2, no S3 or S4, no murmur, click or rub, no peripheral edema and peripheral pulses strong  ABDOMEN: soft, nontender, no hepatosplenomegaly, no masses and bowel sounds normal  PSYCH: mentation appears normal and anxious  LYMPH: normal ant/post cervical, supraclavicular nodes    Results for orders placed or performed in visit on 08/28/23   TSH with free T4 reflex     Status: Abnormal   Result Value Ref Range    TSH 5.27 (H) 0.30 - 4.20 uIU/mL   Hemoglobin A1c     Status: None   Result Value Ref Range    Estimated Average Glucose 105 mg/dL    Hemoglobin A1C 5.3 <5.7 %   Comprehensive metabolic panel (BMP + Alb, Alk Phos, ALT, AST, Total. Bili, TP)     Status: Abnormal   Result Value Ref Range    Sodium 135 (L) 136 - 145 mmol/L    Potassium 3.5 3.4 - 5.3  mmol/L    Chloride 101 98 - 107 mmol/L    Carbon Dioxide (CO2) 23 22 - 29 mmol/L    Anion Gap 11 7 - 15 mmol/L    Urea Nitrogen 6.3 6.0 - 20.0 mg/dL    Creatinine 0.75 0.51 - 0.95 mg/dL    Calcium 9.5 8.6 - 10.0 mg/dL    Glucose 93 70 - 99 mg/dL    Alkaline Phosphatase 73 35 - 104 U/L    AST 24 0 - 45 U/L    ALT 13 0 - 50 U/L    Protein Total 7.1 6.4 - 8.3 g/dL    Albumin 4.9 3.5 - 5.2 g/dL    Bilirubin Total 0.8 <=1.2 mg/dL    GFR Estimate >90 >60 mL/min/1.73m2   Lithium level     Status: Normal   Result Value Ref Range    Lithium 0.90 0.60 - 1.20 mmol/L   CBC with platelets and differential     Status: None   Result Value Ref Range    WBC Count 7.5 4.0 - 11.0 10e3/uL    RBC Count 4.53 3.80 - 5.20 10e6/uL    Hemoglobin 13.8 11.7 - 15.7 g/dL    Hematocrit 41.7 35.0 - 47.0 %    MCV 92 78 - 100 fL    MCH 30.5 26.5 - 33.0 pg    MCHC 33.1 31.5 - 36.5 g/dL    RDW 12.1 10.0 - 15.0 %    Platelet Count 281 150 - 450 10e3/uL    % Neutrophils 62 %    % Lymphocytes 29 %    % Monocytes 7 %    % Eosinophils 1 %    % Basophils 1 %    % Immature Granulocytes 0 %    NRBCs per 100 WBC 0 <1 /100    Absolute Neutrophils 4.6 1.6 - 8.3 10e3/uL    Absolute Lymphocytes 2.2 0.8 - 5.3 10e3/uL    Absolute Monocytes 0.5 0.0 - 1.3 10e3/uL    Absolute Eosinophils 0.1 0.0 - 0.7 10e3/uL    Absolute Basophils 0.1 0.0 - 0.2 10e3/uL    Absolute Immature Granulocytes 0.0 <=0.4 10e3/uL    Absolute NRBCs 0.0 10e3/uL   T4 free     Status: Normal   Result Value Ref Range    Free T4 1.25 0.90 - 1.70 ng/dL   CBC with platelets and differential     Status: None    Narrative    The following orders were created for panel order CBC with platelets and differential.  Procedure                               Abnormality         Status                     ---------                               -----------         ------                     CBC with platelets and d...[[id number]]                      Final result                 Please view results for these tests  on the individual orders.

## 2023-08-29 ENCOUNTER — MYC MEDICAL ADVICE (OUTPATIENT)
Dept: FAMILY MEDICINE | Facility: OTHER | Age: 38
End: 2023-08-29

## 2023-08-30 NOTE — TELEPHONE ENCOUNTER
Pt called, no answer.      Please see MCM and advise.     Last TSH 08/28/2023:   Thyroid lab - TSH slightly elevated with a normal hormone level - no treatment indicated at this time  by EDUARDO [place]    ER visit on 08/28/2023 for panic attack.  Pt left after being triaged.      Next appointment 11/2/2023  LOV 02/20/2023

## 2023-08-31 ENCOUNTER — TELEPHONE (OUTPATIENT)
Dept: FAMILY MEDICINE | Facility: OTHER | Age: 38
End: 2023-08-31

## 2023-08-31 NOTE — TELEPHONE ENCOUNTER
Attempt # 1  Outcome: Left Message   Comment: LVM for patient to call me back directly to reschedule November appt with Dr. Pimentel.

## 2023-09-03 NOTE — TELEPHONE ENCOUNTER
Can do 130, arrive 115 on 9/14  She won't get into endocrine for months but I can help her before that

## 2023-09-05 NOTE — TELEPHONE ENCOUNTER
Attempt # 2  Outcome: Left Message   Comment: LVM for patient to call me back directly to get scheduled with Dr. Pimentel.

## 2023-09-05 NOTE — TELEPHONE ENCOUNTER
Future Appointments 9/5/2023 - 3/3/2024        Date Visit Type Length Department Provider     9/14/2023  1:30 PM SHORT 30 min [facility] Caprice Pimentel MD    Location Instructions:     From [facility]: Take [address]. Turn left at [address]. Turn left at the first stoplight on [address]. At the first stop sign, take a right onto [address]. The upper level parking lot will be on the left. East Entrance Door number 10.   From [place]: Take [address]. Take a right at [address]. At the first stop sign, take a right onto [address]. The upper level parking lot will be on the left. East Entrance Door number 10.   From [place]: Take [address]. Take the [address] ramp towards [place]. Turn left onto [address]. Take a slight right onto [address]. Turn left at the first stoplight on [address]. At the first stop sign, take a right onto [address]. The upper level parking lot will be on the left. East Entrance Door number 10.              11/8/2023  1:15 PM SHORT 30 min [facility] Caprice Pimentel MD    Location Instructions:     From [facility]: Take [address]. Turn left at [address]. Turn left at the first stoplight on [address]. At the first stop sign, take a right onto [address]. The upper level parking lot will be on the left. East Entrance Door number 10.   From [place]: Take [address]. Take a right at [address]. At the first stop sign, take a right onto [address]. The upper level parking lot will be on the left. East Entrance Door number 10.   From [place]: Take [address]. Take the [address] ramp towards [place]. Turn left onto [address]. Take a slight right onto [address]. Turn left at the first stoplight on [address]. At the first stop sign, take a right onto [address]. The upper level parking lot will  be on the left. East Entrance Door number 10.

## 2023-09-14 ENCOUNTER — OFFICE VISIT (OUTPATIENT)
Dept: FAMILY MEDICINE | Facility: OTHER | Age: 38
End: 2023-09-14
Attending: FAMILY MEDICINE
Payer: COMMERCIAL

## 2023-09-14 ENCOUNTER — LAB (OUTPATIENT)
Dept: LAB | Facility: OTHER | Age: 38
End: 2023-09-14
Attending: FAMILY MEDICINE
Payer: COMMERCIAL

## 2023-09-14 VITALS
DIASTOLIC BLOOD PRESSURE: 74 MMHG | TEMPERATURE: 98 F | SYSTOLIC BLOOD PRESSURE: 116 MMHG | HEART RATE: 82 BPM | BODY MASS INDEX: 26.99 KG/M2 | WEIGHT: 152.38 LBS | OXYGEN SATURATION: 98 %

## 2023-09-14 DIAGNOSIS — F31.10 BIPOLAR I DISORDER, MOST RECENT EPISODE (OR CURRENT) MANIC (H): ICD-10-CM

## 2023-09-14 DIAGNOSIS — F41.1 GAD (GENERALIZED ANXIETY DISORDER): ICD-10-CM

## 2023-09-14 DIAGNOSIS — R79.89 ELEVATED TSH: ICD-10-CM

## 2023-09-14 DIAGNOSIS — F90.2 ATTENTION DEFICIT HYPERACTIVITY DISORDER (ADHD), COMBINED TYPE: ICD-10-CM

## 2023-09-14 DIAGNOSIS — R79.89 ELEVATED TSH: Primary | ICD-10-CM

## 2023-09-14 DIAGNOSIS — E55.9 HYPOVITAMINOSIS D: ICD-10-CM

## 2023-09-14 DIAGNOSIS — E53.8 VITAMIN B12 DEFICIENCY (NON ANEMIC): ICD-10-CM

## 2023-09-14 PROBLEM — R30.0 DYSURIA: Status: RESOLVED | Noted: 2018-01-31 | Resolved: 2023-09-14

## 2023-09-14 PROBLEM — O92.79 CLOGGED DUCT, POSTPARTUM: Status: RESOLVED | Noted: 2019-04-22 | Resolved: 2023-09-14

## 2023-09-14 PROBLEM — B37.89 CANDIDIASIS OF BREAST: Status: RESOLVED | Noted: 2019-05-16 | Resolved: 2023-09-14

## 2023-09-14 PROBLEM — F30.10 MANIC BEHAVIOR (H): Status: RESOLVED | Noted: 2023-01-11 | Resolved: 2023-09-14

## 2023-09-14 PROBLEM — N61.0 MASTITIS: Status: RESOLVED | Noted: 2019-04-22 | Resolved: 2023-09-14

## 2023-09-14 PROBLEM — R41.82 ALTERED MENTAL STATUS, UNSPECIFIED ALTERED MENTAL STATUS TYPE: Status: RESOLVED | Noted: 2023-01-11 | Resolved: 2023-09-14

## 2023-09-14 LAB — TSH SERPL DL<=0.005 MIU/L-ACNC: 1.65 UIU/ML (ref 0.3–4.2)

## 2023-09-14 PROCEDURE — 82306 VITAMIN D 25 HYDROXY: CPT | Performed by: FAMILY MEDICINE

## 2023-09-14 PROCEDURE — 36415 COLL VENOUS BLD VENIPUNCTURE: CPT

## 2023-09-14 PROCEDURE — 84443 ASSAY THYROID STIM HORMONE: CPT

## 2023-09-14 PROCEDURE — 99214 OFFICE O/P EST MOD 30 MIN: CPT | Performed by: FAMILY MEDICINE

## 2023-09-14 PROCEDURE — 82607 VITAMIN B-12: CPT | Performed by: FAMILY MEDICINE

## 2023-09-14 ASSESSMENT — ANXIETY QUESTIONNAIRES
6. BECOMING EASILY ANNOYED OR IRRITABLE: SEVERAL DAYS
4. TROUBLE RELAXING: NEARLY EVERY DAY
GAD7 TOTAL SCORE: 14
2. NOT BEING ABLE TO STOP OR CONTROL WORRYING: NEARLY EVERY DAY
5. BEING SO RESTLESS THAT IT IS HARD TO SIT STILL: NOT AT ALL
3. WORRYING TOO MUCH ABOUT DIFFERENT THINGS: NEARLY EVERY DAY
IF YOU CHECKED OFF ANY PROBLEMS ON THIS QUESTIONNAIRE, HOW DIFFICULT HAVE THESE PROBLEMS MADE IT FOR YOU TO DO YOUR WORK, TAKE CARE OF THINGS AT HOME, OR GET ALONG WITH OTHER PEOPLE: EXTREMELY DIFFICULT
1. FEELING NERVOUS, ANXIOUS, OR ON EDGE: NEARLY EVERY DAY
GAD7 TOTAL SCORE: 14
7. FEELING AFRAID AS IF SOMETHING AWFUL MIGHT HAPPEN: SEVERAL DAYS

## 2023-09-14 ASSESSMENT — PAIN SCALES - GENERAL: PAINLEVEL: NO PAIN (0)

## 2023-09-14 NOTE — PROGRESS NOTES
Assessment & Plan     Elevated TSH  Discussed in detail, she verbalizes understanding  - TSH with free T4 reflex; Future  - Thyroid peroxidase antibody; Future  - Anti thyroglobulin antibody; Future    Bipolar I disorder, most recent episode (or current) manic (H)  Struggling in relationships lately, starting process for divorce    SUHA (generalized anxiety disorder)  [facility] at [facility]    Postpartum depression  Started worsening of her mental health 3-4 years ago    Attention deficit hyperactivity disorder (ADHD), combined type  On medication    Hypovitaminosis D    - Vitamin D Deficiency    Vitamin B12 deficiency (non anemic)    - Vitamin B12      Provider  Link to [facility] Help Grid :[id number]}    Patient was agreeable to this plan and had no further questions.  There are no Patient Instructions on file for this visit.    No follow-ups on file.    Caprice Pimentel MD  [facility] - [place] is a 37 year old, presenting for the following health issues:  No chief complaint on file.        9/14/2023     1:20 PM   Additional Questions   Roomed by Kia Andujar   Accompanied by None         9/14/2023     1:20 PM   Patient Reported Additional Medications   Patient reports taking the following new medications None       HPI     Dizziness  Onset/Duration: January of 2023  Description:   Do you feel faint: No  Does it feel like the surroundings (bed, room) are moving: YES  Unsteady/off balance: YES  Have you passed out or fallen: No  Intensity: moderate  Progression of Symptoms: worsening  Accompanying Signs & Symptoms:  Heart palpitations or chest pain: YES- feels like heart is racing  Nausea, vomiting: YES  Weakness or lack of coordination in arms or legs: YES  Vision or speech changes: YES  Numbness or tingling: No  Ringing in ears (Tinnitus): No  Hearing Loss: No  History:   Head trauma/concussion history: No  Previous similar symptoms: No  Recent bleeding history: No  Any new  medications (BP?): No  Precipitating factors:   Worse with activity: No  Worse with head movement: No  Alleviating factors:   Does staying in a fixed position give relief: no   Therapies tried and outcome: None      Depression and Anxiety Follow-Up  How are you doing with your depression since your last visit? Worsened   How are you doing with your anxiety since your last visit?  Worsened   Are you having other symptoms that might be associated with depression or anxiety? Yes:  Patient thinks she may be having panic attacks  Have you had a significant life event? Relationship Concerns, Job Concerns, Financial Concerns, Housing Concerns, Transportation Concerns, and Grief or Loss   Do you have any concerns with your use of alcohol or other drugs? No    Social History     Tobacco Use    Smoking status: Never    Smokeless tobacco: Never   Vaping Use    Vaping Use: Never used   Substance Use Topics    Alcohol use: Yes    Drug use: No         8/18/2022     5:00 PM 12/16/2022     4:55 PM 2/13/2023     4:04 PM   PHQ   PHQ-9 Total Score 9 13 10   Q9: Thoughts of better off dead/self-harm past 2 weeks Not at all Not at all Not at all         8/18/2022     5:00 PM 2/13/2023     4:05 PM 9/14/2023     1:28 PM   SUHA-7 SCORE   Total Score   14 (moderate anxiety)   Total Score 0 7 14         2/13/2023     4:04 PM   Last PHQ-9   1.  Little interest or pleasure in doing things 0   2.  Feeling down, depressed, or hopeless 1   3.  Trouble falling or staying asleep, or sleeping too much 1   4.  Feeling tired or having little energy 3   5.  Poor appetite or overeating 1   6.  Feeling bad about yourself 1   7.  Trouble concentrating 3   8.  Moving slowly or restless 0   Q9: Thoughts of better off dead/self-harm past 2 weeks 0   PHQ-9 Total Score 10   Difficulty at work, home, or with people Very difficult         9/14/2023     1:28 PM   SUHA-7    1. Feeling nervous, anxious, or on edge 3   2. Not being able to stop or control worrying 3    3. Worrying too much about different things 3   4. Trouble relaxing 3   5. Being so restless that it is hard to sit still 0   6. Becoming easily annoyed or irritable 1   7. Feeling afraid, as if something awful might happen 1   SUHA-7 Total Score 14   If you checked any problems, how difficult have they made it for you to do your work, take care of things at home, or get along with other people? Extremely difficult       Suicide Assessment Five-step Evaluation and Treatment (SAFE-T)    Hypothyroidism Follow-up    Since last visit, patient describes the following symptoms: Weight stable, no hair loss, no skin changes, no constipation, no loose stools, weight loss of 6 lbs since 8/28/2023, dry skin, constipation, tremors, anxiety, and depression  How many servings of fruits and vegetables do you eat daily?  0-1  On average, how many sweetened beverages do you drink each day (Examples: soda, juice, sweet tea, etc.  Do NOT count diet or artificially sweetened beverages)?   1  How many days per week do you exercise enough to make your heart beat faster? 7  How many minutes a day do you exercise enough to make your heart beat faster? 30 - 60  How many days per week do you miss taking your medication? 6 in the last two weeks  What makes it hard for you to take your medications?  remembering to take      Review of Systems   Constitutional, HEENT, cardiovascular, pulmonary, gi and gu systems are negative, except as otherwise noted.      Objective    /74 (BP Location: Left arm, Patient Position: Sitting, Cuff Size: Adult Regular)   Pulse 82   Temp 98  F (36.7  C) (Tympanic)   Wt 69.1 kg (152 lb 6 oz)   LMP 09/12/2023 (Within Days)   SpO2 98%   BMI 26.99 kg/m    Body mass index is 26.99 kg/m .  Physical Exam   GENERAL: healthy, alert and no distress  RESP: lungs clear to auscultation - no rales, rhonchi or wheezes  CV: regular rate and rhythm, normal S1 S2, no S3 or S4, no murmur, click or rub, no peripheral edema  and peripheral pulses strong  MS: no gross musculoskeletal defects noted, no edema  PSYCH: mentation appears normal, affect normal/bright    Results for orders placed or performed in visit on 09/14/23   TSH with free T4 reflex     Status: Normal   Result Value Ref Range    TSH 1.65 0.30 - 4.20 uIU/mL         Answers submitted by the patient for this visit:  SUHA-7 (Submitted on 9/14/2023)  SUHA 7 TOTAL SCORE: 14

## 2023-09-14 NOTE — LETTER
September 28, 2023      Barbi Vale  [address]  [place] [address]        Dear ,    We are writing to inform you of your test results.    Per provider. Vitamin d is low, recommend taking 20,000 international unit(s) weekly over the counter. Please call us at [phone].    Resulted Orders   Vitamin D Deficiency   Result Value Ref Range    Vitamin D, Total (25-Hydroxy) 24 20 - 75 ug/L    Narrative    Season, race, dietary intake, and treatment affect the concentration of 25-hydroxy-Vitamin D. Values may decrease during winter months and increase during summer months. Values 20-29 ug/L may indicate Vitamin D insufficiency and values <20 ug/L may indicate Vitamin D deficiency.    Vitamin D determination is routinely performed by an immunoassay specific for 25 hydroxyvitamin D3.  If an individual is on vitamin D2(ergocalciferol) supplementation, please specify 25 OH vitamin D2 and D3 level determination by LCMSMS test VITD23.     Vitamin B12   Result Value Ref Range    Vitamin B12 501 232 - 1,245 pg/mL       If you have any questions or concerns, please call the clinic at the number listed above.       Sincerely,      Caprice Pimentel MD

## 2023-09-15 LAB — VIT B12 SERPL-MCNC: 501 PG/ML (ref 232–1245)

## 2023-09-19 LAB — DEPRECATED CALCIDIOL+CALCIFEROL SERPL-MC: 24 UG/L (ref 20–75)

## 2023-09-20 NOTE — RESULT ENCOUNTER NOTE
Please call patient with the following results:  Vitamin d is low, recommend taking 20,000 international unit(s) weekly otc

## 2023-09-25 ENCOUNTER — LAB (OUTPATIENT)
Dept: LAB | Facility: OTHER | Age: 38
End: 2023-09-25
Payer: COMMERCIAL

## 2023-09-25 ENCOUNTER — MYC MEDICAL ADVICE (OUTPATIENT)
Dept: FAMILY MEDICINE | Facility: OTHER | Age: 38
End: 2023-09-25
Payer: COMMERCIAL

## 2023-09-25 DIAGNOSIS — R79.89 ELEVATED TSH: ICD-10-CM

## 2023-09-25 DIAGNOSIS — E07.9 DYSFUNCTION OF THYROID: Primary | ICD-10-CM

## 2023-09-25 PROCEDURE — 86800 THYROGLOBULIN ANTIBODY: CPT

## 2023-09-25 PROCEDURE — 86376 MICROSOMAL ANTIBODY EACH: CPT

## 2023-09-25 PROCEDURE — 36415 COLL VENOUS BLD VENIPUNCTURE: CPT

## 2023-09-26 LAB
THYROGLOB AB SERPL IA-ACNC: <20 IU/ML
THYROPEROXIDASE AB SERPL-ACNC: <10 IU/ML

## 2023-09-26 NOTE — PROGRESS NOTES
Assessment & Plan     Acute otitis externa of left ear, unspecified type  - ciprofloxacin-dexAMETHasone (CIPRODEX) 0.3-0.1 % otic suspension; Place 4 drops Into the left ear 2 times daily for 7 days    Follow-up if worsening, not improving as anticipated/discussed, or any new symptoms/concerns.       HERBERT CLEMENTS DO  [facility] - [place] is a 37 year old, presenting for the following health issues:  Ear Problem      HPI     38yo female here for concern of left ear pain, feels swollen and trouble inserting Q-tip, x5 days.  Seems to be a bit better today after some flaky stuff came out - she's unsure if wax or face mask material.  Does frequently wear a left ear plug (doesn't wear bother as cannot hear if both ears protected.  No ST, sinus congestion, HA, fever, cough, tinnitus, muffled hearing.  Otherwise well.        Review of Systems   Constitutional:  Negative for chills and fever.   HENT:  Negative for facial swelling, hearing loss, rhinorrhea, sinus pressure, sinus pain, sore throat and tinnitus.    Respiratory:  Negative for cough.             Objective    BP 92/58   Pulse 75   Temp 97.6  F (36.4  C) (Tympanic)   Resp 18   Wt 69.9 kg (154 lb 3.2 oz)   LMP 09/12/2023 (Within Days)   SpO2 99%   BMI 27.32 kg/m    Body mass index is 27.32 kg/m .  Physical Exam  Constitutional:       General: She is not in acute distress.     Appearance: Normal appearance.   HENT:      Head: Normocephalic and atraumatic.      Right Ear: Tympanic membrane normal.      Left Ear: Tympanic membrane normal.      Ears:      Comments: Left canal raw appearing, mild erythema, no exudate, trace cerumen proximal canal, mild discomfort pulling on external ear, no periauricular tenderness  Eyes:      Conjunctiva/sclera: Conjunctivae normal.      Pupils: Pupils are equal, round, and reactive to light.   Cardiovascular:      Rate and Rhythm: Normal rate and regular rhythm.      Heart sounds: Normal  heart sounds. No murmur heard.  Pulmonary:      Effort: Pulmonary effort is normal.      Breath sounds: Normal breath sounds. No wheezing.   Lymphadenopathy:      Cervical: No cervical adenopathy.   Neurological:      Mental Status: She is alert and oriented to person, place, and time.

## 2023-09-27 ENCOUNTER — OFFICE VISIT (OUTPATIENT)
Dept: FAMILY MEDICINE | Facility: OTHER | Age: 38
End: 2023-09-27
Attending: FAMILY MEDICINE
Payer: COMMERCIAL

## 2023-09-27 VITALS
HEART RATE: 75 BPM | TEMPERATURE: 97.6 F | RESPIRATION RATE: 18 BRPM | OXYGEN SATURATION: 99 % | WEIGHT: 154.2 LBS | SYSTOLIC BLOOD PRESSURE: 92 MMHG | BODY MASS INDEX: 27.32 KG/M2 | DIASTOLIC BLOOD PRESSURE: 58 MMHG

## 2023-09-27 DIAGNOSIS — H60.502 ACUTE OTITIS EXTERNA OF LEFT EAR, UNSPECIFIED TYPE: Primary | ICD-10-CM

## 2023-09-27 PROCEDURE — 99213 OFFICE O/P EST LOW 20 MIN: CPT | Performed by: FAMILY MEDICINE

## 2023-09-27 RX ORDER — CIPROFLOXACIN AND DEXAMETHASONE 3; 1 MG/ML; MG/ML
4 SUSPENSION/ DROPS AURICULAR (OTIC) 2 TIMES DAILY
Qty: 7.5 ML | Refills: 0 | Status: SHIPPED | OUTPATIENT
Start: 2023-09-27 | End: 2023-10-04

## 2023-09-27 RX ORDER — LITHIUM CARBONATE 300 MG
TABLET ORAL
COMMUNITY
Start: 2023-09-13 | End: 2024-06-05

## 2023-09-27 ASSESSMENT — ENCOUNTER SYMPTOMS
SINUS PAIN: 0
FEVER: 0
FACIAL SWELLING: 0
RHINORRHEA: 0
SORE THROAT: 0
CHILLS: 0
SINUS PRESSURE: 0
COUGH: 0

## 2023-09-27 ASSESSMENT — PAIN SCALES - GENERAL: PAINLEVEL: MILD PAIN (3)

## 2023-09-29 ENCOUNTER — APPOINTMENT (OUTPATIENT)
Dept: LAB | Facility: OTHER | Age: 38
End: 2023-09-29
Payer: COMMERCIAL

## 2023-09-29 LAB — INSULIN SERPL-ACNC: 5.6 UU/ML (ref 2.6–24.9)

## 2023-09-29 PROCEDURE — 83525 ASSAY OF INSULIN: CPT | Performed by: FAMILY MEDICINE

## 2023-09-29 PROCEDURE — 36415 COLL VENOUS BLD VENIPUNCTURE: CPT | Performed by: FAMILY MEDICINE

## 2023-10-11 NOTE — ADDENDUM NOTE
Addended by: LANI ABBOTT on: 8/23/2021 03:00 PM     Modules accepted: Brielle     Please advise on derm referral.  Fuv for meds not yet scheduled

## 2023-11-08 ENCOUNTER — LAB (OUTPATIENT)
Dept: LAB | Facility: OTHER | Age: 38
End: 2023-11-08
Payer: COMMERCIAL

## 2023-11-08 ENCOUNTER — OFFICE VISIT (OUTPATIENT)
Dept: FAMILY MEDICINE | Facility: OTHER | Age: 38
End: 2023-11-08
Attending: FAMILY MEDICINE
Payer: COMMERCIAL

## 2023-11-08 VITALS
TEMPERATURE: 99.7 F | WEIGHT: 147.56 LBS | DIASTOLIC BLOOD PRESSURE: 70 MMHG | BODY MASS INDEX: 26.14 KG/M2 | SYSTOLIC BLOOD PRESSURE: 102 MMHG | HEART RATE: 98 BPM | OXYGEN SATURATION: 97 %

## 2023-11-08 DIAGNOSIS — F31.10 BIPOLAR I DISORDER, MOST RECENT EPISODE (OR CURRENT) MANIC (H): ICD-10-CM

## 2023-11-08 DIAGNOSIS — E07.9 DYSFUNCTION OF THYROID: Primary | ICD-10-CM

## 2023-11-08 DIAGNOSIS — E07.9 DYSFUNCTION OF THYROID: ICD-10-CM

## 2023-11-08 DIAGNOSIS — N94.3 PREMENSTRUAL SYNDROME: ICD-10-CM

## 2023-11-08 DIAGNOSIS — E55.9 HYPOVITAMINOSIS D: ICD-10-CM

## 2023-11-08 LAB
T4 FREE SERPL-MCNC: 0.96 NG/DL (ref 0.9–1.7)
TSH SERPL DL<=0.005 MIU/L-ACNC: 6.9 UIU/ML (ref 0.3–4.2)

## 2023-11-08 PROCEDURE — 99214 OFFICE O/P EST MOD 30 MIN: CPT | Performed by: FAMILY MEDICINE

## 2023-11-08 PROCEDURE — 84443 ASSAY THYROID STIM HORMONE: CPT

## 2023-11-08 PROCEDURE — 36415 COLL VENOUS BLD VENIPUNCTURE: CPT

## 2023-11-08 PROCEDURE — 84439 ASSAY OF FREE THYROXINE: CPT

## 2023-11-08 PROCEDURE — 82306 VITAMIN D 25 HYDROXY: CPT | Performed by: FAMILY MEDICINE

## 2023-11-08 RX ORDER — GUANFACINE 1 MG/1
TABLET, EXTENDED RELEASE ORAL
COMMUNITY
Start: 2023-09-28 | End: 2024-06-05

## 2023-11-08 RX ORDER — LIOTHYRONINE SODIUM 5 UG/1
5 TABLET ORAL DAILY
Qty: 30 TABLET | Refills: 1 | Status: SHIPPED | OUTPATIENT
Start: 2023-11-08 | End: 2024-01-26

## 2023-11-08 ASSESSMENT — PATIENT HEALTH QUESTIONNAIRE - PHQ9
SUM OF ALL RESPONSES TO PHQ QUESTIONS 1-9: 14
SUM OF ALL RESPONSES TO PHQ QUESTIONS 1-9: 14
10. IF YOU CHECKED OFF ANY PROBLEMS, HOW DIFFICULT HAVE THESE PROBLEMS MADE IT FOR YOU TO DO YOUR WORK, TAKE CARE OF THINGS AT HOME, OR GET ALONG WITH OTHER PEOPLE: SOMEWHAT DIFFICULT

## 2023-11-08 ASSESSMENT — PAIN SCALES - GENERAL: PAINLEVEL: NO PAIN (0)

## 2023-11-08 NOTE — PATIENT INSTRUCTIONS
FEMM lon to keep track of cycles  Come for fasting labs before 8am on day 2, 3, or 4 of your next menstrual cycle  (call to schedule labs )  Schedule appt for 2 weeks later with me

## 2023-11-08 NOTE — PROGRESS NOTES
Assessment & Plan     Dysfunction of thyroid  Back and forth with thyroid, will start cytomel daily and follow-up 2 mos  - TSH with free T4 reflex; Future  - liothyronine (CYTOMEL) 5 MCG tablet; Take 1 tablet (5 mcg) by mouth daily    Hypovitaminosis D  Recheck labs, currently not taking  - Vitamin D Deficiency    Bipolar I disorder, most recent episode (or current) manic (H)  Things are not going well in her relationship  - Lithium level; Future    Premenstrual syndrome  Feeling anxious or panicky lately, unsure if related to cycle  Labs on day 2, 3 or 4 of next cycle fasting before 8am and follow-up 2 wks later  - Luteinizing Hormone; Future  - Follicle stimulating hormone; Future  - Testosterone Free and Total; Future  - DHEA sulfate; Future  - Androstenedione; Future  - Prolactin; Future  - Glucose; Future  - TSH with free T4 reflex; Future  - Insulin level; Future  - 17 OH progesterone; Future  - Mullerian Hormone Antibody; Future    Provider  Link to [facility] Help Grid :[id number]}    Patient was agreeable to this plan and had no further questions.  Patient Instructions    FEMM lon to keep track of cycles  Come for fasting labs before 8am on day 2, 3, or 4 of your next menstrual cycle  (call to schedule labs )  Schedule appt for 2 weeks later with me    No follow-ups on file.    Caprice Pimentel MD  [facility] - [place] is a 38 year old, presenting for the following health issues:  Thyroid Problem        11/8/2023     1:15 PM   Additional Questions   Roomed by Kia Andujar   Accompanied by None         11/8/2023     1:15 PM   Patient Reported Additional Medications   Patient reports taking the following new medications None       HPI     Concern - F/U Thyroid labs   Onset: labs last drawn 9/25/2023  Description: Thyroid lab follow up   Progression of Symptoms:  N/A   Accompanying Signs & Symptoms: None   Previous history of similar problem: N/A   Therapies tried and  outcome: None      Review of Systems   Constitutional, HEENT, cardiovascular, pulmonary, gi and gu systems are negative, except as otherwise noted.      Objective    /70 (BP Location: Right arm, Patient Position: Sitting, Cuff Size: Adult Regular)   Pulse 98   Temp 99.7  F (37.6  C) (Tympanic)   Wt 66.9 kg (147 lb 9 oz)   LMP 09/12/2023 (Within Days)   SpO2 97%   BMI 26.14 kg/m    Body mass index is 26.14 kg/m .  Physical Exam   GENERAL: healthy, alert and no distress  NECK: no adenopathy, no asymmetry, masses, or scars and thyroid normal to palpation  RESP: lungs clear to auscultation - no rales, rhonchi or wheezes  CV: regular rate and rhythm, normal S1 S2, no S3 or S4, no murmur, click or rub, no peripheral edema and peripheral pulses strong  PSYCH: concentration poor, affect flat, and appearance well groomed    Results for orders placed or performed in visit on 11/08/23   TSH with free T4 reflex     Status: Abnormal   Result Value Ref Range    TSH 6.90 (H) 0.30 - 4.20 uIU/mL   T4 free     Status: Normal   Result Value Ref Range    Free T4 0.96 0.90 - 1.70 ng/dL         Answers submitted by the patient for this visit:  Patient Health Questionnaire (Submitted on 11/8/2023)  If you checked off any problems, how difficult have these problems made it for you to do your work, take care of things at home, or get along with other people?: Somewhat difficult  PHQ9 TOTAL SCORE: 14

## 2023-11-09 LAB — VIT D+METAB SERPL-MCNC: 24 NG/ML (ref 20–50)

## 2023-11-13 ENCOUNTER — LAB (OUTPATIENT)
Dept: LAB | Facility: OTHER | Age: 38
End: 2023-11-13
Payer: COMMERCIAL

## 2023-11-13 ENCOUNTER — TELEPHONE (OUTPATIENT)
Dept: FAMILY MEDICINE | Facility: OTHER | Age: 38
End: 2023-11-13

## 2023-11-13 DIAGNOSIS — N94.3 PREMENSTRUAL SYNDROME: ICD-10-CM

## 2023-11-13 DIAGNOSIS — F31.10 BIPOLAR I DISORDER, MOST RECENT EPISODE (OR CURRENT) MANIC (H): ICD-10-CM

## 2023-11-13 LAB
FASTING STATUS PATIENT QL REPORTED: NO
GLUCOSE SERPL-MCNC: 94 MG/DL (ref 70–99)
LITHIUM SERPL-SCNC: 0.8 MMOL/L (ref 0.6–1.2)
T4 FREE SERPL-MCNC: 0.88 NG/DL (ref 0.9–1.7)
TSH SERPL DL<=0.005 MIU/L-ACNC: 4.31 UIU/ML (ref 0.3–4.2)

## 2023-11-13 PROCEDURE — 84403 ASSAY OF TOTAL TESTOSTERONE: CPT

## 2023-11-13 PROCEDURE — 80178 ASSAY OF LITHIUM: CPT

## 2023-11-13 PROCEDURE — 83498 ASY HYDROXYPROGESTERONE 17-D: CPT

## 2023-11-13 PROCEDURE — 83525 ASSAY OF INSULIN: CPT

## 2023-11-13 PROCEDURE — 83002 ASSAY OF GONADOTROPIN (LH): CPT

## 2023-11-13 PROCEDURE — 83520 IMMUNOASSAY QUANT NOS NONAB: CPT

## 2023-11-13 PROCEDURE — 84146 ASSAY OF PROLACTIN: CPT

## 2023-11-13 PROCEDURE — 36415 COLL VENOUS BLD VENIPUNCTURE: CPT

## 2023-11-13 PROCEDURE — 82627 DEHYDROEPIANDROSTERONE: CPT

## 2023-11-13 PROCEDURE — 83001 ASSAY OF GONADOTROPIN (FSH): CPT

## 2023-11-13 PROCEDURE — 82157 ASSAY OF ANDROSTENEDIONE: CPT | Mod: 90

## 2023-11-13 PROCEDURE — 82947 ASSAY GLUCOSE BLOOD QUANT: CPT

## 2023-11-13 PROCEDURE — 84443 ASSAY THYROID STIM HORMONE: CPT

## 2023-11-13 PROCEDURE — 84439 ASSAY OF FREE THYROXINE: CPT

## 2023-11-13 PROCEDURE — 84270 ASSAY OF SEX HORMONE GLOBUL: CPT

## 2023-11-13 NOTE — TELEPHONE ENCOUNTER
GENEM with lon't info:  Thurs 11/30 @ 9:45     Instructed to call back to the clinic if this date/time doesn't work

## 2023-11-13 NOTE — TELEPHONE ENCOUNTER
7:58 AM    Reason for Call: OVERBOOK    Patient is having the following symptoms: Needs to be seen after labs.    The patient is requesting an appointment for Within two weeks with Dr Pimentel.    Was an appointment offered for this call? No  If yes : Appointment type              Date    Preferred method for responding to this message: Telephone Call  What is your phone number [phone]    If we cannot reach you directly, may we leave a detailed response at the number you provided? Yes    Can this message wait until your PCP/provider returns, if unavailable today? Not applicable    Lashonda Mo

## 2023-11-14 LAB
DHEA-S SERPL-MCNC: 135 UG/DL (ref 35–430)
FSH SERPL IRP2-ACNC: 18.9 MIU/ML
INSULIN SERPL-ACNC: 4 UU/ML (ref 2.6–24.9)
LH SERPL-ACNC: 12.5 MIU/ML
MIS SERPL-MCNC: 0.35 NG/ML (ref 0.15–7.5)
PROLACTIN SERPL 3RD IS-MCNC: 22 NG/ML (ref 5–23)
SHBG SERPL-SCNC: 72 NMOL/L (ref 30–135)

## 2023-11-15 LAB
TESTOST FREE SERPL-MCNC: 0.06 NG/DL
TESTOST SERPL-MCNC: 6 NG/DL (ref 8–60)

## 2023-11-16 LAB — 17OHP SERPL-MCNC: 62 NG/DL

## 2023-11-17 LAB — ANDROST SERPL-MCNC: 0.93 NG/ML

## 2023-11-19 ENCOUNTER — HOSPITAL ENCOUNTER (EMERGENCY)
Facility: HOSPITAL | Age: 38
Discharge: HOME | End: 2023-11-19
Attending: FAMILY MEDICINE | Admitting: FAMILY MEDICINE
Payer: COMMERCIAL

## 2023-11-19 VITALS
HEART RATE: 87 BPM | DIASTOLIC BLOOD PRESSURE: 71 MMHG | RESPIRATION RATE: 20 BRPM | SYSTOLIC BLOOD PRESSURE: 113 MMHG | TEMPERATURE: 98.1 F | OXYGEN SATURATION: 100 %

## 2023-11-19 DIAGNOSIS — F41.1 GENERALIZED ANXIETY DISORDER WITH PANIC ATTACKS: ICD-10-CM

## 2023-11-19 DIAGNOSIS — F41.0 GENERALIZED ANXIETY DISORDER WITH PANIC ATTACKS: ICD-10-CM

## 2023-11-19 LAB
AMPHETAMINES UR QL SCN: ABNORMAL
ANION GAP SERPL CALCULATED.3IONS-SCNC: 9 MMOL/L (ref 7–15)
APAP SERPL-MCNC: <5 UG/ML (ref 10–30)
BARBITURATES UR QL SCN: ABNORMAL
BASOPHILS # BLD AUTO: 0.1 10E3/UL (ref 0–0.2)
BASOPHILS NFR BLD AUTO: 1 %
BENZODIAZ UR QL SCN: ABNORMAL
BUN SERPL-MCNC: 4.7 MG/DL (ref 6–20)
BZE UR QL SCN: ABNORMAL
CALCIUM SERPL-MCNC: 9.6 MG/DL (ref 8.6–10)
CANNABINOIDS UR QL SCN: ABNORMAL
CHLORIDE SERPL-SCNC: 102 MMOL/L (ref 98–107)
CREAT SERPL-MCNC: 0.84 MG/DL (ref 0.51–0.95)
DEPRECATED HCO3 PLAS-SCNC: 28 MMOL/L (ref 22–29)
EGFRCR SERPLBLD CKD-EPI 2021: >90 ML/MIN/1.73M2
EOSINOPHIL # BLD AUTO: 0.1 10E3/UL (ref 0–0.7)
EOSINOPHIL NFR BLD AUTO: 2 %
ERYTHROCYTE [DISTWIDTH] IN BLOOD BY AUTOMATED COUNT: 12.3 % (ref 10–15)
ETHANOL SERPL-MCNC: <0.01 G/DL
FENTANYL UR QL: ABNORMAL
GLUCOSE SERPL-MCNC: 103 MG/DL (ref 70–99)
HCT VFR BLD AUTO: 41.3 % (ref 35–47)
HGB BLD-MCNC: 13.8 G/DL (ref 11.7–15.7)
IMM GRANULOCYTES # BLD: 0 10E3/UL
IMM GRANULOCYTES NFR BLD: 0 %
LITHIUM SERPL-SCNC: 0.9 MMOL/L (ref 0.6–1.2)
LYMPHOCYTES # BLD AUTO: 1.9 10E3/UL (ref 0.8–5.3)
LYMPHOCYTES NFR BLD AUTO: 26 %
MCH RBC QN AUTO: 31.7 PG (ref 26.5–33)
MCHC RBC AUTO-ENTMCNC: 33.4 G/DL (ref 31.5–36.5)
MCV RBC AUTO: 95 FL (ref 78–100)
MONOCYTES # BLD AUTO: 0.5 10E3/UL (ref 0–1.3)
MONOCYTES NFR BLD AUTO: 7 %
NEUTROPHILS # BLD AUTO: 4.7 10E3/UL (ref 1.6–8.3)
NEUTROPHILS NFR BLD AUTO: 64 %
NRBC # BLD AUTO: 0 10E3/UL
NRBC BLD AUTO-RTO: 0 /100
OPIATES UR QL SCN: ABNORMAL
PCP QUAL URINE (ROCHE): ABNORMAL
PLATELET # BLD AUTO: 291 10E3/UL (ref 150–450)
POTASSIUM SERPL-SCNC: 4.1 MMOL/L (ref 3.4–5.3)
RBC # BLD AUTO: 4.35 10E6/UL (ref 3.8–5.2)
SALICYLATES SERPL-MCNC: <0.3 MG/DL
SODIUM SERPL-SCNC: 139 MMOL/L (ref 135–145)
WBC # BLD AUTO: 7.2 10E3/UL (ref 4–11)

## 2023-11-19 PROCEDURE — 80179 DRUG ASSAY SALICYLATE: CPT | Performed by: FAMILY MEDICINE

## 2023-11-19 PROCEDURE — 36415 COLL VENOUS BLD VENIPUNCTURE: CPT | Performed by: FAMILY MEDICINE

## 2023-11-19 PROCEDURE — 99285 EMERGENCY DEPT VISIT HI MDM: CPT

## 2023-11-19 PROCEDURE — 80048 BASIC METABOLIC PNL TOTAL CA: CPT | Performed by: FAMILY MEDICINE

## 2023-11-19 PROCEDURE — 85004 AUTOMATED DIFF WBC COUNT: CPT | Performed by: FAMILY MEDICINE

## 2023-11-19 PROCEDURE — 80307 DRUG TEST PRSMV CHEM ANLYZR: CPT | Performed by: FAMILY MEDICINE

## 2023-11-19 PROCEDURE — 80178 ASSAY OF LITHIUM: CPT | Performed by: FAMILY MEDICINE

## 2023-11-19 PROCEDURE — 82077 ASSAY SPEC XCP UR&BREATH IA: CPT | Performed by: FAMILY MEDICINE

## 2023-11-19 PROCEDURE — 80143 DRUG ASSAY ACETAMINOPHEN: CPT | Performed by: FAMILY MEDICINE

## 2023-11-19 PROCEDURE — 99284 EMERGENCY DEPT VISIT MOD MDM: CPT | Performed by: FAMILY MEDICINE

## 2023-11-19 ASSESSMENT — ACTIVITIES OF DAILY LIVING (ADL)
ADLS_ACUITY_SCORE: 33
ADLS_ACUITY_SCORE: 35
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
## 2023-11-20 NOTE — ED TRIAGE NOTES
Pt in for evaluation of increased anxiety the last month. Reporting some panic attacks multiple times a day. States she has been taking medications as directed but they don't seem to be helping anymore. Having ongoing suicidal thoughts but states she has a daughter and would never do anything to leave her. Arrives with father. Pt cooperative.  Reports she has been admitted to [facility] unit and believes admission may be beneficial to her.
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
## 2023-11-20 NOTE — DISCHARGE INSTRUCTIONS
Rest and stay well-hydrated  Keep your appointment with your therapist tomorrow  Close follow-up with PCP  Come back for any concern or any worsening symptoms

## 2023-11-20 NOTE — ED NOTES
Pt came in with father due to pt having increased panic attacks and possibly wanting to be admitted to inpatient. Pt states that over the last month she has been having increased panic attacks due to stress between her family and impending divorce. Pt also states that her current medications may not be helping her manage this. She is unsure if she wants to pursue inpatient. She denies SI or HI. States she has suicidal thoughts chronically but does not want to die or has a plan. She wants to be around for her daughter. Room has been searched and secured. Provider does not think she needs to be in tieless clothing or on continuous observation. Pt also states that she has an appt with her therapist in the AM.

## 2023-11-30 ENCOUNTER — OFFICE VISIT (OUTPATIENT)
Dept: FAMILY MEDICINE | Facility: OTHER | Age: 38
End: 2023-11-30
Attending: FAMILY MEDICINE
Payer: COMMERCIAL

## 2023-11-30 VITALS
SYSTOLIC BLOOD PRESSURE: 117 MMHG | HEIGHT: 63 IN | WEIGHT: 150.3 LBS | BODY MASS INDEX: 26.63 KG/M2 | DIASTOLIC BLOOD PRESSURE: 50 MMHG | OXYGEN SATURATION: 98 % | HEART RATE: 99 BPM | TEMPERATURE: 99.2 F

## 2023-11-30 DIAGNOSIS — F31.10 BIPOLAR I DISORDER, MOST RECENT EPISODE (OR CURRENT) MANIC (H): ICD-10-CM

## 2023-11-30 DIAGNOSIS — R79.89 ELEVATED PROLACTIN LEVEL: Primary | ICD-10-CM

## 2023-11-30 DIAGNOSIS — E03.9 HYPOTHYROIDISM, UNSPECIFIED TYPE: ICD-10-CM

## 2023-11-30 LAB — LITHIUM SERPL-SCNC: 1.3 MMOL/L (ref 0.6–1.2)

## 2023-11-30 PROCEDURE — 36415 COLL VENOUS BLD VENIPUNCTURE: CPT | Performed by: FAMILY MEDICINE

## 2023-11-30 PROCEDURE — 80178 ASSAY OF LITHIUM: CPT | Performed by: FAMILY MEDICINE

## 2023-11-30 PROCEDURE — 99214 OFFICE O/P EST MOD 30 MIN: CPT | Performed by: FAMILY MEDICINE

## 2023-11-30 RX ORDER — CABERGOLINE 0.5 MG/1
0.25 TABLET ORAL
Qty: 16 TABLET | Refills: 1 | Status: SHIPPED | OUTPATIENT
Start: 2023-11-30

## 2023-11-30 RX ORDER — LEVOTHYROXINE SODIUM 25 UG/1
25 TABLET ORAL DAILY
Qty: 30 TABLET | Refills: 1 | Status: SHIPPED | OUTPATIENT
Start: 2023-11-30 | End: 2024-02-15

## 2023-11-30 ASSESSMENT — PAIN SCALES - GENERAL: PAINLEVEL: NO PAIN (0)

## 2023-11-30 NOTE — PROGRESS NOTES
Assessment & Plan     Elevated prolactin level  Start cabergoline  Need to get 2 hr glucose testing also  Follow-up 2 wks afterwards in 2 mos  - cabergoline (DOSTINEX) 0.5 MG tablet; Take 0.5 tablets (0.25 mg) by mouth twice a week  - Glucose; Future  - Insulin level; Future  - Non-gestational glucose tolerance testing, 2 hour; Future  - Prolactin; Standing    Hypothyroidism, unspecified type  Start synthroid in addition to cytomel and repeat labs 2 mos  - levothyroxine (SYNTHROID/LEVOTHROID) 25 MCG tablet; Take 1 tablet (25 mcg) by mouth daily  - TSH with free T4 reflex; Standing    Bipolar I disorder, most recent episode (or current) manic (H)  Slight high, needs to review with her med provider  - Lithium level; Future  - Lithium level    Provider  Link to [facility] Help Grid :[id number]}    Patient was agreeable to this plan and had no further questions.  Patient Instructions    Start synthroid (in addition to cytomel) take on empty stomach   Cabergoline -- take 1/2 tablet 2x/wk  In 2 months, fasting labs before 8am to recheck thyroid and prolactin and to do the 2 hour sugar test    No follow-ups on file.    Caprice Pimentel MD  [facility] - AARTI Landon is a 38 year old, presenting for the following health issues:  Thyroid Problem        11/30/2023     9:54 AM   Additional Questions   Roomed by Jade ROSSI LPN   Accompanied by self       HPI     Hypothyroidism Follow-up    Since last visit, patient describes the following symptoms: weight gain of 3 lbs, loose stools, tremors, anxiety, depression, and fatigue   How many servings of fruits and vegetables do you eat daily?  0-1  On average, how many sweetened beverages do you drink each day (Examples: soda, juice, sweet tea, etc.  Do NOT count diet or artificially sweetened beverages)?   2  How many days per week do you exercise enough to make your heart beat faster? 3 or less  How many minutes a day do you exercise enough to make your heart  "beat faster? 9 or less  How many days per week do you miss taking your medication? 0      Review Lab Results       Review of Systems   Constitutional, HEENT, cardiovascular, pulmonary, gi and gu systems are negative, except as otherwise noted.      Objective    /50 (BP Location: Right arm, Patient Position: Sitting, Cuff Size: Adult Large)   Pulse 99   Temp 99.2  F (37.3  C) (Tympanic)   Ht 1.6 m (5' 3\")   Wt 68.2 kg (150 lb 4.8 oz)   LMP 11/17/2023 (Approximate)   SpO2 98%   BMI 26.62 kg/m    Body mass index is 26.62 kg/m .  Physical Exam   GENERAL: healthy, alert and no distress  RESP: lungs clear to auscultation - no rales, rhonchi or wheezes  CV: regular rate and rhythm, normal S1 S2, no S3 or S4, no murmur, click or rub, no peripheral edema and peripheral pulses strong  MS: no gross musculoskeletal defects noted, no edema  PSYCH: mentation appears normal, affect normal/bright    Results for orders placed or performed in visit on 11/30/23   Lithium level     Status: Abnormal   Result Value Ref Range    Lithium 1.30 (H) 0.60 - 1.20 mmol/L                   "

## 2023-11-30 NOTE — PATIENT INSTRUCTIONS
Start synthroid (in addition to cytomel) take on empty stomach   Cabergoline -- take 1/2 tablet 2x/wk  In 2 months, fasting labs before 8am to recheck thyroid and prolactin and to do the 2 hour sugar test
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
## 2023-12-03 ENCOUNTER — HOSPITAL ENCOUNTER (EMERGENCY)
Facility: HOSPITAL | Age: 38
Discharge: HOME | End: 2023-12-03
Attending: PHYSICIAN ASSISTANT | Admitting: PHYSICIAN ASSISTANT
Payer: COMMERCIAL

## 2023-12-03 ENCOUNTER — TELEPHONE (OUTPATIENT)
Dept: NURSING | Facility: CLINIC | Age: 38
End: 2023-12-03

## 2023-12-03 VITALS
DIASTOLIC BLOOD PRESSURE: 82 MMHG | HEART RATE: 106 BPM | OXYGEN SATURATION: 96 % | SYSTOLIC BLOOD PRESSURE: 122 MMHG | TEMPERATURE: 98.7 F | RESPIRATION RATE: 16 BRPM

## 2023-12-03 DIAGNOSIS — F43.9 STRESS AT HOME: ICD-10-CM

## 2023-12-03 DIAGNOSIS — F41.0 PANIC ATTACK: ICD-10-CM

## 2023-12-03 PROBLEM — F43.20 ADJUSTMENT DISORDER: Status: ACTIVE | Noted: 2023-12-03

## 2023-12-03 PROCEDURE — 99283 EMERGENCY DEPT VISIT LOW MDM: CPT | Performed by: PHYSICIAN ASSISTANT

## 2023-12-03 PROCEDURE — 99285 EMERGENCY DEPT VISIT HI MDM: CPT

## 2023-12-03 PROCEDURE — 250N000013 HC RX MED GY IP 250 OP 250 PS 637: Performed by: PHYSICIAN ASSISTANT

## 2023-12-03 RX ORDER — LORAZEPAM 1 MG/1
1 TABLET ORAL ONCE
Status: COMPLETED | OUTPATIENT
Start: 2023-12-03 | End: 2023-12-03

## 2023-12-03 RX ADMIN — LORAZEPAM 1 MG: 1 TABLET ORAL at 13:53

## 2023-12-03 ASSESSMENT — ACTIVITIES OF DAILY LIVING (ADL): ADLS_ACUITY_SCORE: 35

## 2023-12-03 NOTE — ED NOTES
IP MH Referral Acuity Rating Score (RARS)    LMHP complete at referral to IP MH, with DEC; and, daily while awaiting IP MH placement. Call score to PPS.  CRITERIA SCORING   New 72 HH and Involuntary for IP MH (not adolescent) 0/1   Boarding over 24 hours 0/1   Vulnerable adult at least 55+ with multiple co morbidities; or, Patient age 11 or under 0/1   Suicide ideation without relief of precipitating factors 1/1   Current plan for suicide 0/1   Current plan for homicide 0/1   Imminent risk or actual attempt to seriously harm another without relief of factors precipitating the attempt 1/1   Severe dysfunction in daily living (ex: complete neglect for self care, extreme disruption in vegetative function, extreme deterioration in social interactions) 1/1   Recent (last 2 weeks) or current physical aggression in the ED 0/1   Restraints or seclusion episode in ED 0/1   Verbal aggression, agitation, yelling, etc., while in the ED 0/1   Active psychosis with psychomotor agitation or catatonia 0/1   Need for constant or near constant redirection (from leaving, from others, etc).  1/1   Intrusive or disruptive behaviors 0/1   TOTAL Acuity Total Score: 4
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
## 2023-12-03 NOTE — TELEPHONE ENCOUNTER
Father Van is calling with no consent to communicate on file, states is with pt, but when writer asked for pt to please come on the line to give verbal consent to speak with Van, pt was not put on the phone. Father Van stating that he just wants to tell us information, writer stated that I am not able to discuss anything about the pt with him.     No triage     Care advice given per protocol and when to call back. Pt verbalized understanding and agrees to plan of care.    Eileen Lopez RN  [place] Nurse Advisor  10:46 AM 12/3/2023

## 2023-12-03 NOTE — CONSULTS
Diagnostic Evaluation Consultation  Crisis Assessment    Patient Name: Barbi Vale  Age:  38 year old  Legal Sex: female  Gender Identity: female  Pronouns:   Race: White  Ethnicity: Not  or   Language: English      Patient was assessed: Virtual: [employer] Crisis Assessment Start Time:  (2:15 pm) Crisis Assessment Stop Time:  (3:00 pm)  Patient location: [facility]                                 Referral Data and Chief Complaint  Barbi Vale presents to the ED with family/friends. Patient is presenting to the ED for the following concerns: Anxiety, Worsening psychosocial stress.   Factors that make the mental health crisis life threatening or complex are:  Patient stated that she has history of panic disorder, anxiety and depression. She takes medications and sees a therapist in the community but her panic is worsening.  Patient stated that psychosocial issues contribute to her panic.  Patient reiterated that her living situation including unemployment and dependent on her  make her case worst.  Patient was accompanied by her father to the ED.  Of note, patient was in the ED 2 weeks ago with similar presentation..      Informed Consent and Assessment Methods  Explained the crisis assessment process, including applicable information disclosures and limits to confidentiality, assessed understanding of the process, and obtained consent to proceed with the assessment.  Assessment methods included conducting a formal interview with patient, review of medical records, collaboration with medical staff, and obtaining relevant collateral information from family and community providers when available.  :       Patient response to interventions: eager to participate  Coping skills were attempted to reduce the crisis:  Patient takes medications     History of the Crisis   Patient reported it's been over a year    Brief Psychosocial History  Family:  , Children yes  Support  System:  Parent(s)  Employment Status:  unemployed  Source of Income:  none  Financial Environmental Concerns:  unemployed  Current Hobbies:  television/movies/videos  Barriers in Personal Life:  emotional concerns    Significant Clinical History  Current Anxiety Symptoms:  panic attack, excessive worry, anxious  Current Depression/Trauma:  helplessness  Current Somatic Symptoms:  anxious  Current Psychosis/Thought Disturbance:  displaces blame  Current Eating Symptoms:  loss of appetite  Chemical Use History:  Alcohol: None  Benzodiazepines: None  Opiates: None  Cocaine: None  Marijuana: None  Other Use: None   Past diagnosis:  Anxiety Disorder, Depression  Family history:     Past treatment:  Individual therapy, Psychiatric Medication Management  Details of most recent treatment:  outpatient med management and individual therapy  Other relevant history:  Patient is seperated from her  and has contacted an  for potential divorce process       Collateral Information  Is there collateral information: Yes     Collateral information name, relationship, phone number:  Van - patient's dad [phone]    What happened today: Patient is having panic attack due to her 's behaviors.  Patient is deprived access to her daughter.  She is not happy.     What is different about patient's functioning: She takes medications but she is not improving.     Concern about alcohol/drug use:      What do you think the patient needs:      Has patient made comments about wanting to kill themselves/others: no    If d/c is recommended, can they take part in safety/aftercare planning:  yes (Patient is safe staying with her dad.  Patient wants to see her daughter)    Additional collateral information:        Risk Assessment  [place] Suicide Severity Rating Scale Full Clinical Version:  Suicidal Ideation  Q1 Wish to be Dead (Lifetime): Yes  Q2 Non-Specific Active Suicidal Thoughts (Lifetime): No  3. Active Suicidal  Ideation with any Methods (Not Plan) Without Intent to Act (Lifetime): No  Q4 Active Suicidal Ideation with Some Intent to Act, Without Specific Plan (Lifetime): No  Q5 Active Suicidal Ideation with Specific Plan and Intent (Lifetime): No  Q6 Suicide Behavior (Lifetime): no     Suicidal Behavior (Lifetime)  Actual Attempt (Lifetime): No  Has subject engaged in non-suicidal self-injurious behavior? (Lifetime): No  Interrupted Attempts (Lifetime): No  Aborted or Self-Interrupted Attempt (Lifetime): No  Preparatory Acts or Behavior (Lifetime): No    [place] Suicide Severity Rating Scale Recent:   Suicidal Ideation (Recent)  Q1 Wished to be Dead (Past Month):  (unsure)  Q2 Suicidal Thoughts (Past Month): yes          Environmental or Psychosocial Events: loss of a relationship due to divorce/separation, challenging interpersonal relationships, unstable housing, homelessness, unemployment/underemployment, social isolation, neither working nor attending school  Protective Factors: Protective Factors: help seeking    Does the patient have thoughts of harming others? Feels Like Hurting Others: no  Previous Attempt to Hurt Others: no  Is the patient engaging in sexually inappropriate behavior?: no    Is the patient engaging in sexually inappropriate behavior?  no        Mental Status Exam   Affect: Dramatic  Appearance: Appropriate  Attention Span/Concentration: Attentive  Eye Contact: Variable    Fund of Knowledge: Appropriate   Language /Speech Content: Fluent  Language /Speech Volume: Normal  Language /Speech Rate/Productions: Normal  Recent Memory: Variable  Remote Memory: Variable  Mood: Sad, Anxious  Orientation to Person: Yes   Orientation to Place: Yes  Orientation to Time of Day: Yes  Orientation to Date: Yes     Situation (Do they understand why they are here?): Yes  Psychomotor Behavior: Normal  Thought Content: Clear  Thought Form: Flight of Ideas     Mini-Cog Assessment  Number of Words Recalled:     Clock-Drawing Test:     Three Item Recall:    Mini-Cog Total Score:       Medication  Psychotropic medications:   Medication Orders - Psychiatric (From admission, onward)      None             Current Care Team  Patient Care Team:  Caprice Pimentel MD as PCP - General (Family Medicine)  Belle Nelson as Licensed Mental Health Professional (Therapist)  Caprice Pimentel MD as Assigned PCP  Jeovany Vargas NP as Nurse Practitioner    Diagnosis  Patient Active Problem List   Diagnosis Code    ACP (advance care planning) Z71.89    Chronic pain disorder G89.4    Myofascial muscle pain M79.18    Paresthesia R20.2    Well woman exam with routine gynecological exam Z01.419    Other chronic pain G89.29    Chemical dermatitis L25.3    Bilateral carpal tunnel syndrome G56.03    Depression F32.A    PTSD (post-traumatic stress disorder) F43.10    SUHA (generalized anxiety disorder) F41.1    Hypovitaminosis D E55.9    Hormonal disorder E34.9    Attention deficit hyperactivity disorder (ADHD), combined type F90.2    Bipolar I disorder, most recent episode (or current) manic (H) F31.10    Dermatitis L30.9    Manic bipolar I disorder in full remission (H24) F31.74    Dysfunction of thyroid E07.9    Drug-induced metabolic disease (H) E80.20, T50.905A    Vitamin B12 deficiency (non anemic) E53.8    Elevated prolactin level R79.89    Hypothyroidism, unspecified type E03.9    Adjustment disorder F43.20       Primary Problem This Admission  Active Hospital Problems    Adjustment disorder        Clinical Summary and Substantiation of Recommendations   Patient stated that she has history of panic disorder, anxiety and depression. She takes medications and sees a therapist in the community but her panic is worsening.  Patient stated that psychosocial issues contribute to her panic.  Patient reiterated that her living situation including unemployment and dependent on her  make her case worst.  Patient was accompanied by her father  to the ED.  Of note, patient was in the ED 2 weeks ago with similar presentation.  Writer consulted with ED physician and the recommendation is admit inpatient for medication re-evaluation.       Imminent risk of harm: Suicidal Behavior  Severe psychiatric, behavioral or other comorbid conditions are appropriate for management at inpatient mental health as indicated by at least one of the following: Symptoms of impact to function  Severe dysfunction in daily living is present as indicated by at least one of the following: Complete withdrawal from all social interactions  Situation and expectations are appropriate for inpatient care: Biopsychosocial stresses potentially contributing to clinical presentation (co morbidities) have been assessed and are absent or manageable at proposed level of care         Patient coping skills attempted to reduce the crisis:  Patient takes medications    Disposition  Recommended disposition: Inpatient Mental Health        Reviewed case and recommendations with attending provider. Attending Name: Dr. Marsha Alvarado       Attending concurs with disposition: yes       Patient and/or validated legal guardian concurs with disposition:   yes       Final disposition:  inpatient mental health    Legal status on admission:      Assessment Details   Total duration spent with the patient: 45 min     CPT code(s) utilized: 55346 - Psychotherapy for Crisis - 60 (30-74*) min    YODIT Kim, Psychotherapist  [facility] - Triage & Transition Services  Callback: [phone]

## 2023-12-11 ENCOUNTER — TRANSFERRED RECORDS (OUTPATIENT)
Dept: HEALTH INFORMATION MANAGEMENT | Facility: CLINIC | Age: 38
End: 2023-12-11

## 2024-01-26 DIAGNOSIS — E07.9 DYSFUNCTION OF THYROID: ICD-10-CM

## 2024-01-26 RX ORDER — LIOTHYRONINE SODIUM 5 UG/1
5 TABLET ORAL DAILY
Qty: 30 TABLET | Refills: 0 | Status: SHIPPED | OUTPATIENT
Start: 2024-01-26 | End: 2024-02-07

## 2024-01-26 NOTE — TELEPHONE ENCOUNTER
CYTOMEL   Last Written Prescription Date:  11/08/2023  Last Fill Quantity: 30,   # refills: 1  Last Office Visit: 11/30/2023  Future Office visit:    Next 5 appointments (look out 90 days)      Feb 15, 2024 11:00 AM  (Arrive by 10:45 AM)  PHYSICAL with Caprice Pimentel MD  [facility] [place] ([facility] [place] ) [address]  [place] [address]  [phone]

## 2024-01-31 ENCOUNTER — LAB (OUTPATIENT)
Dept: LAB | Facility: OTHER | Age: 39
End: 2024-01-31
Payer: COMMERCIAL

## 2024-01-31 DIAGNOSIS — R79.89 ELEVATED PROLACTIN LEVEL: ICD-10-CM

## 2024-01-31 LAB
FASTING STATUS PATIENT QL REPORTED: YES
GLUCOSE SERPL-MCNC: 87 MG/DL (ref 70–99)
NON GESTATIONAL GTT 2 HR POST DOSE: 92 MG/DL (ref 60–199)
NON GESTATIONAL GTT FASTING: 87 MG/DL (ref 60–125)

## 2024-01-31 PROCEDURE — 82950 GLUCOSE TEST: CPT

## 2024-01-31 PROCEDURE — 83525 ASSAY OF INSULIN: CPT

## 2024-01-31 PROCEDURE — 36415 COLL VENOUS BLD VENIPUNCTURE: CPT

## 2024-01-31 PROCEDURE — 82947 ASSAY GLUCOSE BLOOD QUANT: CPT

## 2024-02-01 LAB — INSULIN SERPL-ACNC: 5 UU/ML (ref 2.6–24.9)

## 2024-02-05 ENCOUNTER — HOSPITAL ENCOUNTER (EMERGENCY)
Facility: HOSPITAL | Age: 39
Discharge: HOME | End: 2024-02-05
Attending: PHYSICIAN ASSISTANT | Admitting: PHYSICIAN ASSISTANT
Payer: COMMERCIAL

## 2024-02-05 VITALS
HEART RATE: 88 BPM | TEMPERATURE: 97.1 F | SYSTOLIC BLOOD PRESSURE: 127 MMHG | DIASTOLIC BLOOD PRESSURE: 75 MMHG | OXYGEN SATURATION: 99 % | RESPIRATION RATE: 16 BRPM

## 2024-02-05 DIAGNOSIS — F41.0 PANIC ATTACK: ICD-10-CM

## 2024-02-05 PROCEDURE — 250N000013 HC RX MED GY IP 250 OP 250 PS 637: Performed by: PHYSICIAN ASSISTANT

## 2024-02-05 PROCEDURE — 99285 EMERGENCY DEPT VISIT HI MDM: CPT

## 2024-02-05 PROCEDURE — 99283 EMERGENCY DEPT VISIT LOW MDM: CPT | Performed by: PHYSICIAN ASSISTANT

## 2024-02-05 RX ORDER — LORAZEPAM 1 MG/1
1 TABLET ORAL ONCE
Status: COMPLETED | OUTPATIENT
Start: 2024-02-05 | End: 2024-02-05

## 2024-02-05 RX ADMIN — LORAZEPAM 1 MG: 1 TABLET ORAL at 20:37

## 2024-02-05 ASSESSMENT — ACTIVITIES OF DAILY LIVING (ADL): ADLS_ACUITY_SCORE: 35

## 2024-02-06 DIAGNOSIS — E07.9 DYSFUNCTION OF THYROID: ICD-10-CM

## 2024-02-06 NOTE — ED NOTES
Patient mental status has improved significantly, patient continuing to be calm cooperative and DEC  has recommended discharge from ED.  Patient is discharging to home given information on follow up with behavioral health and given crisis resources. Patient stated understanding of these instructions and is discharging by private auto.
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
## 2024-02-06 NOTE — TELEPHONE ENCOUNTER
Cytomel      Last Written Prescription Date:  1/26/24  Last Fill Quantity: 30,   # refills: 0  Last Office Visit: 11/30/23  Future Office visit:    Next 5 appointments (look out 90 days)      Feb 15, 2024 11:00 AM  (Arrive by 10:45 AM)  PHYSICAL with Caprice Pimentel MD  [facility] [place] ([facility] [place] ) [address]  [place] [address]  [phone]             Routing refill request to provider for review/approval because:
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
## 2024-02-07 RX ORDER — LIOTHYRONINE SODIUM 5 UG/1
5 TABLET ORAL DAILY
Qty: 30 TABLET | Refills: 0 | Status: SHIPPED | OUTPATIENT
Start: 2024-02-07 | End: 2024-04-11

## 2024-02-09 ENCOUNTER — MYC MEDICAL ADVICE (OUTPATIENT)
Dept: FAMILY MEDICINE | Facility: OTHER | Age: 39
End: 2024-02-09

## 2024-02-09 DIAGNOSIS — F31.10 BIPOLAR I DISORDER, MOST RECENT EPISODE (OR CURRENT) MANIC (H): Primary | ICD-10-CM

## 2024-02-13 SDOH — HEALTH STABILITY: PHYSICAL HEALTH: ON AVERAGE, HOW MANY MINUTES DO YOU ENGAGE IN EXERCISE AT THIS LEVEL?: 10 MIN

## 2024-02-13 SDOH — HEALTH STABILITY: PHYSICAL HEALTH: ON AVERAGE, HOW MANY DAYS PER WEEK DO YOU ENGAGE IN MODERATE TO STRENUOUS EXERCISE (LIKE A BRISK WALK)?: 2 DAYS

## 2024-02-13 ASSESSMENT — SOCIAL DETERMINANTS OF HEALTH (SDOH): HOW OFTEN DO YOU GET TOGETHER WITH FRIENDS OR RELATIVES?: TWICE A WEEK

## 2024-02-13 ASSESSMENT — ANXIETY QUESTIONNAIRES
6. BECOMING EASILY ANNOYED OR IRRITABLE: SEVERAL DAYS
7. FEELING AFRAID AS IF SOMETHING AWFUL MIGHT HAPPEN: NEARLY EVERY DAY
3. WORRYING TOO MUCH ABOUT DIFFERENT THINGS: NEARLY EVERY DAY
8. IF YOU CHECKED OFF ANY PROBLEMS, HOW DIFFICULT HAVE THESE MADE IT FOR YOU TO DO YOUR WORK, TAKE CARE OF THINGS AT HOME, OR GET ALONG WITH OTHER PEOPLE?: EXTREMELY DIFFICULT
IF YOU CHECKED OFF ANY PROBLEMS ON THIS QUESTIONNAIRE, HOW DIFFICULT HAVE THESE PROBLEMS MADE IT FOR YOU TO DO YOUR WORK, TAKE CARE OF THINGS AT HOME, OR GET ALONG WITH OTHER PEOPLE: EXTREMELY DIFFICULT
GAD7 TOTAL SCORE: 18
1. FEELING NERVOUS, ANXIOUS, OR ON EDGE: NEARLY EVERY DAY
2. NOT BEING ABLE TO STOP OR CONTROL WORRYING: NEARLY EVERY DAY
4. TROUBLE RELAXING: NEARLY EVERY DAY
GAD7 TOTAL SCORE: 18
7. FEELING AFRAID AS IF SOMETHING AWFUL MIGHT HAPPEN: NEARLY EVERY DAY
5. BEING SO RESTLESS THAT IT IS HARD TO SIT STILL: MORE THAN HALF THE DAYS
GAD7 TOTAL SCORE: 18

## 2024-02-13 ASSESSMENT — PATIENT HEALTH QUESTIONNAIRE - PHQ9
10. IF YOU CHECKED OFF ANY PROBLEMS, HOW DIFFICULT HAVE THESE PROBLEMS MADE IT FOR YOU TO DO YOUR WORK, TAKE CARE OF THINGS AT HOME, OR GET ALONG WITH OTHER PEOPLE: EXTREMELY DIFFICULT
SUM OF ALL RESPONSES TO PHQ QUESTIONS 1-9: 20
SUM OF ALL RESPONSES TO PHQ QUESTIONS 1-9: 20

## 2024-02-14 DIAGNOSIS — E03.9 HYPOTHYROIDISM, UNSPECIFIED TYPE: ICD-10-CM

## 2024-02-15 ENCOUNTER — OFFICE VISIT (OUTPATIENT)
Dept: FAMILY MEDICINE | Facility: OTHER | Age: 39
End: 2024-02-15
Attending: FAMILY MEDICINE
Payer: COMMERCIAL

## 2024-02-15 VITALS
HEART RATE: 80 BPM | BODY MASS INDEX: 24.55 KG/M2 | SYSTOLIC BLOOD PRESSURE: 114 MMHG | WEIGHT: 138.56 LBS | DIASTOLIC BLOOD PRESSURE: 69 MMHG | TEMPERATURE: 97.3 F | OXYGEN SATURATION: 100 %

## 2024-02-15 DIAGNOSIS — F43.10 PTSD (POST-TRAUMATIC STRESS DISORDER): ICD-10-CM

## 2024-02-15 DIAGNOSIS — Z00.00 ROUTINE GENERAL MEDICAL EXAMINATION AT A HEALTH CARE FACILITY: Primary | ICD-10-CM

## 2024-02-15 DIAGNOSIS — R79.89 ELEVATED PROLACTIN LEVEL: ICD-10-CM

## 2024-02-15 DIAGNOSIS — E53.8 VITAMIN B12 DEFICIENCY (NON ANEMIC): ICD-10-CM

## 2024-02-15 DIAGNOSIS — Z59.00 HOMELESS: ICD-10-CM

## 2024-02-15 DIAGNOSIS — E03.9 HYPOTHYROIDISM, UNSPECIFIED TYPE: ICD-10-CM

## 2024-02-15 DIAGNOSIS — E78.5 HYPERLIPIDEMIA LDL GOAL <130: ICD-10-CM

## 2024-02-15 DIAGNOSIS — E55.9 HYPOVITAMINOSIS D: ICD-10-CM

## 2024-02-15 DIAGNOSIS — F31.10 BIPOLAR I DISORDER, MOST RECENT EPISODE (OR CURRENT) MANIC (H): ICD-10-CM

## 2024-02-15 DIAGNOSIS — F90.2 ATTENTION DEFICIT HYPERACTIVITY DISORDER (ADHD), COMBINED TYPE: ICD-10-CM

## 2024-02-15 LAB
ALBUMIN SERPL BCG-MCNC: 4.8 G/DL (ref 3.5–5.2)
ALP SERPL-CCNC: 71 U/L (ref 40–150)
ALT SERPL W P-5'-P-CCNC: 13 U/L (ref 0–50)
ANION GAP SERPL CALCULATED.3IONS-SCNC: 9 MMOL/L (ref 7–15)
AST SERPL W P-5'-P-CCNC: 23 U/L (ref 0–45)
BILIRUB SERPL-MCNC: 0.8 MG/DL
BUN SERPL-MCNC: 9.7 MG/DL (ref 6–20)
CALCIUM SERPL-MCNC: 9.7 MG/DL (ref 8.6–10)
CHLORIDE SERPL-SCNC: 99 MMOL/L (ref 98–107)
CHOLEST SERPL-MCNC: 175 MG/DL
CREAT SERPL-MCNC: 0.7 MG/DL (ref 0.51–0.95)
DEPRECATED HCO3 PLAS-SCNC: 27 MMOL/L (ref 22–29)
EGFRCR SERPLBLD CKD-EPI 2021: >90 ML/MIN/1.73M2
FASTING STATUS PATIENT QL REPORTED: NO
GLUCOSE SERPL-MCNC: 100 MG/DL (ref 70–99)
HDLC SERPL-MCNC: 60 MG/DL
LDLC SERPL CALC-MCNC: 101 MG/DL
NONHDLC SERPL-MCNC: 115 MG/DL
POTASSIUM SERPL-SCNC: 3.7 MMOL/L (ref 3.4–5.3)
PROT SERPL-MCNC: 7.4 G/DL (ref 6.4–8.3)
SODIUM SERPL-SCNC: 135 MMOL/L (ref 135–145)
TRIGL SERPL-MCNC: 69 MG/DL
TSH SERPL DL<=0.005 MIU/L-ACNC: 3.59 UIU/ML (ref 0.3–4.2)

## 2024-02-15 PROCEDURE — 80053 COMPREHEN METABOLIC PANEL: CPT | Performed by: FAMILY MEDICINE

## 2024-02-15 PROCEDURE — 99214 OFFICE O/P EST MOD 30 MIN: CPT | Mod: 25 | Performed by: FAMILY MEDICINE

## 2024-02-15 PROCEDURE — 82306 VITAMIN D 25 HYDROXY: CPT | Performed by: FAMILY MEDICINE

## 2024-02-15 PROCEDURE — 99395 PREV VISIT EST AGE 18-39: CPT | Performed by: FAMILY MEDICINE

## 2024-02-15 PROCEDURE — 84146 ASSAY OF PROLACTIN: CPT | Performed by: FAMILY MEDICINE

## 2024-02-15 PROCEDURE — 84443 ASSAY THYROID STIM HORMONE: CPT | Performed by: FAMILY MEDICINE

## 2024-02-15 PROCEDURE — 80061 LIPID PANEL: CPT | Performed by: FAMILY MEDICINE

## 2024-02-15 PROCEDURE — 36415 COLL VENOUS BLD VENIPUNCTURE: CPT | Performed by: FAMILY MEDICINE

## 2024-02-15 RX ORDER — CLONAZEPAM 0.5 MG/1
TABLET ORAL
COMMUNITY
Start: 2024-02-06 | End: 2024-06-05

## 2024-02-15 RX ORDER — LEVOTHYROXINE SODIUM 25 UG/1
25 TABLET ORAL DAILY
Qty: 90 TABLET | Refills: 1 | Status: SHIPPED | OUTPATIENT
Start: 2024-02-15 | End: 2024-08-05

## 2024-02-15 SDOH — ECONOMIC STABILITY - HOUSING INSECURITY: HOMELESSNESS UNSPECIFIED: Z59.00

## 2024-02-15 ASSESSMENT — LIFESTYLE VARIABLES
HOW OFTEN DO YOU HAVE SIX OR MORE DRINKS ON ONE OCCASION: NEVER
HOW OFTEN DO YOU HAVE A DRINK CONTAINING ALCOHOL: NEVER
AUDIT-C TOTAL SCORE: 0
HOW MANY STANDARD DRINKS CONTAINING ALCOHOL DO YOU HAVE ON A TYPICAL DAY: PATIENT DOES NOT DRINK
SKIP TO QUESTIONS 9-10: 1

## 2024-02-15 ASSESSMENT — PAIN SCALES - GENERAL: PAINLEVEL: NO PAIN (0)

## 2024-02-15 NOTE — LETTER
February 21, 2024      Barbi Vale  [id number] [address]   [place] [address]        Dear ,    We are writing to inform you of your test results.    Per PCP. Labs look good. Prolactin is better, vitamin d is slight low, recommend 10,000 international unit(s) weekly over the counter. Please call [phone] with any further questions.     Resulted Orders   TSH with free T4 reflex   Result Value Ref Range    TSH 3.59 0.30 - 4.20 uIU/mL   Comprehensive metabolic panel   Result Value Ref Range    Sodium 135 135 - 145 mmol/L      Comment:      Reference intervals for this test were updated on 09/26/2023 to more accurately reflect our healthy population. There may be differences in the flagging of prior results with similar values performed with this method. Interpretation of those prior results can be made in the context of the updated reference intervals.     Potassium 3.7 3.4 - 5.3 mmol/L    Carbon Dioxide (CO2) 27 22 - 29 mmol/L    Anion Gap 9 7 - 15 mmol/L    Urea Nitrogen 9.7 6.0 - 20.0 mg/dL    Creatinine 0.70 0.51 - 0.95 mg/dL    GFR Estimate >90 >60 mL/min/1.73m2    Calcium 9.7 8.6 - 10.0 mg/dL    Chloride 99 98 - 107 mmol/L    Glucose 100 (H) 70 - 99 mg/dL    Alkaline Phosphatase 71 40 - 150 U/L      Comment:      Reference intervals for this test were updated on 11/14/2023 to more accurately reflect our healthy population. There may be differences in the flagging of prior results with similar values performed with this method. Interpretation of those prior results can be made in the context of the updated reference intervals.    AST 23 0 - 45 U/L      Comment:      Reference intervals for this test were updated on 6/12/2023 to more accurately reflect our healthy population. There may be differences in the flagging of prior results with similar values performed with this method. Interpretation of those prior results can be made in the context of the updated reference intervals.    ALT 13 0 - 50 U/L       Comment:      Reference intervals for this test were updated on 6/12/2023 to more accurately reflect our healthy population. There may be differences in the flagging of prior results with similar values performed with this method. Interpretation of those prior results can be made in the context of the updated reference intervals.      Protein Total 7.4 6.4 - 8.3 g/dL    Albumin 4.8 3.5 - 5.2 g/dL    Bilirubin Total 0.8 <=1.2 mg/dL   Lipid Profile (Chol, Trig, HDL, LDL calc)   Result Value Ref Range    Cholesterol 175 <200 mg/dL    Triglycerides 69 <150 mg/dL    Direct Measure HDL 60 >=50 mg/dL    LDL Cholesterol Calculated 101 (H) <=100 mg/dL    Non HDL Cholesterol 115 <130 mg/dL    Patient Fasting > 8hrs? No     Narrative    Cholesterol  Desirable:  <200 mg/dL    Triglycerides  Normal:  Less than 150 mg/dL  Borderline High:  150-199 mg/dL  High:  200-499 mg/dL  Very High:  Greater than or equal to 500 mg/dL    Direct Measure HDL  Female:  Greater than or equal to 50 mg/dL   Male:  Greater than or equal to 40 mg/dL    LDL Cholesterol  Desirable:  <100mg/dL  Above Desirable:  100-129 mg/dL   Borderline High:  130-159 mg/dL   High:  160-189 mg/dL   Very High:  >= 190 mg/dL    Non HDL Cholesterol  Desirable:  130 mg/dL  Above Desirable:  130-159 mg/dL  Borderline High:  160-189 mg/dL  High:  190-219 mg/dL  Very High:  Greater than or equal to 220 mg/dL       If you have any questions or concerns, please call the clinic at the number listed above.       Sincerely,      Caprice Pimentel MD
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
## 2024-02-15 NOTE — TELEPHONE ENCOUNTER
Synthroid      Last Written Prescription Date:  11/30/23  Last Fill Quantity: 30,   # refills: 1  Last Office Visit: 11/30/23  Future Office visit:    Next 5 appointments (look out 90 days)      Feb 15, 2024 11:00 AM  (Arrive by 10:45 AM)  PHYSICAL with Caprice Pimentel MD  [facility] [place] ([facility] [place] ) [address]  [place] [address]  [phone]             Routing refill request to provider for review/approval because:

## 2024-02-16 LAB
PROLACTIN SERPL 3RD IS-MCNC: 7 NG/ML (ref 5–23)
VIT D+METAB SERPL-MCNC: 29 NG/ML (ref 20–50)

## 2024-02-19 ENCOUNTER — PATIENT OUTREACH (OUTPATIENT)
Dept: FAMILY MEDICINE | Facility: OTHER | Age: 39
End: 2024-02-19

## 2024-02-19 NOTE — TELEPHONE ENCOUNTER
Clinic Care Coordination Contact  Care Team Conversations    SW reached out to Barbi following referral from PCP for crisis resources and assistance getting her connected with [facility] services. No answer. VM box full so this SW unable to leave .     SW will reattempt at a later date.    Silvia Sanchez, MICHEL  [facility]

## 2024-02-20 ENCOUNTER — PATIENT OUTREACH (OUTPATIENT)
Dept: CARE COORDINATION | Facility: OTHER | Age: 39
End: 2024-02-20

## 2024-02-20 NOTE — PROGRESS NOTES
Attempted call to this patient  VM is full - cannot leave a message  Will attempt to contact again

## 2024-02-21 ENCOUNTER — PATIENT OUTREACH (OUTPATIENT)
Dept: FAMILY MEDICINE | Facility: OTHER | Age: 39
End: 2024-02-21

## 2024-02-21 NOTE — TELEPHONE ENCOUNTER
Clinic Care Coordination Contact  Care Team Conversations    SW reached out to Barbi again following referral from PCP for crisis resources and assistance getting her connected with [facility] services. No answer. VM box full so this SW unable to leave .      SW will reattempt at a later date.    Silvia Sanchez, MICHEL  [facility]

## 2024-02-21 NOTE — TELEPHONE ENCOUNTER
Writer made 3 attempts to contact the patient.  Additionally, MICHEL Stubbs also called 2/19/24    Will set task to try calling again 2/27/24

## 2024-02-23 ENCOUNTER — PATIENT OUTREACH (OUTPATIENT)
Dept: CARE COORDINATION | Facility: OTHER | Age: 39
End: 2024-02-23

## 2024-02-23 ENCOUNTER — PATIENT OUTREACH (OUTPATIENT)
Dept: FAMILY MEDICINE | Facility: OTHER | Age: 39
End: 2024-02-23

## 2024-02-23 NOTE — Clinical Note
ROMEOI - could not get a hold of her. Next time she is in clinic please feel free to let me know and I can do an intake with her in person!

## 2024-02-23 NOTE — TELEPHONE ENCOUNTER
Clinic Care Coordination Contact  Care Team Conversations    SW reached out to Barbi again following referral from PCP for crisis resources and assistance getting her connected with [facility] services. No answer. 3rd attempt. VM box full so this SW unable to leave .      There will be no further outreach from this SW due to lack of response.    Silvia Sanchez, MICHEL  [facility]

## 2024-02-23 NOTE — TELEPHONE ENCOUNTER
Several attempts made to contact this patient via phone  No alternate number listed to try  Will close case for now

## 2024-04-11 ENCOUNTER — TRANSFERRED RECORDS (OUTPATIENT)
Dept: HEALTH INFORMATION MANAGEMENT | Facility: CLINIC | Age: 39
End: 2024-04-11

## 2024-04-11 DIAGNOSIS — E07.9 DYSFUNCTION OF THYROID: ICD-10-CM

## 2024-04-11 RX ORDER — LIOTHYRONINE SODIUM 5 UG/1
5 TABLET ORAL DAILY
Qty: 30 TABLET | Refills: 0 | Status: SHIPPED | OUTPATIENT
Start: 2024-04-11 | End: 2024-05-21

## 2024-04-11 NOTE — TELEPHONE ENCOUNTER
Liothyronine (Cytomel) 5 mcg tablet  Take 1 tablet by mouth once daily  Last Written Prescription Date:  2-7-2024  Last Fill Quantity: 30 tablet,   # refills: 0  Last Office Visit: 2-  Future Office visit:    Next 5 appointments (look out 90 days)      Apr 15, 2024 10:30 AM  (Arrive by 10:15 AM)  Provider Visit with Caprice Pimentel MD  [facility] [place] ([facility] [place] ) [address]  [place] [address]  [phone]

## 2024-04-11 NOTE — TELEPHONE ENCOUNTER
Thyroid Protocol [id number]/11/2024 02:00 PM   Protocol Details Medication indicated for associated diagnosis

## 2024-04-15 ENCOUNTER — LAB (OUTPATIENT)
Dept: LAB | Facility: OTHER | Age: 39
End: 2024-04-15
Attending: FAMILY MEDICINE
Payer: COMMERCIAL

## 2024-04-15 ENCOUNTER — PATIENT OUTREACH (OUTPATIENT)
Dept: FAMILY MEDICINE | Facility: OTHER | Age: 39
End: 2024-04-15

## 2024-04-15 ENCOUNTER — TELEPHONE (OUTPATIENT)
Dept: FAMILY MEDICINE | Facility: OTHER | Age: 39
End: 2024-04-15

## 2024-04-15 ENCOUNTER — OFFICE VISIT (OUTPATIENT)
Dept: FAMILY MEDICINE | Facility: OTHER | Age: 39
End: 2024-04-15
Attending: FAMILY MEDICINE
Payer: COMMERCIAL

## 2024-04-15 VITALS
BODY MASS INDEX: 23.49 KG/M2 | SYSTOLIC BLOOD PRESSURE: 100 MMHG | OXYGEN SATURATION: 98 % | TEMPERATURE: 98.9 F | DIASTOLIC BLOOD PRESSURE: 64 MMHG | HEART RATE: 75 BPM | WEIGHT: 132.6 LBS

## 2024-04-15 DIAGNOSIS — E03.9 HYPOTHYROIDISM, UNSPECIFIED TYPE: ICD-10-CM

## 2024-04-15 DIAGNOSIS — R79.89 ELEVATED PROLACTIN LEVEL: ICD-10-CM

## 2024-04-15 DIAGNOSIS — E55.9 HYPOVITAMINOSIS D: ICD-10-CM

## 2024-04-15 DIAGNOSIS — E03.9 HYPOTHYROIDISM, UNSPECIFIED TYPE: Primary | ICD-10-CM

## 2024-04-15 DIAGNOSIS — F31.10 BIPOLAR I DISORDER, MOST RECENT EPISODE (OR CURRENT) MANIC (H): ICD-10-CM

## 2024-04-15 DIAGNOSIS — F43.10 PTSD (POST-TRAUMATIC STRESS DISORDER): ICD-10-CM

## 2024-04-15 LAB — TSH SERPL DL<=0.005 MIU/L-ACNC: 2.22 UIU/ML (ref 0.3–4.2)

## 2024-04-15 PROCEDURE — 84443 ASSAY THYROID STIM HORMONE: CPT

## 2024-04-15 PROCEDURE — 99214 OFFICE O/P EST MOD 30 MIN: CPT | Performed by: FAMILY MEDICINE

## 2024-04-15 PROCEDURE — 36415 COLL VENOUS BLD VENIPUNCTURE: CPT

## 2024-04-15 PROCEDURE — 84481 FREE ASSAY (FT-3): CPT

## 2024-04-15 PROCEDURE — 84146 ASSAY OF PROLACTIN: CPT

## 2024-04-15 RX ORDER — LISDEXAMFETAMINE DIMESYLATE 70 MG/1
CAPSULE ORAL
COMMUNITY
Start: 2024-03-13 | End: 2024-06-05

## 2024-04-15 ASSESSMENT — PATIENT HEALTH QUESTIONNAIRE - PHQ9
10. IF YOU CHECKED OFF ANY PROBLEMS, HOW DIFFICULT HAVE THESE PROBLEMS MADE IT FOR YOU TO DO YOUR WORK, TAKE CARE OF THINGS AT HOME, OR GET ALONG WITH OTHER PEOPLE: SOMEWHAT DIFFICULT
SUM OF ALL RESPONSES TO PHQ QUESTIONS 1-9: 11
SUM OF ALL RESPONSES TO PHQ QUESTIONS 1-9: 11

## 2024-04-15 ASSESSMENT — PAIN SCALES - GENERAL: PAINLEVEL: NO PAIN (0)

## 2024-04-15 NOTE — TELEPHONE ENCOUNTER
Clinic Care Coordination Contact  Care Team Conversations    SW met with Barbi face to face in clinic today during appointment. She reports that she recently split with her  this past December. Barbi shares that she has not gotten to see very much of her daughter during this time, being allowed time with her every other weekend.     She has been living between her dad and brother's house, not particularly happy with either of these arrangements. She reports that both of them live in [place].     She is currently on her 's insurance still, looking to get off of this and onto her own insurance. SW began process of discussing community mental health supports and process of applying for [facility] insurance and benefits. During this time, Barbi shared that she needed to get going. She requested this SW follow up with resources. SW confirmed contact number for Barbi and stated that she would be in touch with additional resources.     SW to reach out at a later date.    Silvia Sanchez, MICHEL  [facility]

## 2024-04-15 NOTE — PROGRESS NOTES
Assessment & Plan     Hypothyroidism, unspecified type  Labs stable, continue current dosing  - TSH with free T4 reflex; Future  - T3, Free; Future  - T3, total; Future    Hypovitaminosis D  stable    PTSD (post-traumatic stress disorder)  Is doing well today, good eye contact, hasn't used klonopin in a few days    Bipolar I disorder, most recent episode (or current) manic (H)  Has appt in mental health here in 2 mos  Grateful to Silvia PEARSON for talking with her today, going to help as able with assistance  Barbi reports unaware that messages were left and she will keep better watch to return calls    Patient was agreeable to this plan and had no further questions.  There are no Patient Instructions on file for this visit.    No follow-ups on file.    Subjective   Barbi is a 38 year old, presenting for the following health issues:  Thyroid Problem        4/15/2024    10:46 AM   Additional Questions   Roomed by Sandor Mattson   Accompanied by Daughter         4/15/2024    10:46 AM   Patient Reported Additional Medications   Patient reports taking the following new medications none     History of Present Illness       Hypothyroidism:     Since last visit, patient describes the following symptoms::  Anxiety, Constipation and Weight loss    Weight loss::  6-10 lbs.    She eats 2-3 servings of fruits and vegetables daily.She consumes 2 sweetened beverage(s) daily.She exercises with enough effort to increase her heart rate 20 to 29 minutes per day.  She exercises with enough effort to increase her heart rate 3 or less days per week.   She is taking medications regularly.     Abnormal Mood Symptoms  Onset/Duration: years  Description: no job and fluctuating btwn dad and brothers houses  Depression (if yes, do PHQ-9): YES  Anxiety (if yes, do SUHA-7): YES  Accompanying Signs & Symptoms:  Still participating in activities that you used to enjoy: No  Fatigue: YES  Irritability: YES  Difficulty concentrating: YES  Changes in  appetite: YES  Problems with sleep: YES  Heart racing/beating fast: YES  Abnormally elevated, expansive, or irritable mood: YES  Persistently increased activity or energy: YES  Thoughts of hurting yourself or others: No  History:  Recent stress or major life event: YES- divorce  Prior depression or anxiety: yes  Family history of depression or anxiety: YES  Alcohol/drug use: No  Difficulty sleeping: YES  Precipitating or alleviating factors: None  Therapies tried and outcome: none      11/8/2023     1:10 PM 2/13/2024    11:46 PM 4/15/2024    10:20 AM   PHQ   PHQ-9 Total Score 14 20 11   Q9: Thoughts of better off dead/self-harm past 2 weeks Not at all Several days Not at all   F/U: Thoughts of suicide or self-harm  Yes    F/U: Self harm-plan  No    F/U: Self-harm action  No    F/U: Safety concerns  No          2/13/2023     4:05 PM 9/14/2023     1:28 PM 2/13/2024    11:47 PM   SUHA-7 SCORE   Total Score  14 (moderate anxiety) 18 (severe anxiety)   Total Score 7 14 18         Review of Systems  Constitutional, neuro, ENT, endocrine, pulmonary, cardiac, gastrointestinal, genitourinary, musculoskeletal, integument and psychiatric systems are negative, except as otherwise noted.      Objective    /64 (BP Location: Left arm, Patient Position: Sitting, Cuff Size: Adult Regular)   Pulse 75   Temp 98.9  F (37.2  C) (Tympanic)   Wt 60.1 kg (132 lb 9.6 oz)   LMP  (LMP Unknown)   SpO2 98%   BMI 23.49 kg/m    Body mass index is 23.49 kg/m .  Physical Exam   GENERAL: alert and no distress  NECK: no adenopathy, no asymmetry, masses, or scars  RESP: lungs clear to auscultation - no rales, rhonchi or wheezes  CV: regular rate and rhythm, normal S1 S2, no S3 or S4, no murmur, click or rub, no peripheral edema  ABDOMEN: soft, nontender, no hepatosplenomegaly, no masses and bowel sounds normal  MS: no gross musculoskeletal defects noted, no edema  PSYCH: mentation appears normal, affect normal/bright    Results for orders  placed or performed in visit on 04/15/24   TSH with free T4 reflex     Status: Normal   Result Value Ref Range    TSH 2.22 0.30 - 4.20 uIU/mL           Signed Electronically by: Caprice Pimentel MD

## 2024-04-16 LAB
PROLACTIN SERPL 3RD IS-MCNC: 4 NG/ML (ref 5–23)
T3 SERPL-MCNC: 113 NG/DL (ref 85–202)
T3FREE SERPL-MCNC: 2.6 PG/ML (ref 2–4.4)

## 2024-04-17 ENCOUNTER — PATIENT OUTREACH (OUTPATIENT)
Dept: FAMILY MEDICINE | Facility: OTHER | Age: 39
End: 2024-04-17

## 2024-04-17 NOTE — Clinical Note
FYI - Yay, I am glad I have been able to get a hold of her. Hopefully we can get her connected with these 2 items for starters.

## 2024-04-17 NOTE — TELEPHONE ENCOUNTER
Clinic Care Coordination Contact  Care Team Conversations    SW reached out to Barbi to follow up on in person visit from earlier this week. SW discussed the process of applying for county insurance with Barbi. However, SW advised she may want to wait until she is off of her 's insurance to ensure she qualifies. BLANE explained she can offer help when that time comes.     Barbi feels two biggest concerns right now are housing and monetary resources. BLANE walked through process of applying for county assistance with Barbi. SW indicated she will want to apply for EA, SNAP and cash assistance. SW provided web address and answered specific questions about this process. SW explained she will be available as a resource throughout application process to assist where needed.     BLANE also discussed 211 program with Barbi. SW provided education that since Barbi is couch hopping, she would be considered classified as homeless and therefore have access to different housing programs. Barbi states she will plan to call 211 to initate this process and see what options are available.     BLANE and Barbi make plan to connect next week to discuss these resources and so BLANE can assist with navigating.     Silvia Sanchez, CHRISTOPHERW  [facility]

## 2024-04-19 ENCOUNTER — ANESTHESIA EVENT (OUTPATIENT)
Dept: SURGERY | Facility: HOSPITAL | Age: 39
End: 2024-04-19
Payer: COMMERCIAL
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
## 2024-04-23 ENCOUNTER — PATIENT OUTREACH (OUTPATIENT)
Dept: FAMILY MEDICINE | Facility: OTHER | Age: 39
End: 2024-04-23

## 2024-04-23 NOTE — TELEPHONE ENCOUNTER
Clinic Care Coordination Contact  Care Team Conversations    BLANE reached out to Barbi to follow up on phone call from last week regarding 211 and county assistance. Barbi reports that she was able to get in touch with 211 last week. They set her up with an in person interview with [facility] for May 29th to go over housing options. She was not sure whether this was for [facility] or [place]. She shared that she was going to call to clarify prior to this appointment.     Barbi also reports that she would really like to secure her own housing. Her soon to be ex  informed her that he is planning to relocate to [facility] with their daughter. She feels she would have a better leg to stand on in court if she had her own stable housing. BLANE will continue to assist as able with housing search and work with 211.     She did not get a chance to apply for county assistance as she reports she lost the information this BLANE provided her with how to apply. BLANE reviewed this information again with Barbi, educating her on how to apply and which items to apply for. She states she will plan to do so today or tomorrow.     BLANE and Barbi make plans to connect later this week regarding this.     Silvia Sanchez, [facility]

## 2024-04-24 ENCOUNTER — APPOINTMENT (OUTPATIENT)
Dept: LAB | Facility: OTHER | Age: 39
End: 2024-04-24
Attending: FAMILY MEDICINE
Payer: COMMERCIAL

## 2024-04-24 ENCOUNTER — OFFICE VISIT (OUTPATIENT)
Dept: FAMILY MEDICINE | Facility: OTHER | Age: 39
End: 2024-04-24
Attending: FAMILY MEDICINE
Payer: COMMERCIAL

## 2024-04-24 VITALS
BODY MASS INDEX: 24.85 KG/M2 | OXYGEN SATURATION: 99 % | DIASTOLIC BLOOD PRESSURE: 64 MMHG | HEART RATE: 74 BPM | WEIGHT: 140.3 LBS | TEMPERATURE: 99.5 F | SYSTOLIC BLOOD PRESSURE: 112 MMHG

## 2024-04-24 DIAGNOSIS — Z01.818 PREOP GENERAL PHYSICAL EXAM: Primary | ICD-10-CM

## 2024-04-24 DIAGNOSIS — G56.03 BILATERAL CARPAL TUNNEL SYNDROME: ICD-10-CM

## 2024-04-24 LAB
ALBUMIN SERPL BCG-MCNC: 4.4 G/DL (ref 3.5–5.2)
ALP SERPL-CCNC: 94 U/L (ref 40–150)
ALT SERPL W P-5'-P-CCNC: 12 U/L (ref 0–50)
ANION GAP SERPL CALCULATED.3IONS-SCNC: 10 MMOL/L (ref 7–15)
AST SERPL W P-5'-P-CCNC: 19 U/L (ref 0–45)
BILIRUB SERPL-MCNC: 0.5 MG/DL
BUN SERPL-MCNC: 6.7 MG/DL (ref 6–20)
CALCIUM SERPL-MCNC: 9.5 MG/DL (ref 8.6–10)
CHLORIDE SERPL-SCNC: 102 MMOL/L (ref 98–107)
CREAT SERPL-MCNC: 0.68 MG/DL (ref 0.51–0.95)
DEPRECATED HCO3 PLAS-SCNC: 26 MMOL/L (ref 22–29)
EGFRCR SERPLBLD CKD-EPI 2021: >90 ML/MIN/1.73M2
ERYTHROCYTE [DISTWIDTH] IN BLOOD BY AUTOMATED COUNT: 12.5 % (ref 10–15)
GLUCOSE SERPL-MCNC: 97 MG/DL (ref 70–99)
HCT VFR BLD AUTO: 38.6 % (ref 35–47)
HGB BLD-MCNC: 12.7 G/DL (ref 11.7–15.7)
MCH RBC QN AUTO: 31.1 PG (ref 26.5–33)
MCHC RBC AUTO-ENTMCNC: 32.9 G/DL (ref 31.5–36.5)
MCV RBC AUTO: 95 FL (ref 78–100)
PLATELET # BLD AUTO: 291 10E3/UL (ref 150–450)
POTASSIUM SERPL-SCNC: 3.8 MMOL/L (ref 3.4–5.3)
PROT SERPL-MCNC: 6.9 G/DL (ref 6.4–8.3)
RBC # BLD AUTO: 4.08 10E6/UL (ref 3.8–5.2)
SODIUM SERPL-SCNC: 138 MMOL/L (ref 135–145)
WBC # BLD AUTO: 7.6 10E3/UL (ref 4–11)

## 2024-04-24 PROCEDURE — 36415 COLL VENOUS BLD VENIPUNCTURE: CPT | Performed by: FAMILY MEDICINE

## 2024-04-24 PROCEDURE — 99214 OFFICE O/P EST MOD 30 MIN: CPT | Performed by: FAMILY MEDICINE

## 2024-04-24 PROCEDURE — 85027 COMPLETE CBC AUTOMATED: CPT | Performed by: FAMILY MEDICINE

## 2024-04-24 PROCEDURE — 80053 COMPREHEN METABOLIC PANEL: CPT | Performed by: FAMILY MEDICINE

## 2024-04-24 SDOH — HEALTH STABILITY: PHYSICAL HEALTH: ON AVERAGE, HOW MANY DAYS PER WEEK DO YOU ENGAGE IN MODERATE TO STRENUOUS EXERCISE (LIKE A BRISK WALK)?: 0 DAYS

## 2024-04-24 SDOH — HEALTH STABILITY: PHYSICAL HEALTH: ON AVERAGE, HOW MANY MINUTES DO YOU ENGAGE IN EXERCISE AT THIS LEVEL?: 0 MIN

## 2024-04-24 ASSESSMENT — LIFESTYLE VARIABLES
HOW OFTEN DO YOU HAVE SIX OR MORE DRINKS ON ONE OCCASION: NEVER
AUDIT-C TOTAL SCORE: 0
SKIP TO QUESTIONS 9-10: 1
HOW OFTEN DO YOU HAVE A DRINK CONTAINING ALCOHOL: NEVER
HOW MANY STANDARD DRINKS CONTAINING ALCOHOL DO YOU HAVE ON A TYPICAL DAY: PATIENT DOES NOT DRINK

## 2024-04-24 ASSESSMENT — PAIN SCALES - GENERAL: PAINLEVEL: NO PAIN (0)

## 2024-04-24 NOTE — COMMUNITY RESOURCES LIST (ENGLISH)
April 24, 2024           YOUR PERSONALIZED LIST OF SERVICES & PROGRAMS           & RECREATION    Sports      [facility] - Adult Enrichment  Phone: [phone]  Website: [email or web address]  Language: English  Hours: Mon 7:30 AM - 4:00 PM Tue 7:30 AM - 4:00 PM Wed 7:30 AM - 4:00 PM Thu 7:30 AM - 4:00 PM Fri 7:30 AM - 4:00 PM    Classes/Groups      [facility] [place] - Yoga or Pilates classes  [address] [place] [address] (Distance: 20.8 miles)  Phone: [phone]  Language: English  Fee: Free, Self pay      [email or web address] [place] - Personal training  [address] [place] [address] (Distance: 20.8 miles)  Phone: [phone]  Language: English  Fee: Free, Self pay      [facility] - Adult Enrichment  Phone: [phone]  Website: [email or web address]  Language: English  Hours: Mon 7:30 AM - 4:00 PM Tue 7:30 AM - 4:00 PM Wed 7:30 AM - 4:00 PM Thu 7:30 AM - 4:00 PM Fri 7:30 AM - 4:00 PM               IMPORTANT NUMBERS & WEBSITES        Emergency Services  911  .   [facility]  211 http://211unitedway.org  .   Poison Control  (851) 135-4657 http://mnpoison.org http://[place]poison.org  .     Suicide and Crisis Lifeline  988 http://988lifeline.org  .   Childhelp National Child Abuse Hotline  629.775.9871 http://Childhelphotline.org   .   National Sexual Assault Hotline  (235) 349-1417 (HOPE) http://Rainn.org   .     National Runaway Safeline  (555) 933-8525 (RUNAWAY) http://ProjektinoruLarge Business District Networking.org  .   Pregnancy & Postpartum Support  Call/text 497-348-3999  [place]: http://ppsupportmn.org  [place]: http://psichapters.com/wi  .   Substance Abuse National Helpline (SAMA)  800-622-HELP (1631) http://Findtreatment.gov   .                DISCLAIMER: These resources have been generated via the [employer] Platform. [employer] does not endorse any service providers mentioned in this resource list.  [employer] does not guarantee that the services mentioned in this resource list will be available to you or will improve your health or wellness.    [facility]

## 2024-04-25 NOTE — DISCHARGE INSTRUCTIONS
"You have a follow up appointment with Dr. Fay at [facility], in [place], on May 10th at 9:00am.     IMPORTANT OBSERVATIONS  Check C-M-S of operative extremity every 4 hours while you are awake for the first 48 hours:    * C: color - should appear normal/pink   * M: motion - able to move fingers and toes.   * S: sensation - able to \"feel\": no numbness, tingling  If you experience changes in C-M-S and/or in severe pain, you may remove the elastic bandages and reapply ot - tight enough to provide support for the gauze dressing but loose enough to improve C-M-S. The gauze dressing should NOT be removed when rewrapping the elastic bandage.     If you have any questions or concerns, please call the [facility] Triage Line at [phone].      After Anesthesia (Sleep Medicine)  What should I do after anesthesia?  You should rest and relax for the next 24 hours. Avoid risky or difficult (strenuous) activity. A responsible adult should stay with you overnight.  Don't drive or use any heavy equipment for 24 hours. Even if you feel normal, your reactions may be affected by the sleep medicine given to you.  Don't drink alcohol or make any important decisions for 24 hours.  Slowly get back to your regular diet, as you feel able.  How should I expect to feel?  It's normal to feel dizzy, light-headed, or faint for up to a full day after anesthesia or while taking pain medicine. If this happens:   Sit down for a few minutes before standing.  Have someone help you when you get up to walk or use the bathroom.  If you have nausea (feel sick to your stomach) or vomit (throw up):   Drink clear liquids (such as apple juice, ginger ale, broth, or 7UP) until you feel better.  If you feel sick to your stomach, or you keep vomiting for 24 hours, please call the doctor.  What else should I know?  You might have a dry mouth, sore throat, muscle aches, or trouble sleeping. These should go away after 24 hours.  Please " contact your doctor if you have any other symptoms that concern you, such as fever, pain, bleeding, fluid drainage, swelling, or headache, or if it's been over 8 to 10 hours and you still aren't able to pee (urinate).  If you have a history of sleep apnea, it's very important to use your CPAP machine for the next 24 hours when you nap or sleep.   For informational purposes only. Not to replace the advice of your health care provider. Copyright   2023 [facility]. All rights reserved. Clinically reviewed by Agapito Gilbert MD. [employer] [id number] - REV 09/23.

## 2024-04-26 ENCOUNTER — ANESTHESIA (OUTPATIENT)
Dept: SURGERY | Facility: HOSPITAL | Age: 39
End: 2024-04-26
Payer: COMMERCIAL

## 2024-04-26 ENCOUNTER — LAB (OUTPATIENT)
Dept: LAB | Facility: HOSPITAL | Age: 39
End: 2024-04-26
Attending: ORTHOPAEDIC SURGERY
Payer: COMMERCIAL

## 2024-04-26 ENCOUNTER — PATIENT OUTREACH (OUTPATIENT)
Dept: FAMILY MEDICINE | Facility: OTHER | Age: 39
End: 2024-04-26

## 2024-04-26 ENCOUNTER — HOSPITAL ENCOUNTER (OUTPATIENT)
Facility: HOSPITAL | Age: 39
Discharge: HOME | End: 2024-04-26
Attending: ORTHOPAEDIC SURGERY | Admitting: ORTHOPAEDIC SURGERY
Payer: COMMERCIAL

## 2024-04-26 VITALS
TEMPERATURE: 97.6 F | HEART RATE: 58 BPM | WEIGHT: 138 LBS | RESPIRATION RATE: 14 BRPM | OXYGEN SATURATION: 96 % | SYSTOLIC BLOOD PRESSURE: 91 MMHG | HEIGHT: 63 IN | DIASTOLIC BLOOD PRESSURE: 54 MMHG | BODY MASS INDEX: 24.45 KG/M2

## 2024-04-26 DIAGNOSIS — G56.02 CARPAL TUNNEL SYNDROME OF LEFT WRIST: Primary | ICD-10-CM

## 2024-04-26 LAB — HCG UR QL: NEGATIVE

## 2024-04-26 PROCEDURE — 370N000017 HC ANESTHESIA TECHNICAL FEE, PER MIN: Performed by: ORTHOPAEDIC SURGERY

## 2024-04-26 PROCEDURE — 258N000003 HC RX IP 258 OP 636: Performed by: NURSE ANESTHETIST, CERTIFIED REGISTERED

## 2024-04-26 PROCEDURE — 250N000011 HC RX IP 250 OP 636: Performed by: NURSE ANESTHETIST, CERTIFIED REGISTERED

## 2024-04-26 PROCEDURE — 360N000075 HC SURGERY LEVEL 2, PER MIN: Performed by: ORTHOPAEDIC SURGERY

## 2024-04-26 PROCEDURE — 64721 CARPAL TUNNEL SURGERY: CPT | Mod: LT | Performed by: ORTHOPAEDIC SURGERY

## 2024-04-26 PROCEDURE — 710N000012 HC RECOVERY PHASE 2, PER MINUTE: Performed by: ORTHOPAEDIC SURGERY

## 2024-04-26 PROCEDURE — 64721 CARPAL TUNNEL SURGERY: CPT | Performed by: NURSE ANESTHETIST, CERTIFIED REGISTERED

## 2024-04-26 PROCEDURE — 272N000001 HC OR GENERAL SUPPLY STERILE: Performed by: ORTHOPAEDIC SURGERY

## 2024-04-26 PROCEDURE — 250N000011 HC RX IP 250 OP 636: Performed by: ORTHOPAEDIC SURGERY

## 2024-04-26 PROCEDURE — 999N000141 HC STATISTIC PRE-PROCEDURE NURSING ASSESSMENT: Performed by: ORTHOPAEDIC SURGERY

## 2024-04-26 PROCEDURE — 250N000009 HC RX 250: Performed by: NURSE ANESTHETIST, CERTIFIED REGISTERED

## 2024-04-26 PROCEDURE — 81025 URINE PREGNANCY TEST: CPT | Performed by: NURSE ANESTHETIST, CERTIFIED REGISTERED

## 2024-04-26 RX ORDER — LIDOCAINE HYDROCHLORIDE 20 MG/ML
INJECTION, SOLUTION INFILTRATION; PERINEURAL PRN
Status: DISCONTINUED | OUTPATIENT
Start: 2024-04-26 | End: 2024-04-26

## 2024-04-26 RX ORDER — KETAMINE HYDROCHLORIDE 10 MG/ML
INJECTION INTRAMUSCULAR; INTRAVENOUS PRN
Status: DISCONTINUED | OUTPATIENT
Start: 2024-04-26 | End: 2024-04-26

## 2024-04-26 RX ORDER — PROPOFOL 10 MG/ML
INJECTION, EMULSION INTRAVENOUS PRN
Status: DISCONTINUED | OUTPATIENT
Start: 2024-04-26 | End: 2024-04-26

## 2024-04-26 RX ORDER — ONDANSETRON 4 MG/1
4 TABLET, ORALLY DISINTEGRATING ORAL EVERY 30 MIN PRN
Status: DISCONTINUED | OUTPATIENT
Start: 2024-04-26 | End: 2024-04-26 | Stop reason: HOSPADM

## 2024-04-26 RX ORDER — FENTANYL CITRATE 50 UG/ML
INJECTION, SOLUTION INTRAMUSCULAR; INTRAVENOUS PRN
Status: DISCONTINUED | OUTPATIENT
Start: 2024-04-26 | End: 2024-04-26

## 2024-04-26 RX ORDER — SODIUM CHLORIDE, SODIUM LACTATE, POTASSIUM CHLORIDE, CALCIUM CHLORIDE 600; 310; 30; 20 MG/100ML; MG/100ML; MG/100ML; MG/100ML
INJECTION, SOLUTION INTRAVENOUS CONTINUOUS
Status: DISCONTINUED | OUTPATIENT
Start: 2024-04-26 | End: 2024-04-26 | Stop reason: HOSPADM

## 2024-04-26 RX ORDER — NALOXONE HYDROCHLORIDE 0.4 MG/ML
0.1 INJECTION, SOLUTION INTRAMUSCULAR; INTRAVENOUS; SUBCUTANEOUS
Status: DISCONTINUED | OUTPATIENT
Start: 2024-04-26 | End: 2024-04-26 | Stop reason: HOSPADM

## 2024-04-26 RX ORDER — CEFAZOLIN SODIUM/WATER 2 G/20 ML
2 SYRINGE (ML) INTRAVENOUS SEE ADMIN INSTRUCTIONS
Status: DISCONTINUED | OUTPATIENT
Start: 2024-04-26 | End: 2024-04-26 | Stop reason: HOSPADM

## 2024-04-26 RX ORDER — TRAMADOL HYDROCHLORIDE 50 MG/1
50 TABLET ORAL EVERY 6 HOURS PRN
Qty: 10 TABLET | Refills: 0 | Status: SHIPPED | OUTPATIENT
Start: 2024-04-26 | End: 2024-04-29

## 2024-04-26 RX ORDER — BUPIVACAINE HYDROCHLORIDE 2.5 MG/ML
INJECTION, SOLUTION EPIDURAL; INFILTRATION; INTRACAUDAL
Status: DISCONTINUED
Start: 2024-04-26 | End: 2024-04-26 | Stop reason: HOSPADM

## 2024-04-26 RX ORDER — ONDANSETRON 2 MG/ML
4 INJECTION INTRAMUSCULAR; INTRAVENOUS EVERY 30 MIN PRN
Status: DISCONTINUED | OUTPATIENT
Start: 2024-04-26 | End: 2024-04-26 | Stop reason: HOSPADM

## 2024-04-26 RX ORDER — CEFAZOLIN SODIUM/WATER 2 G/20 ML
2 SYRINGE (ML) INTRAVENOUS
Status: COMPLETED | OUTPATIENT
Start: 2024-04-26 | End: 2024-04-26

## 2024-04-26 RX ORDER — BUPIVACAINE HYDROCHLORIDE 2.5 MG/ML
INJECTION, SOLUTION INFILTRATION; PERINEURAL PRN
Status: DISCONTINUED | OUTPATIENT
Start: 2024-04-26 | End: 2024-04-26 | Stop reason: HOSPADM

## 2024-04-26 RX ORDER — LIDOCAINE 40 MG/G
CREAM TOPICAL
Status: DISCONTINUED | OUTPATIENT
Start: 2024-04-26 | End: 2024-04-26 | Stop reason: HOSPADM

## 2024-04-26 RX ADMIN — LIDOCAINE HYDROCHLORIDE 60 MG: 20 INJECTION, SOLUTION INFILTRATION; PERINEURAL at 12:39

## 2024-04-26 RX ADMIN — PROPOFOL 30 MG: 10 INJECTION, EMULSION INTRAVENOUS at 12:43

## 2024-04-26 RX ADMIN — PROPOFOL 20 MG: 10 INJECTION, EMULSION INTRAVENOUS at 12:47

## 2024-04-26 RX ADMIN — FENTANYL CITRATE 50 MCG: 50 INJECTION INTRAMUSCULAR; INTRAVENOUS at 12:36

## 2024-04-26 RX ADMIN — PROPOFOL 50 MG: 10 INJECTION, EMULSION INTRAVENOUS at 12:39

## 2024-04-26 RX ADMIN — SODIUM CHLORIDE, POTASSIUM CHLORIDE, SODIUM LACTATE AND CALCIUM CHLORIDE: 600; 310; 30; 20 INJECTION, SOLUTION INTRAVENOUS at 11:29

## 2024-04-26 RX ADMIN — Medication 15 MG: at 12:39

## 2024-04-26 RX ADMIN — Medication 2 G: at 12:22

## 2024-04-26 RX ADMIN — PROPOFOL 50 MG: 10 INJECTION, EMULSION INTRAVENOUS at 12:41

## 2024-04-26 ASSESSMENT — ACTIVITIES OF DAILY LIVING (ADL)
ADLS_ACUITY_SCORE: 20
ADLS_ACUITY_SCORE: 21
ADLS_ACUITY_SCORE: 20
ADLS_ACUITY_SCORE: 20

## 2024-04-26 NOTE — ANESTHESIA CARE TRANSFER NOTE
Patient: Barbi Vale    Procedure: Procedure(s):  Left Carpal Tunnel Release       Diagnosis: Carpal tunnel syndrome of left wrist [G56.02]  Diagnosis Additional Information: No value filed.    Anesthesia Type:   MAC     Note:    Oropharynx: oropharynx clear of all foreign objects and spontaneously breathing  Level of Consciousness: drowsy  Oxygen Supplementation: room air    Independent Airway: airway patency satisfactory and stable  Dentition: dentition unchanged  Vital Signs Stable: post-procedure vital signs reviewed and stable  Report to RN Given: handoff report given  Patient transferred to: Phase II    Handoff Report: Identifed the Patient, Identified the Reponsible Provider, Reviewed the pertinent medical history, Discussed the surgical course, Reviewed Intra-OP anesthesia mangement and issues during anesthesia, Set expectations for post-procedure period and Allowed opportunity for questions and acknowledgement of understanding    Vitals:  Vitals Value Taken Time   BP     Temp     Pulse     Resp     SpO2         Electronically Signed By: GUERRERO BARBA CRNA  April 26, 2024  12:52 PM

## 2024-04-26 NOTE — TELEPHONE ENCOUNTER
Clinic Care Coordination Contact  Care Team Conversations    SW reached out to Barbi to follow up on conversation from earlier this week and see if she was able to apply for [facility] assistance. No answer. BLANE left  with request for call back.     BLANE will reattempt at a later date.    Silvia Sanchez, MICHEL  [facility]

## 2024-04-26 NOTE — OR NURSING
Patient and responsible adult given discharge instructions with no questions regarding instructions. Marzena score 20/20. Pain level 0/10.  Discharged from unit via ambulation. Patient discharged to home with parent.

## 2024-04-26 NOTE — BRIEF OP NOTE
[facility]    Brief Operative Note    Pre-operative diagnosis: Carpal tunnel syndrome of left wrist [G56.02]  Post-operative diagnosis Same as pre-operative diagnosis    Procedure: Left Carpal Tunnel Release, Left - Wrist    Surgeon: Surgeons and Role:     * Saw Sanchez MD - Primary     * Harshil Chou PA-C - Assisting  Anesthesia: MAC with Local   Estimated Blood Loss: Less than 10 ml    Drains: None  Specimens: * No specimens in log *  Findings:   None.  Complications: None.  Implants: * No implants in log *

## 2024-04-26 NOTE — ANESTHESIA POSTPROCEDURE EVALUATION
Patient: Barbi Vale    Procedure: Procedure(s):  Left Carpal Tunnel Release       Anesthesia Type:  MAC    Note:  Disposition: Outpatient   Postop Pain Control: Uneventful            Sign Out: Well controlled pain   PONV: No   Neuro/Psych: Uneventful            Sign Out: Acceptable/Baseline neuro status   Airway/Respiratory: Uneventful            Sign Out: Acceptable/Baseline resp. status   CV/Hemodynamics: Uneventful            Sign Out: Acceptable CV status; No obvious hypovolemia; No obvious fluid overload   Other NRE: NONE   DID A NON-ROUTINE EVENT OCCUR? No           Last vitals:  Vitals Value Taken Time   BP 84/51 04/26/24 1325   Temp 97.6  F (36.4  C) 04/26/24 1256   Pulse 58 04/26/24 1325   Resp 14 04/26/24 1315   SpO2 96 % 04/26/24 1328   Vitals shown include unfiled device data.    Electronically Signed By: GUERRERO Rasmussen CRNA  April 26, 2024  1:29 PM

## 2024-04-26 NOTE — OP NOTE
Preoperative Diagnosis: Left Carpal Tunnel Syndrome    Postoperative Diagnosis: Left Carpal Tunnel Syndrome    Procedure: Left Open Carpal Tunnel Release    Surgeon: Saw Sanchez MD    Assistants: Harshil LOUIS    Anesthesia: Sedation with Local    Findings:   1) Thickened Transverse Carpal Ligament  2) Satisfactory carpal tunnel release  3) No excessive fluid or masses in the carpal tunnel  4) Adequate Hemostasis     Implants: None    Specimens: None    Tourniquet Time: 4 minutes    Estimated Blood Loss: <5 ml    Complications: None    Indications for Surgery:     The patient is a 38 who has clinical and EMG findings of carpal tunnel syndrome.  They have failed non-operative management of their symptoms and decided to proceed with carpal tunnel release.  The risks of the surgery including recurrence of symptoms, infection, wound healing complications, vascular injury and others were discussed with the patient.  Surgical Consent was obtained and site marking was completed.  Questions were answered.       Description of Procedure:     The patient was taken to the operating room and placed supine on the operating table.  A non-sterile tourniquet was applied.  The Left upper extremity was prepped with chloroprep and draped in a sterile fashion.  A timeout was called to confirm the correct patient and surgical site.  A mixture of lidocaine and marcaine was injected at the incision site.  The upper extremity was elevated and the tourniquet was inflated to 250 mmHg.  A 2 cm incision was made overlying the carpal tunnel.  Dissection was carried down through the palmar fascia to identify the transverse carpal ligament.  The TCL was incised at the mid portion and release was carried distally and proximally until a complete carpal tunnel release was obtained.  The median nerve was in continuity and no masses or excessive fluid was observed.  The tourniquet was deflated and hemostasis was achieved.  The incision was  closed with interrupted 3-0 Nylon sutures.  A sterile dressing was applied.  The patient was awakened and transferred to PACU in stable condition.      Postoperative Plan:   The patient can remove the dressing in 3-4 days.  Then it is ok to shower.  Follow-up in 10-14 days for suture removal.

## 2024-04-30 ENCOUNTER — PATIENT OUTREACH (OUTPATIENT)
Dept: FAMILY MEDICINE | Facility: OTHER | Age: 39
End: 2024-04-30

## 2024-04-30 NOTE — TELEPHONE ENCOUNTER
Clinic Care Coordination Contact  Care Team Conversations    SW reached out to Barbi again to follow up on conversation from last week and see if she was able to apply for [facility] assistance. No answer. SW left  with request for call back.     BLANE will reattempt at a later date.    Silvia Sanchez, MICHEL  [facility]

## 2024-05-03 ENCOUNTER — PATIENT OUTREACH (OUTPATIENT)
Dept: FAMILY MEDICINE | Facility: OTHER | Age: 39
End: 2024-05-03

## 2024-05-06 ENCOUNTER — PATIENT OUTREACH (OUTPATIENT)
Dept: FAMILY MEDICINE | Facility: OTHER | Age: 39
End: 2024-05-06

## 2024-05-06 NOTE — TELEPHONE ENCOUNTER
Clinic Care Coordination Contact  Care Team Conversations    SW reached out to Barbi again to follow up on conversation from last week and see if she was able to apply for county assistance. She reports that she is actually online applying for it currently, during this conversation with BLANE.     Barbi reports she will reach out to this SW if any questions come up as she is filling out the application. BLANE and Barbi make plans to connect later this week to see if she needs any assistance navigating the application.    Silvia Sanchez, MICHEL  [facility]

## 2024-05-10 ENCOUNTER — TRANSFERRED RECORDS (OUTPATIENT)
Dept: HEALTH INFORMATION MANAGEMENT | Facility: CLINIC | Age: 39
End: 2024-05-10

## 2024-05-15 ENCOUNTER — PATIENT OUTREACH (OUTPATIENT)
Dept: FAMILY MEDICINE | Facility: OTHER | Age: 39
End: 2024-05-15

## 2024-05-15 NOTE — TELEPHONE ENCOUNTER
Clinic Care Coordination Contact  Care Team Conversations    SW reached out to Barbi to follow up and see if she was able to successfully complete application for [facility] assistance or if she needed this SW's assistance.     No answer. Sw left  with request for call back. BLANE will await return call.     MICHEL Lees  [facility]

## 2024-05-20 DIAGNOSIS — E07.9 DYSFUNCTION OF THYROID: ICD-10-CM

## 2024-05-20 NOTE — TELEPHONE ENCOUNTER
Cytomel  Last Written Prescription Date: 4/11/24  Last Fill Quantity: 30 # of Refills: 0  Last Office Visit: 4/24/24

## 2024-05-21 RX ORDER — LIOTHYRONINE SODIUM 5 UG/1
TABLET ORAL
Qty: 90 TABLET | Refills: 0 | Status: SHIPPED | OUTPATIENT
Start: 2024-05-21 | End: 2024-09-09

## 2024-05-22 ENCOUNTER — PATIENT OUTREACH (OUTPATIENT)
Dept: FAMILY MEDICINE | Facility: OTHER | Age: 39
End: 2024-05-22

## 2024-05-22 ENCOUNTER — TRANSFERRED RECORDS (OUTPATIENT)
Dept: HEALTH INFORMATION MANAGEMENT | Facility: CLINIC | Age: 39
End: 2024-05-22

## 2024-05-22 NOTE — TELEPHONE ENCOUNTER
Clinic Care Coordination Contact  Care Team Conversations    SW received call back from Barbi. She reports that yes she was able to get her [facility] assistance application submitted. She actually has already been approved for and is receiving food stamps in the amount of approximately $220 per month. They also provided her information for cash assistance and stated this is something she may be eligible for, she just has to provide more information. BLANE stressed importance of providing this as the assistance may be almost as much as her SNAP benefit. Barbi reports that she would work on getting this submitted, she did not feel as though she needed assistance with this at this time.     She reports that she is currently in process of applying for jobs in the [place] area at this time, which is where her dad lives. 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     Because of this, she is looking for housing in both [facility] and [facility] to see which would better fit her situation.She has her housing meeting with [facility] coming up on the 29th, but did reach out to [facility] as well. Her meeting with [facility] is set for tomorrow at 1pm. Barbi states she will reach out to this  after her meeting to touch base on what she learns and look at next steps.     No further questions or concerns for this SW at this time.     Silvia Sanchez, [facility]

## 2024-05-22 NOTE — TELEPHONE ENCOUNTER
Clinic Care Coordination Contact  Care Team Conversations    SW reached out to Barbi again to follow up and see if she was able to successfully complete application for [facility] assistance or if she needed this SW's assistance.      No answer. BLANE left  with request for call back. BLANE will await return call.     Silvia Sanchez, MICHEL  [facility]

## 2024-05-29 ENCOUNTER — PATIENT OUTREACH (OUTPATIENT)
Dept: FAMILY MEDICINE | Facility: OTHER | Age: 39
End: 2024-05-29

## 2024-05-29 NOTE — TELEPHONE ENCOUNTER
Clinic Care Coordination Contact  Care Team Conversations    BLANE reached out to Barbi to touch base on how her meetings went with [facility] and [facility] regarding housing services. She reports that it went good, she stated they were mainly just informative interviews where they gathered information about her history to see what programs she would qualify for.     She was referred to apply for Section 8 housing through [employer]. This would allow her to move wherever she would like and choose her own housing. She is planning to apply for this sometime this or next week.     In the meantime she is continuing to apply for jobs. Barbi shared that she is feeling very hopeful with how much she has accomplished in the last few weeks. BLANE offered listening support and encouragement to Barbi.     No further questions or concerns for BLANE at this time.    Silvia Sanchez, MIKE  [facility]

## 2024-06-05 ENCOUNTER — OFFICE VISIT (OUTPATIENT)
Dept: PSYCHIATRY | Facility: OTHER | Age: 39
End: 2024-06-05
Payer: COMMERCIAL

## 2024-06-05 VITALS
BODY MASS INDEX: 25.23 KG/M2 | OXYGEN SATURATION: 96 % | SYSTOLIC BLOOD PRESSURE: 98 MMHG | HEIGHT: 63 IN | TEMPERATURE: 98.5 F | WEIGHT: 142.4 LBS | HEART RATE: 79 BPM | DIASTOLIC BLOOD PRESSURE: 56 MMHG

## 2024-06-05 DIAGNOSIS — F90.2 ATTENTION DEFICIT HYPERACTIVITY DISORDER (ADHD), COMBINED TYPE: ICD-10-CM

## 2024-06-05 DIAGNOSIS — F41.1 GAD (GENERALIZED ANXIETY DISORDER): Primary | ICD-10-CM

## 2024-06-05 PROCEDURE — 99205 OFFICE O/P NEW HI 60 MIN: CPT

## 2024-06-05 RX ORDER — LISDEXAMFETAMINE DIMESYLATE 20 MG/1
20 CAPSULE ORAL EVERY MORNING
Qty: 30 CAPSULE | Refills: 0 | Status: SHIPPED | OUTPATIENT
Start: 2024-06-05 | End: 2024-07-22

## 2024-06-05 ASSESSMENT — ANXIETY QUESTIONNAIRES
6. BECOMING EASILY ANNOYED OR IRRITABLE: SEVERAL DAYS
7. FEELING AFRAID AS IF SOMETHING AWFUL MIGHT HAPPEN: SEVERAL DAYS
5. BEING SO RESTLESS THAT IT IS HARD TO SIT STILL: NOT AT ALL
3. WORRYING TOO MUCH ABOUT DIFFERENT THINGS: NEARLY EVERY DAY
8. IF YOU CHECKED OFF ANY PROBLEMS, HOW DIFFICULT HAVE THESE MADE IT FOR YOU TO DO YOUR WORK, TAKE CARE OF THINGS AT HOME, OR GET ALONG WITH OTHER PEOPLE?: VERY DIFFICULT
GAD7 TOTAL SCORE: 14
2. NOT BEING ABLE TO STOP OR CONTROL WORRYING: NEARLY EVERY DAY
1. FEELING NERVOUS, ANXIOUS, OR ON EDGE: NEARLY EVERY DAY
GAD7 TOTAL SCORE: 14
4. TROUBLE RELAXING: NEARLY EVERY DAY
GAD7 TOTAL SCORE: 14
7. FEELING AFRAID AS IF SOMETHING AWFUL MIGHT HAPPEN: SEVERAL DAYS
IF YOU CHECKED OFF ANY PROBLEMS ON THIS QUESTIONNAIRE, HOW DIFFICULT HAVE THESE PROBLEMS MADE IT FOR YOU TO DO YOUR WORK, TAKE CARE OF THINGS AT HOME, OR GET ALONG WITH OTHER PEOPLE: VERY DIFFICULT

## 2024-06-05 ASSESSMENT — PAIN SCALES - GENERAL: PAINLEVEL: NO PAIN (0)

## 2024-06-05 ASSESSMENT — PATIENT HEALTH QUESTIONNAIRE - PHQ9
SUM OF ALL RESPONSES TO PHQ QUESTIONS 1-9: 12
10. IF YOU CHECKED OFF ANY PROBLEMS, HOW DIFFICULT HAVE THESE PROBLEMS MADE IT FOR YOU TO DO YOUR WORK, TAKE CARE OF THINGS AT HOME, OR GET ALONG WITH OTHER PEOPLE: SOMEWHAT DIFFICULT
SUM OF ALL RESPONSES TO PHQ QUESTIONS 1-9: 12

## 2024-06-05 ASSESSMENT — COLUMBIA-SUICIDE SEVERITY RATING SCALE - C-SSRS
2. HAVE YOU ACTUALLY HAD ANY THOUGHTS OF KILLING YOURSELF?: NO
1. IN THE PAST MONTH, HAVE YOU WISHED YOU WERE DEAD OR WISHED YOU COULD GO TO SLEEP AND NOT WAKE UP?: NO
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
## 2024-06-05 NOTE — PATIENT INSTRUCTIONS
- Start Vyvanse 20 mg daily  - Return to clinic in 2 weeks  Thank you for allowing Nadeen Bahena, AJ, APRN to participate in your care.  If you have a scheduling or an appointment question please contact our scheduling department at their direct line [phone].   ALL nursing questions or concerns can be directed to the psychiatry nurses at [phone] or [phone]

## 2024-06-07 ENCOUNTER — TRANSFERRED RECORDS (OUTPATIENT)
Dept: HEALTH INFORMATION MANAGEMENT | Facility: CLINIC | Age: 39
End: 2024-06-07

## 2024-06-20 ENCOUNTER — VIRTUAL VISIT (OUTPATIENT)
Dept: PSYCHIATRY | Facility: OTHER | Age: 39
End: 2024-06-20
Payer: COMMERCIAL

## 2024-06-20 DIAGNOSIS — F41.1 GAD (GENERALIZED ANXIETY DISORDER): Primary | ICD-10-CM

## 2024-06-20 DIAGNOSIS — F90.2 ATTENTION DEFICIT HYPERACTIVITY DISORDER (ADHD), COMBINED TYPE: ICD-10-CM

## 2024-06-20 PROCEDURE — 99443 PR PHYSICIAN TELEPHONE EVALUATION 21-30 MIN: CPT | Mod: 93

## 2024-06-20 ASSESSMENT — ANXIETY QUESTIONNAIRES
GAD7 TOTAL SCORE: 8
6. BECOMING EASILY ANNOYED OR IRRITABLE: SEVERAL DAYS
IF YOU CHECKED OFF ANY PROBLEMS ON THIS QUESTIONNAIRE, HOW DIFFICULT HAVE THESE PROBLEMS MADE IT FOR YOU TO DO YOUR WORK, TAKE CARE OF THINGS AT HOME, OR GET ALONG WITH OTHER PEOPLE: SOMEWHAT DIFFICULT
8. IF YOU CHECKED OFF ANY PROBLEMS, HOW DIFFICULT HAVE THESE MADE IT FOR YOU TO DO YOUR WORK, TAKE CARE OF THINGS AT HOME, OR GET ALONG WITH OTHER PEOPLE?: SOMEWHAT DIFFICULT
1. FEELING NERVOUS, ANXIOUS, OR ON EDGE: MORE THAN HALF THE DAYS
7. FEELING AFRAID AS IF SOMETHING AWFUL MIGHT HAPPEN: NOT AT ALL
GAD7 TOTAL SCORE: 8
3. WORRYING TOO MUCH ABOUT DIFFERENT THINGS: MORE THAN HALF THE DAYS
5. BEING SO RESTLESS THAT IT IS HARD TO SIT STILL: NOT AT ALL
4. TROUBLE RELAXING: SEVERAL DAYS
GAD7 TOTAL SCORE: 8
7. FEELING AFRAID AS IF SOMETHING AWFUL MIGHT HAPPEN: NOT AT ALL
2. NOT BEING ABLE TO STOP OR CONTROL WORRYING: MORE THAN HALF THE DAYS

## 2024-06-20 ASSESSMENT — PATIENT HEALTH QUESTIONNAIRE - PHQ9
10. IF YOU CHECKED OFF ANY PROBLEMS, HOW DIFFICULT HAVE THESE PROBLEMS MADE IT FOR YOU TO DO YOUR WORK, TAKE CARE OF THINGS AT HOME, OR GET ALONG WITH OTHER PEOPLE: SOMEWHAT DIFFICULT
SUM OF ALL RESPONSES TO PHQ QUESTIONS 1-9: 8
SUM OF ALL RESPONSES TO PHQ QUESTIONS 1-9: 8
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
## 2024-07-01 DIAGNOSIS — R79.89 ELEVATED PROLACTIN LEVEL: ICD-10-CM

## 2024-07-01 NOTE — TELEPHONE ENCOUNTER
Disp Refills Start End MIGUEL   cabergoline (DOSTINEX) 0.5 MG tablet 16 tablet 1 11/30/2023 -- No     Last Office Visit: 04/24/2024  Future Office visit:    Next 5 appointments (look out 90 days)      Jul 05, 2024 2:00 PM  (Arrive by 1:45 PM)  Adult Preventative Visit with GUERRERO Cuadra CNP  [facility] [address] [place] [address]  [phone]     Aug 01, 2024 9:00 AM  (Arrive by 8:45 AM)  Return Visit with GUERRERO Pollard CNP  [facility] ([facility] )  Arrive at:  [facility]CHIATRY [address] [place] [address] [phone]     Aug 22, 2024 11:30 AM  (Arrive by 11:15 AM)  Provider Visit with Caprice Pimentel MD  [facility] ([facility] ) [address]  [facility] [address]  [phone]             Routing refill request to provider for review/approval because:

## 2024-07-02 RX ORDER — CABERGOLINE 0.5 MG/1
0.25 TABLET ORAL
Qty: 16 TABLET | Refills: 1 | OUTPATIENT
Start: 2024-07-04

## 2024-07-03 ENCOUNTER — LAB (OUTPATIENT)
Dept: LAB | Facility: OTHER | Age: 39
End: 2024-07-03
Payer: COMMERCIAL

## 2024-07-03 DIAGNOSIS — R79.89 ELEVATED PROLACTIN LEVEL: ICD-10-CM

## 2024-07-03 DIAGNOSIS — E03.9 HYPOTHYROIDISM, UNSPECIFIED TYPE: ICD-10-CM

## 2024-07-03 LAB — TSH SERPL DL<=0.005 MIU/L-ACNC: 0.7 UIU/ML (ref 0.3–4.2)

## 2024-07-03 PROCEDURE — 36415 COLL VENOUS BLD VENIPUNCTURE: CPT

## 2024-07-03 PROCEDURE — 84146 ASSAY OF PROLACTIN: CPT

## 2024-07-03 PROCEDURE — 84443 ASSAY THYROID STIM HORMONE: CPT

## 2024-07-04 LAB — PROLACTIN SERPL 3RD IS-MCNC: 6 NG/ML (ref 5–23)

## 2024-07-19 NOTE — PATIENT INSTRUCTIONS
Patient Education   Preventive Care Advice   This is general advice given by our system to help you stay healthy. However, your care team may have specific advice just for you. Please talk to your care team about your preventive care needs.  Nutrition  Eat 5 or more servings of fruits and vegetables each day.  Try wheat bread, brown rice and whole grain pasta (instead of white bread, rice, and pasta).  Get enough calcium and vitamin D. Check the label on foods and aim for 100% of the RDA (recommended daily allowance).  Lifestyle  Exercise at least 150 minutes each week  (30 minutes a day, 5 days a week).  Do muscle strengthening activities 2 days a week. These help control your weight and prevent disease.  No smoking.  Wear sunscreen to prevent skin cancer.  Have a dental exam and cleaning every 6 months.  Yearly exams  See your health care team every year to talk about:  Any changes in your health.  Any medicines your care team has prescribed.  Preventive care, family planning, and ways to prevent chronic diseases.  Shots (vaccines)   HPV shots (up to age 26), if you've never had them before.  Hepatitis B shots (up to age 59), if you've never had them before.  COVID-19 shot: Get this shot when it's due.  Flu shot: Get a flu shot every year.  Tetanus shot: Get a tetanus shot every 10 years.  Pneumococcal, hepatitis A, and RSV shots: Ask your care team if you need these based on your risk.  Shingles shot (for age 50 and up)  General health tests  Diabetes screening:  Starting at age 35, Get screened for diabetes at least every 3 years.  If you are younger than age 35, ask your care team if you should be screened for diabetes.  Cholesterol test: At age 39, start having a cholesterol test every 5 years, or more often if advised.  Bone density scan (DEXA): At age 50, ask your care team if you should have this scan for osteoporosis (brittle bones).  Hepatitis C: Get tested at least once in your life.  STIs (sexually  transmitted infections)  Before age 24: Ask your care team if you should be screened for STIs.  After age 24: Get screened for STIs if you're at risk. You are at risk for STIs (including HIV) if:  You are sexually active with more than one person.  You don't use condoms every time.  You or a partner was diagnosed with a sexually transmitted infection.  If you are at risk for HIV, ask about PrEP medicine to prevent HIV.  Get tested for HIV at least once in your life, whether you are at risk for HIV or not.  Cancer screening tests  Cervical cancer screening: If you have a cervix, begin getting regular cervical cancer screening tests starting at age 21.  Breast cancer scan (mammogram): If you've ever had breasts, begin having regular mammograms starting at age 40. This is a scan to check for breast cancer.  Colon cancer screening: It is important to start screening for colon cancer at age 45.  Have a colonoscopy test every 10 years (or more often if you're at risk) Or, ask your provider about stool tests like a FIT test every year or Cologuard test every 3 years.  To learn more about your testing options, visit:   .  For help making a decision, visit:   https://bit.ly/hj69625.  Prostate cancer screening test: If you have a prostate, ask your care team if a prostate cancer screening test (PSA) at age 55 is right for you.  Lung cancer screening: If you are a current or former smoker ages 50 to 80, ask your care team if ongoing lung cancer screenings are right for you.  For informational purposes only. Not to replace the advice of your health care provider. Copyright   2023 [employer]. All rights reserved. Clinically reviewed by the [facility]. [employer] [id number] - REV 01/24.

## 2024-07-22 ENCOUNTER — OFFICE VISIT (OUTPATIENT)
Dept: FAMILY MEDICINE | Facility: OTHER | Age: 39
End: 2024-07-22
Attending: NURSE PRACTITIONER
Payer: COMMERCIAL

## 2024-07-22 VITALS
HEART RATE: 80 BPM | SYSTOLIC BLOOD PRESSURE: 118 MMHG | BODY MASS INDEX: 27.63 KG/M2 | OXYGEN SATURATION: 99 % | TEMPERATURE: 96.5 F | DIASTOLIC BLOOD PRESSURE: 70 MMHG | WEIGHT: 156 LBS

## 2024-07-22 DIAGNOSIS — B37.31 YEAST INFECTION OF THE VAGINA: ICD-10-CM

## 2024-07-22 DIAGNOSIS — Z11.51 SCREENING FOR HUMAN PAPILLOMAVIRUS: Primary | ICD-10-CM

## 2024-07-22 DIAGNOSIS — Z11.3 SCREEN FOR STD (SEXUALLY TRANSMITTED DISEASE): ICD-10-CM

## 2024-07-22 LAB
BACTERIAL VAGINOSIS VAG-IMP: NEGATIVE
C TRACH DNA SPEC QL PROBE+SIG AMP: NEGATIVE
CANDIDA DNA VAG QL NAA+PROBE: DETECTED
CANDIDA GLABRATA / CANDIDA KRUSEI DNA: NOT DETECTED
N GONORRHOEA DNA SPEC QL NAA+PROBE: NEGATIVE
T VAGINALIS DNA VAG QL NAA+PROBE: NOT DETECTED

## 2024-07-22 PROCEDURE — 87491 CHLMYD TRACH DNA AMP PROBE: CPT | Performed by: NURSE PRACTITIONER

## 2024-07-22 PROCEDURE — 87591 N.GONORRHOEAE DNA AMP PROB: CPT | Performed by: NURSE PRACTITIONER

## 2024-07-22 PROCEDURE — 87624 HPV HI-RISK TYP POOLED RSLT: CPT | Performed by: NURSE PRACTITIONER

## 2024-07-22 PROCEDURE — 99214 OFFICE O/P EST MOD 30 MIN: CPT | Mod: 24 | Performed by: NURSE PRACTITIONER

## 2024-07-22 PROCEDURE — G0123 SCREEN CERV/VAG THIN LAYER: HCPCS | Performed by: NURSE PRACTITIONER

## 2024-07-22 PROCEDURE — 0352U MULTIPLEX VAGINAL PANEL BY PCR: CPT | Performed by: NURSE PRACTITIONER

## 2024-07-22 RX ORDER — FLUCONAZOLE 150 MG/1
150 TABLET ORAL DAILY
Qty: 2 TABLET | Refills: 0 | Status: SHIPPED | OUTPATIENT
Start: 2024-07-22 | End: 2024-07-24

## 2024-07-22 SDOH — HEALTH STABILITY: PHYSICAL HEALTH: ON AVERAGE, HOW MANY DAYS PER WEEK DO YOU ENGAGE IN MODERATE TO STRENUOUS EXERCISE (LIKE A BRISK WALK)?: 5 DAYS

## 2024-07-22 ASSESSMENT — SOCIAL DETERMINANTS OF HEALTH (SDOH): HOW OFTEN DO YOU GET TOGETHER WITH FRIENDS OR RELATIVES?: TWICE A WEEK

## 2024-07-24 ENCOUNTER — TELEPHONE (OUTPATIENT)
Dept: FAMILY MEDICINE | Facility: OTHER | Age: 39
End: 2024-07-24

## 2024-07-24 LAB
HPV HR 12 DNA CVX QL NAA+PROBE: NEGATIVE
HPV16 DNA CVX QL NAA+PROBE: NEGATIVE
HPV18 DNA CVX QL NAA+PROBE: NEGATIVE
HUMAN PAPILLOMA VIRUS FINAL DIAGNOSIS: NORMAL

## 2024-07-24 NOTE — TELEPHONE ENCOUNTER
12:47 PM    Reason for Call: Phone Call    Description: Patient called and states she was in for an appt on Monday 7-22 with Nisa Washington and has some questions regarding the visit -- she is requesting that she get a call back from nurse to discuss.     Was an appointment offered for this call? No    Preferred method for responding to this message: Telephone Call  What is your phone number ?[phone]     If we cannot reach you directly, may we leave a detailed response at the number you provided? Yes    Can this message wait until your PCP/provider returns, if available today? YES    Carolann Wu

## 2024-07-25 NOTE — TELEPHONE ENCOUNTER
Received call back from patient. Requesting Hepatitis screening, I see HIV screening was ordered does that include Hep screenings as well?

## 2024-07-26 ENCOUNTER — TELEPHONE (OUTPATIENT)
Dept: FAMILY MEDICINE | Facility: OTHER | Age: 39
End: 2024-07-26

## 2024-07-26 DIAGNOSIS — Z11.3 SCREEN FOR STD (SEXUALLY TRANSMITTED DISEASE): Primary | ICD-10-CM

## 2024-07-26 LAB
BKR LAB AP GYN ADEQUACY: NORMAL
BKR LAB AP GYN INTERPRETATION: NORMAL
BKR LAB AP LMP: NORMAL
BKR LAB AP PREVIOUS ABNORMAL: NORMAL
PATH REPORT.COMMENTS IMP SPEC: NORMAL
PATH REPORT.COMMENTS IMP SPEC: NORMAL
PATH REPORT.RELEVANT HX SPEC: NORMAL

## 2024-07-30 ENCOUNTER — LAB (OUTPATIENT)
Dept: LAB | Facility: OTHER | Age: 39
End: 2024-07-30
Payer: COMMERCIAL

## 2024-07-30 DIAGNOSIS — Z11.3 SCREEN FOR STD (SEXUALLY TRANSMITTED DISEASE): ICD-10-CM

## 2024-07-30 LAB
HBV CORE AB SERPL QL IA: NONREACTIVE
HCV AB SERPL QL IA: NONREACTIVE
HIV 1+2 AB+HIV1 P24 AG SERPL QL IA: NONREACTIVE

## 2024-07-30 PROCEDURE — 86803 HEPATITIS C AB TEST: CPT

## 2024-07-30 PROCEDURE — 86704 HEP B CORE ANTIBODY TOTAL: CPT

## 2024-07-30 PROCEDURE — 36415 COLL VENOUS BLD VENIPUNCTURE: CPT

## 2024-07-30 PROCEDURE — 86780 TREPONEMA PALLIDUM: CPT

## 2024-07-30 PROCEDURE — 87389 HIV-1 AG W/HIV-1&-2 AB AG IA: CPT

## 2024-07-31 LAB — T PALLIDUM AB SER QL: NONREACTIVE

## 2024-08-01 ENCOUNTER — OFFICE VISIT (OUTPATIENT)
Dept: PSYCHIATRY | Facility: OTHER | Age: 39
End: 2024-08-01
Payer: COMMERCIAL

## 2024-08-01 VITALS
HEIGHT: 63 IN | SYSTOLIC BLOOD PRESSURE: 116 MMHG | OXYGEN SATURATION: 98 % | BODY MASS INDEX: 27.64 KG/M2 | DIASTOLIC BLOOD PRESSURE: 72 MMHG | TEMPERATURE: 98.5 F | WEIGHT: 156 LBS | HEART RATE: 83 BPM

## 2024-08-01 DIAGNOSIS — F90.2 ATTENTION DEFICIT HYPERACTIVITY DISORDER (ADHD), COMBINED TYPE: Primary | ICD-10-CM

## 2024-08-01 PROCEDURE — 99215 OFFICE O/P EST HI 40 MIN: CPT

## 2024-08-01 RX ORDER — LISDEXAMFETAMINE DIMESYLATE 20 MG/1
20 CAPSULE ORAL DAILY PRN
Qty: 30 CAPSULE | Refills: 0 | Status: SHIPPED | OUTPATIENT
Start: 2024-08-01

## 2024-08-01 ASSESSMENT — PAIN SCALES - GENERAL: PAINLEVEL: NO PAIN (0)

## 2024-08-01 NOTE — PATIENT INSTRUCTIONS
- Change Vyvanse to 20 mg daily as needed for overstimulation, focus   - Return to clinic in 1 month for follow up  Thank you for allowing Nadeen Bahena, AJ, APRN to participate in your care.  If you have a scheduling or an appointment question please contact our scheduling department at their direct line [phone].   ALL nursing questions or concerns can be directed to the psychiatry nurses at [phone] or [phone]
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
## 2024-08-05 DIAGNOSIS — E03.9 HYPOTHYROIDISM, UNSPECIFIED TYPE: ICD-10-CM

## 2024-08-05 RX ORDER — LEVOTHYROXINE SODIUM 25 UG/1
25 TABLET ORAL DAILY
Qty: 90 TABLET | Refills: 0 | Status: SHIPPED | OUTPATIENT
Start: 2024-08-05

## 2024-08-05 NOTE — TELEPHONE ENCOUNTER
Levothyroxine  Last Written Prescription Date: 2/15/24  Last Fill Quantity: 90 # of Refills: 1  Last Office Visit: 7/22/24

## 2024-08-22 ENCOUNTER — OFFICE VISIT (OUTPATIENT)
Dept: FAMILY MEDICINE | Facility: OTHER | Age: 39
End: 2024-08-22
Attending: FAMILY MEDICINE
Payer: COMMERCIAL

## 2024-08-22 VITALS
OXYGEN SATURATION: 98 % | HEART RATE: 67 BPM | TEMPERATURE: 98.9 F | DIASTOLIC BLOOD PRESSURE: 70 MMHG | SYSTOLIC BLOOD PRESSURE: 117 MMHG | BODY MASS INDEX: 27.87 KG/M2 | WEIGHT: 157.31 LBS | HEIGHT: 63 IN | RESPIRATION RATE: 16 BRPM

## 2024-08-22 DIAGNOSIS — F31.10 BIPOLAR I DISORDER, MOST RECENT EPISODE (OR CURRENT) MANIC (H): ICD-10-CM

## 2024-08-22 DIAGNOSIS — F90.2 ATTENTION DEFICIT HYPERACTIVITY DISORDER (ADHD), COMBINED TYPE: ICD-10-CM

## 2024-08-22 DIAGNOSIS — E03.9 HYPOTHYROIDISM, UNSPECIFIED TYPE: Primary | ICD-10-CM

## 2024-08-22 PROCEDURE — 90746 HEPB VACCINE 3 DOSE ADULT IM: CPT | Performed by: FAMILY MEDICINE

## 2024-08-22 PROCEDURE — 90677 PCV20 VACCINE IM: CPT | Performed by: FAMILY MEDICINE

## 2024-08-22 PROCEDURE — 99214 OFFICE O/P EST MOD 30 MIN: CPT | Mod: 25 | Performed by: FAMILY MEDICINE

## 2024-08-22 PROCEDURE — 90472 IMMUNIZATION ADMIN EACH ADD: CPT | Performed by: FAMILY MEDICINE

## 2024-08-22 PROCEDURE — 90471 IMMUNIZATION ADMIN: CPT | Performed by: FAMILY MEDICINE

## 2024-08-22 ASSESSMENT — PAIN SCALES - GENERAL: PAINLEVEL: NO PAIN (0)

## 2024-08-22 NOTE — PROGRESS NOTES
"  Assessment & Plan     Hypothyroidism, unspecified type  stable    Bipolar I disorder, most recent episode (or current) manic (H)  Feeling favorable about daughter and court case  Seeing Nadeen Bahena, seems manic currently based on pressured speech    Attention deficit hyperactivity disorder (ADHD), combined type  Started on vyvanse  Follow-up for CPE or sooner if problems      BMI  Estimated body mass index is 27.87 kg/m  as calculated from the following:    Height as of this encounter: 1.6 m (5' 3\").    Weight as of this encounter: 71.4 kg (157 lb 5 oz).   Weight management plan: Discussed healthy diet and exercise guidelines    Patient was agreeable to this plan and had no further questions.  There are no Patient Instructions on file for this visit.    No follow-ups on file.    Eb Landon is a 38 year old, presenting for the following health issues:  Thyroid Problem        8/22/2024    11:23 AM   Additional Questions   Roomed by Kia Andujar   Accompanied by None         8/22/2024    11:23 AM   Patient Reported Additional Medications   Patient reports taking the following new medications None     History of Present Illness       Reason for visit:  Thyroid monitoring    She eats 2-3 servings of fruits and vegetables daily.She consumes 3 sweetened beverage(s) daily.She exercises with enough effort to increase her heart rate 60 or more minutes per day.  She exercises with enough effort to increase her heart rate 7 days per week. She is missing 1 dose(s) of medications per week.       Hypothyroidism Follow-up    Since last visit, patient describes the following symptoms: constipation, anxiety, and fatigue    Depression and Anxiety   How are you doing with your depression since your last visit? Improved divorce almost final  How are you doing with your anxiety since your last visit?  Improved working on getting job, has an apartment  Are you having other symptoms that might be associated with " "depression or anxiety? No  Have you had a significant life event? Relationship Concerns   Do you have any concerns with your use of alcohol or other drugs? No    Social History     Tobacco Use    Smoking status: Never     Passive exposure: Never    Smokeless tobacco: Never   Vaping Use    Vaping status: Never Used   Substance Use Topics    Alcohol use: Not Currently    Drug use: No         4/15/2024    10:20 AM 6/5/2024     9:54 AM 6/20/2024     7:42 AM   PHQ   PHQ-9 Total Score 11 12 8   Q9: Thoughts of better off dead/self-harm past 2 weeks Not at all Not at all Not at all         2/13/2024    11:47 PM 6/5/2024    10:39 AM 6/20/2024     7:43 AM   SUHA-7 SCORE   Total Score 18 (severe anxiety) 14 (moderate anxiety) 8 (mild anxiety)   Total Score 18 14 8     Suicide Assessment Five-step Evaluation and Treatment (SAFE-T)      Review of Systems  Constitutional, neuro, ENT, endocrine, pulmonary, cardiac, gastrointestinal, genitourinary, musculoskeletal, integument and psychiatric systems are negative, except as otherwise noted.      Objective    /70 (BP Location: Left arm, Patient Position: Sitting, Cuff Size: Adult Regular)   Pulse 67   Temp 98.9  F (37.2  C) (Tympanic)   Resp 16   Ht 1.6 m (5' 3\")   Wt 71.4 kg (157 lb 5 oz)   LMP 07/05/2024 (Exact Date)   SpO2 98%   BMI 27.87 kg/m    Body mass index is 27.87 kg/m .  Physical Exam   GENERAL: alert and no distress  RESP: lungs clear to auscultation - no rales, rhonchi or wheezes  CV: regular rate and rhythm, normal S1 S2, no S3 or S4, no murmur, click or rub, no peripheral edema  ABDOMEN: soft, nontender, no hepatosplenomegaly, no masses and bowel sounds normal  MS: no gross musculoskeletal defects noted, no edema  PSYCH: mentation appears normal, affect normal/bright    No results found for any visits on 08/22/24.        Signed Electronically by: Caprice Pimentel MD    "

## 2024-08-27 DIAGNOSIS — E07.9 DYSFUNCTION OF THYROID: ICD-10-CM

## 2024-08-27 DIAGNOSIS — R79.89 ELEVATED PROLACTIN LEVEL: ICD-10-CM

## 2024-08-28 NOTE — TELEPHONE ENCOUNTER
Dostinex       Last Written Prescription Date: 11/30/2023  Last Fill Quantity: 16,   # refills: 1  Last Office Visit: 8/22/2024    Cytomel      Last Written Prescription Date:  5/21/2024  Last Fill Quantity: 90,   # refills: 0  Last Office Visit: 8/22/2024  Future Office visit:    Next 5 appointments (look out 90 days)      Sep 05, 2024 3:30 PM  (Arrive by 3:15 PM)  Return Visit with GUERRERO Pollard CNP  [facility] ([facility] )  Arrive at:  PSYCHIATRY [address] [place] [address] [id number] [phone]

## 2024-08-29 RX ORDER — LIOTHYRONINE SODIUM 5 UG/1
TABLET ORAL
Qty: 90 TABLET | Refills: 0 | OUTPATIENT
Start: 2024-08-29

## 2024-08-29 RX ORDER — CABERGOLINE 0.5 MG/1
TABLET ORAL
Qty: 4 TABLET | Refills: 0 | OUTPATIENT
Start: 2024-08-29

## 2024-08-29 NOTE — TELEPHONE ENCOUNTER
Cabergoline 0.5 mg        Routing refill request to provider for review/approval because:  Drug not on the [facility], [facility] or [facility] refill protocol or controlled substance      Liothyronine 5 mcg        Routing refill request to provider for review/approval because:  Thyroid Protocol Failed    Rerun Protocol (8/27/2024 6:31 PM)    Medication indicated for associated diagnosis    Medication is associated with one or more of the following diagnoses:  Hypothyroidism  Thyroid stimulating hormone suppression therapy  Thyroid cancer  Acquired atrophy of thyroid

## 2024-08-29 NOTE — TELEPHONE ENCOUNTER
Per PCP pt no longer needs the cabergoline.  Call placed to [facility] and informed them.

## 2024-09-08 ENCOUNTER — HOSPITAL ENCOUNTER (EMERGENCY)
Facility: HOSPITAL | Age: 39
Discharge: HOME | End: 2024-09-08
Attending: EMERGENCY MEDICINE | Admitting: EMERGENCY MEDICINE
Payer: COMMERCIAL

## 2024-09-08 VITALS
HEART RATE: 95 BPM | OXYGEN SATURATION: 96 % | TEMPERATURE: 99.2 F | HEIGHT: 63 IN | SYSTOLIC BLOOD PRESSURE: 129 MMHG | DIASTOLIC BLOOD PRESSURE: 83 MMHG | BODY MASS INDEX: 27.87 KG/M2 | RESPIRATION RATE: 16 BRPM

## 2024-09-08 DIAGNOSIS — T74.21XA SEXUAL ASSAULT OF ADULT, INITIAL ENCOUNTER: ICD-10-CM

## 2024-09-08 DIAGNOSIS — S10.93XA CONTUSION OF NECK, INITIAL ENCOUNTER: ICD-10-CM

## 2024-09-08 LAB
ALBUMIN SERPL BCG-MCNC: 4.5 G/DL (ref 3.5–5.2)
ALP SERPL-CCNC: 81 U/L (ref 40–150)
ALT SERPL W P-5'-P-CCNC: 14 U/L (ref 0–50)
AMPHETAMINES UR QL SCN: ABNORMAL
AST SERPL W P-5'-P-CCNC: 26 U/L (ref 0–45)
BARBITURATES UR QL SCN: ABNORMAL
BENZODIAZ UR QL SCN: ABNORMAL
BILIRUB DIRECT SERPL-MCNC: 0.25 MG/DL (ref 0–0.3)
BILIRUB SERPL-MCNC: 1.5 MG/DL
BZE UR QL SCN: ABNORMAL
C TRACH DNA SPEC QL PROBE+SIG AMP: NEGATIVE
CANNABINOIDS UR QL SCN: ABNORMAL
ETHANOL SERPL-MCNC: <0.01 G/DL
FENTANYL UR QL: ABNORMAL
HCG UR QL: NEGATIVE
HOLD SPECIMEN: NORMAL
N GONORRHOEA DNA SPEC QL NAA+PROBE: NEGATIVE
OPIATES UR QL SCN: ABNORMAL
PCP QUAL URINE (ROCHE): ABNORMAL
PROT SERPL-MCNC: 7.2 G/DL (ref 6.4–8.3)
TSH SERPL DL<=0.005 MIU/L-ACNC: 1.37 UIU/ML (ref 0.3–4.2)

## 2024-09-08 PROCEDURE — 250N000013 HC RX MED GY IP 250 OP 250 PS 637: Performed by: EMERGENCY MEDICINE

## 2024-09-08 PROCEDURE — 84443 ASSAY THYROID STIM HORMONE: CPT | Performed by: EMERGENCY MEDICINE

## 2024-09-08 PROCEDURE — 99292 CRITICAL CARE ADDL 30 MIN: CPT

## 2024-09-08 PROCEDURE — 80076 HEPATIC FUNCTION PANEL: CPT | Performed by: EMERGENCY MEDICINE

## 2024-09-08 PROCEDURE — 87661 TRICHOMONAS VAGINALIS AMPLIF: CPT | Performed by: EMERGENCY MEDICINE

## 2024-09-08 PROCEDURE — 36415 COLL VENOUS BLD VENIPUNCTURE: CPT | Performed by: EMERGENCY MEDICINE

## 2024-09-08 PROCEDURE — 82077 ASSAY SPEC XCP UR&BREATH IA: CPT | Performed by: EMERGENCY MEDICINE

## 2024-09-08 PROCEDURE — 80307 DRUG TEST PRSMV CHEM ANLYZR: CPT | Performed by: EMERGENCY MEDICINE

## 2024-09-08 PROCEDURE — 87389 HIV-1 AG W/HIV-1&-2 AB AG IA: CPT | Performed by: EMERGENCY MEDICINE

## 2024-09-08 PROCEDURE — 96372 THER/PROPH/DIAG INJ SC/IM: CPT | Performed by: EMERGENCY MEDICINE

## 2024-09-08 PROCEDURE — 87491 CHLMYD TRACH DNA AMP PROBE: CPT | Performed by: EMERGENCY MEDICINE

## 2024-09-08 PROCEDURE — 81025 URINE PREGNANCY TEST: CPT | Performed by: EMERGENCY MEDICINE

## 2024-09-08 PROCEDURE — 99284 EMERGENCY DEPT VISIT MOD MDM: CPT | Performed by: EMERGENCY MEDICINE

## 2024-09-08 PROCEDURE — 250N000011 HC RX IP 250 OP 636: Performed by: EMERGENCY MEDICINE

## 2024-09-08 PROCEDURE — 86780 TREPONEMA PALLIDUM: CPT | Performed by: EMERGENCY MEDICINE

## 2024-09-08 PROCEDURE — 99291 CRITICAL CARE FIRST HOUR: CPT

## 2024-09-08 RX ORDER — CEFTRIAXONE SODIUM 1 G
500 VIAL (EA) INJECTION ONCE
Status: COMPLETED | OUTPATIENT
Start: 2024-09-08 | End: 2024-09-08

## 2024-09-08 RX ORDER — CEFTRIAXONE SODIUM 250 MG/1
500 INJECTION, POWDER, FOR SOLUTION INTRAMUSCULAR; INTRAVENOUS ONCE
Qty: 2 EACH | Refills: 0 | Status: SHIPPED | OUTPATIENT
Start: 2024-09-08 | End: 2024-09-08

## 2024-09-08 RX ORDER — AZITHROMYCIN 250 MG/1
TABLET, FILM COATED ORAL
Qty: 8 TABLET | Refills: 0 | Status: SHIPPED | OUTPATIENT
Start: 2024-09-08 | End: 2024-09-08

## 2024-09-08 RX ORDER — AZITHROMYCIN 250 MG/1
1000 TABLET, FILM COATED ORAL ONCE
Status: COMPLETED | OUTPATIENT
Start: 2024-09-08 | End: 2024-09-08

## 2024-09-08 RX ORDER — METRONIDAZOLE 500 MG/1
500 TABLET ORAL 2 TIMES DAILY
Qty: 14 TABLET | Refills: 0 | Status: SHIPPED | OUTPATIENT
Start: 2024-09-08 | End: 2024-09-15

## 2024-09-08 RX ORDER — LEVONORGESTREL 1.5 MG/1
1.5 TABLET ORAL ONCE
Qty: 1 TABLET | Refills: 0 | Status: SHIPPED | OUTPATIENT
Start: 2024-09-08 | End: 2024-09-08

## 2024-09-08 RX ADMIN — CEFTRIAXONE 500 MG: 500 INJECTION, POWDER, FOR SOLUTION INTRAMUSCULAR; INTRAVENOUS at 13:24

## 2024-09-08 RX ADMIN — AZITHROMYCIN DIHYDRATE 1000 MG: 250 TABLET ORAL at 13:25

## 2024-09-08 ASSESSMENT — ACTIVITIES OF DAILY LIVING (ADL)
ADLS_ACUITY_SCORE: 35

## 2024-09-08 ASSESSMENT — COLUMBIA-SUICIDE SEVERITY RATING SCALE - C-SSRS
2. HAVE YOU ACTUALLY HAD ANY THOUGHTS OF KILLING YOURSELF IN THE PAST MONTH?: NO
6. HAVE YOU EVER DONE ANYTHING, STARTED TO DO ANYTHING, OR PREPARED TO DO ANYTHING TO END YOUR LIFE?: NO
1. IN THE PAST MONTH, HAVE YOU WISHED YOU WERE DEAD OR WISHED YOU COULD GO TO SLEEP AND NOT WAKE UP?: NO
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
## 2024-09-08 NOTE — DISCHARGE INSTRUCTIONS
Follow-up with your physician and follow-up with community resources provided for mental health care
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
## 2024-09-08 NOTE — ED NOTES
Discussed collection kit with [employer] officers, asked if they would like a blood and urine collection. [employer] declined need for blood and urine kit, will be back later to be  BCA kit.

## 2024-09-09 ENCOUNTER — TELEPHONE (OUTPATIENT)
Dept: FAMILY MEDICINE | Facility: OTHER | Age: 39
End: 2024-09-09

## 2024-09-09 DIAGNOSIS — E07.9 DYSFUNCTION OF THYROID: ICD-10-CM

## 2024-09-09 LAB
HIV 1+2 AB+HIV1 P24 AG SERPL QL IA: NONREACTIVE
T PALLIDUM AB SER QL: NONREACTIVE
T VAGINALIS DNA SPEC QL NAA+PROBE: NOT DETECTED

## 2024-09-09 RX ORDER — LIOTHYRONINE SODIUM 5 UG/1
5 TABLET ORAL DAILY
Qty: 90 TABLET | Refills: 0 | Status: SHIPPED | OUTPATIENT
Start: 2024-09-09

## 2024-09-09 NOTE — TELEPHONE ENCOUNTER
Pt called and left a message regarding needing a follow-up for a ER visit yesterday.  Pt was called back and no answer.  Left message to have pt call us back at with call back number of [phone].

## 2024-09-09 NOTE — TELEPHONE ENCOUNTER
Liothyronine 5 mcg      Last Written Prescription Date:  05/21/2024  Last Fill Quantity: 90,   # refills: 0  Last Office Visit: 08/22/2024  Future Office visit:       Routing refill request to provider for review/approval because:  Thyroid Protocol Failed    Rerun Protocol (9/9/2024 1:45 PM)    Medication indicated for associated diagnosis    Medication is associated with one or more of the following diagnoses:  Hypothyroidism  Thyroid stimulating hormone suppression therapy  Thyroid cancer  Acquired atrophy of thyroid

## 2024-09-10 ENCOUNTER — MYC MEDICAL ADVICE (OUTPATIENT)
Dept: FAMILY MEDICINE | Facility: OTHER | Age: 39
End: 2024-09-10

## 2024-10-16 ENCOUNTER — OFFICE VISIT (OUTPATIENT)
Dept: PSYCHIATRY | Facility: OTHER | Age: 39
End: 2024-10-16
Payer: COMMERCIAL

## 2024-10-16 VITALS
DIASTOLIC BLOOD PRESSURE: 72 MMHG | BODY MASS INDEX: 30.02 KG/M2 | SYSTOLIC BLOOD PRESSURE: 124 MMHG | WEIGHT: 169.4 LBS | HEART RATE: 92 BPM | TEMPERATURE: 98.7 F | HEIGHT: 63 IN | OXYGEN SATURATION: 98 %

## 2024-10-16 DIAGNOSIS — F90.2 ATTENTION DEFICIT HYPERACTIVITY DISORDER (ADHD), COMBINED TYPE: ICD-10-CM

## 2024-10-16 DIAGNOSIS — F41.1 GAD (GENERALIZED ANXIETY DISORDER): Primary | ICD-10-CM

## 2024-10-16 PROCEDURE — 99214 OFFICE O/P EST MOD 30 MIN: CPT

## 2024-10-16 ASSESSMENT — PATIENT HEALTH QUESTIONNAIRE - PHQ9
10. IF YOU CHECKED OFF ANY PROBLEMS, HOW DIFFICULT HAVE THESE PROBLEMS MADE IT FOR YOU TO DO YOUR WORK, TAKE CARE OF THINGS AT HOME, OR GET ALONG WITH OTHER PEOPLE: VERY DIFFICULT
SUM OF ALL RESPONSES TO PHQ QUESTIONS 1-9: 12
SUM OF ALL RESPONSES TO PHQ QUESTIONS 1-9: 12

## 2024-10-16 ASSESSMENT — ANXIETY QUESTIONNAIRES
7. FEELING AFRAID AS IF SOMETHING AWFUL MIGHT HAPPEN: SEVERAL DAYS
1. FEELING NERVOUS, ANXIOUS, OR ON EDGE: NEARLY EVERY DAY
GAD7 TOTAL SCORE: 17
6. BECOMING EASILY ANNOYED OR IRRITABLE: MORE THAN HALF THE DAYS
GAD7 TOTAL SCORE: 17
4. TROUBLE RELAXING: NEARLY EVERY DAY
7. FEELING AFRAID AS IF SOMETHING AWFUL MIGHT HAPPEN: SEVERAL DAYS
IF YOU CHECKED OFF ANY PROBLEMS ON THIS QUESTIONNAIRE, HOW DIFFICULT HAVE THESE PROBLEMS MADE IT FOR YOU TO DO YOUR WORK, TAKE CARE OF THINGS AT HOME, OR GET ALONG WITH OTHER PEOPLE: VERY DIFFICULT
5. BEING SO RESTLESS THAT IT IS HARD TO SIT STILL: MORE THAN HALF THE DAYS
GAD7 TOTAL SCORE: 17
8. IF YOU CHECKED OFF ANY PROBLEMS, HOW DIFFICULT HAVE THESE MADE IT FOR YOU TO DO YOUR WORK, TAKE CARE OF THINGS AT HOME, OR GET ALONG WITH OTHER PEOPLE?: VERY DIFFICULT
3. WORRYING TOO MUCH ABOUT DIFFERENT THINGS: NEARLY EVERY DAY
2. NOT BEING ABLE TO STOP OR CONTROL WORRYING: NEARLY EVERY DAY

## 2024-10-16 ASSESSMENT — PAIN SCALES - GENERAL: PAINLEVEL: NO PAIN (0)
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
## 2024-10-16 NOTE — PATIENT INSTRUCTIONS
Thank you for allowing Nadeen Bahena DNP, APRN to participate in your care.  If you have a scheduling or an appointment question please contact our scheduling department at their direct line [phone].   ALL nursing questions or concerns can be directed to the psychiatry nurses at [phone] or [phone]

## 2024-10-17 ENCOUNTER — TELEPHONE (OUTPATIENT)
Dept: FAMILY MEDICINE | Facility: OTHER | Age: 39
End: 2024-10-17

## 2024-10-17 NOTE — TELEPHONE ENCOUNTER
ED visit 9/8/24  Pt had f/up but was unable to make it  Calling to reschedule   Writer relayed an appt is already set with PcP for 11/1  Pt states this appt will work for her schedule      Recommended ED/UC with any acute/rapid decline in condition.  Call back to the clinic with any further questions/concerns  Patient relayed understanding  No further concerns at calls end.

## 2024-10-17 NOTE — TELEPHONE ENCOUNTER
Symptom or reason needing to speak to RN: ER [facility]     Best number to return call: [phone]     Best time to return call: Anytime

## 2024-10-30 ENCOUNTER — HOSPITAL ENCOUNTER (EMERGENCY)
Facility: HOSPITAL | Age: 39
Discharge: HOME | End: 2024-10-30
Attending: PHYSICIAN ASSISTANT | Admitting: PHYSICIAN ASSISTANT
Payer: COMMERCIAL

## 2024-10-30 VITALS
HEART RATE: 80 BPM | RESPIRATION RATE: 19 BRPM | DIASTOLIC BLOOD PRESSURE: 73 MMHG | SYSTOLIC BLOOD PRESSURE: 111 MMHG | OXYGEN SATURATION: 98 % | TEMPERATURE: 98.4 F

## 2024-10-30 DIAGNOSIS — F41.9 ANXIETY: ICD-10-CM

## 2024-10-30 LAB
HOLD SPECIMEN: NORMAL
TSH SERPL DL<=0.005 MIU/L-ACNC: 1.03 UIU/ML (ref 0.3–4.2)

## 2024-10-30 PROCEDURE — 84481 FREE ASSAY (FT-3): CPT | Performed by: PHYSICIAN ASSISTANT

## 2024-10-30 PROCEDURE — 99213 OFFICE O/P EST LOW 20 MIN: CPT | Performed by: PHYSICIAN ASSISTANT

## 2024-10-30 PROCEDURE — 36415 COLL VENOUS BLD VENIPUNCTURE: CPT | Performed by: PHYSICIAN ASSISTANT

## 2024-10-30 PROCEDURE — 84443 ASSAY THYROID STIM HORMONE: CPT | Performed by: PHYSICIAN ASSISTANT

## 2024-10-30 PROCEDURE — G0463 HOSPITAL OUTPT CLINIC VISIT: HCPCS

## 2024-10-30 ASSESSMENT — COLUMBIA-SUICIDE SEVERITY RATING SCALE - C-SSRS
1. IN THE PAST MONTH, HAVE YOU WISHED YOU WERE DEAD OR WISHED YOU COULD GO TO SLEEP AND NOT WAKE UP?: NO
6. HAVE YOU EVER DONE ANYTHING, STARTED TO DO ANYTHING, OR PREPARED TO DO ANYTHING TO END YOUR LIFE?: YES
2. HAVE YOU ACTUALLY HAD ANY THOUGHTS OF KILLING YOURSELF IN THE PAST MONTH?: NO

## 2024-10-30 ASSESSMENT — ACTIVITIES OF DAILY LIVING (ADL): ADLS_ACUITY_SCORE: 0

## 2024-10-30 ASSESSMENT — ENCOUNTER SYMPTOMS: NERVOUS/ANXIOUS: 1

## 2024-10-30 NOTE — ED TRIAGE NOTES
C/o thyroid labs    Concerns for months about thyroid issues    Does have an apt on Friday  Does have some anxiety and mental issues that she is working on

## 2024-10-30 NOTE — ED TRIAGE NOTES
RAFAEL Hong  assessed patient in triage and determined patient Urgent Care appropriate. Will be seen in Urgent Care.       Patient would like TSH, free T3 &T4 labs drawn. Tearful and anxious in triage
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
## 2024-11-01 ENCOUNTER — OFFICE VISIT (OUTPATIENT)
Dept: FAMILY MEDICINE | Facility: OTHER | Age: 39
End: 2024-11-01
Attending: FAMILY MEDICINE
Payer: COMMERCIAL

## 2024-11-01 VITALS
RESPIRATION RATE: 16 BRPM | BODY MASS INDEX: 31.18 KG/M2 | SYSTOLIC BLOOD PRESSURE: 114 MMHG | HEIGHT: 63 IN | DIASTOLIC BLOOD PRESSURE: 66 MMHG | HEART RATE: 87 BPM | TEMPERATURE: 98.4 F | WEIGHT: 176 LBS | OXYGEN SATURATION: 98 %

## 2024-11-01 DIAGNOSIS — F51.02 ADJUSTMENT INSOMNIA: ICD-10-CM

## 2024-11-01 DIAGNOSIS — E55.9 HYPOVITAMINOSIS D: Primary | ICD-10-CM

## 2024-11-01 DIAGNOSIS — F43.10 PTSD (POST-TRAUMATIC STRESS DISORDER): ICD-10-CM

## 2024-11-01 DIAGNOSIS — E03.9 HYPOTHYROIDISM, UNSPECIFIED TYPE: ICD-10-CM

## 2024-11-01 DIAGNOSIS — F31.10 BIPOLAR I DISORDER, MOST RECENT EPISODE (OR CURRENT) MANIC (H): ICD-10-CM

## 2024-11-01 DIAGNOSIS — E07.9 DYSFUNCTION OF THYROID: ICD-10-CM

## 2024-11-01 DIAGNOSIS — E53.8 VITAMIN B12 DEFICIENCY (NON ANEMIC): ICD-10-CM

## 2024-11-01 PROBLEM — E80.20: Status: RESOLVED | Noted: 2023-02-20 | Resolved: 2024-11-01

## 2024-11-01 PROBLEM — T50.905A: Status: RESOLVED | Noted: 2023-02-20 | Resolved: 2024-11-01

## 2024-11-01 LAB — T3FREE SERPL-MCNC: 2.6 PG/ML (ref 2–4.4)

## 2024-11-01 PROCEDURE — G2211 COMPLEX E/M VISIT ADD ON: HCPCS | Performed by: FAMILY MEDICINE

## 2024-11-01 PROCEDURE — 99215 OFFICE O/P EST HI 40 MIN: CPT | Performed by: FAMILY MEDICINE

## 2024-11-01 RX ORDER — PRAZOSIN HYDROCHLORIDE 1 MG/1
1 CAPSULE ORAL AT BEDTIME
Qty: 30 CAPSULE | Refills: 0 | Status: SHIPPED | OUTPATIENT
Start: 2024-11-01

## 2024-11-01 RX ORDER — LEVOTHYROXINE SODIUM 25 UG/1
25 TABLET ORAL DAILY
Qty: 90 TABLET | Refills: 0 | Status: SHIPPED | OUTPATIENT
Start: 2024-11-01

## 2024-11-01 ASSESSMENT — PAIN SCALES - GENERAL: PAINLEVEL_OUTOF10: NO PAIN (0)
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
## 2024-11-01 NOTE — ADDENDUM NOTE
Addended by: ASUNCION OLVERA on: 11/1/2024 10:55 AM     Modules accepted: Orders, Level of Service

## 2024-11-05 RX ORDER — LIOTHYRONINE SODIUM 5 UG/1
5 TABLET ORAL DAILY
Qty: 90 TABLET | Refills: 0 | OUTPATIENT
Start: 2024-11-05

## 2024-12-02 ENCOUNTER — OFFICE VISIT (OUTPATIENT)
Dept: PSYCHIATRY | Facility: OTHER | Age: 39
End: 2024-12-02
Payer: COMMERCIAL

## 2024-12-02 VITALS
TEMPERATURE: 98.1 F | BODY MASS INDEX: 31.29 KG/M2 | HEART RATE: 100 BPM | WEIGHT: 176.6 LBS | SYSTOLIC BLOOD PRESSURE: 112 MMHG | OXYGEN SATURATION: 98 % | DIASTOLIC BLOOD PRESSURE: 68 MMHG | HEIGHT: 63 IN

## 2024-12-02 DIAGNOSIS — F43.10 PTSD (POST-TRAUMATIC STRESS DISORDER): ICD-10-CM

## 2024-12-02 PROCEDURE — 99215 OFFICE O/P EST HI 40 MIN: CPT

## 2024-12-02 RX ORDER — PRAZOSIN HYDROCHLORIDE 1 MG/1
1 CAPSULE ORAL AT BEDTIME
Qty: 30 CAPSULE | Refills: 2 | Status: SHIPPED | OUTPATIENT
Start: 2024-12-02 | End: 2024-12-10

## 2024-12-02 ASSESSMENT — PAIN SCALES - GENERAL: PAINLEVEL_OUTOF10: NO PAIN (0)

## 2024-12-02 NOTE — PATIENT INSTRUCTIONS
Thank you for allowing Nadeen Bahena DNP, APRN to participate in your care.  If you have a scheduling or an appointment question please contact our scheduling department at their direct line [phone].   ALL nursing questions or concerns can be directed to the psychiatry nurses at [phone] or [phone]
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
## 2025-01-02 ENCOUNTER — OFFICE VISIT (OUTPATIENT)
Dept: PSYCHIATRY | Facility: OTHER | Age: 40
End: 2025-01-02
Payer: COMMERCIAL

## 2025-01-02 VITALS
WEIGHT: 177.6 LBS | OXYGEN SATURATION: 98 % | HEIGHT: 63 IN | RESPIRATION RATE: 18 BRPM | SYSTOLIC BLOOD PRESSURE: 94 MMHG | DIASTOLIC BLOOD PRESSURE: 54 MMHG | BODY MASS INDEX: 31.47 KG/M2 | TEMPERATURE: 98.1 F | HEART RATE: 96 BPM

## 2025-01-02 DIAGNOSIS — F43.10 PTSD (POST-TRAUMATIC STRESS DISORDER): ICD-10-CM

## 2025-01-02 DIAGNOSIS — F90.2 ATTENTION DEFICIT HYPERACTIVITY DISORDER (ADHD), COMBINED TYPE: ICD-10-CM

## 2025-01-02 DIAGNOSIS — F41.1 GAD (GENERALIZED ANXIETY DISORDER): Primary | ICD-10-CM

## 2025-01-02 RX ORDER — PRAZOSIN HYDROCHLORIDE 1 MG/1
1 CAPSULE ORAL AT BEDTIME
Qty: 30 CAPSULE | Refills: 2 | Status: SHIPPED | OUTPATIENT
Start: 2025-01-02

## 2025-01-02 RX ORDER — PRAZOSIN HYDROCHLORIDE 1 MG/1
1 CAPSULE ORAL AT BEDTIME
COMMUNITY
End: 2025-01-02

## 2025-01-02 RX ORDER — MULTIVITAMIN
1 TABLET ORAL DAILY
COMMUNITY

## 2025-01-02 ASSESSMENT — ANXIETY QUESTIONNAIRES
GAD7 TOTAL SCORE: 8
1. FEELING NERVOUS, ANXIOUS, OR ON EDGE: NEARLY EVERY DAY
4. TROUBLE RELAXING: MORE THAN HALF THE DAYS
5. BEING SO RESTLESS THAT IT IS HARD TO SIT STILL: NOT AT ALL
IF YOU CHECKED OFF ANY PROBLEMS ON THIS QUESTIONNAIRE, HOW DIFFICULT HAVE THESE PROBLEMS MADE IT FOR YOU TO DO YOUR WORK, TAKE CARE OF THINGS AT HOME, OR GET ALONG WITH OTHER PEOPLE: SOMEWHAT DIFFICULT
GAD7 TOTAL SCORE: 8
GAD7 TOTAL SCORE: 8
7. FEELING AFRAID AS IF SOMETHING AWFUL MIGHT HAPPEN: NOT AT ALL
6. BECOMING EASILY ANNOYED OR IRRITABLE: SEVERAL DAYS
2. NOT BEING ABLE TO STOP OR CONTROL WORRYING: SEVERAL DAYS
8. IF YOU CHECKED OFF ANY PROBLEMS, HOW DIFFICULT HAVE THESE MADE IT FOR YOU TO DO YOUR WORK, TAKE CARE OF THINGS AT HOME, OR GET ALONG WITH OTHER PEOPLE?: SOMEWHAT DIFFICULT
7. FEELING AFRAID AS IF SOMETHING AWFUL MIGHT HAPPEN: NOT AT ALL
3. WORRYING TOO MUCH ABOUT DIFFERENT THINGS: SEVERAL DAYS

## 2025-01-02 ASSESSMENT — PATIENT HEALTH QUESTIONNAIRE - PHQ9
10. IF YOU CHECKED OFF ANY PROBLEMS, HOW DIFFICULT HAVE THESE PROBLEMS MADE IT FOR YOU TO DO YOUR WORK, TAKE CARE OF THINGS AT HOME, OR GET ALONG WITH OTHER PEOPLE: SOMEWHAT DIFFICULT
SUM OF ALL RESPONSES TO PHQ QUESTIONS 1-9: 14
SUM OF ALL RESPONSES TO PHQ QUESTIONS 1-9: 14

## 2025-01-02 ASSESSMENT — PAIN SCALES - GENERAL: PAINLEVEL_OUTOF10: NO PAIN (0)
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
## 2025-01-27 ENCOUNTER — OFFICE VISIT (OUTPATIENT)
Dept: PSYCHIATRY | Facility: OTHER | Age: 40
End: 2025-01-27
Payer: COMMERCIAL

## 2025-01-27 VITALS
SYSTOLIC BLOOD PRESSURE: 108 MMHG | WEIGHT: 174 LBS | OXYGEN SATURATION: 98 % | TEMPERATURE: 98.2 F | RESPIRATION RATE: 16 BRPM | BODY MASS INDEX: 30.83 KG/M2 | HEIGHT: 63 IN | DIASTOLIC BLOOD PRESSURE: 66 MMHG | HEART RATE: 78 BPM

## 2025-01-27 DIAGNOSIS — F41.1 GAD (GENERALIZED ANXIETY DISORDER): ICD-10-CM

## 2025-01-27 DIAGNOSIS — F43.23 ADJUSTMENT DISORDER WITH MIXED ANXIETY AND DEPRESSED MOOD: Primary | ICD-10-CM

## 2025-01-27 DIAGNOSIS — F43.10 PTSD (POST-TRAUMATIC STRESS DISORDER): ICD-10-CM

## 2025-01-27 DIAGNOSIS — F90.2 ATTENTION DEFICIT HYPERACTIVITY DISORDER (ADHD), COMBINED TYPE: ICD-10-CM

## 2025-01-27 PROCEDURE — 99215 OFFICE O/P EST HI 40 MIN: CPT

## 2025-01-27 RX ORDER — PRAZOSIN HYDROCHLORIDE 1 MG/1
1 CAPSULE ORAL AT BEDTIME
Qty: 30 CAPSULE | Refills: 2 | Status: SHIPPED | OUTPATIENT
Start: 2025-01-27

## 2025-01-27 RX ORDER — ESCITALOPRAM OXALATE 10 MG/1
10 TABLET ORAL DAILY
Qty: 30 TABLET | Refills: 2 | Status: SHIPPED | OUTPATIENT
Start: 2025-01-27

## 2025-01-27 ASSESSMENT — PAIN SCALES - GENERAL: PAINLEVEL_OUTOF10: NO PAIN (0)
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
## 2025-01-27 NOTE — PATIENT INSTRUCTIONS
Thank you for allowing Nadeen Bahena DNP, APRN to participate in your care.  If you have a scheduling or an appointment question please contact our scheduling department at their direct line [phone].   ALL nursing questions or concerns can be directed to the psychiatry nurses at [phone] or [phone]

## 2025-02-05 ENCOUNTER — HOSPITAL ENCOUNTER (EMERGENCY)
Facility: HOSPITAL | Age: 40
Discharge: HOME | End: 2025-02-05
Attending: PHYSICIAN ASSISTANT
Payer: COMMERCIAL

## 2025-02-05 VITALS
DIASTOLIC BLOOD PRESSURE: 68 MMHG | SYSTOLIC BLOOD PRESSURE: 92 MMHG | HEART RATE: 90 BPM | OXYGEN SATURATION: 98 % | RESPIRATION RATE: 18 BRPM | TEMPERATURE: 98.1 F

## 2025-02-05 DIAGNOSIS — J06.9 UPPER RESPIRATORY TRACT INFECTION, UNSPECIFIED TYPE: ICD-10-CM

## 2025-02-05 PROCEDURE — 99213 OFFICE O/P EST LOW 20 MIN: CPT | Performed by: PHYSICIAN ASSISTANT

## 2025-02-05 PROCEDURE — G0463 HOSPITAL OUTPT CLINIC VISIT: HCPCS

## 2025-02-05 ASSESSMENT — COLUMBIA-SUICIDE SEVERITY RATING SCALE - C-SSRS
2. HAVE YOU ACTUALLY HAD ANY THOUGHTS OF KILLING YOURSELF IN THE PAST MONTH?: NO
1. IN THE PAST MONTH, HAVE YOU WISHED YOU WERE DEAD OR WISHED YOU COULD GO TO SLEEP AND NOT WAKE UP?: NO
6. HAVE YOU EVER DONE ANYTHING, STARTED TO DO ANYTHING, OR PREPARED TO DO ANYTHING TO END YOUR LIFE?: NO

## 2025-02-05 NOTE — DISCHARGE INSTRUCTIONS
At this time I recommend that you drink hot tea and honey take vitamin C.  Take elderberry zinc as well.  You can alternate Tylenol and ibuprofen every 3 hours for fevers headaches or pains.  Return if your symptoms are worsening.    Saline spray, netti pot, nasocort.
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
## 2025-02-05 NOTE — Clinical Note
Barbi Vale was seen and treated in our emergency department on 2/5/2025.  She may return to work on 02/06/2025.  If having fevers should stay home until fever free for 24 hours       If you have any questions or concerns, please don't hesitate to call.      Sukhjinder Carpenter PA-C

## 2025-02-19 ENCOUNTER — VIRTUAL VISIT (OUTPATIENT)
Dept: PSYCHIATRY | Facility: OTHER | Age: 40
End: 2025-02-19
Payer: COMMERCIAL

## 2025-02-19 DIAGNOSIS — F43.10 PTSD (POST-TRAUMATIC STRESS DISORDER): ICD-10-CM

## 2025-02-19 RX ORDER — PRAZOSIN HYDROCHLORIDE 1 MG/1
CAPSULE ORAL
Qty: 70 CAPSULE | Refills: 1 | Status: SHIPPED | OUTPATIENT
Start: 2025-02-19
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
## 2025-02-19 NOTE — PATIENT INSTRUCTIONS
Thank you for allowing Nadeen Bahena DNP, APRN to participate in your care.  If you have a scheduling or an appointment question please contact our scheduling department at their direct line [phone].   ALL nursing questions or concerns can be directed to the psychiatry nurses at [phone] or [phone]

## 2025-03-05 DIAGNOSIS — E03.9 HYPOTHYROIDISM, UNSPECIFIED TYPE: ICD-10-CM

## 2025-03-05 DIAGNOSIS — E07.9 DYSFUNCTION OF THYROID: ICD-10-CM

## 2025-03-05 RX ORDER — LIOTHYRONINE SODIUM 5 UG/1
5 TABLET ORAL DAILY
Qty: 90 TABLET | Refills: 1 | Status: SHIPPED | OUTPATIENT
Start: 2025-03-05

## 2025-03-05 RX ORDER — LEVOTHYROXINE SODIUM 25 UG/1
25 TABLET ORAL DAILY
Qty: 90 TABLET | Refills: 1 | Status: SHIPPED | OUTPATIENT
Start: 2025-03-05

## 2025-03-26 ENCOUNTER — OFFICE VISIT (OUTPATIENT)
Dept: PSYCHIATRY | Facility: OTHER | Age: 40
End: 2025-03-26
Payer: COMMERCIAL

## 2025-03-26 VITALS
BODY MASS INDEX: 30.82 KG/M2 | OXYGEN SATURATION: 95 % | DIASTOLIC BLOOD PRESSURE: 62 MMHG | SYSTOLIC BLOOD PRESSURE: 108 MMHG | HEART RATE: 63 BPM | RESPIRATION RATE: 16 BRPM | WEIGHT: 174 LBS

## 2025-03-26 DIAGNOSIS — F43.10 PTSD (POST-TRAUMATIC STRESS DISORDER): ICD-10-CM

## 2025-03-26 DIAGNOSIS — F41.1 GAD (GENERALIZED ANXIETY DISORDER): ICD-10-CM

## 2025-03-26 DIAGNOSIS — F90.2 ATTENTION DEFICIT HYPERACTIVITY DISORDER (ADHD), COMBINED TYPE: ICD-10-CM

## 2025-03-26 DIAGNOSIS — F43.23 ADJUSTMENT DISORDER WITH MIXED ANXIETY AND DEPRESSED MOOD: Primary | ICD-10-CM

## 2025-03-26 ASSESSMENT — PAIN SCALES - GENERAL: PAINLEVEL_OUTOF10: MILD PAIN (3)
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
## 2025-03-26 NOTE — PATIENT INSTRUCTIONS
"- Continue Lexapro 10 mg daily  - Stop prazosin  Thank you for allowing GUERRERO Posey, [facility] to participate in your care.  If you have a scheduling or an appointment question please contact our scheduling department at their direct line [phone].   ALL nursing questions or concerns can be directed to the psychiatry nurses at [phone] or [phone]    [facility]  Crisis Text Line: text or call 988  pjb3aasq- Text \"Life\" to 56314  Crisis Text Line: Text  [place]  to 615920  First Call for Help: 2-1-1  Suicide LifeLine Chat: suicidepreventionlifeline.org/chat  National Suicide Prevention Lifeline: 2-073-110-TALK (0224)  National [place] on Mental Illness (www.juan.org): 569.133.8214 or 786-650-0891  JUAN Helpline- 6-982-DNHL-HELP   Mental Health Association (www.mentalhealth.org): 646.910.1780 or 893-705-9756  [place] Crisis and Referral Services: 1-208.537.4133    "
Verdin

## 2025-03-31 ENCOUNTER — HOSPITAL ENCOUNTER (EMERGENCY)
Facility: HOSPITAL | Age: 40
Discharge: HOME | End: 2025-03-31
Attending: PHYSICIAN ASSISTANT
Payer: COMMERCIAL

## 2025-03-31 VITALS
HEART RATE: 74 BPM | DIASTOLIC BLOOD PRESSURE: 74 MMHG | TEMPERATURE: 98.2 F | OXYGEN SATURATION: 98 % | RESPIRATION RATE: 18 BRPM | SYSTOLIC BLOOD PRESSURE: 111 MMHG

## 2025-03-31 DIAGNOSIS — J06.9 UPPER RESPIRATORY TRACT INFECTION, UNSPECIFIED TYPE: Primary | ICD-10-CM

## 2025-03-31 DIAGNOSIS — J06.9 URI (UPPER RESPIRATORY INFECTION): ICD-10-CM

## 2025-03-31 LAB — S PYO DNA THROAT QL NAA+PROBE: NOT DETECTED

## 2025-03-31 PROCEDURE — 87651 STREP A DNA AMP PROBE: CPT | Performed by: PHYSICIAN ASSISTANT

## 2025-03-31 PROCEDURE — 99213 OFFICE O/P EST LOW 20 MIN: CPT | Performed by: PHYSICIAN ASSISTANT

## 2025-03-31 PROCEDURE — G0463 HOSPITAL OUTPT CLINIC VISIT: HCPCS

## 2025-03-31 ASSESSMENT — ENCOUNTER SYMPTOMS
SORE THROAT: 1
COUGH: 1
HEADACHES: 1

## 2025-03-31 ASSESSMENT — ACTIVITIES OF DAILY LIVING (ADL): ADLS_ACUITY_SCORE: 42
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
## 2025-04-05 ENCOUNTER — HEALTH MAINTENANCE LETTER (OUTPATIENT)
Age: 40
End: 2025-04-05

## 2025-05-02 NOTE — TELEPHONE ENCOUNTER
1:53 PM    Reason for Call: OVERBOOK    Patient is needing a Pre-op    Pre-op/[facility]/04/26/2024 surgery date/L carpel tunnel/Dr. Sanchez    The patient is requesting an appointment for ASAP with Dr. Pimentel.    Was an appointment offered for this call? No  If yes : Appointment type              Date    Preferred method for responding to this message: Telephone Call  What is your phone number ?[phone]     If we cannot reach you directly, may we leave a detailed response at the number you provided? Yes    Can this message wait until your PCP/provider returns, if unavailable today? Not applicable    Tavia Rees  
Left pt a message that I was scheduling for 4/24 at 4pm and to call.  
Left pt another message to call to let us know she got the message about her appt scheduled for 4/24 at 4 pm.  
Sent pt a letter.  
(M6) obeys commands

## 2025-05-06 DIAGNOSIS — F43.23 ADJUSTMENT DISORDER WITH MIXED ANXIETY AND DEPRESSED MOOD: ICD-10-CM

## 2025-05-06 DIAGNOSIS — F41.1 GAD (GENERALIZED ANXIETY DISORDER): ICD-10-CM

## 2025-05-06 NOTE — TELEPHONE ENCOUNTER
Disp Refills Start End MIGUEL   escitalopram (LEXAPRO) 10 MG tablet 30 tablet 2 1/27/2025 -- No     Last Office Visit: 03/26/2025  Future Office visit:    Next 5 appointments (look out 90 days)      May 09, 2025 3:00 PM  (Arrive by 2:45 PM)  Return Visit with GUERRERO Pollard CNP  [facility] ([facility] )  Arrive at:  PSYCHIATRY [address] [place] [id number] [phone]             Routing refill request to provider for review/approval because:

## 2025-05-06 NOTE — TELEPHONE ENCOUNTER
SSRIs Protocol [id number]/06/2025 09:28 AM   Protocol Details PHQ-9 score less than 5 in past 6 months    SUHA-7 score of less than 5 in past 6 month

## 2025-05-08 RX ORDER — ESCITALOPRAM OXALATE 10 MG/1
10 TABLET ORAL DAILY
Qty: 30 TABLET | Refills: 2 | Status: SHIPPED | OUTPATIENT
Start: 2025-05-08

## 2025-05-09 ENCOUNTER — OFFICE VISIT (OUTPATIENT)
Dept: PSYCHIATRY | Facility: OTHER | Age: 40
End: 2025-05-09
Payer: COMMERCIAL

## 2025-05-09 VITALS
HEART RATE: 70 BPM | TEMPERATURE: 97.5 F | DIASTOLIC BLOOD PRESSURE: 74 MMHG | SYSTOLIC BLOOD PRESSURE: 116 MMHG | OXYGEN SATURATION: 99 %

## 2025-05-09 DIAGNOSIS — F41.1 GAD (GENERALIZED ANXIETY DISORDER): ICD-10-CM

## 2025-05-09 DIAGNOSIS — F90.2 ATTENTION DEFICIT HYPERACTIVITY DISORDER (ADHD), COMBINED TYPE: ICD-10-CM

## 2025-05-09 DIAGNOSIS — F43.10 PTSD (POST-TRAUMATIC STRESS DISORDER): ICD-10-CM

## 2025-05-09 DIAGNOSIS — F43.23 ADJUSTMENT DISORDER WITH MIXED ANXIETY AND DEPRESSED MOOD: ICD-10-CM

## 2025-05-09 PROCEDURE — 99417 PROLNG OP E/M EACH 15 MIN: CPT

## 2025-05-09 PROCEDURE — G2211 COMPLEX E/M VISIT ADD ON: HCPCS

## 2025-05-09 PROCEDURE — 99215 OFFICE O/P EST HI 40 MIN: CPT

## 2025-05-09 ASSESSMENT — PAIN SCALES - GENERAL: PAINLEVEL_OUTOF10: NO PAIN (0)

## 2025-05-09 NOTE — PATIENT INSTRUCTIONS
Thank you for allowing Nadeen Bahena DNP, APRN to participate in your care.  If you have a scheduling or an appointment question please contact our scheduling department at their direct line [phone].   ALL nursing questions or concerns can be directed to the psychiatry nurses at [phone] or [phone]
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
## 2025-06-25 ASSESSMENT — PATIENT HEALTH QUESTIONNAIRE - PHQ9

## 2025-06-26 ENCOUNTER — LAB (OUTPATIENT)
Dept: LAB | Facility: OTHER | Age: 40
End: 2025-06-26
Attending: STUDENT IN AN ORGANIZED HEALTH CARE EDUCATION/TRAINING PROGRAM
Payer: COMMERCIAL

## 2025-06-26 ENCOUNTER — OFFICE VISIT (OUTPATIENT)
Dept: FAMILY MEDICINE | Facility: OTHER | Age: 40
End: 2025-06-26
Attending: STUDENT IN AN ORGANIZED HEALTH CARE EDUCATION/TRAINING PROGRAM
Payer: COMMERCIAL

## 2025-06-26 ENCOUNTER — RESULTS FOLLOW-UP (OUTPATIENT)
Dept: FAMILY MEDICINE | Facility: OTHER | Age: 40
End: 2025-06-26

## 2025-06-26 VITALS
OXYGEN SATURATION: 97 % | DIASTOLIC BLOOD PRESSURE: 60 MMHG | BODY MASS INDEX: 30.56 KG/M2 | RESPIRATION RATE: 16 BRPM | HEART RATE: 71 BPM | SYSTOLIC BLOOD PRESSURE: 100 MMHG | WEIGHT: 172.5 LBS | HEIGHT: 63 IN | TEMPERATURE: 97.9 F

## 2025-06-26 DIAGNOSIS — F41.1 GAD (GENERALIZED ANXIETY DISORDER): ICD-10-CM

## 2025-06-26 DIAGNOSIS — E03.9 HYPOTHYROIDISM, UNSPECIFIED TYPE: ICD-10-CM

## 2025-06-26 DIAGNOSIS — E03.9 HYPOTHYROIDISM, UNSPECIFIED TYPE: Primary | ICD-10-CM

## 2025-06-26 DIAGNOSIS — F43.10 PTSD (POST-TRAUMATIC STRESS DISORDER): ICD-10-CM

## 2025-06-26 DIAGNOSIS — F43.23 ADJUSTMENT DISORDER WITH MIXED ANXIETY AND DEPRESSED MOOD: ICD-10-CM

## 2025-06-26 DIAGNOSIS — E07.9 DYSFUNCTION OF THYROID: ICD-10-CM

## 2025-06-26 LAB — TSH SERPL DL<=0.005 MIU/L-ACNC: 0.9 UIU/ML (ref 0.3–4.2)

## 2025-06-26 PROCEDURE — 84481 FREE ASSAY (FT-3): CPT

## 2025-06-26 PROCEDURE — 36415 COLL VENOUS BLD VENIPUNCTURE: CPT

## 2025-06-26 PROCEDURE — 84443 ASSAY THYROID STIM HORMONE: CPT

## 2025-06-26 RX ORDER — LEVOTHYROXINE SODIUM 25 UG/1
25 TABLET ORAL DAILY
Qty: 90 TABLET | Refills: 1 | Status: SHIPPED | OUTPATIENT
Start: 2025-06-26

## 2025-06-26 RX ORDER — LIOTHYRONINE SODIUM 5 UG/1
5 TABLET ORAL DAILY
Qty: 90 TABLET | Refills: 1 | Status: SHIPPED | OUTPATIENT
Start: 2025-06-26

## 2025-06-26 ASSESSMENT — PAIN SCALES - GENERAL: PAINLEVEL_OUTOF10: NO PAIN (0)

## 2025-06-26 NOTE — PROGRESS NOTES
"  Assessment & Plan     Hypothyroidism, unspecified type  TSH is normal.  Will keep medication the same and give refills. Will repeat labs at yearly physical.   - TSH with free T4 reflex; Future    PTSD (post-traumatic stress disorder)  Has weaned off Lexapro. Continuing to follow with psychiatry. Advised continuing with weekly counseling.  Discussed continuing with self care.     Generalized anxiety disorder  Stable. Denies any recent panic attacks. Weaned off Lexapro. Following with psychiatry. Will continue to monitor.     Adjustment disorder with mixed anxiety and depressed mood  Presented today with pressured speech and concern with upcoming stressors. Has recently weaned off the Lexapro. Discussed with increased stress importance of self care including diet, sleep and exercise. Encouraged continuing with therapy. Has upcoming appointment with psychiatry. Discussed symptoms of yamilka and to seek further evaluation if they were develop. Will continue to monitor.       BMI  Estimated body mass index is 30.56 kg/m  as calculated from the following:    Height as of this encounter: 1.6 m (5' 3\").    Weight as of this encounter: 78.2 kg (172 lb 8 oz).             Eb Landon is a 39 year old, presenting for the following health issues:  Thyroid Problem and Medication Follow-up    Has increased stress along with palpitations, lack of appetite, rapid heart heart, delirium and agitation. Has noticed increased fatigue. Will feel completely aware of situations if doesn't eat or drink appropriately. Currently job is physically and emotionally. Has been compliant with taking thyroid medication daily. Has had a low appetite at home. Is getting at least 6 hours of sleep a night.       6/26/2025    10:49 AM   Additional Questions   Roomed by Janet bone   Accompanied by Self         6/26/2025    10:49 AM   Patient Reported Additional Medications   Patient reports taking the following new medications None     HPI  " "    Hypothyroidism Follow-up    Since last visit, patient describes the following symptoms: tremors and fatigue          Review of Systems  Constitutional, HEENT, cardiovascular, pulmonary, GI, , musculoskeletal, neuro, skin, endocrine and psych systems are negative, except as otherwise noted.      Objective    /60 (BP Location: Right arm, Patient Position: Sitting, Cuff Size: Adult Regular)   Pulse 71   Temp 97.9  F (36.6  C) (Tympanic)   Resp 16   Ht 1.6 m (5' 3\")   Wt 78.2 kg (172 lb 8 oz)   LMP  (LMP Unknown)   SpO2 97%   BMI 30.56 kg/m    Body mass index is 30.56 kg/m .  Physical Exam   GENERAL: alert and no distress  EYES: Eyes grossly normal to inspection  RESP: lungs clear to auscultation - no rales, rhonchi or wheezes  CV: regular rate and rhythm, normal S1 S2, no S3 or S4, no murmur, click or rub, no peripheral edema  ABDOMEN: soft, nontender, no masses and bowel sounds normal  MS: no gross musculoskeletal defects noted, no edema  PSYCH: mentation appears normal, affect normal/bright, and speech pressured    Recent Results (from the past 24 hours)   TSH with free T4 reflex   Result Value Ref Range    TSH 0.90 0.30 - 4.20 uIU/mL         50 minutes spent by me on the date of the encounter doing chart review, history and exam, documentation and further activities per the note.      Signed Electronically by: Adeola Brower PA-C    Answers submitted by the patient for this visit:  Patient Health Questionnaire (Submitted on 6/25/2025)  If you checked off any problems, how difficult have these problems made it for you to do your work, take care of things at home, or get along with other people?: Very difficult  PHQ9 TOTAL SCORE: 15    "

## 2025-06-27 LAB — T3FREE SERPL-MCNC: 3.5 PG/ML (ref 2–4.4)

## 2025-06-29 ASSESSMENT — ANXIETY QUESTIONNAIRES
3. WORRYING TOO MUCH ABOUT DIFFERENT THINGS: SEVERAL DAYS
8. IF YOU CHECKED OFF ANY PROBLEMS, HOW DIFFICULT HAVE THESE MADE IT FOR YOU TO DO YOUR WORK, TAKE CARE OF THINGS AT HOME, OR GET ALONG WITH OTHER PEOPLE?: SOMEWHAT DIFFICULT
7. FEELING AFRAID AS IF SOMETHING AWFUL MIGHT HAPPEN: NOT AT ALL
7. FEELING AFRAID AS IF SOMETHING AWFUL MIGHT HAPPEN: NOT AT ALL
6. BECOMING EASILY ANNOYED OR IRRITABLE: SEVERAL DAYS
4. TROUBLE RELAXING: SEVERAL DAYS
GAD7 TOTAL SCORE: 5
5. BEING SO RESTLESS THAT IT IS HARD TO SIT STILL: NOT AT ALL
2. NOT BEING ABLE TO STOP OR CONTROL WORRYING: SEVERAL DAYS
GAD7 TOTAL SCORE: 5
IF YOU CHECKED OFF ANY PROBLEMS ON THIS QUESTIONNAIRE, HOW DIFFICULT HAVE THESE PROBLEMS MADE IT FOR YOU TO DO YOUR WORK, TAKE CARE OF THINGS AT HOME, OR GET ALONG WITH OTHER PEOPLE: SOMEWHAT DIFFICULT
1. FEELING NERVOUS, ANXIOUS, OR ON EDGE: SEVERAL DAYS
GAD7 TOTAL SCORE: 5

## 2025-06-30 ENCOUNTER — OFFICE VISIT (OUTPATIENT)
Dept: PSYCHIATRY | Facility: OTHER | Age: 40
End: 2025-06-30
Payer: COMMERCIAL

## 2025-06-30 VITALS
OXYGEN SATURATION: 98 % | HEIGHT: 63 IN | TEMPERATURE: 98.6 F | BODY MASS INDEX: 30.55 KG/M2 | WEIGHT: 172.4 LBS | HEART RATE: 86 BPM | SYSTOLIC BLOOD PRESSURE: 118 MMHG | DIASTOLIC BLOOD PRESSURE: 72 MMHG

## 2025-06-30 DIAGNOSIS — F41.1 GAD (GENERALIZED ANXIETY DISORDER): ICD-10-CM

## 2025-06-30 DIAGNOSIS — F43.10 PTSD (POST-TRAUMATIC STRESS DISORDER): ICD-10-CM

## 2025-06-30 DIAGNOSIS — F90.2 ATTENTION DEFICIT HYPERACTIVITY DISORDER (ADHD), COMBINED TYPE: ICD-10-CM

## 2025-06-30 PROCEDURE — 99215 OFFICE O/P EST HI 40 MIN: CPT

## 2025-06-30 ASSESSMENT — PAIN SCALES - GENERAL: PAINLEVEL_OUTOF10: NO PAIN (0)
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
## 2025-06-30 NOTE — PATIENT INSTRUCTIONS
Thank you for allowing Nadeen Bahena DNP, APRN to participate in your care.  If you have a scheduling or an appointment question please contact our scheduling department at their direct line [phone].   ALL nursing questions or concerns can be directed to the psychiatry nurses at [phone] or [phone]

## (undated) DEVICE — DRAPE EXTREMITY UPPER 120X76" 29414

## (undated) DEVICE — LABEL STERILE PREPRINTED FOR OR FRRH01-2M

## (undated) DEVICE — SUTURE-CHROMIC GUT 1 CTX 905H

## (undated) DEVICE — GLV-8.5 BIOGEL LATEX

## (undated) DEVICE — PACK BASIN SET UP SUTCNBSBBA

## (undated) DEVICE — TAPE-MEDIPORE 4" X 2YD

## (undated) DEVICE — CAST PADDING 2" STERILE 9062S

## (undated) DEVICE — CAUTERY PAD-POLYHESIVE II ADULT

## (undated) DEVICE — LIGHT HANDLE COVER

## (undated) DEVICE — TOURNIQUET SGL BLADDER 18"X3" RED 5921-018-135

## (undated) DEVICE — APPLICATOR-CHLORAPREP 26ML TINTED CHG 2%+ 70% IPA-SURGICAL

## (undated) DEVICE — DRSG XEROFORM GAUZE 1X8" LP 8884433301

## (undated) DEVICE — SU ETHILON 3-0 PS-2 18" 1669H

## (undated) DEVICE — LABEL-STERILE PREPRINTED FOR OR

## (undated) DEVICE — DRAPE SHEET REV FOLD 3/4 9349

## (undated) DEVICE — SUTURE-VICRYL 0 CT J358H

## (undated) DEVICE — FLUID TRAP FOR VIROSAFE FILTERS

## (undated) DEVICE — BLADE KNIFE BEAVER MINI BEAVER6400

## (undated) DEVICE — DRAPE STERI TOWEL LG 1010

## (undated) DEVICE — CANISTER-SUCTION 2000CC

## (undated) DEVICE — SOL NACL 0.9% IRRIG 1000ML BOTTLE 2F7124

## (undated) DEVICE — SENSOR-OXISENSOR II ADULT

## (undated) DEVICE — IRRIGATION-H2O 1000ML

## (undated) DEVICE — SYRINGE-ASEPTO IRRIGATION

## (undated) DEVICE — CATH TRAY-16FR METER W/STATLOCK LATEX]

## (undated) DEVICE — BNDG ESMARK 4" STERILE LF 820-3412

## (undated) DEVICE — SOL WATER IRRIG 1000ML BOTTLE 2F7114

## (undated) DEVICE — BNDG ELASTIC 4"X5YDS STERILE 6611-4S

## (undated) DEVICE — COVER LT HANDLE 2/PK 5160-2FG

## (undated) DEVICE — PACK LAPAROTOMY CUSTOM SBA32LPMBG

## (undated) DEVICE — SUTURE-PDS II 0 CTX Z370T

## (undated) DEVICE — GOWN-SURG XL LVL 3 REINFORCED

## (undated) DEVICE — PREP CHLORAPREP 26ML TINTED HI-LITE ORANGE 930815

## (undated) DEVICE — IRRIGATION-NACL 1000ML

## (undated) DEVICE — DRSG-SPONGE STERILE 8 X 4

## (undated) DEVICE — PACK-C-SECTION-CUSTOM

## (undated) DEVICE — SCD SLEEVE-KNEE REG.

## (undated) RX ORDER — EPHEDRINE SULFATE 50 MG/ML
INJECTION, SOLUTION INTRAVENOUS
Status: DISPENSED
Start: 2019-03-15

## (undated) RX ORDER — HYDROMORPHONE HYDROCHLORIDE 2 MG/ML
INJECTION, SOLUTION INTRAMUSCULAR; INTRAVENOUS; SUBCUTANEOUS
Status: DISPENSED
Start: 2019-03-15

## (undated) RX ORDER — ONDANSETRON 2 MG/ML
INJECTION INTRAMUSCULAR; INTRAVENOUS
Status: DISPENSED
Start: 2019-03-15

## (undated) RX ORDER — OXYTOCIN 10 [USP'U]/ML
INJECTION, SOLUTION INTRAMUSCULAR; INTRAVENOUS
Status: DISPENSED
Start: 2019-03-15

## (undated) RX ORDER — FENTANYL CITRATE 50 UG/ML
INJECTION, SOLUTION INTRAMUSCULAR; INTRAVENOUS
Status: DISPENSED
Start: 2024-04-26

## (undated) RX ORDER — PHENYLEPHRINE HCL IN 0.9% NACL 1 MG/10 ML
SYRINGE (ML) INTRAVENOUS
Status: DISPENSED
Start: 2019-03-15

## (undated) RX ORDER — DEXAMETHASONE SODIUM PHOSPHATE 10 MG/ML
INJECTION, SOLUTION INTRAMUSCULAR; INTRAVENOUS
Status: DISPENSED
Start: 2019-03-15

## (undated) RX ORDER — PROPOFOL 10 MG/ML
INJECTION, EMULSION INTRAVENOUS
Status: DISPENSED
Start: 2024-04-26